# Patient Record
Sex: MALE | Race: ASIAN | NOT HISPANIC OR LATINO | Employment: FULL TIME | URBAN - METROPOLITAN AREA
[De-identification: names, ages, dates, MRNs, and addresses within clinical notes are randomized per-mention and may not be internally consistent; named-entity substitution may affect disease eponyms.]

---

## 2017-10-24 ENCOUNTER — ALLIED HEALTH/NURSE VISIT (OUTPATIENT)
Dept: FAMILY MEDICINE | Facility: CLINIC | Age: 37
End: 2017-10-24

## 2017-10-24 DIAGNOSIS — Z23 NEED FOR PROPHYLACTIC VACCINATION AND INOCULATION AGAINST INFLUENZA: Primary | ICD-10-CM

## 2017-10-24 NOTE — MR AVS SNAPSHOT
After Visit Summary   10/24/2017    Bertin Mcgill    MRN: [id number]           Patient Information     Date Of Birth          1980        Visit Information        Provider Department      10/24/2017 5:00 PM Nurse, Mi [facility]        Today's Diagnoses     Need for prophylactic vaccination and inoculation against influenza    -  1       Follow-ups after your visit        Who to contact     Please call your clinic at [phone] to:    Ask questions about your health    Make or cancel appointments    Discuss your medicines    Learn about your test results    Speak to your doctor   If you have compliments or concerns about an experience at your clinic, or if you wish to file a complaint, please contact [facility] Patient Relations at [phone] or email us at [email or web address]         Additional Information About Your Visit        MyChart Information     People[employer] is an electronic gateway that provides easy, online access to your medical records. With People[employer], you can request a clinic appointment, read your test results, renew a prescription or communicate with your care team.     To sign up for [employer] visit the website at [email or web address]   You will be asked to enter the access code listed below, as well as some personal information. Please follow the directions to create your username and password.     Your access code is: [email or web address]  Expires: 2018  5:43 PM     Your access code will  in 90 days. If you need help or a new code, please contact your [facility] or call [phone] for assistance.        Care EveryWhere ID     This is your Care EveryWhere ID. This could be used by other organizations to access your [facility] medical records  [id number]         Blood Pressure from Last 3 Encounters:   No data found for BP    Weight from Last 3 Encounters:   No data found for Wt              We  Performed the Following     FLU VAC, SPLIT VIRUS IM > 3 YO (QUADRIVALENT) [83122]     Vaccine Administration, Initial [46518]        Primary Care Provider    Physician No Ref-Primary       NO REF-PRIMARY PHYSICIAN        Equal Access to Services     DELFINA CARPENTER : Hadii aad ku hadevelioluiza Ben, carlosda luparviz, eddieta kakymda tracee, yue joannein hayaakatherine sheffieldderejebrennen kennedy. So [facility] [phone].    ATENCIÓN: Si habla español, tiene a thomas disposición servicios gratuitos de asistencia lingüística. Llame al [phone].    We comply with applicable federal civil rights laws and [place] laws. We do not discriminate on the basis of race, color, national origin, age, disability, sex, sexual orientation, or gender identity.            Thank you!     Thank you for choosing [facility]  for your care. Our goal is always to provide you with excellent care. Hearing back from our patients is one way we can continue to improve our services. Please take a few minutes to complete the written survey that you may receive in the mail after your visit with us. Thank you!             Your Updated Medication List - Protect others around you: Learn how to safely use, store and throw away your medicines at www.disposemymeds.org.      Notice  As of 10/24/2017  5:43 PM    You have not been prescribed any medications.

## 2019-08-12 ENCOUNTER — OFFICE VISIT (OUTPATIENT)
Dept: ENDOCRINOLOGY | Facility: CLINIC | Age: 39
End: 2019-08-12
Payer: COMMERCIAL

## 2019-08-12 VITALS
SYSTOLIC BLOOD PRESSURE: 141 MMHG | HEIGHT: 66 IN | DIASTOLIC BLOOD PRESSURE: 97 MMHG | HEART RATE: 75 BPM | BODY MASS INDEX: 30.84 KG/M2 | WEIGHT: 191.9 LBS

## 2019-08-12 DIAGNOSIS — E78.5 HYPERLIPIDEMIA LDL GOAL <100: ICD-10-CM

## 2019-08-12 DIAGNOSIS — E11.9 TYPE 2 DIABETES MELLITUS WITHOUT COMPLICATION, WITHOUT LONG-TERM CURRENT USE OF INSULIN (H): Primary | ICD-10-CM

## 2019-08-12 LAB
ALT SERPL W P-5'-P-CCNC: 67 U/L (ref 0–70)
ANION GAP SERPL CALCULATED.3IONS-SCNC: 8 MMOL/L (ref 3–14)
BUN SERPL-MCNC: 14 MG/DL (ref 7–30)
CALCIUM SERPL-MCNC: 9.3 MG/DL (ref 8.5–10.1)
CHLORIDE SERPL-SCNC: 106 MMOL/L (ref 94–109)
CHOLEST SERPL-MCNC: 196 MG/DL
CO2 SERPL-SCNC: 27 MMOL/L (ref 20–32)
CREAT SERPL-MCNC: 0.72 MG/DL (ref 0.66–1.25)
CREAT UR-MCNC: 137 MG/DL
GFR SERPL CREATININE-BSD FRML MDRD: >90 ML/MIN/{1.73_M2}
GLUCOSE SERPL-MCNC: 148 MG/DL (ref 70–99)
HBA1C MFR BLD: 6.8 % (ref 0–5.6)
HDLC SERPL-MCNC: 50 MG/DL
LDLC SERPL CALC-MCNC: 79 MG/DL
MICROALBUMIN UR-MCNC: 16 MG/L
MICROALBUMIN/CREAT UR: 11.39 MG/G CR (ref 0–17)
NONHDLC SERPL-MCNC: 146 MG/DL
POTASSIUM SERPL-SCNC: 4.1 MMOL/L (ref 3.4–5.3)
SODIUM SERPL-SCNC: 141 MMOL/L (ref 133–144)
TRIGL SERPL-MCNC: 337 MG/DL
TSH SERPL DL<=0.005 MIU/L-ACNC: 1.24 MU/L (ref 0.4–4)

## 2019-08-12 PROCEDURE — 84443 ASSAY THYROID STIM HORMONE: CPT | Performed by: INTERNAL MEDICINE

## 2019-08-12 PROCEDURE — 99204 OFFICE O/P NEW MOD 45 MIN: CPT | Performed by: INTERNAL MEDICINE

## 2019-08-12 PROCEDURE — 80048 BASIC METABOLIC PNL TOTAL CA: CPT | Performed by: INTERNAL MEDICINE

## 2019-08-12 PROCEDURE — 82043 UR ALBUMIN QUANTITATIVE: CPT | Performed by: INTERNAL MEDICINE

## 2019-08-12 PROCEDURE — 80061 LIPID PANEL: CPT | Performed by: INTERNAL MEDICINE

## 2019-08-12 PROCEDURE — 99207 C FOOT EXAM  NO CHARGE: CPT | Performed by: INTERNAL MEDICINE

## 2019-08-12 PROCEDURE — 84460 ALANINE AMINO (ALT) (SGPT): CPT | Performed by: INTERNAL MEDICINE

## 2019-08-12 PROCEDURE — 83036 HEMOGLOBIN GLYCOSYLATED A1C: CPT | Performed by: INTERNAL MEDICINE

## 2019-08-12 PROCEDURE — 36415 COLL VENOUS BLD VENIPUNCTURE: CPT | Performed by: INTERNAL MEDICINE

## 2019-08-12 RX ORDER — SIMVASTATIN 20 MG
TABLET ORAL
Refills: 1 | COMMUNITY
Start: 2019-06-04 | End: 2020-08-24

## 2019-08-12 RX ORDER — IBUPROFEN 200 MG
TABLET ORAL
COMMUNITY
End: 2022-04-27

## 2019-08-12 RX ORDER — METFORMIN HCL 500 MG
1000 TABLET, EXTENDED RELEASE 24 HR ORAL
Refills: 3 | COMMUNITY
Start: 2019-07-23 | End: 2019-11-09 | Stop reason: ALTCHOICE

## 2019-08-12 RX ORDER — FLUTICASONE PROPIONATE 50 MCG
SPRAY, SUSPENSION (ML) NASAL
Refills: 0 | COMMUNITY
Start: 2019-05-31 | End: 2020-03-30

## 2019-08-12 RX ORDER — MULTIVITAMIN/IRON/FOLIC ACID 18MG-0.4MG
1 TABLET ORAL
COMMUNITY
End: 2019-11-08

## 2019-08-12 ASSESSMENT — MIFFLIN-ST. JEOR: SCORE: 1733.2

## 2019-08-12 NOTE — PROGRESS NOTES
Name: Bertin Mcgill is a 39 year old man, seen at the request of Dr. Skye Mane for evaluation of     Chief Complaint   Patient presents with     Diabetes     HPI:  Recent issues:  Here for evaluation of diabetes.  Reviewed medical history from patient and [facility] chart record        2017. Diagnosis of diabetes mellitus when med eval for torn right shoulder muscle  High glucose level noted during med evaluation, hgbA1c near 11.5%  Started Jardiance medication, took for approx 6 months  Recalls significant weight loss of approx 30#  Switched to metformin medication, then dose increases   Concerns about GI symptoms with nausea, nausea, also morning vomiting   Tried Prilosec, then Zantac  Has seen [facility] for medical evaluations  Recent discussion with his workplace team physician, Dr. Skye Mane   Advised to see me for medical evaluation    Previous [facility] labs include:        Current DM medications:  metforminXR 500 mg 2-tabs with lunch and also supper.    Blood glucose (BG) meter   Tests infrequently, but recalls prior trends am , supper  mg/dl range    Last eye exam 5/2019, no DR  Dino Hx Diabetes: None  DM Complications: None known    Recent symptoms:   Indigestion, bloating, nausea, heatburn, occasional vomiting  Other chronic illnesses include:   Allergic rhinitis?:   Takes Flonase med, followed by PCP   Tobacco abuse:    Takes Nicoderm patch med, followed by PCP   Hyperlipidemia:  Takes simvastatin med, followed by PCP      Lives in [facility]  Sees [facility] for general medicine evaluations.    PMH/PSH:  Past Medical History:   Diagnosis Date     Allergic rhinitis      Hyperlipidemia      Rotator cuff tear, left 2015     Rotator cuff tear, right 2017     Type 2 diabetes mellitus (H)      Past Surgical History:   Procedure Laterality Date     APPENDECTOMY       SHOULDER SURGERY      left and right previously       Family Hx:  No family  history on file.      Social Hx:  Social History     Socioeconomic History     Marital status: Unknown     Spouse name: Not on file     Number of children: Not on file     Years of education: Not on file     Highest education level: Not on file   Occupational History     Not on file   Social Needs     Financial resource strain: Not on file     Food insecurity:     Worry: Not on file     Inability: Not on file     Transportation needs:     Medical: Not on file     Non-medical: Not on file   Tobacco Use     Smoking status: Current Some Day Smoker     Smokeless tobacco: Never Used   Substance and Sexual Activity     Alcohol use: Not on file     Drug use: Not on file     Sexual activity: Not on file   Lifestyle     Physical activity:     Days per week: Not on file     Minutes per session: Not on file     Stress: Not on file   Relationships     Social connections:     Talks on phone: Not on file     Gets together: Not on file     Attends [employer] service: Not on file     Active member of club or organization: Not on file     Attends meetings of clubs or organizations: Not on file     Relationship status: Not on file     Intimate partner violence:     Fear of current or ex partner: Not on file     Emotionally abused: Not on file     Physically abused: Not on file     Forced sexual activity: Not on file   Other Topics Concern     Not on file   Social History Narrative     Not on file          MEDICATIONS:  has a current medication list which includes the following prescription(s): fluticasone, ibuprofen, metformin, centrovite, nicotine, ranitidine, and simvastatin.    ROS:     ROS: 10 point ROS neg other than the symptoms noted above in the HPI.    GENERAL: no fatigue, mild weight gain; denies fevers, chills, night sweats.   HEENT: no dysphagia, odonophagia, diplopia, neck pain  THYROID:  no apparent hyper or hypothyroid symptoms  CV: no chest pain, pressure, palpitations  LUNGS: no SOB, MEHTA, cough, wheezing   ABDOMEN:  "intermittent nausea, heartburn, bloating; denies abdominal pain  EXTREMITIES: no rashes, ulcers, edema  NEUROLOGY: no headaches, denies changes in vision, tingling, extremitiy numbness   MSK: no muscle aches or pains, weakness  SKIN: no rashes or lesions  : denies nocturia  PSYCH:  stable mood, no significant anxiety or depression  ENDOCRINE: no heat or cold intolerance    Physical Exam   VS: BP (!) 141/97   Pulse 75   Ht 1.676 m (5' 6\")   Wt 87 kg (191 lb 14.4 oz)   BMI 30.97 kg/m    GENERAL: AXOX3, NAD, well dressed, answering questions appropriately, appears stated age.  THYROID:  normal gland, no apparent nodules or goiter  HEENT: neck non-tender, no exopthalmous, no proptosis, EOMI  CV: RRR, no rubs, gallops, no murmurs  LUNGS: CTAB, no wheezes, rales, or ronchi  ABDOMEN: mildly obese; soft, nontender, nondistended  EXTREMITIES: no edema, +pedal pulses, no lesions  NEUROLOGY: CN grossly intact, no tremors  MSK: grossly intact  SKIN: facial gottee; no rashes, no lesions    LABS:    All pertinent notes, labs, and images personally reviewed by me.     A/P:  Encounter Diagnoses   Name Primary?     Type 2 diabetes mellitus without complication, without long-term current use of insulin (H) Yes     Hyperlipidemia LDL goal <100        Comments:  Reviewed health history and diabetes issues.  Recent GI symptoms likely due to metformin medication, needs dose reduction    Plan:  Discussed general issues with the diabetes diagnosis and management  Reviewed the pathophysiology of T2DM  We discussed the hgbA1c test which reflects previous overall glucose levels or control  Discussed the importance of blood glucose (BG) testing to assess glucose trends  Provided general overview of the diabetes medication options and medication treatment plan.    Recommend:  Discussed medication options, metformin med use and potential side effects  Advised lowering the dose as metforminXR 500 mg 1-tab BID or 2 tabs every day (1000 mg " daily, not 2000 mg daily)  Consider future options with restarting low dose SGLT2-I (such as Jardianc) or DPP4-I (such as Januvia)  Resume use of BG meter, fasting testing  Goal target FBG  mg/dl  Check fasting labs today  Keep focus on diet, exercise, weight management.  Continue simvastatin med use, goal target LDL <100 mg/dl  Advise having fasting lipid panel testing and dilated eye examination, at least annually    Reminded patient of the importance to quit cigarette smoking due to potential health risks, offered assistance with smoking cessation counseling and/or medication treatment.  Bertin to follow up with Primary Care provider regarding elevated blood pressure.  Would not restart Zantac med use, since reflux may resolve with lower metformin med dosing  Addressed patient questions today    Labs ordered today:   Orders Placed This Encounter   Procedures     C FOOT EXAM  NO CHARGE     Hemoglobin A1c     Lipid panel reflex to direct LDL Fasting     TSH     Basic metabolic panel     ALT     Albumin Random Urine Quantitative with Creat Ratio     Radiology/Consults ordered today: C FOOT EXAM  NO CHARGE    More than 50% of the time spent with Mr. Mcgill on counseling / coordinating his care.  Total appointment time was 50 minutes.    Follow-up:  2-3 mo    Anival Flores MD  [facility]  CC: [facility] and BROOKLYNN Mane

## 2019-08-27 ENCOUNTER — MYC MEDICAL ADVICE (OUTPATIENT)
Dept: ENDOCRINOLOGY | Facility: CLINIC | Age: 39
End: 2019-08-27

## 2019-08-27 NOTE — TELEPHONE ENCOUNTER
Dr. Harris,     See below update from patient - pt asking if he should continue on the same Metformin dosing?    Charlotte GUERRA RN

## 2019-11-08 ENCOUNTER — OFFICE VISIT (OUTPATIENT)
Dept: ENDOCRINOLOGY | Facility: CLINIC | Age: 39
End: 2019-11-08
Payer: COMMERCIAL

## 2019-11-08 VITALS
HEART RATE: 82 BPM | HEIGHT: 66 IN | BODY MASS INDEX: 31.02 KG/M2 | SYSTOLIC BLOOD PRESSURE: 134 MMHG | WEIGHT: 193 LBS | DIASTOLIC BLOOD PRESSURE: 88 MMHG

## 2019-11-08 DIAGNOSIS — E11.9 TYPE 2 DIABETES MELLITUS WITHOUT COMPLICATION, WITHOUT LONG-TERM CURRENT USE OF INSULIN (H): Primary | ICD-10-CM

## 2019-11-08 DIAGNOSIS — E78.5 HYPERLIPIDEMIA LDL GOAL <100: ICD-10-CM

## 2019-11-08 LAB
ALT SERPL W P-5'-P-CCNC: 66 U/L (ref 0–70)
ANION GAP SERPL CALCULATED.3IONS-SCNC: 9 MMOL/L (ref 3–14)
BUN SERPL-MCNC: 14 MG/DL (ref 7–30)
CALCIUM SERPL-MCNC: 9 MG/DL (ref 8.5–10.1)
CHLORIDE SERPL-SCNC: 106 MMOL/L (ref 94–109)
CHOLEST SERPL-MCNC: 228 MG/DL
CO2 SERPL-SCNC: 23 MMOL/L (ref 20–32)
CREAT SERPL-MCNC: 0.72 MG/DL (ref 0.66–1.25)
GFR SERPL CREATININE-BSD FRML MDRD: >90 ML/MIN/{1.73_M2}
GLUCOSE SERPL-MCNC: 226 MG/DL (ref 70–99)
HBA1C MFR BLD: 7.6 % (ref 0–5.6)
HDLC SERPL-MCNC: 53 MG/DL
LDLC SERPL CALC-MCNC: ABNORMAL MG/DL
LDLC SERPL DIRECT ASSAY-MCNC: 101 MG/DL
NONHDLC SERPL-MCNC: 175 MG/DL
POTASSIUM SERPL-SCNC: 4.1 MMOL/L (ref 3.4–5.3)
SODIUM SERPL-SCNC: 138 MMOL/L (ref 133–144)
TRIGL SERPL-MCNC: 518 MG/DL

## 2019-11-08 PROCEDURE — 83036 HEMOGLOBIN GLYCOSYLATED A1C: CPT | Performed by: INTERNAL MEDICINE

## 2019-11-08 PROCEDURE — 80061 LIPID PANEL: CPT | Performed by: INTERNAL MEDICINE

## 2019-11-08 PROCEDURE — 84460 ALANINE AMINO (ALT) (SGPT): CPT | Performed by: INTERNAL MEDICINE

## 2019-11-08 PROCEDURE — 83721 ASSAY OF BLOOD LIPOPROTEIN: CPT | Performed by: INTERNAL MEDICINE

## 2019-11-08 PROCEDURE — 80048 BASIC METABOLIC PNL TOTAL CA: CPT | Performed by: INTERNAL MEDICINE

## 2019-11-08 PROCEDURE — 36415 COLL VENOUS BLD VENIPUNCTURE: CPT | Performed by: INTERNAL MEDICINE

## 2019-11-08 PROCEDURE — 99214 OFFICE O/P EST MOD 30 MIN: CPT | Performed by: INTERNAL MEDICINE

## 2019-11-08 ASSESSMENT — MIFFLIN-ST. JEOR: SCORE: 1738.19

## 2019-11-08 NOTE — PROGRESS NOTES
Name: Bertin Mcgill     Chief Complaint   Patient presents with     Diabetes     HPI:  Recent issues:  Here for f/u diabetes evaluation  Feeling well overall, less nausea but BM urgency        2017. Diagnosis of diabetes mellitus when med eval for torn right shoulder muscle  High glucose level noted during med evaluation, hgbA1c near 11.5%  Started Jardiance medication, took for approx 6 months  Recalls significant weight loss of approx 30#  Switched to metformin medication, then dose increases   Concerns about GI symptoms with nausea, nausea, also morning vomiting   Tried Prilosec, then Zantac  Has seen [facility] for medical evaluations  Recent discussion with his workplace team physician, Dr. Skye Mane   Advised to see me for medical evaluation    Previous [facility] labs include:        Previously on metforminXR 500 mg 2 tabs BID  Current DM medications:  metforminXR 500 mg 2-tabs after lunchtime    Blood glucose (BG) meter   No meter data today, tests infrequently   Previous trends reportely , supper  mg/dl range    Fam Hx Diabetes: None  History of hyperlipidemia, takes simvastatin 20 mg daily  Last eye exam 5/2019, no DR  Recent [facility] labs include:      DM Complications: None known        Lives in [facility]  Sees [facility] for general medicine evaluations.    PMH/PSH:  Past Medical History:   Diagnosis Date     Allergic rhinitis      Hyperlipidemia      Rotator cuff tear, left 2015     Rotator cuff tear, right 2017     Type 2 diabetes mellitus (H)      Past Surgical History:   Procedure Laterality Date     APPENDECTOMY       SHOULDER SURGERY      left and right previously       Family Hx:  No family history on file.      Social Hx:  Social History     Socioeconomic History     Marital status: Single     Spouse name: Not on file     Number of children: Not on file     Years of education: Not on file     Highest education level: Not on file    Occupational History     Not on file   Social Needs     Financial resource strain: Not on file     Food insecurity:     Worry: Not on file     Inability: Not on file     Transportation needs:     Medical: Not on file     Non-medical: Not on file   Tobacco Use     Smoking status: Current Some Day Smoker     Smokeless tobacco: Never Used   Substance and Sexual Activity     Alcohol use: Not on file     Drug use: Not on file     Sexual activity: Not on file   Lifestyle     Physical activity:     Days per week: Not on file     Minutes per session: Not on file     Stress: Not on file   Relationships     Social connections:     Talks on phone: Not on file     Gets together: Not on file     Attends Christian service: Not on file     Active member of club or organization: Not on file     Attends meetings of clubs or organizations: Not on file     Relationship status: Not on file     Intimate partner violence:     Fear of current or ex partner: Not on file     Emotionally abused: Not on file     Physically abused: Not on file     Forced sexual activity: Not on file   Other Topics Concern     Not on file   Social History Narrative     Not on file          MEDICATIONS:  has a current medication list which includes the following prescription(s): acetaminophen, ibuprofen, metformin, multiple vitamins-minerals, naproxen sodium, simvastatin, and fluticasone.    ROS:     ROS: 10 point ROS neg other than the symptoms noted above in the HPI.    GENERAL: no fatigue, mild weight gain; denies fevers, chills, night sweats.   HEENT: some left ear pains; no dysphagia, odonophagia, diplopia, neck pain  THYROID:  no apparent hyper or hypothyroid symptoms  CV: no chest pain, pressure, palpitations  LUNGS: no SOB, MEHTA, cough, wheezing   ABDOMEN: rare nausea, yet BM urgency; denies abdominal pain  EXTREMITIES: no rashes, ulcers, edema  NEUROLOGY: no headaches, denies changes in vision, tingling, extremitiy numbness   MSK: no muscle aches or  "pains, weakness  SKIN: no rashes or lesions  : denies nocturia  PSYCH:  stable mood, no significant anxiety or depression  ENDOCRINE: no heat or cold intolerance    Physical Exam   VS: /88 (BP Location: Right arm, Cuff Size: Adult Large)   Pulse 82   Ht 1.676 m (5' 6\")   Wt 87.5 kg (193 lb)   BMI 31.15 kg/m    GENERAL: AXOX3, NAD, well dressed, answering questions appropriately, appears stated age.  THYROID:  normal gland, no apparent nodules or goiter  HEENT: ears with normal TM's and pink ext canal left ear, no significant wax; neck non-tender, no exopthalmous, no proptosis, EOMI  CV: RRR, no rubs, gallops, no murmurs  LUNGS: CTAB, no wheezes, rales, or ronchi  ABDOMEN: mildly obese; soft, nontender, nondistended  EXTREMITIES: no edema  NEUROLOGY: CN grossly intact, no tremors  MSK: grossly intact  SKIN: facial gottee; no rashes, no lesions    LABS:    All pertinent notes, labs, and images personally reviewed by me.     A/P:  Encounter Diagnoses   Name Primary?     Type 2 diabetes mellitus without complication, without long-term current use of insulin (H) Yes     Hyperlipidemia LDL goal <100        Comments:  Reviewed health history and diabetes issues.  Recent GI symptoms likely due to metformin medication    Plan:  Discussed general issues with the diabetes diagnosis and management  We discussed the hgbA1c test which reflects previous overall glucose levels or control  Discussed the importance of blood glucose (BG) testing to assess glucose trends  Provided general overview of the diabetes medication options and medication treatment plan.    Recommend:  Continue current lower dose metforminXR 1000 mg daily, though consider dose reduction plan  We discussed option of starting a DPP4-I (such as Januvia)  Resume use of BG meter, fasting testing  Goal target FBG  mg/dl  Check fasting labs today  Keep focus on diet, exercise, weight management.  Continue simvastatin med use, goal target LDL <100 " mg/dl  Advise having fasting lipid panel testing and dilated eye examination, at least annually    See PCP if worsening ear pain  I have reminded patient of the importance to quit cigarette smoking due to potential health risks, offered assistance with smoking cessation counseling and/or medication treatment.  Addressed patient questions today    Labs ordered today:   Orders Placed This Encounter   Procedures     Hemoglobin A1c     ALT     Basic metabolic panel     Lipid panel reflex to direct LDL Fasting     Radiology/Consults ordered today: None    More than 50% of the time spent with Mr. Mcgill on counseling / coordinating his care.  Total appointment time was 25 minutes.    Follow-up:  4 mo.    LEO Flores MD, MS  Endocrinology  [facility]

## 2019-11-09 DIAGNOSIS — E11.9 TYPE 2 DIABETES MELLITUS WITHOUT COMPLICATION, WITHOUT LONG-TERM CURRENT USE OF INSULIN (H): Primary | ICD-10-CM

## 2020-01-22 ENCOUNTER — OFFICE VISIT (OUTPATIENT)
Dept: FAMILY MEDICINE | Facility: CLINIC | Age: 40
End: 2020-01-22
Payer: COMMERCIAL

## 2020-01-22 VITALS
HEART RATE: 92 BPM | HEIGHT: 66 IN | TEMPERATURE: 98.3 F | BODY MASS INDEX: 30.54 KG/M2 | DIASTOLIC BLOOD PRESSURE: 97 MMHG | WEIGHT: 190.04 LBS | RESPIRATION RATE: 18 BRPM | OXYGEN SATURATION: 99 % | SYSTOLIC BLOOD PRESSURE: 140 MMHG

## 2020-01-22 DIAGNOSIS — R79.89 LOW VITAMIN D LEVEL: ICD-10-CM

## 2020-01-22 DIAGNOSIS — E11.9 TYPE 2 DIABETES MELLITUS WITHOUT COMPLICATION, WITHOUT LONG-TERM CURRENT USE OF INSULIN (H): ICD-10-CM

## 2020-01-22 DIAGNOSIS — Z72.0 TOBACCO ABUSE: ICD-10-CM

## 2020-01-22 RX ORDER — OXYCODONE AND ACETAMINOPHEN 5; 325 MG/1; MG/1
TABLET ORAL PRN
COMMUNITY
Start: 2020-01-16 | End: 2020-03-30

## 2020-01-22 RX ORDER — ERGOCALCIFEROL 1.25 MG/1
50000 CAPSULE, LIQUID FILLED ORAL
Qty: 8 CAPSULE | Refills: 0 | Status: SHIPPED | OUTPATIENT
Start: 2020-01-22 | End: 2020-03-30

## 2020-01-22 ASSESSMENT — MIFFLIN-ST. JEOR: SCORE: 1724.77

## 2020-01-22 ASSESSMENT — PAIN SCALES - GENERAL: PAINLEVEL: MILD PAIN (2)

## 2020-01-22 NOTE — PROGRESS NOTES
"Bertin Mcgill is a 39 year old male who presents today for his first visit to the [facility].  His primary reason for scheduling was to discuss low vitamin D and have primary care. He was Diagnosed with DM2 at age 36 in Oct 2017.   A1C was over 11. He was weighing around 195lbs. Dropped to around 170 lbs and A1C normalized but then drifted back up. Now on   SitaGLIPtin-MetFORMIN HCl 100-1000 MG TB24       He broke his rib about 1 month ago coughing. Pain has subsided mostly.  After the fracture was tested for vitamin D and noted to be low.      Regarding lifestyle behaviors:  Physical activity - Not doing much now. Was set back with pneumonia and then rib fracture    Diet:   Mornings - Glucerna shakes.   Also, coffee or tea    Lunch around noon - \"anything\". Eats at cafe.   No snacks.   Dinner is usually out at a restaurant at about 9pm.     Sleep with snoring. Doesn't sleep until about 2 am- Awakes at 6:30ish.     He drinks alcohol approximately 7 nights/week and 1-2 drinks/night.     He smokes 1 ppd cigarettes/day and also occasional cigars.      Bertin Mcgill wears seat belt.    His last dental check up was about 6 months ago      STD concerns: None.         There is no problem list on file for this patient.      Past Surgical History:   Procedure Laterality Date     APPENDECTOMY       SHOULDER SURGERY      left and right previously       History reviewed. No pertinent family history.    Current Outpatient Medications   Medication Sig Dispense Refill     Acetaminophen (TYLENOL EXTRA STRENGTH PO)        fluticasone (FLONASE) 50 MCG/ACT nasal spray INSTILL 1 SPRAY INTO EACH NOSTRIL TWICE A DAY.  0     ibuprofen (ADVIL/MOTRIN) 200 MG tablet        Multiple Vitamins-Minerals (CENTRUM MEN PO)        Naproxen Sodium (ALEVE PO)        oxyCODONE-acetaminophen (PERCOCET) 5-325 MG tablet as needed       simvastatin (ZOCOR) 20 MG tablet TAKE 1 TABLET (20 MG) BY MOUTH ONCE A DAY WITH EVENING MEAL.  1     " "SitaGLIPtin-MetFORMIN HCl 100-1000 MG TB24 Take 1 tablet by mouth daily (with dinner) 30 tablet 11     Allergies   Allergen Reactions     Seasonal Allergies          Regarding preventive health care, his immunizations are as follows:     Immunization History   Administered Date(s) Administered     Influenza (IIV3) PF 10/23/2012, 10/31/2013     Influenza Intranasal Vaccine 10/01/2014     Influenza Intranasal Vaccine 4 valent 10/01/2014, 10/29/2014, 10/22/2015     Influenza Vaccine IM > 6 months Valent IIV4 10/20/2016, 10/24/2017, 09/25/2018, 10/20/2019     Pneumococcal 23 valent 10/17/2017     TD (ADULT, 7+) 10/17/2017     Tdap (Adult) Unspecified Formulation 09/17/2007           ROS  PHQ-2 Score:     PHQ-2 ( 1999 Pfizer) 1/22/2020   Q1: Little interest or pleasure in doing things 0   Q2: Feeling down, depressed or hopeless 0   PHQ-2 Score 0       CONSTITUTIONAL:NEGATIVE for fever, chills, change in weight  INTEGUMENTARY/SKIN: NEGATIVE for worrisome rashes, moles or lesions  EYES: NEGATIVE for vision changes or irritation  ENT/MOUTH: NEGATIVE for ear, mouth and throat problems  RESP:NEGATIVE for significant cough or SOB  CV: NEGATIVE for chest pain, palpitations, MEHTA, orthopnea, PND  or peripheral edema  GI: NEGATIVE for nausea, abdominal pain, heartburn, or change in bowel habits  :NEGATIVE for frequency, dysuria, or hematuria  NEURO: NEGATIVE for weakness, dizziness or paresthesias  HEME/ALLERGY/IMMUNE: NEGATIVE for bleeding problems  PSYCHIATRIC: NEGATIVE for changes in mood or affect    EXAM  BP (!) 140/97 (BP Location: Left arm, Patient Position: Chair, Cuff Size: Adult Large)   Pulse 92   Temp 98.3  F (36.8  C) (Oral)   Resp 18   Ht 1.676 m (5' 6\")   Wt 86.2 kg (190 lb 0.6 oz)   SpO2 99%   BMI 30.67 kg/m      Appears as a well-appearing 39-year-old in no distress.    HEENT: TM normal bilaterally. Eyes- with normal motor function. Conjunctiva are clear. Nose and mouth without lesions  NECK: no " adenopathy, thyroid normal to palpation  RESP: lungs clear to auscultation bilaterally  Axillae: no palpable axillary masses or adenopathy  CV: regular rate and rhythm, normal S1 S2, no murmur, no carotid bruits  ABDOMEN: soft, nontender, without HSM or masses. Bowel sounds normal   and Rectal exam: deferred  MS: extremities normal- no gross deformities noted, no tender, hot or swollen joints.   SKIN: no suspicious lesions or rashes  NEURO: Normal strength and tone, sensory exam grossly normal   PSYCH: mentation and affect appear normal.  EXT: no peripheral edema, pedal pulses palpable      ASSESSMENT/PLAN:    Bertin is a 40 yo with low level obesity at 30.7 BMI and with DM2 for the past few years.   He also has some unhealthy lifestyle behaviors such as alcohol nightly, poor sleep, low levels of exercise and smoking 1 ppd.    Also, has a recent rib fracture and a known low Vitamin D at 17.     PLAN:  Replenish Vit D with 50,000 Units weekly for 8 weeks.   After 8 weeks start 2000 international unit(s) daily.   Try to eat a diet rich in Vitamin D.     -We discussed options for exercise. I mentioned group or individual classes. Bertin states that he can push himself with elliptical or treadmill. He knows that he needs about 40 mins/day of elevated heart rates.  -suggested a home physioball with 10 min total body exercises  -Also, Bertin has access to trainers at [employer].     -We discussed smoking cessation. He's quit in the past. He's not ready now.   We have a pharm D here who can help when you're ready.     -Discussed decreasing alcohol, e.g. trying to abstain a few nights/week. This may help sleep and weight.     Follow up after your Endo visit on March 6. Goal would be to be down a few lbs through increased exercise and healthier diet.     Over 50% of the 45 minute in room time was spent discussing above treatment plan and coordinating care.     --Miguel Jose MD  [facility]  Department  of Family Medicine and [facility]

## 2020-01-22 NOTE — NURSING NOTE
"39 year old  Chief Complaint   Patient presents with     Establish Care     needs to discuss about his Vitamin D levels.        Blood pressure (!) 140/97, pulse 92, temperature 98.3  F (36.8  C), temperature source Oral, resp. rate 18, height 1.676 m (5' 6\"), weight 86.2 kg (190 lb 0.6 oz), SpO2 99 %. Body mass index is 30.67 kg/m .  There is no problem list on file for this patient.      Wt Readings from Last 2 Encounters:   01/22/20 86.2 kg (190 lb 0.6 oz)   11/08/19 87.5 kg (193 lb)     BP Readings from Last 3 Encounters:   01/22/20 (!) 140/97   11/08/19 134/88   08/12/19 (!) 141/97         Current Outpatient Medications   Medication     Acetaminophen (TYLENOL EXTRA STRENGTH PO)     fluticasone (FLONASE) 50 MCG/ACT nasal spray     ibuprofen (ADVIL/MOTRIN) 200 MG tablet     Multiple Vitamins-Minerals (CENTRUM MEN PO)     Naproxen Sodium (ALEVE PO)     oxyCODONE-acetaminophen (PERCOCET) 5-325 MG tablet     simvastatin (ZOCOR) 20 MG tablet     SitaGLIPtin-MetFORMIN HCl 100-1000 MG TB24     No current facility-administered medications for this visit.        Social History     Tobacco Use     Smoking status: Current Some Day Smoker     Smokeless tobacco: Never Used   Substance Use Topics     Alcohol use: Yes     Drug use: Never       Health Maintenance Due   Topic Date Due     PREVENTIVE CARE VISIT  1981     EYE EXAM  1981     HIV SCREENING  02/01/1996     PHQ-2  01/01/2020       No results found for: ANANDA Ruiz CMA, CMA  January 22, 2020 2:59 PM  "

## 2020-01-22 NOTE — PATIENT INSTRUCTIONS
Bertin is a 38 yo with low level obesity at 30.7 BMI and with DM2 for the past few years.   He also has some unhealthy lifestyle behaviors such as alcohol nightly, poor sleep, low levels of exercise and smoking 1 ppd.    Also, has a recent rib fracture and a known low Vitamin D at 17.     PLAN:  Replenish Vit D with 50,000 Units weekly for 8 weeks.   After 8 weeks start 2000 international unit(s) daily.   Try to eat a diet rich in Vitamin D.     -We discussed options for exercise. I mentioned group or individual classes. Bertin states that he can push himself with elliptical or treadmill. He knows that he needs about 40 mins/day of elevated heart rates.  -suggested a home physioball with 10 min total body exercises  -Also, Bertin has access to trainers at [employer].     -We discussed smoking cessation. He's quit in the past. He's not ready now.   We have a pharm D here who can help when you're ready.     -Discussed decreasing alcohol, e.g. trying to abstain a few nights/week. This may help sleep and weight.     Follow up after your Endo visit on March 6. Goal would be to be down a few lbs through increased exercise and healthier diet.

## 2020-03-01 ENCOUNTER — HEALTH MAINTENANCE LETTER (OUTPATIENT)
Age: 40
End: 2020-03-01

## 2020-03-06 ENCOUNTER — TELEPHONE (OUTPATIENT)
Dept: ORTHOPEDICS | Facility: CLINIC | Age: 40
End: 2020-03-06

## 2020-03-06 NOTE — TELEPHONE ENCOUNTER
Reason for Call:  Same Day Appointment, Requested Provider: Dr Flores    PCP: Miguel Jose    Reason for visit: Diabetes follow up    Duration of symptoms: NA    Have you been treated for this in the past? Yes    Additional comments: pt had his appt cancelled today 3/6/2020. Pt is hoping to get in sooner than June    Can we leave a detailed message on this number? YES    Phone number patient can be reached at: Home number on file [phone] (home)    Best Time: any    Call taken on 3/6/2020 at 8:19 AM by Juan David Tejeda

## 2020-03-06 NOTE — TELEPHONE ENCOUNTER
Dr. Flores,  2 voicemails were left for patient to call and reschedule this appointment.  I will try calling again

## 2020-03-06 NOTE — TELEPHONE ENCOUNTER
Message noted.  It's unfortunate that this patient was not contacted long ago for the appointment rescheduling, since my work day shift (Friday 3/6 to Thursday 3/5/20) was planned months ago.  OK for patient to use an available (10am, 3pm, or 1pm) workin slot at the [facility] or [facility].  Thanks for assisting patient.    LEO Flores MD, MS  Endocrinology  [facility]

## 2020-03-06 NOTE — TELEPHONE ENCOUNTER
Dr. Flores,    Pt's appt today was cancelled. Are you able to fit him in sooner than June (first available)?    Thank you,  Asher SALAS RN

## 2020-03-09 ENCOUNTER — OFFICE VISIT (OUTPATIENT)
Dept: FAMILY MEDICINE | Facility: CLINIC | Age: 40
End: 2020-03-09
Payer: COMMERCIAL

## 2020-03-09 VITALS
HEART RATE: 75 BPM | TEMPERATURE: 98 F | HEIGHT: 66 IN | DIASTOLIC BLOOD PRESSURE: 101 MMHG | WEIGHT: 193 LBS | RESPIRATION RATE: 18 BRPM | BODY MASS INDEX: 31.02 KG/M2 | SYSTOLIC BLOOD PRESSURE: 149 MMHG | OXYGEN SATURATION: 99 %

## 2020-03-09 DIAGNOSIS — I10 BENIGN ESSENTIAL HYPERTENSION: Primary | ICD-10-CM

## 2020-03-09 DIAGNOSIS — E78.5 HYPERLIPIDEMIA, UNSPECIFIED HYPERLIPIDEMIA TYPE: ICD-10-CM

## 2020-03-09 DIAGNOSIS — Z72.0 TOBACCO ABUSE: ICD-10-CM

## 2020-03-09 DIAGNOSIS — E11.9 TYPE 2 DIABETES MELLITUS WITHOUT COMPLICATION, WITHOUT LONG-TERM CURRENT USE OF INSULIN (H): ICD-10-CM

## 2020-03-09 RX ORDER — LISINOPRIL 5 MG/1
5 TABLET ORAL DAILY
Qty: 30 TABLET | Refills: 1 | Status: SHIPPED | OUTPATIENT
Start: 2020-03-09 | End: 2020-04-01

## 2020-03-09 ASSESSMENT — MIFFLIN-ST. JEOR: SCORE: 1732.94

## 2020-03-09 NOTE — PROGRESS NOTES
"Bertin Mcgill is a 40 year old male  with DM2 who presents to [facility] today for follow up of his weight and blood pressure.   He was seen here once previously by me on Jan 22, 2020.   Bertin was to have a follow up with his Endocrinologist on March 6, but that was cancelled and will be on March 30.     Still recovering from his rib fracture. Tough to get to gym as works late hours with the [place] [employer].     Still smoking 1 ppd. Also, still drinking every night about 1-2 drinks/night.       Review Of Systems:  Has otherwise been in usual state of health, e.g.   Cardiovascular: negative  Respiratory: No shortness of breath, dyspnea on exertion, cough, or hemoptysis  Gastrointestinal: negative  Genitourinary: negative    Patient Active Problem List   Diagnosis     Type 2 diabetes mellitus (H)     Hyperlipidemia           Current Outpatient Medications   Medication Sig Dispense Refill     Acetaminophen (TYLENOL EXTRA STRENGTH PO)        ibuprofen (ADVIL/MOTRIN) 200 MG tablet        Multiple Vitamins-Minerals (CENTRUM MEN PO)        Naproxen Sodium (ALEVE PO)        simvastatin (ZOCOR) 20 MG tablet TAKE 1 TABLET (20 MG) BY MOUTH ONCE A DAY WITH EVENING MEAL.  1     SitaGLIPtin-MetFORMIN HCl 100-1000 MG TB24 Take 1 tablet by mouth daily (with dinner) 30 tablet 11     vitamin D2 (ERGOCALCIFEROL) 27531 units (1250 mcg) capsule Take 1 capsule (50,000 Units) by mouth every 7 days for 8 doses 8 capsule 0     fluticasone (FLONASE) 50 MCG/ACT nasal spray INSTILL 1 SPRAY INTO EACH NOSTRIL TWICE A DAY.  0     oxyCODONE-acetaminophen (PERCOCET) 5-325 MG tablet as needed         Allergies   Allergen Reactions     Seasonal Allergies         Social:   Social History     Social History Narrative    Single.     Works as \"\" for the [employer] at [employer].     From [place]         EXAM    Vitals: BP (!) 151/106   Pulse 103   Temp 98  F (36.7  C) (Oral)   Resp 18   Ht 1.676 m (5' 5.98\")   Wt " "87.5 kg (193 lb)   SpO2 99%   BMI 31.17 kg/m    BMI= Body mass index is 31.17 kg/m .     BP (!) 149/101   Pulse 75   Temp 98  F (36.7  C) (Oral)   Resp 18   Ht 1.676 m (5' 5.98\")   Wt 87.5 kg (193 lb)   SpO2 99%   BMI 31.17 kg/m      Appears in no distress.    CV: RRR. No murmurs  Lungs - CTA        ASSESSMENT/PLAN:  Bertin is a 39 yo with DM2, Obesity and hypertension. He has some unhealthy habits, e.g. smoking and alcohol nightly about 2 drinks.     PLAN:   For all of the above discussed the Cardiovascular risks.   Motivational interviewing used. Bertin not feeling ready for changes, but knows that he is able.   Given the BP, suggested starting on Low dose Ace inhibitor, Lisinopril 5 mg. Take at bedtime.     Also, consider taking your DM medication in the morning.     Discouraged smoking. Again, offered a visit with our Pharm D.   Offered visits with our nutritionist and counselor. Bertin not interested at this time.     Suggested \"DASH\" diet (Diet against Systolic Hypertension)  Decrease alcohol intake.   Preferred sports are softball and golf.   He doesn't like cycling or spinning. Suggested rowing to increase heart rate.     For the Vitamin D, once done with the weekly high dose, then start over the counter 2000 international unit(s) daily in addition to your multivitamin.   We can recheck with your next blood draw.       Follow up in about 3 weeks, after your visit with Endocrinology. May need to increase blood pressure medication at that time.   Over 50% of the 25 minutes in room time was spent discussing the above treatment and diagnostic plan    --Miguel Jose MD  [facility], Department of Family Medicine and Community Health  "

## 2020-03-09 NOTE — NURSING NOTE
"40 year old  Chief Complaint   Patient presents with     RECHECK     follow up on Vitamin D levels and lifestyle changes       Blood pressure (!) 151/106, pulse 103, temperature 98  F (36.7  C), temperature source Oral, resp. rate 18, height 1.676 m (5' 5.98\"), weight 87.5 kg (193 lb), SpO2 99 %. Body mass index is 31.17 kg/m .  There is no problem list on file for this patient.      Wt Readings from Last 2 Encounters:   03/09/20 87.5 kg (193 lb)   01/22/20 86.2 kg (190 lb 0.6 oz)     BP Readings from Last 3 Encounters:   03/09/20 (!) 151/106   01/22/20 (!) 140/97   11/08/19 134/88         Current Outpatient Medications   Medication     Acetaminophen (TYLENOL EXTRA STRENGTH PO)     ibuprofen (ADVIL/MOTRIN) 200 MG tablet     Multiple Vitamins-Minerals (CENTRUM MEN PO)     Naproxen Sodium (ALEVE PO)     simvastatin (ZOCOR) 20 MG tablet     SitaGLIPtin-MetFORMIN HCl 100-1000 MG TB24     vitamin D2 (ERGOCALCIFEROL) 68861 units (1250 mcg) capsule     fluticasone (FLONASE) 50 MCG/ACT nasal spray     oxyCODONE-acetaminophen (PERCOCET) 5-325 MG tablet     No current facility-administered medications for this visit.        Social History     Tobacco Use     Smoking status: Current Some Day Smoker     Smokeless tobacco: Never Used   Substance Use Topics     Alcohol use: Yes     Drug use: Never       Health Maintenance Due   Topic Date Due     PREVENTIVE CARE VISIT  1981     EYE EXAM  1981     HIV SCREENING  02/01/1996     A1C  02/08/2020       No results found for: PAP      March 9, 2020 2:45 PM  "

## 2020-03-30 ENCOUNTER — VIRTUAL VISIT (OUTPATIENT)
Dept: ENDOCRINOLOGY | Facility: CLINIC | Age: 40
End: 2020-03-30
Payer: COMMERCIAL

## 2020-03-30 DIAGNOSIS — E11.9 TYPE 2 DIABETES MELLITUS WITHOUT COMPLICATION, WITHOUT LONG-TERM CURRENT USE OF INSULIN (H): Primary | ICD-10-CM

## 2020-03-30 PROCEDURE — 99213 OFFICE O/P EST LOW 20 MIN: CPT | Mod: TEL | Performed by: INTERNAL MEDICINE

## 2020-03-30 NOTE — PROGRESS NOTES
"Bertin Mcgill is a 40 year old male who is being evaluated via a telephone visit.      The patient has been notified of following (by GARRY Espinoza MA     \"We have found that certain health care needs can be provided without the need for a physical exam.  This service lets us provide the care you need with a short phone conversation.  If a prescription is necessary we can send it directly to your pharmacy.  If lab work is needed we can place an order for that and you can then stop by our lab to have the test done at a later time.    Since this is like an office visit,  will bill your insurance company for this service.  Please check with your medical insurance if this type of telephone/virtual is covered . You may be responsible for the cost of this service if insurance coverage is denied.  The typical cost is $30 (10min), $59(11-20min) and $85 (21-30min)     If during the course of the call the physician/provider feels a telephone visit is not appropriate, you will not be charged for this service\"    Consent has been obtained for this service by care team member: yes.  See the scanned image in the medical record.    Pertinent parts of the the patient's medical history reviewed and confirmed by the provider included    Recent issues:  Patient has had recent nausea and vomiting symptom, cause unclear  Following virus precautions  Taking the JanumetXR medication, per plan             2017. Diagnosis of diabetes mellitus when med eval for torn right shoulder muscle  High glucose level noted during med evaluation, hgbA1c near 11.5%  Started Jardiance medication, took for approx 6 months  Recalls significant weight loss of approx 30#  Switched to metformin medication, then dose increases              Concerns about GI symptoms with nausea, nausea, also morning vomiting              Tried Prilosec, then Zantac  Has seen [facility] for medical evaluations  Recent discussion with his workplace team physician, " Dr. Skye Mane              Advised to see me for medical evaluation     Previous [facility] labs include:         Previously on metforminXR 500 mg 2 tabs BID    Current DM medications:  JanumetXR 100/1000 1-tab each morning     Blood glucose (BG) meter              No recent BG data available     Fam Hx Diabetes:       None  History of hyperlipidemia, takes simvastatin 20 mg daily  Last eye exam 5/2019, no DR  Recent [facility] labs include:  Lab Results   Component Value Date    A1C 7.6 (H) 11/08/2019     11/08/2019    POTASSIUM 4.1 11/08/2019    CHLORIDE 106 11/08/2019    CO2 23 11/08/2019    ANIONGAP 9 11/08/2019     (H) 11/08/2019    BUN 14 11/08/2019    CR 0.72 11/08/2019    GFRESTIMATED >90 11/08/2019    GFRESTBLACK >90 11/08/2019    FLAVIO 9.0 11/08/2019    CHOL 228 (H) 11/08/2019    TRIG 518 (H) 11/08/2019    HDL 53 11/08/2019    LDL  11/08/2019     Cannot estimate LDL when triglyceride exceeds 400 mg/dL     (H) 11/08/2019    NHDL 175 (H) 11/08/2019    UCRR 137 08/12/2019    MICROL 16 08/12/2019    UMALCR 11.39 08/12/2019     DM Complications:      None known        [place]  Has seen [facility]   Also sees Dr. Jose for general medicine evaluations.    ===================================================    I have reviewed the note as documented above.  This accurately captures the substance of my conversation with the patient,      Assessment/Plan:    1. Type 2 diabetes mellitus  2. Hyperlipidemia      Continue current JanumetXR daily dose plan, though OK to transition to suppertime dosing  Resume use of BG meter, fasting testing  Goal target FBG  mg/dl  Recommend future hgbA1c lab test   Can be done at the [facility] lab   Future lab order placed  Keep focus on diet, exercise, weight management.  Continue simvastatin med use, goal target LDL <100 mg/dl  Advise having fasting lipid panel testing and dilated eye examination, at  least annually  Tentative plan for f/u diabetes evaluation in 6 mo.  Keep planned appointment with Dr. Jose or whichever PCP he plans to see    Total time of call between patient and provider was 15 minutes     This telephone visit chart note is the equivalent of a 81455 office visit billing code      LEO Flores MD, MS  Endocrinology  [facility]

## 2020-03-31 DIAGNOSIS — I10 BENIGN ESSENTIAL HYPERTENSION: ICD-10-CM

## 2020-04-01 RX ORDER — LISINOPRIL 5 MG/1
TABLET ORAL
Qty: 90 TABLET | Refills: 0 | Status: SHIPPED | OUTPATIENT
Start: 2020-04-01 | End: 2020-06-29

## 2020-04-01 NOTE — TELEPHONE ENCOUNTER
COVID-19, 90-day refill authorization per Medical Director override.    Lainey Gonzalez RN  04/01/20  1:27 PM

## 2020-04-02 ENCOUNTER — MYC MEDICAL ADVICE (OUTPATIENT)
Dept: ENDOCRINOLOGY | Facility: CLINIC | Age: 40
End: 2020-04-02

## 2020-06-28 DIAGNOSIS — I10 BENIGN ESSENTIAL HYPERTENSION: ICD-10-CM

## 2020-06-28 DIAGNOSIS — E11.9 TYPE 2 DIABETES MELLITUS WITHOUT COMPLICATION, WITHOUT LONG-TERM CURRENT USE OF INSULIN (H): Primary | ICD-10-CM

## 2020-06-29 ENCOUNTER — OFFICE VISIT (OUTPATIENT)
Dept: FAMILY MEDICINE | Facility: CLINIC | Age: 40
End: 2020-06-29
Payer: COMMERCIAL

## 2020-06-29 VITALS
HEIGHT: 66 IN | RESPIRATION RATE: 15 BRPM | TEMPERATURE: 98.1 F | SYSTOLIC BLOOD PRESSURE: 142 MMHG | WEIGHT: 189.5 LBS | OXYGEN SATURATION: 100 % | HEART RATE: 94 BPM | DIASTOLIC BLOOD PRESSURE: 103 MMHG | BODY MASS INDEX: 30.46 KG/M2

## 2020-06-29 DIAGNOSIS — E78.5 HYPERLIPIDEMIA, UNSPECIFIED HYPERLIPIDEMIA TYPE: ICD-10-CM

## 2020-06-29 DIAGNOSIS — I10 BENIGN ESSENTIAL HYPERTENSION: Primary | ICD-10-CM

## 2020-06-29 LAB
ALBUMIN SERPL-MCNC: 4.6 G/DL (ref 3.3–4.6)
ALP SERPL-CCNC: 46 U/L (ref 40–150)
ALT SERPL-CCNC: 91 U/L (ref 0–70)
AST SERPL-CCNC: 50 U/L (ref 0–55)
BILIRUB SERPL-MCNC: 0.7 MG/DL (ref 0.2–1.3)
BUN SERPL-MCNC: 12 MG/DL (ref 5–24)
CALCIUM SERPL-MCNC: 9.8 MG/DL (ref 8.5–10.4)
CHLORIDE SERPLBLD-SCNC: 103 MMOL/L (ref 94–109)
CHOLEST SERPL-MCNC: 204 MG/DL (ref 0–200)
CHOLEST/HDLC SERPL: 3.1 {RATIO} (ref 0–5)
CO2 SERPL-SCNC: 28 MMOL/L (ref 20–32)
CREAT SERPL-MCNC: 0.9 MG/DL (ref 0.8–1.5)
EGFR CALCULATED (BLACK REFERENCE): 120.8
EGFR CALCULATED (NON BLACK REFERENCE): 99.8
FASTING SPECIMEN: YES
GLUCOSE SERPL-MCNC: 172 MG/DL (ref 60–99)
HBA1C MFR BLD: 7.9 % (ref 4.1–5.7)
HDLC SERPL-MCNC: 67 MG/DL
LDLC SERPL CALC-MCNC: 69 MG/DL (ref 0–129)
POTASSIUM SERPL-SCNC: 4.7 MMOL/L (ref 3.4–5.3)
PROT SERPL-MCNC: 7.8 G/DL (ref 6.8–8.8)
SODIUM SERPL-SCNC: 142 MMOL/L (ref 137.3–146.3)
TRIGL SERPL-MCNC: 343 MG/DL (ref 0–150)
VLDL-CHOLESTEROL: 69 (ref 7–32)

## 2020-06-29 RX ORDER — LISINOPRIL 10 MG/1
10 TABLET ORAL DAILY
Qty: 90 TABLET | Refills: 1 | Status: SHIPPED | OUTPATIENT
Start: 2020-06-29 | End: 2020-11-02 | Stop reason: SINTOL

## 2020-06-29 ASSESSMENT — MIFFLIN-ST. JEOR: SCORE: 1717.07

## 2020-06-29 NOTE — NURSING NOTE
"40 year old  Chief Complaint   Patient presents with     RECHECK     blood pressure follow up        Blood pressure (!) 142/103, pulse 94, temperature 98.1  F (36.7  C), resp. rate 15, height 1.676 m (5' 5.98\"), weight 86 kg (189 lb 8 oz), SpO2 100 %. Body mass index is 30.6 kg/m .  Patient Active Problem List   Diagnosis     Type 2 diabetes mellitus (H)     Hyperlipidemia       Wt Readings from Last 2 Encounters:   06/29/20 86 kg (189 lb 8 oz)   03/09/20 87.5 kg (193 lb)     BP Readings from Last 3 Encounters:   06/29/20 (!) 142/103   03/09/20 (!) 149/101   01/22/20 (!) 140/97         Current Outpatient Medications   Medication     Acetaminophen (TYLENOL EXTRA STRENGTH PO)     lisinopril (ZESTRIL) 5 MG tablet     Multiple Vitamins-Minerals (CENTRUM MEN PO)     Naproxen Sodium (ALEVE PO)     simvastatin (ZOCOR) 20 MG tablet     SitaGLIPtin-MetFORMIN HCl 100-1000 MG TB24     VITAMIN D PO     ibuprofen (ADVIL/MOTRIN) 200 MG tablet     No current facility-administered medications for this visit.        Social History     Tobacco Use     Smoking status: Current Some Day Smoker     Smokeless tobacco: Never Used   Substance Use Topics     Alcohol use: Yes     Drug use: Never       Health Maintenance Due   Topic Date Due     PREVENTIVE CARE VISIT  1981     EYE EXAM  1981     HIV SCREENING  02/01/1996     A1C  02/08/2020       No results found for: PAP      June 29, 2020 2:25 PM  "

## 2020-06-29 NOTE — TELEPHONE ENCOUNTER
Last seen 3/9/20, no future appt noted   Pt needs BP follow up, overdue, plus labs.  Started on 5 mg lisinopril back in March, with no recheck yet due to COVID. .      Made an appt for today with Rebeca at 2:40 pm for above.     Med refill on hold for now.    Cherie Stratton RN  June 29, 2020 11:48 AM    Seen today. Will change to 10 mg/day  --Miguel Jose MD

## 2020-06-29 NOTE — PATIENT INSTRUCTIONS
ASSESSMENT/PLAN:    Bertin is a 40 with hypertension, DM2 and BMI = 30    -Increase Lisinopril to 10mg/day  -ordered a home BP check  -Encouraged healthy diet and exercise  -Check COMP panel, A1C and Lipids.    Follow up 2 months, sooner PRN      --Miguel Jose MD  [facility], Department of Family Medicine and Community Health

## 2020-06-29 NOTE — PROGRESS NOTES
"Bertin Mcgill is a 40 year old male who presents to [facility] today for follow up of his blood pressure. Last visit was on March 9, 2020, just as coronavirus started to affect [place].     He has started walking more.   Not checking blood pressure at home.   Home blood sugars were slightly, e.g. pre-lunch was around 140-160.   Then, post-meal in the lower 200s.   He then made some dietary changes but has not rechecked.       Review Of Systems:  Has otherwise been in usual state of health, e.g.   Cardiovascular: negative  Respiratory: No shortness of breath, dyspnea on exertion, cough, or hemoptysis  Gastrointestinal: negative  Genitourinary: negative    Problem list per EMR:  Patient Active Problem List   Diagnosis     Type 2 diabetes mellitus (H)     Hyperlipidemia           Current Outpatient Medications   Medication Sig Dispense Refill     Acetaminophen (TYLENOL EXTRA STRENGTH PO)        lisinopril (ZESTRIL) 5 MG tablet TAKE 1 TABLET BY MOUTH EVERY DAY 90 tablet 0     Multiple Vitamins-Minerals (CENTRUM MEN PO)        Naproxen Sodium (ALEVE PO)        simvastatin (ZOCOR) 20 MG tablet TAKE 1 TABLET (20 MG) BY MOUTH ONCE A DAY WITH EVENING MEAL.  1     SitaGLIPtin-MetFORMIN HCl 100-1000 MG TB24 Take 1 tablet by mouth daily (with dinner) 30 tablet 11     VITAMIN D PO Take 2,000 Units by mouth       ibuprofen (ADVIL/MOTRIN) 200 MG tablet          Allergies   Allergen Reactions     Seasonal Allergies         Social:   Works for the [employer]       EXAM    Vitals: BP (!) 142/103   Pulse 94   Temp 98.1  F (36.7  C)   Resp 15   Ht 1.676 m (5' 5.98\")   Wt 86 kg (189 lb 8 oz)   SpO2 100%   BMI 30.60 kg/m    BMI= Body mass index is 30.6 kg/m .   Recheck 127/91.  CV: RRR. No murmurs  Lungs - CTA      ASSESSMENT/PLAN:    Bertin is a 40 with hypertension, DM2 and BMI = 30    -Increase Lisinopril to 10mg/day  -ordered a home BP check  -Encouraged healthy diet and exercise  -Check COMP panel, A1C and " Lipids.    Follow up 2 months, sooner PRN      --Miguel Jose MD  [facility], Department of Family Medicine and Community Health

## 2020-08-24 ENCOUNTER — MYC MEDICAL ADVICE (OUTPATIENT)
Dept: FAMILY MEDICINE | Facility: CLINIC | Age: 40
End: 2020-08-24

## 2020-08-24 DIAGNOSIS — E78.5 HYPERLIPIDEMIA, UNSPECIFIED HYPERLIPIDEMIA TYPE: Primary | ICD-10-CM

## 2020-08-24 RX ORDER — SIMVASTATIN 20 MG
TABLET ORAL
Qty: 90 TABLET | Refills: 3 | Status: SHIPPED | OUTPATIENT
Start: 2020-08-24 | End: 2021-08-20

## 2020-08-24 NOTE — TELEPHONE ENCOUNTER
Last Office Visit: 6/29/20  Future [facility] Appointments: 9/30/20  Medication last refilled: HISTORICAL  Last labs: 6/29/20    Component      Latest Ref Rng & Units 6/29/2020   Fasting Specimen       yes   Cholesterol      0.0 - 200.0 204.0 (H)   HDL Cholesterol      >40.0 67.0   Triglycerides      0.0 - 150.0 343.0 (H)   Cholesterol/HDL Ratio      0.0 - 5.0 3.1   LDL Cholesterol Direct      0.0 - 129.0 69.0   VLDL-Cholesterol      7.0 - 32.0 69.0 (H)     Routing refill request to provider for review/approval because: Medication is reported/historical - teed up the previous dose listed in his chart.      Lainey Gonzalez RN  08/24/20  10:41 AM

## 2020-09-30 ENCOUNTER — OFFICE VISIT (OUTPATIENT)
Dept: FAMILY MEDICINE | Facility: CLINIC | Age: 40
End: 2020-09-30
Payer: COMMERCIAL

## 2020-09-30 VITALS
OXYGEN SATURATION: 99 % | BODY MASS INDEX: 31.21 KG/M2 | SYSTOLIC BLOOD PRESSURE: 118 MMHG | DIASTOLIC BLOOD PRESSURE: 84 MMHG | HEART RATE: 109 BPM | TEMPERATURE: 98 F | WEIGHT: 193.25 LBS | RESPIRATION RATE: 14 BRPM

## 2020-09-30 DIAGNOSIS — G47.00 INSOMNIA, UNSPECIFIED TYPE: ICD-10-CM

## 2020-09-30 DIAGNOSIS — R05.9 COUGH: ICD-10-CM

## 2020-09-30 DIAGNOSIS — I10 BENIGN ESSENTIAL HYPERTENSION: Primary | ICD-10-CM

## 2020-09-30 DIAGNOSIS — Z23 NEEDS FLU SHOT: ICD-10-CM

## 2020-09-30 DIAGNOSIS — R10.11 RUQ ABDOMINAL PAIN: ICD-10-CM

## 2020-09-30 RX ORDER — TRAZODONE HYDROCHLORIDE 50 MG/1
50 TABLET, FILM COATED ORAL AT BEDTIME
Qty: 30 TABLET | Refills: 1 | Status: SHIPPED | OUTPATIENT
Start: 2020-09-30 | End: 2021-01-05

## 2020-09-30 RX ORDER — LOSARTAN POTASSIUM 50 MG/1
50 TABLET ORAL AT BEDTIME
Qty: 30 TABLET | Refills: 3 | Status: SHIPPED | OUTPATIENT
Start: 2020-09-30 | End: 2021-01-22

## 2020-09-30 NOTE — PATIENT INSTRUCTIONS
ASSESSMENT/PLAN:    1. Hypertension with still poor control in a diabetic.   Also, with a cough that may be related to the Lisinopril:    Suggested stopping Lisinopril.   Start Cozaar 50 mg at bedtime.   Start measuring home blood pressure. Record numbers and bring in to next visit or send me a note with readings.     Suggested increasing medication, but Bertin wants to try to handle with exercise. This is a good option as his pulse is elevated.   About to purchase home bicycle.   Suggested obtaining a heart rate monitor. Aim to keep heart rate around 130 bpm for a few minutes.     2. Insomnia  -Try Trazadone 50 mg an hour or two prior to bedtime. Try to avoid alcohol near bedtime    3. DM  -Make appt with your Endocrinologist    4. Give flu vaccine.     5. Suggested smoking cessation. Bertin is not ready yet.   Also, mentioned counseling for insomnia and other issues, but Bertin wants to defer.     6. Keep up with the Vitamin D daily.     7.  Return to clinic in 1 month to reassess weight, pulse and BP  8. RUQ pain - Check Ultrasound    9. Concern for COVID  -Check Covid today    --Miguel Jose MD  [facility], Department of Family Medicine and Community Health

## 2020-09-30 NOTE — NURSING NOTE
40 year old  Chief Complaint   Patient presents with     Hypertension       Blood pressure (!) 137/102, pulse 99, temperature 98  F (36.7  C), temperature source Skin, resp. rate 14, weight 87.7 kg (193 lb 4 oz), SpO2 99 %. Body mass index is 31.21 kg/m .  Patient Active Problem List   Diagnosis     Type 2 diabetes mellitus (H)     Hyperlipidemia       Wt Readings from Last 2 Encounters:   09/30/20 87.7 kg (193 lb 4 oz)   06/29/20 86 kg (189 lb 8 oz)     BP Readings from Last 3 Encounters:   09/30/20 (!) 137/102   06/29/20 (!) 142/103   03/09/20 (!) 149/101         Current Outpatient Medications   Medication     Acetaminophen (TYLENOL EXTRA STRENGTH PO)     fexofenadine (ALLEGRA) 30 MG ODT     ibuprofen (ADVIL/MOTRIN) 200 MG tablet     lisinopril (ZESTRIL) 10 MG tablet     Multiple Vitamins-Minerals (CENTRUM MEN PO)     Naproxen Sodium (ALEVE PO)     simvastatin (ZOCOR) 20 MG tablet     SitaGLIPtin-MetFORMIN HCl 100-1000 MG TB24     VITAMIN D PO     No current facility-administered medications for this visit.        Social History     Tobacco Use     Smoking status: Current Some Day Smoker     Smokeless tobacco: Never Used   Substance Use Topics     Alcohol use: Yes     Drug use: Never       Health Maintenance Due   Topic Date Due     PREVENTIVE CARE VISIT  1981     EYE EXAM  1981     HIV SCREENING  02/01/1996     HEPATITIS B IMMUNIZATION (1 of 3 - Risk 3-dose series) 02/01/2000     MICROALBUMIN  08/12/2020     DIABETIC FOOT EXAM  08/12/2020     INFLUENZA VACCINE (1) 09/01/2020     A1C  09/29/2020       No results found for: PAP      September 30, 2020 2:05 PM

## 2020-09-30 NOTE — PROGRESS NOTES
Bertin Mcgill is a 40 year old male who presents to [facility] today for follow up of blood pressure. He has hypertension and DM2.     He has not obtained a home blood pressure cuff so he is unsure of his readings.  He is on lisinopril 10 mg/day.  He's doing OK, but having some intermittent coughing daily. Notes it primarily after eating. He is still   Smoking a pack/day. States that he's not at a point where he is interested in quitting.     He's unsure whether it's worse since starting Lisinopril.   10 days ago, awoke with sharp RUQ. The pain decreased, but still sore, especially with coughing and sneezing.       DM2 was diagnosed in 2017 at age 35 yo. Followed by Endocrinologist in [place].     Still smoking a pack/day.   Still drinking on most nights. Usually a glass or 2 of scotch prior to bed.     States that he has trouble falling asleep at night without the alcohol.   He's tried some nighttime hygeine, e.g. less screen time. Nothing has been effective.     Admits to not having been very physically active.   He is interested in starting exercise.   About to purchase some home exercise equipment.     Review Of Systems:  Has otherwise been in usual state of health, e.g.   Cardiovascular: negative  Respiratory: No shortness of breath, dyspnea on exertion, cough, or hemoptysis  Gastrointestinal: Intermittent right upper quadrant tenderness.  Genitourinary: negative    Problem list per EMR:  Patient Active Problem List   Diagnosis     Type 2 diabetes mellitus (H)     Hyperlipidemia       Current Outpatient Medications   Medication Sig Dispense Refill     Acetaminophen (TYLENOL EXTRA STRENGTH PO)        fexofenadine (ALLEGRA) 30 MG ODT Take 30 mg by mouth 2 times daily       ibuprofen (ADVIL/MOTRIN) 200 MG tablet        lisinopril (ZESTRIL) 10 MG tablet Take 1 tablet (10 mg) by mouth daily 90 tablet 1     Multiple Vitamins-Minerals (CENTRUM MEN PO)        Naproxen Sodium (ALEVE PO)        simvastatin (ZOCOR)  20 MG tablet TAKE 1 TABLET (20 MG) BY MOUTH ONCE A DAY WITH EVENING MEAL. 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    EXAM    Vitals: BP (!) 137/102 (BP Location: Right arm, Patient Position: Sitting, Cuff Size: Adult Large)   Pulse 99   Temp 98  F (36.7  C) (Skin)   Resp 14   Wt 87.7 kg (193 lb 4 oz)   SpO2 99%   BMI 31.21 kg/m    BMI= Body mass index is 31.21 kg/m .  Blood pressure readings taken at intervals are with a high systolic of 133 and a high diastolic of 90.  The lowest systolic was 118 with a diastolic of 84.  His pulse remained approximately 100 bpm on each reading.  His pulse was regular.    He appeared in his usual state of health.  Cardiovascular was with borderline tachycardia with a regular rhythm.  No murmur noted.  Lungs clear to auscultation.  Abdomen-soft with some mild to moderate right upper quadrant tenderness.  No rebound or guarding.    Gait normal.    Weight noted to be up 4 pounds from 3 months ago.        ASSESSMENT/PLAN:    1. Hypertension with still poor control in a diabetic.   Also, with a cough that may be related to the Lisinopril:    Suggested stopping Lisinopril.   Start Cozaar 50 mg at bedtime.   Start measuring home blood pressure. Record numbers and bring in to next visit or send me a note with readings.     Suggested increasing medication, but Bertin wants to try to handle with exercise. This is a good option as his pulse is elevated.   About to purchase home bicycle.   Suggested obtaining a heart rate monitor. Aim to keep heart rate around 130 bpm for a few minutes.     2. Insomnia  -Try Trazadone 50 mg an hour or two prior to bedtime. Try to avoid alcohol near bedtime    3. DM  -Make appt with your  Endocrinologist    4. Give flu vaccine.     5. Suggested smoking cessation. Bertin is not ready yet.   Also, mentioned counseling for insomnia and other issues, but Bertin wants to defer.     6. Keep up with the Vitamin D daily.     7.  Return to clinic in 1 month to reassess weight, pulse and BP  8. RUQ pain - Check Ultrasound    9. Concern for COVID  -Check Covid today    Over 50% of the 40 minutes in room time was spent discussing the above treatment and diagnostic plan      --Miguel Jose MD  [facility], Department of Family Medicine and Community Health

## 2020-09-30 NOTE — NURSING NOTE
"Injectable Influenza Immunization Documentation    1.  Has the patient received the information for the injectable influenza vaccine? YES     2. Is the patient 6 months of age or older? YES     3. Does the patient have any of the following contraindications?         Severe allergy to eggs? No     Severe allergic reaction to previous influenza vaccines? No   Severe allergy to latex? No       History of Guillain-Widen syndrome? No     Currently have a temperature greater than 100.4F? No        4.  Severely egg allergic patients should have flu vaccine eligibility assessed by an MD, RN, or pharmacist, and those who received flu vaccine should be observed for 15 min by an MD, RN, Pharmacist, Medical Technician, or member of clinic staff.\": YES    5. Latex-allergic patients should be given latex-free influenza vaccine Yes. Please reference the Vaccine latex table to determine if your clinic s product is latex-containing.       Vaccination given by Dalila Simmons CMA          "

## 2020-10-01 ENCOUNTER — ANCILLARY PROCEDURE (OUTPATIENT)
Dept: ULTRASOUND IMAGING | Facility: CLINIC | Age: 40
End: 2020-10-01
Attending: FAMILY MEDICINE
Payer: COMMERCIAL

## 2020-10-01 DIAGNOSIS — R10.11 RUQ ABDOMINAL PAIN: ICD-10-CM

## 2020-10-01 PROCEDURE — 76700 US EXAM ABDOM COMPLETE: CPT | Mod: GC | Performed by: RADIOLOGY

## 2020-10-03 DIAGNOSIS — R05.9 COUGH: ICD-10-CM

## 2020-10-03 PROCEDURE — U0003 INFECTIOUS AGENT DETECTION BY NUCLEIC ACID (DNA OR RNA); SEVERE ACUTE RESPIRATORY SYNDROME CORONAVIRUS 2 (SARS-COV-2) (CORONAVIRUS DISEASE [COVID-19]), AMPLIFIED PROBE TECHNIQUE, MAKING USE OF HIGH THROUGHPUT TECHNOLOGIES AS DESCRIBED BY CMS-2020-01-R: HCPCS | Performed by: FAMILY MEDICINE

## 2020-10-05 LAB
SARS-COV-2 RNA SPEC QL NAA+PROBE: NOT DETECTED
SPECIMEN SOURCE: NORMAL

## 2020-10-12 ENCOUNTER — MYC MEDICAL ADVICE (OUTPATIENT)
Dept: ENDOCRINOLOGY | Facility: CLINIC | Age: 40
End: 2020-10-12

## 2020-10-12 NOTE — TELEPHONE ENCOUNTER
Dr Flores,  Please [email or web address] and advise.  Thanks,  Brooke HERNANDES RN

## 2020-10-15 DIAGNOSIS — E11.9 TYPE 2 DIABETES MELLITUS WITHOUT COMPLICATION, WITHOUT LONG-TERM CURRENT USE OF INSULIN (H): ICD-10-CM

## 2020-10-16 RX ORDER — SITAGLIPTIN AND METFORMIN HYDROCHLORIDE 1000; 100 MG/1; MG/1
1 TABLET, FILM COATED, EXTENDED RELEASE ORAL
Qty: 90 TABLET | Refills: 0 | Status: SHIPPED | OUTPATIENT
Start: 2020-10-16 | End: 2021-01-13

## 2020-11-02 ENCOUNTER — OFFICE VISIT (OUTPATIENT)
Dept: FAMILY MEDICINE | Facility: CLINIC | Age: 40
End: 2020-11-02
Payer: COMMERCIAL

## 2020-11-02 VITALS
RESPIRATION RATE: 14 BRPM | DIASTOLIC BLOOD PRESSURE: 107 MMHG | OXYGEN SATURATION: 99 % | TEMPERATURE: 98.1 F | WEIGHT: 193 LBS | SYSTOLIC BLOOD PRESSURE: 148 MMHG | HEART RATE: 94 BPM | BODY MASS INDEX: 31.17 KG/M2

## 2020-11-02 DIAGNOSIS — I10 BENIGN ESSENTIAL HYPERTENSION: Primary | ICD-10-CM

## 2020-11-02 RX ORDER — HYDROCHLOROTHIAZIDE 12.5 MG/1
12.5 TABLET ORAL DAILY
Qty: 30 TABLET | Refills: 0 | Status: SHIPPED | OUTPATIENT
Start: 2020-11-02 | End: 2020-11-25

## 2020-11-02 NOTE — NURSING NOTE
40 year old  Chief Complaint   Patient presents with     Hypertension       Blood pressure (!) 148/107, pulse 94, temperature 98.1  F (36.7  C), temperature source Skin, resp. rate 14, weight 87.5 kg (193 lb), SpO2 99 %. Body mass index is 31.17 kg/m .  Patient Active Problem List   Diagnosis     Type 2 diabetes mellitus (H)     Hyperlipidemia       Wt Readings from Last 2 Encounters:   11/02/20 87.5 kg (193 lb)   09/30/20 87.7 kg (193 lb 4 oz)     BP Readings from Last 3 Encounters:   11/02/20 (!) 148/107   09/30/20 118/84   06/29/20 (!) 142/103         Current Outpatient Medications   Medication     Acetaminophen (TYLENOL EXTRA STRENGTH PO)     fexofenadine (ALLEGRA) 30 MG ODT     losartan (COZAAR) 50 MG tablet     Multiple Vitamins-Minerals (CENTRUM MEN PO)     Naproxen Sodium (ALEVE PO)     simvastatin (ZOCOR) 20 MG tablet     SitaGLIPtin-MetFORMIN HCl (JANUMET XR) 100-1000 MG TB24     VITAMIN D PO     ibuprofen (ADVIL/MOTRIN) 200 MG tablet     lisinopril (ZESTRIL) 10 MG tablet     traZODone (DESYREL) 50 MG tablet     No current facility-administered medications for this visit.        Social History     Tobacco Use     Smoking status: Current Some Day Smoker     Smokeless tobacco: Never Used   Substance Use Topics     Alcohol use: Yes     Drug use: Never       Health Maintenance Due   Topic Date Due     PREVENTIVE CARE VISIT  1981     EYE EXAM  1981     HIV SCREENING  02/01/1996     HEPATITIS C SCREENING  02/01/1999     HEPATITIS B IMMUNIZATION (1 of 3 - Risk 3-dose series) 02/01/2000     MICROALBUMIN  08/12/2020     DIABETIC FOOT EXAM  08/12/2020     A1C  09/29/2020       No results found for: PAP      November 2, 2020 1:28 PM

## 2020-11-02 NOTE — PATIENT INSTRUCTIONS
"ASSESSMENT/PLAN:    41 yo with Diabetes and Hypertension.     1. For the Diabetes - He has an upcoming visit with Endocrinology    2. For the hypertension - Now on 50mg of Cozaar and the cough is improved. BP is still quite high.   -Add hydrochlorothiazide at lowest dose of 12.5mg/day.   -Check numbers 1-2 times/day and record on phone.     -If pressures remain above 135/85, then will increase Cozaar to 100 mg/day.       3. For difficulty sleeping, discussed the supplement 'Somsleep\" which the baseball team uses. It has Magnesium and Melatonin or he's tried and had success with \"Valerian root\".   He knows that these are unregulated.     He may try them and/or the Trazodone at night and let me know what strategy works best.     Return in 3-4 weeks.     --Miguel Jose MD  [facility], Department of Family Medicine and Community Health  "

## 2020-11-02 NOTE — PROGRESS NOTES
Bertin Mcgill is a 40 year old male who presents to [facility] today for follow up of hypertension.   At last visit, he was switched from Lisinopril to Cozaar.       His pressures at home have been around 140-160/ about .       He also informs me that he picked up the Trazodone, but has not started it yet. He has tried some Valirian Root    He's started cycling on his new indoor bike for 45 mins to 60 mins. He's working on increasing frequency to about 4-5 times per week. He has other home exercise equipment.     Wt Readings from Last 5 Encounters:   11/02/20 87.5 kg (193 lb)   09/30/20 87.7 kg (193 lb 4 oz)   06/29/20 86 kg (189 lb 8 oz)   03/09/20 87.5 kg (193 lb)   01/22/20 86.2 kg (190 lb 0.6 oz)         Review Of Systems:  Has otherwise been in usual state of health, e.g.   Cardiovascular: negative  Respiratory: No shortness of breath, dyspnea on exertion, cough, or hemoptysis  Gastrointestinal: negative  Genitourinary: negative    Problem list per EMR:  Patient Active Problem List   Diagnosis     Type 2 diabetes mellitus (H)     Hyperlipidemia       Current Outpatient Medications   Medication Sig Dispense Refill     Acetaminophen (TYLENOL EXTRA STRENGTH PO)        fexofenadine (ALLEGRA) 30 MG ODT Take 30 mg by mouth 2 times daily       losartan (COZAAR) 50 MG tablet Take 1 tablet (50 mg) by mouth At Bedtime 30 tablet 3     Multiple Vitamins-Minerals (CENTRUM MEN PO)        Naproxen Sodium (ALEVE PO)        simvastatin (ZOCOR) 20 MG tablet TAKE 1 TABLET (20 MG) BY MOUTH ONCE A DAY WITH EVENING MEAL. 90 tablet 3     SitaGLIPtin-MetFORMIN HCl (JANUMET XR) 100-1000 MG TB24 Take 1 tablet by mouth daily (with dinner) (please call [phone]) to schedule an appointment for further refills) 90 tablet 0     VITAMIN D PO Take 2,000 Units by mouth       ibuprofen (ADVIL/MOTRIN) 200 MG tablet        lisinopril (ZESTRIL) 10 MG tablet Take 1 tablet (10 mg) by mouth daily 90 tablet 1     traZODone (DESYREL)  "50 MG tablet Take 1 tablet (50 mg) by mouth At Bedtime 30 tablet 1       Allergies   Allergen Reactions     Seasonal Allergies         Social:   Unchanged. Works for [employer]    EXAM    Vitals: BP (!) 148/107 (BP Location: Right arm, Patient Position: Sitting, Cuff Size: Adult Large)   Pulse 94   Temp 98.1  F (36.7  C) (Skin)   Resp 14   Wt 87.5 kg (193 lb)   SpO2 99%   BMI 31.17 kg/m    BMI= Body mass index is 31.17 kg/m .  Appears well.       ASSESSMENT/PLAN:    41 yo with Diabetes and Hypertension.     1. For the Diabetes - He has an upcoming visit with Endocrinology    2. For the hypertension - Now on 50mg of Cozaar and the cough is improved. BP is still quite high.   -Add hydrochlorothiazide at lowest dose of 12.5mg/day.   -Check numbers 1-2 times/day and record on phone.     -If pressures remain above 135/85, then will increase Cozaar to 100 mg/day.       3. For difficulty sleeping, discussed the supplement 'Somsleep\" which the baseball team uses. It has Magnesium and Melatonin or he's tried and had success with \"Valerian root\".   He knows that these are unregulated.     He may try them and/or the Trazodone at night and let me know what strategy works best.     Return in 3-4 weeks.     --Miguel Jose MD  [facility], Department of Family Medicine and Community Health  "

## 2020-11-24 DIAGNOSIS — I10 BENIGN ESSENTIAL HYPERTENSION: ICD-10-CM

## 2020-11-25 RX ORDER — HYDROCHLOROTHIAZIDE 12.5 MG/1
TABLET ORAL
Qty: 30 TABLET | Refills: 0 | Status: SHIPPED | OUTPATIENT
Start: 2020-11-25 | End: 2020-12-29

## 2020-11-25 NOTE — TELEPHONE ENCOUNTER
Last Office Visit: 11/2/20  [place] [facility] [place]  Medication last refilled: 11/2/20 #30    Patient notified to send in some home blood pressure readings, his meds were adjusted at last visit.     Lainey Gonzalez RN  11/25/20  3:40 PM

## 2020-12-14 ENCOUNTER — HEALTH MAINTENANCE LETTER (OUTPATIENT)
Age: 40
End: 2020-12-14

## 2020-12-22 DIAGNOSIS — I10 BENIGN ESSENTIAL HYPERTENSION: ICD-10-CM

## 2020-12-23 ENCOUNTER — MYC MEDICAL ADVICE (OUTPATIENT)
Dept: FAMILY MEDICINE | Facility: CLINIC | Age: 40
End: 2020-12-23

## 2020-12-23 NOTE — TELEPHONE ENCOUNTER
Last seen 11/2/20, no future appt noted       Pt needs to send us home BP readings again,  Has been 2  month since MD added hydrochlorothiazide ( low dose) , in addition to the losartan.      Sent pt a "[employer] message regarding this.    Will await his response.      Cherie Stratton RN  December 23, 2020 2:02 PM    Pt sent his home BP's today via "[employer] message , and I have sent the Mychart to Rebeca to determine next steps on med.     Cherie Stratton RN  December 29, 2020 11:35 AM      Hi Cherie,   Please help Bertin with Refills at his current medications dosages.   The high pulse is a bit strange. He has had a normal Thyroid test in past. Likely some dehydration and deconditioning. So, can you also gently asked him to increase hydration and keep aiming for daily exercise.   Thanks!   --Froylan     Refill done and [employer] sent back to pt with MD info above. Also entered home average BP/HR into Epic flowsheet.     Cherie Stratton RN  December 29, 2020 1:19 PM

## 2020-12-29 VITALS — SYSTOLIC BLOOD PRESSURE: 126 MMHG | HEART RATE: 109 BPM | DIASTOLIC BLOOD PRESSURE: 89 MMHG

## 2020-12-29 RX ORDER — HYDROCHLOROTHIAZIDE 12.5 MG/1
TABLET ORAL
Qty: 90 TABLET | Refills: 1 | Status: SHIPPED | OUTPATIENT
Start: 2020-12-29 | End: 2021-06-21

## 2020-12-29 NOTE — TELEPHONE ENCOUNTER
Froylan Scott , I asked pt to send us his latest home BP's as you started low dose hydrochlorothiazide 2 months ago, and he is now needing refill on this new med.     What say you?  Refill as is and for how long, or do you want to make a dosage adjustment.  On this med, or his other med?     Let me know .     Cherie SWEENEY RN   [facility] Cherie,   Please help Bertin with Refills at his current medications dosages.   The high pulse is a bit strange. He has had a normal Thyroid test in past. Likely some dehydration and deconditioning. So, can you also gently asked him to increase hydration and keep aiming for daily exercise.   Thanks!   --Froylan Weemsted pt back and completed refill at current dosage.     Cherie Stratton RN  December 29, 2020 1:16 PM

## 2021-01-05 ENCOUNTER — VIRTUAL VISIT (OUTPATIENT)
Dept: ENDOCRINOLOGY | Facility: CLINIC | Age: 41
End: 2021-01-05
Payer: COMMERCIAL

## 2021-01-05 DIAGNOSIS — E11.9 TYPE 2 DIABETES MELLITUS WITHOUT COMPLICATION, WITHOUT LONG-TERM CURRENT USE OF INSULIN (H): Primary | ICD-10-CM

## 2021-01-05 PROCEDURE — 99213 OFFICE O/P EST LOW 20 MIN: CPT | Mod: 95 | Performed by: INTERNAL MEDICINE

## 2021-01-05 RX ORDER — TRAZODONE HYDROCHLORIDE 50 MG/1
TABLET, FILM COATED ORAL PRN
COMMUNITY
Start: 2020-10-31 | End: 2022-04-06

## 2021-01-05 NOTE — PROGRESS NOTES
Bertin Mcgill is a 40 year old male who is being evaluated via a billable telephone visit.      What phone number would you like to be contacted at? Cellphone  How would you like to obtain your AVS? Verbally Reviewed    Recent issues:  Diabetes follow-up evaluation  Patient taking the JanumetXR medication, no significant GI symptoms but higher BG levels              2017. Diagnosis of diabetes mellitus when med eval for torn right shoulder muscle  High glucose level noted during med evaluation, hgbA1c near 11.5%  Started Jardiance medication, took for approx 6 months  Recalls significant weight loss of approx 30#  Switched to metformin medication, then dose increases              Concerns about GI symptoms with nausea, nausea, also morning vomiting              Tried Prilosec, then Zantac  Has seen [facility] for medical evaluations  Recent discussion with his workplace team physician, Dr. Skye Mane              Advised to see me for medical evaluation     Previous [facility] labs include:         Previously on metforminXR 500 mg 2 tabs BID    8/12/19. Initial diabetes evaluation with me at [facility]  Reviewed health history and diabetes management  Med change to JanumetXR     Current DM medications:  JanumetXR 100/1000 1-tab each morning     Blood glucose (BG) meter              Reports recent premeal -190 mg/dl     Fam Hx Diabetes:       None  Recent [facility] labs include:    Lab Results   Component Value Date    A1C 7.9 (H) 06/29/2020    .0 06/29/2020    POTASSIUM 4.7 06/29/2020    CHLORIDE 103.0 06/29/2020    CO2 28.0 06/29/2020    ANIONGAP 9 11/08/2019    .0 (H) 06/29/2020    BUN 12.0 06/29/2020    CR 0.9 06/29/2020    GFRESTIMATED >90 11/08/2019    GFRESTBLACK >90 11/08/2019    FLAVIO 9.8 06/29/2020    CHOL 204.0 (H) 06/29/2020    TRIG 343.0 (H) 06/29/2020    HDL 67.0 06/29/2020    LDL 69.0 06/29/2020    NHDL 175 (H) 11/08/2019    UCRR 137 08/12/2019    MICROL 16 08/12/2019    UMALCR 11.39  08/12/2019     History of hyperlipidemia, takes simvastatin 20 mg daily  Last eye exam 5/2019, no DR  DM Complications:      None known        L[facility] in [place] [facility]  Has seen [facility]   Also sees Dr. Jose for general medicine evaluations.      ROS: 10 point ROS neg other than the symptoms noted above in the HPI.     GENERAL: no fatigue, mild weight gain; denies fevers, chills, night sweats.   HEENT: no dysphagia, odonophagia, diplopia, neck pain  THYROID:  no apparent hyper or hypothyroid symptoms  CV: no chest pain, pressure, palpitations  LUNGS: no SOB, MEHTA, cough, wheezing   ABDOMEN: episode of rectal bleeding; occasional nausea; denies abdominal pain  EXTREMITIES: no rashes, ulcers, edema  NEUROLOGY: no headaches, denies changes in vision, tingling, extremitiy numbness   MSK: no muscle aches or pains, weakness  SKIN: no rashes or lesions  : denies nocturia  PSYCH:  stable mood, no significant anxiety or depression  ENDOCRINE: no heat or cold intolerance        LABS:     All pertinent notes, labs, and images personally reviewed by me.      A/P:       Encounter Diagnoses   Name      Type 2 diabetes mellitus without complication, without long-term current use of insulin (H)      Hyperlipidemia LDL goal <100          Comments:  Reviewed health history and diabetes issues.  Reviewed and interpreted tests that I previously ordered. Ordered appropriate tests for the endocrinology disease management.   Management options discussed and implemented after shared medical decision making with the patient.     Plan:  Discussed general issues with the diabetes diagnosis and management  We discussed the hgbA1c test which reflects previous overall glucose levels or control  Discussed the importance of blood glucose (BG) testing to assess glucose trends  Provided general overview of the diabetes medication options and medication treatment plan.     Recommend:  We have discussed medication  options, metformin med use and potential side effects  Continue JanumetXR 100/1000 as 1-tablet daily  Add Jardiance 10 mg tablet each morning    Test FBG daily or every other day, goal target BG  mg/dl  Plan [facility] lab appt in 4/2021   Lab orders placed    Keep focus on diet, exercise, weight management.  Continue simvastatin med use, goal target LDL <100 mg/dl  Advise having fasting lipid panel testing and dilated eye examination, at least annually    Plan followup evaluation with PCP, including discussion of his rectal bleeding episode  Addressed patient questions today    Total time spent in evaluation with patient, review of pertinent lab tests and chart notes, and documentation:  22 min    Follow-up:  4/2021      LEO Flores MD, MS  Endocrinology  [facility]    CC:  LANCE Jose

## 2021-01-21 ENCOUNTER — MYC MEDICAL ADVICE (OUTPATIENT)
Dept: ENDOCRINOLOGY | Facility: CLINIC | Age: 41
End: 2021-01-21

## 2021-01-21 DIAGNOSIS — E11.9 TYPE 2 DIABETES MELLITUS WITHOUT COMPLICATION, WITHOUT LONG-TERM CURRENT USE OF INSULIN (H): Primary | ICD-10-CM

## 2021-01-22 DIAGNOSIS — I10 BENIGN ESSENTIAL HYPERTENSION: ICD-10-CM

## 2021-01-22 RX ORDER — LOSARTAN POTASSIUM 50 MG/1
TABLET ORAL
Qty: 90 TABLET | Refills: 1 | Status: SHIPPED | OUTPATIENT
Start: 2021-01-22 | End: 2021-07-12

## 2021-01-22 NOTE — TELEPHONE ENCOUNTER
Pt last seen 11/2/20, no future appt noted.    Prescription approved per [facility] Refill Protocol.      Cherie Stratton RN  January 22, 2021 1:59 PM

## 2021-04-09 ENCOUNTER — IMMUNIZATION (OUTPATIENT)
Dept: LAB | Facility: CLINIC | Age: 41
End: 2021-04-09
Payer: COMMERCIAL

## 2021-04-17 ENCOUNTER — HEALTH MAINTENANCE LETTER (OUTPATIENT)
Age: 41
End: 2021-04-17

## 2021-06-19 DIAGNOSIS — I10 BENIGN ESSENTIAL HYPERTENSION: ICD-10-CM

## 2021-06-21 RX ORDER — HYDROCHLOROTHIAZIDE 12.5 MG/1
TABLET ORAL
Qty: 90 TABLET | Refills: 0 | Status: SHIPPED | OUTPATIENT
Start: 2021-06-21 | End: 2021-09-23

## 2021-06-21 NOTE — TELEPHONE ENCOUNTER
Last Office Visit: 11/2/20  [place] [facility] [place]  Medication last refilled: 12/29/20 #90 + 1 RF  Last labs: 6/29/20    Prescription approved per Refill Protocol.  Lainey Gonzalez RN  06/21/21  12:03 PM

## 2021-07-10 DIAGNOSIS — I10 BENIGN ESSENTIAL HYPERTENSION: ICD-10-CM

## 2021-07-12 RX ORDER — LOSARTAN POTASSIUM 50 MG/1
TABLET ORAL
Qty: 90 TABLET | Refills: 1 | Status: SHIPPED | OUTPATIENT
Start: 2021-07-12 | End: 2022-01-10

## 2021-07-12 NOTE — TELEPHONE ENCOUNTER
Last Office Visit: 11/20/20  [place] [facility] [place]  Medication last refilled: 1/22/21 #90 1refills    Last labs 6/29/20    Prescription approved per [facility] Refill Protocol.    Ngoc Almodovar RN, BSN

## 2021-08-07 ENCOUNTER — HEALTH MAINTENANCE LETTER (OUTPATIENT)
Age: 41
End: 2021-08-07

## 2021-08-20 DIAGNOSIS — E78.5 HYPERLIPIDEMIA, UNSPECIFIED HYPERLIPIDEMIA TYPE: ICD-10-CM

## 2021-08-20 RX ORDER — SIMVASTATIN 20 MG
TABLET ORAL
Qty: 30 TABLET | Refills: 0 | Status: SHIPPED | OUTPATIENT
Start: 2021-08-20 | End: 2021-09-13

## 2021-08-20 NOTE — TELEPHONE ENCOUNTER
Last Office Visit: 11/2/20  Future [facility] [place]ointments: None  Medication last refilled: 8/24/20 #90 with 3 refill(s)    Required labs per protocol:     Ref Range & Units  6/29/20   LDL Cholesterol Direct 0.0 - 129.0 69.0      Medication is being filled for 1 time refill only due to:  Patient needs labs Hbg A1C, Lipid Panel, ALT and BMP. Future labs ordered no. Patient needs to be seen because due for annual exam.     [employer] message sent to patient to call and schedule annual exam.    Ngoc Almodovar, RN, BSN    
25-May-2018 20:14

## 2021-08-31 ENCOUNTER — LAB (OUTPATIENT)
Dept: LAB | Facility: CLINIC | Age: 41
End: 2021-08-31
Payer: COMMERCIAL

## 2021-08-31 DIAGNOSIS — E11.9 TYPE 2 DIABETES MELLITUS WITHOUT COMPLICATION, WITHOUT LONG-TERM CURRENT USE OF INSULIN (H): ICD-10-CM

## 2021-08-31 LAB
ALT SERPL W P-5'-P-CCNC: 120 U/L (ref 0–70)
ANION GAP SERPL CALCULATED.3IONS-SCNC: 5 MMOL/L (ref 3–14)
BUN SERPL-MCNC: 14 MG/DL (ref 7–30)
CALCIUM SERPL-MCNC: 8.9 MG/DL (ref 8.5–10.1)
CHLORIDE BLD-SCNC: 105 MMOL/L (ref 94–109)
CO2 SERPL-SCNC: 25 MMOL/L (ref 20–32)
CREAT SERPL-MCNC: 0.92 MG/DL (ref 0.66–1.25)
CREAT UR-MCNC: 69 MG/DL
GFR SERPL CREATININE-BSD FRML MDRD: >90 ML/MIN/1.73M2
GLUCOSE BLD-MCNC: 212 MG/DL (ref 70–99)
HBA1C MFR BLD: 6.5 % (ref 0–5.6)
MICROALBUMIN UR-MCNC: 8 MG/L
MICROALBUMIN/CREAT UR: 11.59 MG/G CR (ref 0–17)
POTASSIUM BLD-SCNC: 4.4 MMOL/L (ref 3.4–5.3)
SODIUM SERPL-SCNC: 135 MMOL/L (ref 133–144)

## 2021-08-31 PROCEDURE — 83036 HEMOGLOBIN GLYCOSYLATED A1C: CPT

## 2021-08-31 PROCEDURE — 82043 UR ALBUMIN QUANTITATIVE: CPT

## 2021-08-31 PROCEDURE — 84460 ALANINE AMINO (ALT) (SGPT): CPT

## 2021-08-31 PROCEDURE — 80048 BASIC METABOLIC PNL TOTAL CA: CPT

## 2021-09-07 ENCOUNTER — MYC MEDICAL ADVICE (OUTPATIENT)
Dept: FAMILY MEDICINE | Facility: CLINIC | Age: 41
End: 2021-09-07

## 2021-09-13 DIAGNOSIS — E78.5 HYPERLIPIDEMIA, UNSPECIFIED HYPERLIPIDEMIA TYPE: ICD-10-CM

## 2021-09-13 RX ORDER — SIMVASTATIN 20 MG
TABLET ORAL
Qty: 30 TABLET | Refills: 0 | Status: SHIPPED | OUTPATIENT
Start: 2021-09-13 | End: 2021-10-06

## 2021-09-13 NOTE — TELEPHONE ENCOUNTER
Simvastatin (Zocor) 20mg    Last Office Visit: 11/2/20  [place] [facility] [place]  Medication last refilled: 8/20/21 #30 with 0 refill(s)    Required labs per protocol:     Ref Range & Units 11/8/19 6/29/20   LDL Cholesterol 0.0 - 129.0 101.0 High 69.0     Prescription approved per [facility] Refill Protocol.    Ngoc Almodovar RN, BSN

## 2021-09-15 ENCOUNTER — OFFICE VISIT (OUTPATIENT)
Dept: FAMILY MEDICINE | Facility: CLINIC | Age: 41
End: 2021-09-15
Payer: COMMERCIAL

## 2021-09-15 VITALS
BODY MASS INDEX: 29.75 KG/M2 | RESPIRATION RATE: 13 BRPM | WEIGHT: 184.25 LBS | OXYGEN SATURATION: 99 % | SYSTOLIC BLOOD PRESSURE: 129 MMHG | DIASTOLIC BLOOD PRESSURE: 89 MMHG | HEART RATE: 84 BPM | TEMPERATURE: 97.3 F

## 2021-09-15 DIAGNOSIS — R00.0 TACHYCARDIA: ICD-10-CM

## 2021-09-15 DIAGNOSIS — K76.0 HEPATIC STEATOSIS: Primary | ICD-10-CM

## 2021-09-15 DIAGNOSIS — Z91.89 AT RISK FOR CARDIOVASCULAR EVENT: ICD-10-CM

## 2021-09-15 DIAGNOSIS — I10 BENIGN ESSENTIAL HYPERTENSION: ICD-10-CM

## 2021-09-15 DIAGNOSIS — Z23 IMMUNIZATION DUE: ICD-10-CM

## 2021-09-15 PROCEDURE — 86803 HEPATITIS C AB TEST: CPT | Performed by: FAMILY MEDICINE

## 2021-09-15 PROCEDURE — 86706 HEP B SURFACE ANTIBODY: CPT | Performed by: FAMILY MEDICINE

## 2021-09-15 PROCEDURE — 86708 HEPATITIS A ANTIBODY: CPT | Performed by: FAMILY MEDICINE

## 2021-09-15 PROCEDURE — 86709 HEPATITIS A IGM ANTIBODY: CPT | Performed by: FAMILY MEDICINE

## 2021-09-15 PROCEDURE — 87340 HEPATITIS B SURFACE AG IA: CPT | Performed by: FAMILY MEDICINE

## 2021-09-15 NOTE — PROGRESS NOTES
OVERVIEW: Bertin Mcgill is a 41 year old male who presents to [facility] today for Results (ALT), Wart (left great toe), Neck Pain, Back Pain, and Tachycardia (feels like heart is racing while sleeping and some times during the day)        ASSESSMENT/PLAN:    1. Hypertension   - Continue on Cozaar, 50 mg/day  - Aim to lose another 10 lbs. Increase exercise with treadmill  - Encouraged to check BP at home and record on cell phone  -Encouraged to do the same for his blood sugar readings.     2. DM2  -Follow recs of Endocrinology    On Janumet XR with an A1C at 7.9.   Added Jardiance and A1C now at 6.5     3. Hepatic steatosis  - Check Hepatitis studies  - Recommended weight loss, decrease alcohol to only 3 nights/week  - Recheck LFTs in another 2-3 months.   Of note, had an Ultrasound last year. May need to repeat     4. Left foot callous vs wart  - Apply pumuce stone daily to scrap off skin. Keep clean.   -Return if no improvement.     5. Periodic elevated heart rate.  - Referred to preventive cardiology    6. Neck and Back pain  -Return to clinic for further evaluation      Schedule visit in a few weeks.     --Miguel Jose MD      SUBJECTIVE:   Here to review labs.   Also, to discuss neck and back pain and elevated heart rate.     Labs were ordered from Endocrinology. They asked that he discuss the elevated ALT with me.     Also, with LEFT foot callous over the 1st toe IP joint area dorsum on fibular side.     He's taking his BP at home about every 3 days. Believes the numbers are around 130/90, but he can't recall exactly.       Re: DM2 - On Janumet XR with an A1C at 7.9.   Added Jardiance and A1C now at 6.5     Checks his blood sugars only irregularly. Last was a few weeks ago. He can't recall the numbers but thinks that it's been around 120 mg.     Still with occasional sleep problems. Tried Trazodone without success. Feels that it's a bit better than in the past.     Exercise is slightly up but still  "\"not what it should be.\" Now, has treadmill. Often uses it for a brisk walk for an hour. Occasional running.   No weight lifting.     Drinks alcohol on most nights. Trying to limit to 2 drinks/night.     Wt Readings from Last 5 Encounters:   09/15/21 83.6 kg (184 lb 4 oz)   11/02/20 87.5 kg (193 lb)   09/30/20 87.7 kg (193 lb 4 oz)   06/29/20 86 kg (189 lb 8 oz)   03/09/20 87.5 kg (193 lb)         BP Readings from Last 6 Encounters:   09/15/21 129/89   12/29/20 126/89   11/02/20 (!) 148/107   09/30/20 118/84   06/29/20 (!) 142/103   03/09/20 (!) 149/101       Review Of Systems:    Also with some back and neck pains.    Cardiovascular: negative  Respiratory: No shortness of breath, dyspnea on exertion, cough, or hemoptysis  Gastrointestinal: negative  Genitourinary: more frequent urination since on flozin medication.     Problem list per EMR:  Patient Active Problem List   Diagnosis     Type 2 diabetes mellitus (H)     Hyperlipidemia     Benign essential hypertension       Current Outpatient Medications   Medication Sig Dispense Refill     Acetaminophen (TYLENOL EXTRA STRENGTH PO)        fexofenadine (ALLEGRA) 30 MG ODT Take 30 mg by mouth once as needed        hydrochlorothiazide (HYDRODIURIL) 12.5 MG tablet TAKE 1 TABLET BY MOUTH EVERY DAY 90 tablet 0     ibuprofen (ADVIL/MOTRIN) 200 MG tablet        JARDIANCE 10 MG TABS tablet TAKE 1 TABLET BY MOUTH EVERY DAY 90 tablet 1     losartan (COZAAR) 50 MG tablet TAKE 1 TABLET BY MOUTH EVERYDAY AT BEDTIME 90 tablet 1     Multiple Vitamins-Minerals (CENTRUM MEN PO)        Naproxen Sodium (ALEVE PO)        simvastatin (ZOCOR) 20 MG tablet TAKE 1 TABLET (20 MG) BY MOUTH ONCE A DAY WITH EVENING MEAL. 30 tablet 0     SitaGLIPtin-MetFORMIN HCl (JANUMET XR) 100-1000 MG TB24 Take 1 tablet by mouth daily 90 tablet 1     traZODone (DESYREL) 50 MG tablet as needed        VITAMIN D PO Take 2,000 Units by mouth         Allergies   Allergen Reactions     Seasonal Allergies  "        Social:   Unchanged.  He continues to work for the [employer].  He is working from home given the pandemic.    OBJECTIVE    Vitals: /89   Pulse 84   Temp 97.3  F (36.3  C) (Skin)   Resp 13   Wt 83.6 kg (184 lb 4 oz)   SpO2 99%   BMI 29.75 kg/m    BMI= Body mass index is 29.75 kg/m .  He appears in his usual state of health.  In no distress.  His gait is normal.  HEENT is unremarkable.  Cardiovascular-regular rate and rhythm without murmur    Lungs-clear to auscultation    Abdomen-is nontender.  Liver size was not assessed.    Left foot dorsum of the interphalangeal joint on the fibular side has a small callus.  No redness.    SEE TOP OF NOTE FOR ASSESSMENT AND PLAN    --Miguel Jose MD  [facility], Department of Family Medicine and Community Health

## 2021-09-15 NOTE — NURSING NOTE
41 year old  Chief Complaint   Patient presents with     Results     ALT     Wart     left great toe     Neck Pain     Back Pain     Tachycardia     feels like heart is racing while sleeping and some times during the day       Blood pressure (!) 138/92, pulse 84, temperature 97.3  F (36.3  C), temperature source Skin, resp. rate 13, weight 83.6 kg (184 lb 4 oz), SpO2 99 %. Body mass index is 29.75 kg/m .  Patient Active Problem List   Diagnosis     Type 2 diabetes mellitus (H)     Hyperlipidemia       Wt Readings from Last 2 Encounters:   09/15/21 83.6 kg (184 lb 4 oz)   11/02/20 87.5 kg (193 lb)     BP Readings from Last 3 Encounters:   09/15/21 (!) 138/92   12/29/20 126/89   11/02/20 (!) 148/107         Current Outpatient Medications   Medication     Acetaminophen (TYLENOL EXTRA STRENGTH PO)     fexofenadine (ALLEGRA) 30 MG ODT     hydrochlorothiazide (HYDRODIURIL) 12.5 MG tablet     ibuprofen (ADVIL/MOTRIN) 200 MG tablet     JARDIANCE 10 MG TABS tablet     losartan (COZAAR) 50 MG tablet     Multiple Vitamins-Minerals (CENTRUM MEN PO)     Naproxen Sodium (ALEVE PO)     simvastatin (ZOCOR) 20 MG tablet     SitaGLIPtin-MetFORMIN HCl (JANUMET XR) 100-1000 MG TB24     traZODone (DESYREL) 50 MG tablet     VITAMIN D PO     No current facility-administered medications for this visit.       Social History     Tobacco Use     Smoking status: Current Some Day Smoker     Smokeless tobacco: Never Used   Substance Use Topics     Alcohol use: Yes     Drug use: Never       Health Maintenance Due   Topic Date Due     PREVENTIVE CARE VISIT  Never done     ADVANCE CARE PLANNING  Never done     EYE EXAM  Never done     HIV SCREENING  Never done     HEPATITIS C SCREENING  Never done     HEPATITIS B IMMUNIZATION (1 of 3 - Risk 3-dose series) Never done     DIABETIC FOOT EXAM  08/12/2020     LIPID  06/29/2021     INFLUENZA VACCINE (1) 09/01/2021       No results found for: PAP      September 15, 2021 2:42 PM

## 2021-09-15 NOTE — PATIENT INSTRUCTIONS
ASSESSMENT/PLAN:    1. Hypertension   - Continue on Cozaar, 50 mg/day  - Aim to lose another 10 lbs. Increase exercise with treadmill  - Encouraged to check BP at home and record on cell phone  -Encouraged to do the same for his blood sugar readings.     2. DM2  -Follow recs of Endocrinology    On Janumet XR with an A1C at 7.9.   Added Jardiance and A1C now at 6.5     3. Hepatic steatosis  - Check Hepatitis studies  - Recommended weight loss, decrease alcohol to only 3 nights/week  - Recheck LFTs in another 2-3 months.   Of note, had an Ultrasound last year. May need to repeat     4. Left foot callous vs wart  - Apply pumuce stone daily to scrap off skin. Keep clean.   -Return if no improvement.     5. Periodic elevated heart rate.  - Referred to preventive cardiology    6. Neck and Back pain  -Return to clinic for further evaluation    Schedule visit for 2-6 weeks    --Miguel Jose MD

## 2021-09-16 LAB
HAV IGG SER QL IA: NONREACTIVE
HAV IGM SERPL QL IA: NONREACTIVE
HBV SURFACE AB SERPL IA-ACNC: 0 M[IU]/ML
HBV SURFACE AG SERPL QL IA: NONREACTIVE
HCV AB SERPL QL IA: NONREACTIVE

## 2021-09-23 ENCOUNTER — TELEPHONE (OUTPATIENT)
Dept: FAMILY MEDICINE | Facility: CLINIC | Age: 41
End: 2021-09-23

## 2021-09-23 DIAGNOSIS — I10 BENIGN ESSENTIAL HYPERTENSION: ICD-10-CM

## 2021-09-23 RX ORDER — HYDROCHLOROTHIAZIDE 12.5 MG/1
TABLET ORAL
Qty: 90 TABLET | Refills: 0 | Status: SHIPPED | OUTPATIENT
Start: 2021-09-23 | End: 2021-12-22

## 2021-09-23 NOTE — TELEPHONE ENCOUNTER
Hydrochlorothiazide (Hydrodiuril) 12.5 mg    Last Office Visit: 9/15/21  [facility] Appointments: None  Medication last refilled: 6/21/21 #90 with 0 refill(s)    Vital Signs 12/29/2020 9/15/2021 9/15/2021   Systolic 126 138 129   Diastolic 89 92 89     Required labs per protocol:    DRUG REF RANGE 6/29/20 8/31/21   Creatinine 0.8-1.25 mg/dL 0.9 0.92   Potassium 3.4-5.3 mmol/L 4.7 4.4   Sodium 137.3-146.3 mmol/L 142 135     Prescription approved per [facility] Refill Protocol.    Asked scheduling to contact patient to schedule his blood pressure recheck with Dr. Jose per Dr. Jose note on 9/15/21.    Ngoc Almodovar, RN, BSN

## 2021-09-23 NOTE — TELEPHONE ENCOUNTER
----- Message from Ngoc Almodovar RN sent at 9/23/2021 12:52 PM CDT -----  Regarding: Call to Schedule  Hi,  Please call Lj to schedule a BP follow-up with Dr. Jose in mid-October.  Thanks!

## 2021-10-02 ENCOUNTER — HEALTH MAINTENANCE LETTER (OUTPATIENT)
Age: 41
End: 2021-10-02

## 2021-10-06 DIAGNOSIS — E78.5 HYPERLIPIDEMIA, UNSPECIFIED HYPERLIPIDEMIA TYPE: ICD-10-CM

## 2021-10-06 RX ORDER — SIMVASTATIN 20 MG
TABLET ORAL
Qty: 30 TABLET | Refills: 5 | Status: SHIPPED | OUTPATIENT
Start: 2021-10-06 | End: 2022-04-27

## 2021-10-06 NOTE — TELEPHONE ENCOUNTER
Simvastatin (Zocor) 20 mg    Last Office Visit: 9/15/21  [facility] [place] [place] Appointments: None   Medication last refilled: 9/13/21 #30 with 0 refill(s)    Required labs per protocol:    DRUG REF RANGE 11/8/19 6/29/20   LDL 0.0-129.0  101 High 69     Prescription approved per [facility] Refill Protocol.    Ngoc Almodovar, RN, BSN

## 2021-10-07 PROBLEM — E11.9 TYPE 2 DIABETES MELLITUS WITHOUT COMPLICATION, WITHOUT LONG-TERM CURRENT USE OF INSULIN (H): Status: ACTIVE | Noted: 2017-10-18

## 2021-10-07 RX ORDER — CHLORAL HYDRATE 500 MG
1000 CAPSULE ORAL
COMMUNITY
End: 2022-04-06

## 2021-10-07 RX ORDER — MULTIVITAMIN
1 TABLET ORAL
COMMUNITY
Start: 2019-11-25 | End: 2022-04-06

## 2021-10-07 ASSESSMENT — ENCOUNTER SYMPTOMS
SINUS CONGESTION: 1
LIGHT-HEADEDNESS: 0
HYPOTENSION: 0
HYPERTENSION: 1
WEIGHT GAIN: 0
DECREASED APPETITE: 0
FATIGUE: 1
NAUSEA: 0
JOINT SWELLING: 0
MUSCLE CRAMPS: 0
HOARSE VOICE: 0
SYNCOPE: 0
DECREASED CONCENTRATION: 0
STIFFNESS: 0
BLOOD IN STOOL: 0
SORE THROAT: 0
HALLUCINATIONS: 0
TASTE DISTURBANCE: 0
TROUBLE SWALLOWING: 0
FEVER: 0
CONSTIPATION: 1
INCREASED ENERGY: 0
DIARRHEA: 1
BOWEL INCONTINENCE: 0
ORTHOPNEA: 0
HEARTBURN: 1
NERVOUS/ANXIOUS: 1
MYALGIAS: 1
BACK PAIN: 1
JAUNDICE: 0
NIGHT SWEATS: 1
ALTERED TEMPERATURE REGULATION: 0
MUSCLE WEAKNESS: 0
PANIC: 0
BLOATING: 1
NECK MASS: 0
WEIGHT LOSS: 0
VOMITING: 0
RECTAL PAIN: 0
SMELL DISTURBANCE: 0
DEPRESSION: 0
NECK PAIN: 1
POLYDIPSIA: 1
POLYPHAGIA: 0
PALPITATIONS: 1
SINUS PAIN: 1
INSOMNIA: 1
CHILLS: 0
LEG PAIN: 0
EXERCISE INTOLERANCE: 0
SLEEP DISTURBANCES DUE TO BREATHING: 0
ABDOMINAL PAIN: 0
ARTHRALGIAS: 0

## 2021-10-14 ENCOUNTER — PRE VISIT (OUTPATIENT)
Dept: CARDIOLOGY | Facility: CLINIC | Age: 41
End: 2021-10-14

## 2021-10-14 ENCOUNTER — OFFICE VISIT (OUTPATIENT)
Dept: CARDIOLOGY | Facility: CLINIC | Age: 41
End: 2021-10-14
Attending: INTERNAL MEDICINE
Payer: COMMERCIAL

## 2021-10-14 VITALS
DIASTOLIC BLOOD PRESSURE: 83 MMHG | SYSTOLIC BLOOD PRESSURE: 125 MMHG | WEIGHT: 185.4 LBS | HEART RATE: 99 BPM | BODY MASS INDEX: 29.94 KG/M2 | OXYGEN SATURATION: 98 %

## 2021-10-14 DIAGNOSIS — R00.2 PALPITATIONS: Primary | ICD-10-CM

## 2021-10-14 DIAGNOSIS — I10 BENIGN ESSENTIAL HYPERTENSION: ICD-10-CM

## 2021-10-14 DIAGNOSIS — E11.9 TYPE 2 DIABETES MELLITUS WITHOUT COMPLICATION, WITHOUT LONG-TERM CURRENT USE OF INSULIN (H): ICD-10-CM

## 2021-10-14 PROCEDURE — 93005 ELECTROCARDIOGRAM TRACING: CPT

## 2021-10-14 PROCEDURE — 99204 OFFICE O/P NEW MOD 45 MIN: CPT | Mod: GC | Performed by: INTERNAL MEDICINE

## 2021-10-14 PROCEDURE — G0463 HOSPITAL OUTPT CLINIC VISIT: HCPCS | Mod: 25

## 2021-10-14 ASSESSMENT — PAIN SCALES - GENERAL: PAINLEVEL: NO PAIN (0)

## 2021-10-14 NOTE — NURSING NOTE
No medication changes at this time.     Sleep study referral placed.     Pt to reach out if he is having more palpitations, well then send him a ziopatch.     Follow up with Dr. Alejo as needed.     Moiz Kemp, RN   Cardiology Nurse Coordinator

## 2021-10-14 NOTE — PROGRESS NOTES
"October 14, 2021     [facility] Cardiac Electrophysiology Clinic    Bertin Mcgill is a 41 year old man with a history of HTN, HLD, DMII who presents for evaluation of tachycardia and palpitations.    He reports intermittent symptoms for the past 6-8 months. He specifically feels his heart racing at night. He can feel when he is going to bed, but more commonly he wake up in the middle of the night sweaty with a racing heart. He does not experience symptoms during the day. His symptoms come and go, he has not had an episode for a few weeks. Other times he will have episodes every night for a week. He denies any lightheadedness or dizziness. He has a \"pinch\" of chest pain intermittently but this is not associated with his heart racing. No dyspnea, orthopnea, leg swelling. He has taken up exercising again recently and feels deconditioned.     He drinks about 1-2 alcoholic beverages per night. He smokes 1ppd. No excessive caffeine use. No illicit drug use. He has never been diagnosed with sleep apnea previously but it has been suggested to him before that he has sleep apnea.    PAST MEDICAL HISTORY:  Past Medical History:   Diagnosis Date     Allergic rhinitis      Benign essential hypertension      Hyperlipidemia      Rotator cuff tear, left 2015     Rotator cuff tear, right 2017     Type 2 diabetes mellitus (H)        FAMILY HISTORY:  No family history on file.    SOCIAL HISTORY:  Social History     Socioeconomic History     Marital status: Single     Spouse name: Not on file     Number of children: Not on file     Years of education: Not on file     Highest education level: Not on file   Occupational History     Not on file   Tobacco Use     Smoking status: Current Some Day Smoker     Smokeless tobacco: Never Used   Substance and Sexual Activity     Alcohol use: Yes     Drug use: Never     Sexual activity: Not on file   Other Topics Concern     Not on file   Social History Narrative    Single.     " "Works as \"\" for [employer] at [employer].     From [place]     Social Determinants of Health     Financial Resource Strain:      Difficulty of Paying Living Expenses:    Food Insecurity:      Worried About Running Out of Food in the Last Year:      Ran Out of Food in the Last Year:    Transportation Needs:      Lack of Transportation (Medical):      Lack of Transportation (Non-Medical):    Physical Activity:      Days of Exercise per Week:      Minutes of Exercise per Session:    Stress:      Feeling of Stress :    Social Connections:      Frequency of Communication with Friends and Family:      Frequency of Social Gatherings with Friends and Family:      Attends Anabaptism Services:      Active Member of Clubs or Organizations:      Attends Club or Organization Meetings:      Marital Status:    Intimate Partner Violence:      Fear of Current or Ex-Partner:      Emotionally Abused:      Physically Abused:      Sexually Abused:        CURRENT MEDICATIONS:  Current Outpatient Medications   Medication Sig Dispense Refill     Acetaminophen (TYLENOL EXTRA STRENGTH PO)        fexofenadine (ALLEGRA) 30 MG ODT Take 30 mg by mouth once as needed        fish oil-omega-3 fatty acids 1000 MG capsule Take 1,000 mg by mouth       hydrochlorothiazide (HYDRODIURIL) 12.5 MG tablet TAKE 1 TABLET BY MOUTH EVERY DAY 90 tablet 0     ibuprofen (ADVIL/MOTRIN) 200 MG tablet        JARDIANCE 10 MG TABS tablet TAKE 1 TABLET BY MOUTH EVERY DAY 90 tablet 1     losartan (COZAAR) 50 MG tablet TAKE 1 TABLET BY MOUTH EVERYDAY AT BEDTIME 90 tablet 1     Multiple Vitamins-Minerals (CENTRUM MEN PO)        Naproxen Sodium (ALEVE PO)        simvastatin (ZOCOR) 20 MG tablet TAKE 1 TABLET (20 MG) BY MOUTH ONCE A DAY WITH EVENING MEAL. 30 tablet 5     SitaGLIPtin-MetFORMIN HCl (JANUMET XR) 100-1000 MG TB24 Take 1 tablet by mouth daily 90 tablet 1     Specialty Vitamins Products (VITAMINS FOR HAIR) TABS Take 1 tablet by mouth       " traZODone (DESYREL) 50 MG tablet as needed        VITAMIN D PO Take 2,000 Units by mouth         ROS:   Comprehensive ROS negative except HPI    EXAM:  There were no vitals taken for this visit.  General: appears comfortable, alert and articulate  Head: normocephalic, atraumatic  Eyes: anicteric sclera, EOMI  Neck: no adenopathy  Orophyarynx: moist mucosa, no lesions, dentition intact  Heart: regular, S1/S2, no murmur, gallop, rub, JVP not elevated   Lungs: clear, no rales or wheezing  Abdomen: soft, non-tender, bowel sounds present, no hepatosplenomegaly  Extremities: no clubbing, cyanosis or edema  Neurological: normal speech and affect, no gross motor deficits    Labs:  No results for input(s): WBC, HGB, HCT, PLT in the last 45950 hours.  Recent Labs   Lab Test 08/31/21  1020 06/29/20  1513 11/08/19  0936 11/08/19  0936    142.0   < > 138   POTASSIUM 4.4 4.7   < > 4.1   CHLORIDE 105 103.0   < > 106   CO2 25 28.0   < > 23   BUN 14 12.0   < > 14   CR 0.92 0.9   < > 0.72   GFRESTIMATED >90  --   --  >90    < > = values in this interval not displayed.     Recent Labs   Lab Test 06/29/20  1454 11/08/19  0936   CHOL 204.0* 228*   HDL 67.0 53   LDL 69.0 Cannot estimate LDL when triglyceride exceeds 400 mg/dL  101*   TRIG 343.0* 518*   CHOLHDLRATIO 3.1  --      EKG  SR, right access deviation    Assessment and Plan: Bertin Mcgill is a 41 year old with a history of HTN, HLD, DMII who presents for evaluation of tachycardia and palpitations.    #Palpitations  Intermittent nocturnal palpitations and heart racing for the past 6 months. No symptoms during the day. No other associated symptoms outside of sweating. Ddx includes AFTAB, SVT, anxiety. Given that it only happens intermittently (no episodes in the last few weeks), an event monitor will be of limited use. He would likely benefit from a sleep study to ensure no AFTAB.   - Sleep referral  - Monitor symptoms, if recurs he has been instructed to call clinic and we  will send him a ziopatch     Follow up pending the results of the above.    This patient was seen and discussed with Dr. Alejo.     Bill Donato MD  Cardiology Fellow  [phone]

## 2021-10-14 NOTE — LETTER
"10/14/2021      RE: Bertin Mcgill  [address] [facility] [address]       Dear Colleague,    Thank you for the opportunity to participate in the care of your patient, Bertin Mcgill, at the [facility] [place] [facility]. Please see a copy of my visit note below.    October 14, 2021     [facility] Cardiac Electrophysiology Clinic    Bertin Mcgill is a 41 year old man with a history of HTN, HLD, DMII who presents for evaluation of tachycardia and palpitations.    He reports intermittent symptoms for the past 6-8 months. He specifically feels his heart racing at night. He can feel when he is going to bed, but more commonly he wake up in the middle of the night sweaty with a racing heart. He does not experience symptoms during the day. His symptoms come and go, he has not had an episode for a few weeks. Other times he will have episodes every night for a week. He denies any lightheadedness or dizziness. He has a \"pinch\" of chest pain intermittently but this is not associated with his heart racing. No dyspnea, orthopnea, leg swelling. He has taken up exercising again recently and feels deconditioned.     He drinks about 1-2 alcoholic beverages per night. He smokes 1ppd. No excessive caffeine use. No illicit drug use. He has never been diagnosed with sleep apnea previously but it has been suggested to him before that he has sleep apnea.    PAST MEDICAL HISTORY:  Past Medical History:   Diagnosis Date     Allergic rhinitis      Benign essential hypertension      Hyperlipidemia      Rotator cuff tear, left 2015     Rotator cuff tear, right 2017     Type 2 diabetes mellitus (H)        FAMILY HISTORY:  No family history on file.    SOCIAL HISTORY:  Social History     Socioeconomic History     Marital status: Single     Spouse name: Not on file     Number of children: Not on file     Years of education: Not on file     Highest " "education level: Not on file   Occupational History     Not on file   Tobacco Use     Smoking status: Current Some Day Smoker     Smokeless tobacco: Never Used   Substance and Sexual Activity     Alcohol use: Yes     Drug use: Never     Sexual activity: Not on file   Other Topics Concern     Not on file   Social History Narrative    Single.     Works as \"\" for [employer] at [employer].     From [place]     Social Determinants of Health     Financial Resource Strain:      Difficulty of Paying Living Expenses:    Food Insecurity:      Worried About Running Out of Food in the Last Year:      Ran Out of Food in the Last Year:    Transportation Needs:      Lack of Transportation (Medical):      Lack of Transportation (Non-Medical):    Physical Activity:      Days of Exercise per Week:      Minutes of Exercise per Session:    Stress:      Feeling of Stress :    Social Connections:      Frequency of Communication with Friends and Family:      Frequency of Social Gatherings with Friends and Family:      Attends [employer] Services:      Active Member of Clubs or Organizations:      Attends Club or Organization Meetings:      Marital Status:    Intimate Partner Violence:      Fear of Current or Ex-Partner:      Emotionally Abused:      Physically Abused:      Sexually Abused:        CURRENT MEDICATIONS:  Current Outpatient Medications   Medication Sig Dispense Refill     Acetaminophen (TYLENOL EXTRA STRENGTH PO)        fexofenadine (ALLEGRA) 30 MG ODT Take 30 mg by mouth once as needed        fish oil-omega-3 fatty acids 1000 MG capsule Take 1,000 mg by mouth       hydrochlorothiazide (HYDRODIURIL) 12.5 MG tablet TAKE 1 TABLET BY MOUTH EVERY DAY 90 tablet 0     ibuprofen (ADVIL/MOTRIN) 200 MG tablet        JARDIANCE 10 MG TABS tablet TAKE 1 TABLET BY MOUTH EVERY DAY 90 tablet 1     losartan (COZAAR) 50 MG tablet TAKE 1 TABLET BY MOUTH EVERYDAY AT BEDTIME 90 tablet 1     Multiple Vitamins-Minerals " (CENTRUM MEN PO)        Naproxen Sodium (ALEVE PO)        simvastatin (ZOCOR) 20 MG tablet TAKE 1 TABLET (20 MG) BY MOUTH ONCE A DAY WITH EVENING MEAL. 30 tablet 5     SitaGLIPtin-MetFORMIN HCl (JANUMET XR) 100-1000 MG TB24 Take 1 tablet by mouth daily 90 tablet 1     Specialty Vitamins Products (VITAMINS FOR HAIR) TABS Take 1 tablet by mouth       traZODone (DESYREL) 50 MG tablet as needed        VITAMIN D PO Take 2,000 Units by mouth         ROS:   Comprehensive ROS negative except HPI    EXAM:  There were no vitals taken for this visit.  General: appears comfortable, alert and articulate  Head: normocephalic, atraumatic  Eyes: anicteric sclera, EOMI  Neck: no adenopathy  Orophyarynx: moist mucosa, no lesions, dentition intact  Heart: regular, S1/S2, no murmur, gallop, rub, JVP not elevated   Lungs: clear, no rales or wheezing  Abdomen: soft, non-tender, bowel sounds present, no hepatosplenomegaly  Extremities: no clubbing, cyanosis or edema  Neurological: normal speech and affect, no gross motor deficits    Labs:  No results for input(s): WBC, HGB, HCT, PLT in the last 56362 hours.  Recent Labs   Lab Test 08/31/21  1020 06/29/20  1513 11/08/19  0936 11/08/19  0936    142.0   < > 138   POTASSIUM 4.4 4.7   < > 4.1   CHLORIDE 105 103.0   < > 106   CO2 25 28.0   < > 23   BUN 14 12.0   < > 14   CR 0.92 0.9   < > 0.72   GFRESTIMATED >90  --   --  >90    < > = values in this interval not displayed.     Recent Labs   Lab Test 06/29/20  1454 11/08/19  0936   CHOL 204.0* 228*   HDL 67.0 53   LDL 69.0 Cannot estimate LDL when triglyceride exceeds 400 mg/dL  101*   TRIG 343.0* 518*   CHOLHDLRATIO 3.1  --      EKG  SR, right access deviation    Assessment and Plan: Bertin Mcgill is a 41 year old with a history of HTN, HLD, DMII who presents for evaluation of tachycardia and palpitations.    #Palpitations  Intermittent nocturnal palpitations and heart racing for the past 6 months. No symptoms during the day. No other  associated symptoms outside of sweating. Ddx includes AFTAB, SVT, anxiety. Given that it only happens intermittently (no episodes in the last few weeks), an event monitor will be of limited use. He would likely benefit from a sleep study to ensure no AFTAB.   - Sleep referral  - Monitor symptoms, if recurs he has been instructed to call clinic and we will send him a ziopatch     Follow up pending the results of the above.    This patient was seen and discussed with Dr. Alejo.     Bill Donato MD  Cardiology Fellow  [phone]      Attestation signed by Sandhya Alejo MD at 10/15/2021  9:22 AM (Updated):  Patient seen and examined with Dr. Donato. The history and physical findings are accurate as recorded.   Briefly, healthy 40 yo with intermittent palpitations, only at night, often waking him up in the middle of the night. No symptoms last several weeks. DMII on sitagliptin-metformin and empagliflozin, HTN on hydrochlorothiazide and losartan.    All labs, imaging studies, ECG and telemetry data have been reviewed personally. Specifically,   EKG today: sinus 94 bpm, rightward axis. Normal intervals.    The assessment and plans outlined reflect our joint decision making.  Sleep study.  If/when symptoms recur - I asked him notify me and will send him a patch monitor.      Dr. Donato and I spent a total of 30 minutes with him today, and another 15 minutes on chart review and documentation.        Please do not hesitate to contact me if you have any questions/concerns.     Sincerely,     Sandhya Alejo MD

## 2021-10-14 NOTE — NURSING NOTE
Chief Complaint   Patient presents with     New Patient     October 14, 2021 -  Dr. Alejo: NEW Cardiology referral for Tachycardia      Vitals were taken, medications reconciled, and EKG was performed.    Cordell Joyner, EMT  4:20 PM

## 2021-10-14 NOTE — PATIENT INSTRUCTIONS
"You were seen today in the Cardiovascular Clinic at the [facility].     Cardiology Providers you saw during your visit: Dr. Sandhya Alejo    Diagnosis: palpitations    Results: discussed with patient    Orders:   None    Medication Changes:   None    Recommendations:   Sleep study  If you have more palpitations on a regular basis, please send me a note through [employer] and we can send you a heart monitor     Follow-up:   As needed  Please follow up with primary care provider for medication refills         Please feel free to call me with any questions or concerns.       Nadia Rodriguez RN     Questions and [phone].   First press #1 for the ARPU and then press #3 for \"Medical Questions\" to reach us Cardiology Nurses.      On Call Cardiologist for after hours or on weekends: [phone]   option #4 and ask to speak to the on-call Cardiologist.          If you need a medication refill please contact your pharmacy.  Please allow 3 business days for your refill to be completed.    "

## 2021-10-15 LAB
ATRIAL RATE - MUSE: 94 BPM
DIASTOLIC BLOOD PRESSURE - MUSE: NORMAL MMHG
INTERPRETATION ECG - MUSE: NORMAL
P AXIS - MUSE: 47 DEGREES
PR INTERVAL - MUSE: 170 MS
QRS DURATION - MUSE: 92 MS
QT - MUSE: 338 MS
QTC - MUSE: 422 MS
R AXIS - MUSE: 104 DEGREES
SYSTOLIC BLOOD PRESSURE - MUSE: NORMAL MMHG
T AXIS - MUSE: 41 DEGREES
VENTRICULAR RATE- MUSE: 94 BPM

## 2021-12-07 ASSESSMENT — ENCOUNTER SYMPTOMS
POLYPHAGIA: 0
WEIGHT LOSS: 0
MYALGIAS: 1
BACK PAIN: 1
VOMITING: 0
DECREASED CONCENTRATION: 0
NIGHT SWEATS: 1
RECTAL PAIN: 0
INSOMNIA: 1
HEARTBURN: 1
SMELL DISTURBANCE: 0
PANIC: 0
DEPRESSION: 0
BLOOD IN STOOL: 0
ALTERED TEMPERATURE REGULATION: 0
BLOATING: 1
INCREASED ENERGY: 0
DECREASED APPETITE: 0
NAUSEA: 0
ABDOMINAL PAIN: 0
MUSCLE CRAMPS: 0
SINUS PAIN: 0
CONSTIPATION: 1
NECK PAIN: 1
FEVER: 0
JOINT SWELLING: 0
STIFFNESS: 0
HALLUCINATIONS: 0
NECK MASS: 0
BOWEL INCONTINENCE: 0
CHILLS: 0
SINUS CONGESTION: 1
WEIGHT GAIN: 0
HOARSE VOICE: 0
DIARRHEA: 1
NERVOUS/ANXIOUS: 1
JAUNDICE: 0
ARTHRALGIAS: 0
MUSCLE WEAKNESS: 0
TROUBLE SWALLOWING: 0
POLYDIPSIA: 0
TASTE DISTURBANCE: 0
FATIGUE: 1
SORE THROAT: 0

## 2021-12-07 ASSESSMENT — SLEEP AND FATIGUE QUESTIONNAIRES
HOW LIKELY ARE YOU TO NOD OFF OR FALL ASLEEP WHILE SITTING QUIETLY AFTER LUNCH WITHOUT ALCOHOL: SLIGHT CHANCE OF DOZING
HOW LIKELY ARE YOU TO NOD OFF OR FALL ASLEEP WHILE WATCHING TV: WOULD NEVER DOZE
HOW LIKELY ARE YOU TO NOD OFF OR FALL ASLEEP WHILE SITTING AND READING: SLIGHT CHANCE OF DOZING
HOW LIKELY ARE YOU TO NOD OFF OR FALL ASLEEP WHILE LYING DOWN TO REST IN THE AFTERNOON WHEN CIRCUMSTANCES PERMIT: MODERATE CHANCE OF DOZING
HOW LIKELY ARE YOU TO NOD OFF OR FALL ASLEEP WHILE SITTING AND TALKING TO SOMEONE: WOULD NEVER DOZE
HOW LIKELY ARE YOU TO NOD OFF OR FALL ASLEEP WHILE SITTING INACTIVE IN A PUBLIC PLACE: WOULD NEVER DOZE
HOW LIKELY ARE YOU TO NOD OFF OR FALL ASLEEP WHEN YOU ARE A PASSENGER IN A CAR FOR AN HOUR WITHOUT A BREAK: SLIGHT CHANCE OF DOZING
HOW LIKELY ARE YOU TO NOD OFF OR FALL ASLEEP IN A CAR, WHILE STOPPED FOR A FEW MINUTES IN TRAFFIC: WOULD NEVER DOZE

## 2021-12-13 ENCOUNTER — VIRTUAL VISIT (OUTPATIENT)
Dept: SLEEP MEDICINE | Facility: CLINIC | Age: 41
End: 2021-12-13
Attending: INTERNAL MEDICINE
Payer: COMMERCIAL

## 2021-12-13 VITALS — BODY MASS INDEX: 29.73 KG/M2 | WEIGHT: 185 LBS | HEIGHT: 66 IN

## 2021-12-13 DIAGNOSIS — E66.3 OVERWEIGHT (BMI 25.0-29.9): ICD-10-CM

## 2021-12-13 DIAGNOSIS — R61 NIGHT SWEATS: ICD-10-CM

## 2021-12-13 DIAGNOSIS — F51.04 PSYCHOPHYSIOLOGICAL INSOMNIA: ICD-10-CM

## 2021-12-13 DIAGNOSIS — G47.23 CIRCADIAN RHYTHM SLEEP DISORDER, IRREGULAR SLEEP WAKE TYPE: ICD-10-CM

## 2021-12-13 DIAGNOSIS — G47.9 SLEEP DISTURBANCE: Primary | ICD-10-CM

## 2021-12-13 DIAGNOSIS — R06.83 SNORING: ICD-10-CM

## 2021-12-13 DIAGNOSIS — R00.2 PALPITATIONS: ICD-10-CM

## 2021-12-13 PROCEDURE — 99205 OFFICE O/P NEW HI 60 MIN: CPT | Mod: 95 | Performed by: NURSE PRACTITIONER

## 2021-12-13 RX ORDER — ZOLPIDEM TARTRATE 10 MG/1
TABLET ORAL
Qty: 2 TABLET | Refills: 0 | Status: SHIPPED | OUTPATIENT
Start: 2021-12-13 | End: 2022-02-15

## 2021-12-13 ASSESSMENT — MIFFLIN-ST. JEOR: SCORE: 1691.9

## 2021-12-13 NOTE — PROGRESS NOTES
Lj is a 41 year old who is being evaluated via a billable video visit.      How would you like to obtain your AVS? MyChart  If the video visit is dropped, the invitation should be resent by: Send to e-mail at: [email or web address]  Will anyone else be joining your video visit? No        Video-Visit Details    Type of service:  Video Visit  Video Start Time: 3:29 PM  Video End Time:  4:14 PM    Originating Location (pt. Location): Home    Distant Location (provider location):  [facility]     Platform used for Video Visit: wywy       Medication and allergies have been reviewed.     MIA Henry       Sleep Consultation:    Date on this visit: 12/13/2021    Bertin Mcgill is sent by Sandhya Alejo for a sleep consultation regarding intermittent heart palpitations/tachycardia, night sweats, evaluate for possible AFTAB.    Primary Physician: Miguel Jose     Bertin Mcgill is a pleasant 41-year-old male with a PMH pertinent for HTN, HLD, DM 2, hepatic steatosis, nicotine dependence, allergic rhinitis, and overweight who presents today with reports of occasional snoring and previously told may have sleep apnea for several years.  He wakes up not feeling well-rested/refreshed. He has difficulty falling asleep and will often restrict his sleep to achieve the ability to fall asleep. He was seen by Dr. Sandhya Alejo, cardiology, who referred the patient for evaluation for possible obstructive sleep apnea due to intermittent symptoms of heart palpitations/tachycardia and night sweats.    Bertin goes to sleep at 2:00 - 3:00 AM during the week. He wakes up at 7:30 - 9:30 AM with an alarm. He falls asleep in  minutes.  Bertin has difficulty falling asleep.  He wakes up 0-1 times a night for 5 minutes before falling back to sleep.  Bertin wakes up to go to the bathroom.  On weekends, Bertin goes to sleep at 2:00 - 3:30 AM.  He wakes up at 9:00 -10:00 AM without an alarm. He  "falls asleep in 30 minutes.  Patient gets an average of 6-12 hours of sleep per night.     Patient does worry in bed about everything and anything and does not use electronics in bed, watch TV in bed and read in bed.     Bertin does not do shift work.  He works day hours.  Works as \"\" for [employer] at [employer].    Bertin does snore some nights and snoring is loud. Patient does not have a regular bed partner. There is report of snoring.  He usually does not have witnessed apneas. Patient sleeps on his side. He has occasional morning dry mouth and morning headaches, denies occasional sleep disturbance, snort arousals, morning confusion and restless legs. Bertin denies any bruxism, sleep walking, sleep talking, dream enactment, sleep paralysis, cataplexy and hypnogogic/hypnopompic hallucinations.    He denies sleep walking, sleep talking, enuresis and sleep terrors as a child.  Bertin has difficulty breathing through his nose, denies claustrophobia, reflux at night, heartburn and depression.      Bertin has remained about the same in the last few years.  Patient describes themself as a night person.  He would prefer to go to sleep at 2:30 AM and wake up at 10:30 AM.  Patient's [place] Sleepiness score 5/24 consistent with no daytime sleepiness.      Bertin naps very infrequently. He takes no inadvertant naps.  He denies closing eyes, dozing and falling asleep while driving. Patient was counseled on the importance of driving while alert, to pull over if drowsy, or nap before getting into the vehicle if sleepy.  He uses 1-2 cups/day of coffee/tea, 1-2 sodas/day. Last caffeine intake is usually before 9PM.    Alcohol use: Drinks 1-2 drinks/evening  Nicotine/tobacco use: Smokes 1PPD  Recreational drug use: None    Other Tests:  EKG 12-lead, tracing only  Status: Edited Result - FINAL    Dx: Palpitations        Ref Range & Units 10/14/21  4:12 PM    Systolic Blood Pressure mmHg     Diastolic " Blood Pressure mmHg     Ventricular Rate BPM 94     Atrial Rate BPM 94     VT Interval ms 170     QRS Duration ms 92     QT ms 338     QTc ms 422     P Axis degrees 47     R AXIS degrees 104     T Axis degrees 41     Interpretation ECG  Sinus rhythm   Rightward axis   Nonspecific ST abnormality   Abnormal ECG   No previous ECGs available   Confirmed by fellow Ahmet Almendarez ([id number]) on 10/15/2021 11:04:09 AM   Confirmed by MD HAQUE HENRI [id number]) on 10/15/2021 7:17:01 PM             Allergies:    Allergies   Allergen Reactions     Seasonal Allergies        Medications:    Current Outpatient Medications   Medication Sig Dispense Refill     Acetaminophen (TYLENOL EXTRA STRENGTH PO)        fexofenadine (ALLEGRA) 30 MG ODT Take 30 mg by mouth once as needed        hydrochlorothiazide (HYDRODIURIL) 12.5 MG tablet TAKE 1 TABLET BY MOUTH EVERY DAY 90 tablet 0     ibuprofen (ADVIL/MOTRIN) 200 MG tablet        JARDIANCE 10 MG TABS tablet TAKE 1 TABLET BY MOUTH EVERY DAY 90 tablet 1     losartan (COZAAR) 50 MG tablet TAKE 1 TABLET BY MOUTH EVERYDAY AT BEDTIME 90 tablet 1     Multiple Vitamins-Minerals (CENTRUM MEN PO)        Naproxen Sodium (ALEVE PO)        simvastatin (ZOCOR) 20 MG tablet TAKE 1 TABLET (20 MG) BY MOUTH ONCE A DAY WITH EVENING MEAL. 30 tablet 5     SitaGLIPtin-MetFORMIN HCl (JANUMET XR) 100-1000 MG TB24 Take 1 tablet by mouth daily 90 tablet 1     VITAMIN D PO Take 2,000 Units by mouth       zolpidem (AMBIEN) 10 MG tablet Take tablet by mouth 15 minutes prior to sleep, for Sleep Study 2 tablet 0     fish oil-omega-3 fatty acids 1000 MG capsule Take 1,000 mg by mouth (Patient not taking: Reported on 10/14/2021)       Specialty Vitamins Products (VITAMINS FOR HAIR) TABS Take 1 tablet by mouth (Patient not taking: Reported on 10/14/2021)       traZODone (DESYREL) 50 MG tablet as needed  (Patient not taking: Reported on 10/14/2021)         Problem List:  Patient Active Problem List    Diagnosis Date Noted      "Benign essential hypertension      Priority: Medium     Type 2 diabetes mellitus without complication, without long-term current use of insulin (H) 10/18/2017     Priority: Medium     Formatting of this note might be different from the original.  Vendor update to replace retired or updated dx codes and/or terms.  Formatting of this note might be different from the original.  Overview:   Vendor update to replace retired or updated dx codes and/or terms.       Seasonal allergies 09/08/2015     Priority: Medium     Smoker 03/13/2015     Priority: Medium     Mixed hyperlipidemia 12/06/2007     Priority: Medium     Overweight (BMI 25.0-29.9) 12/06/2007     Priority: Medium        Past Medical/Surgical History:  Past Medical History:   Diagnosis Date     Allergic rhinitis      Benign essential hypertension      Hyperlipidemia      Rotator cuff tear, left 2015     Rotator cuff tear, right 2017     Type 2 diabetes mellitus (H)      Past Surgical History:   Procedure Laterality Date     APPENDECTOMY       SHOULDER SURGERY      left and right previously       Social History:  Social History     Socioeconomic History     Marital status: Single     Spouse name: Not on file     Number of children: Not on file     Years of education: Not on file     Highest education level: Not on file   Occupational History     Not on file   Tobacco Use     Smoking status: Current Every Day Smoker     Smokeless tobacco: Never Used   Substance and Sexual Activity     Alcohol use: Yes     Drug use: Never     Sexual activity: Not on file   Other Topics Concern     Not on file   Social History Narrative    Single.     Works as \"\" for [employer] at [employer].     From [place]     Social Determinants of Health     Financial Resource Strain: Not on file   Food Insecurity: Not on file   Transportation Needs: Not on file   Physical Activity: Not on file   Stress: Not on file   Social Connections: Not on file   Intimate Partner " "Violence: Not on file   Housing Stability: Not on file       Family History:  No family history on file.    Review of Systems:  Answers for HPI/ROS submitted by the patient on 12/7/2021  General Symptoms: Yes  Skin Symptoms: No  HENT Symptoms: Yes  EYE SYMPTOMS: No  HEART SYMPTOMS: No  LUNG SYMPTOMS: No  INTESTINAL SYMPTOMS: Yes  URINARY SYMPTOMS: No  REPRODUCTIVE SYMPTOMS: No  SKELETAL SYMPTOMS: Yes  BLOOD SYMPTOMS: No  NERVOUS SYSTEM SYMPTOMS: No  MENTAL HEALTH SYMPTOMS: Yes  Ear pain: Yes  Ear discharge: No  Hearing loss: No  Tinnitus: No  Nosebleeds: No  Congestion: Yes  Sinus pain: No  Trouble swallowing: No   Voice hoarseness: No  Mouth sores: No  Sore throat: No  Tooth pain: No  Gum tenderness: No  Bleeding gums: No  Change in taste: No  Change in sense of smell: No  Dry mouth: Yes  Hearing aid used: No  Neck lump: No  Fever: No  Loss of appetite: No  Weight loss: No  Weight gain: No  Fatigue: Yes  Night sweats: Yes  Chills: No  Increased stress: Yes  Excessive hunger: No  Excessive thirst: No  Feeling hot or cold when others believe the temperature is normal: No  Loss of height: No  Post-operative complications: No  Surgical site pain: No  Hallucinations: No  Change in or Loss of Energy: No  Hyperactivity: No  Confusion: No  Heart burn or indigestion: Yes  Nausea: No  Vomiting: No  Abdominal pain: No  Bloating: Yes  Constipation: Yes  Diarrhea: Yes  Blood in stool: No  Black stools: No  Rectal or Anal pain: No  Fecal incontinence: No  Yellowing of skin or eyes: No  Vomit with blood: No  Change in stools: No  Back pain: Yes  Muscle aches: Yes  Neck pain: Yes  Swollen joints: No  Joint pain: No  Bone pain: No  Muscle cramps: No  Muscle weakness: No  Joint stiffness: No  Bone fracture: No  Nervous or Anxious: Yes  Depression: No  Trouble sleeping: Yes  Trouble thinking or concentrating: No  Mood changes: No  Panic attacks: No        Physical Examination:  Vitals: Ht 1.676 m (5' 6\")   Wt 83.9 kg (185 lb)   BMI " 29.86 kg/m    BMI= Body mass index is 29.86 kg/m .         [place] Total Score 12/7/2021   Total score - [place] 5     STOP-BANG score: 4  (12/12/2021)    ZAHIDA Total Score: 9 (12/07/21 1617)    GENERAL APPEARANCE: healthy, alert, no distress and cooperative  EYES: Eyes grossly normal to inspection  RESP: Unlabored, easy breathing with normal conversational speech  CV: color normal  NEURO: Alert and oriented x3, mentation intact and speech normal  PSYCH: mentation appears normal and affect normal/bright  Mallampati Class: Not examined  Tonsillar Stage: Not examined    Impression/Plan:  1. Sleep disturbance  2. Palpitations  3. Night sweats  4. Snoring  5. Psychophysiological insomnia  6. Circadian rhythm sleep disorder, irregular sleep wake type  7. Overweight (BMI 25.0-29.9)  - SLEEP EVALUATION & MANAGEMENT REFERRAL - ADULT -  - Comprehensive Sleep Study; Future  - zolpidem (AMBIEN) 10 MG tablet; Take tablet by mouth 15 minutes prior to sleep, for Sleep Study  Dispense: 2 tablet; Refill: 0    This is a pleasant 41-year-old male with a PMH pertinent for HTN, HLD, DM 2, hepatic steatosis, nicotine dependence, allergic rhinitis, and overweight who presents today with reports of occasional snoring and previously told may have sleep apnea for several years.  He wakes up not feeling well-rested/refreshed. He has difficulty falling asleep and will often restrict his sleep to achieve the ability to fall asleep. He was seen by Dr. Sandhya Alejo, cardiology, who referred the patient for evaluation for possible obstructive sleep apnea due to intermittent symptoms of heart palpitations/tachycardia and night sweats.  His symptoms are consistent with possible sleep disordered breathing.  His [place] score today is 5 which is borderline for mild daytime somnolence.  His insomnia severity index score is 9 which is within normal limits.  His STOP-BANG score is 4 which suggests an intermediate risk of AFTAB.    His insomnia symptoms appear  to be multifactorial which includes overactive thinking/mind racing, anxiety, and poor sleep hygiene as well as circadian rhythm sleep disorder -irregular sleep-wake type, and possibly sleep disordered breathing contributing.  He has been using sleep deprivation or restriction to help him fall asleep at times.  He has previously tried trazodone with out significant benefit, however, it is unclear if the medication was titrated for effect.    We discussed the pathophysiology of obstructive sleep apnea and the risks associated with untreated AFTAB.  We also discussed the pros and cons of in lab polysomnography versus home sleep testing.  The patient has elected to undergo in lab polysomnography (PSG) to further evaluate his symptoms of possible obstructive sleep apnea.  A prescription for zolpidem 10 mg p.o. nightly x1 was e-scribed for the night of the study and the patient would also like to try the medication prior to using it on the night of the study to ascertain its effects.  We also discussed the role that body weight plays with regard to obstructive sleep apnea and I have encouraged weight loss as the patient is able.    Literature provided regarding sleep apnea, sleep hygiene, insomnia and circadian rhythm disorders.      He will follow up with me in approximately two weeks after his sleep study has been competed to review the results and discuss plan of care.       Polysomnography reviewed.  Obstructive sleep apnea reviewed.  Complications of untreated sleep apnea were reviewed.  Reviewed recent cardiology note and EKG results.    60 minutes were spent on the date of the encounter doing chart review, history and exam, documentation and further activities as noted above.     ALINE Scott CNP  Sleep Medicine      CC: Sandhya Alejo    This note was written with the assistance of the Dragon voice-dictation technology software. The final document, although reviewed, may contain errors. For corrections, please  contact the office.

## 2021-12-13 NOTE — PATIENT INSTRUCTIONS
Your BMI is Body mass index is 29.86 kg/m .    What is BMI?  Body mass index (BMI) is one way to tell whether you are at a healthy weight, overweight, or obese. It measures your weight in relation to your height.  A BMI of 18.5 to 24.9 is in the healthy range. A person with a BMI of 25 to 29.9 is considered overweight, and someone with a BMI of 30 or greater is considered obese.  Another way to find out if you are at risk for health problems caused by overweight and obesity is to measure your waist. If you are a woman and your waist is more than 35 inches, or if you are a man and your waist is more than 40 inches, your risk of disease may be higher.  More than two-thirds of American adults are considered overweight or obese. Being overweight or obese increases the risk for further weight gain.  Excess weight may lead to heart disease and diabetes. Creating and following plans for healthy eating and physical activity may help you improve your health.    Methods for maintaining or losing weight.  Weight control is part of healthy lifestyle and includes exercise, emotional health, and healthy eating habits.  Careful eating habits lifelong is the mainstay of weight control.  Though there are significant health benefits from weight loss, long-term weight loss with diet alone may be very difficult to achieve- studies show long-term success with dietary management in less than 10% of people. Attaining a healthy weight may be especially difficult to achieve in those with severe obesity. In some cases, medications, devices and surgical management might be considered.    What can you do?  If you are overweight or obese and are interested in methods for weight loss, you should discuss this with your provider. In addition, we recommend that you review healthy life styles and methods for weight loss available through the National Institutes of Health patient information sites:    http://win.niddk.nih.gov/publications/index.htm      Patient Education     What Are Snoring and Obstructive Sleep Apnea?  If you ve ever had a stuffed-up nose, you know the feeling of trying to breathe through a very narrow passageway. This is what happens in your throat when you snore. While you sleep, structures in your throat partly block your air passage. This makes the passage narrow and hard to breathe through. In some cases, the entire passage may become blocked so you can t breathe at all. This is called obstructive sleep apnea.      Snoring       Obstructive sleep apnea      Snoring  If your throat structures are too large or the muscles relax too much during sleep, the air passage may be partly blocked for a brief moment. But the air eventually passes through. As air from the nose or mouth passes around this blockage, the throat structures vibrate. This causes the familiar sound of snoring. This snoring sound can be loud and may wake up others, or even themselves, during the night. Snoring gets worse as more and more of the air passage is blocked.   Obstructive sleep apnea  If the structures partly or completely block the throat, air can t flow to the lungs at all. This is called hypopnea (decreased breathing) or apnea (meaning  no breathing ). The lungs aren t getting fresh air. So the brain tells the body to wake up just enough to tighten the muscles and unblock the air passage. With a loud gasp, breathing starts again. This process may be repeated over and over again during the night. This can make your sleep fragmented with lighter stages of sleep. You may not remember waking up many times during the night however due to lighter sleep you will most likely feel tired the next day. The lack of sleep and fresh air can also strain your lungs, heart, and other organs. This may lead to problems such as high blood pressure, diabetes, behavioral disorders, heart attack, or stroke.   Problems in the nose and  jaw  Problems in the structure of the nose may block breathing. A crooked (deviated) septum or swollen turbinates can make snoring worse or lead to apnea. Also, a receding jaw may make the tongue sit too far back. Then it s more likely to block the airway when you re asleep.   [employer] last reviewed this educational content on 9/1/2019 2000-2021 [employer]. All rights reserved. This information is not intended as a substitute for professional medical care. Always follow your healthcare professional's instructions.    Patient Education     Insomnia  Insomnia is repeated difficulty going to sleep or staying asleep, or both. Whether you have insomnia is not defined by a specific amount of sleep. Different people need different amounts of sleep, and you may need more or less sleep at different times of your life.  There are 3 major types of insomnia: short-term, chronic, and  other.  Short-term, or acute insomnia lasts less than 3 months. The symptoms are temporary and can be linked directly to a stressor, such as the death of a loved one, financial problems, or a new physical problem. Short-term insomnia stops when the stressor resolves or the person adapts to its presence.  Chronic insomnia occurs at least 3 times a week and lasts longer than 3 months. Chronic insomnia can occur when either the cause of the sleeping problem is not clear, or the insomnia does not get better when the stressor is resolved. A number of other criteria are also used to make the diagnosis of chronic insomnia.    Other insomnia  is the third type of insomnia-related sleep disorders. This description applies to people who have problems getting to sleep or staying asleep, but don't meet all of the factors that describe either short-term or chronic insomnia.    Many things cause insomnia. Different people may have different causes. It can be from an underlying medical or psychological condition, or lifestyle. It can also be  primary insomnia, which means no cause can be found.  Causes of insomnia include:    Chronic medical problems- heart disease, gastrointestinal problems, hormonal changes, breathing problems    Anxiety    Stress    Depression    Pain    Work schedule    Sleep apnea    Illegal drugs    Certain medicines  Many different medicines can affect your sleep, such as stimulants, caffeine, alcohol, some decongestants, and diet pills. Other medicines may include some types of blood pressure pills, steroids, asthma medicines, antihistamines, antidepressants, seizure medicines and statins. Not all of these will affect your sleep, and they shouldn t be stopped without talking to your doctor.  Symptoms of insomnia can include:    Lying awake for long periods at night before falling asleep    Waking up several times during the night    Waking up early in the morning and not being able to get back to sleep    Feeling tired and not refreshed by sleep    Not being able to function properly during the day and finding it hard to concentrate    Irritability    Tiredness and fatigue during the day  Home care    Review your medicines with your doctor or pharmacist to find out if they can cause insomnia. Not all medicines will affect your sleep, but they shouldn't be stopped without reviewing them with your doctor. There may be serious side effects and consequences from suddenly stopping your medicines. Not taking them may cause strokes, heart attacks, and many other problems.    Caffeine, smoking, and alcohol also affect sleep. Limit your daily use and don't use these before bedtime. Alcohol may make you sleepy at first, but as its effects wear off, you may awaken a few hours later and have trouble returning to sleep.    Don't exercise, eat or drink large amounts of liquid within 2 hours of your bedtime.    Improve your sleep habits. Have a fixed bed and wake-up time. Try to keep noise, light and heat in your bedroom at a comfortable level.  Try using earplugs or eyeshades if needed.     Don't watch TV in bed.    If you don't fall asleep within 30 minutes, try to relax by reading or listening to soft music.    Limit daytime napping to one 30 minute period, early in the day.    Get regular exercise. Find other ways to lessen your stress level.    If a medicine was prescribed to help reset your sleep patterns, take it as directed. Sleeping pills are intended for short-term use, only. If taken for too long, the effect wears off while the risk of physical addiction and psychological dependence increases.  Sleep diary  If the cause isn t obvious and it is not improving, try keeping a  sleep diary  for a couple of weeks. Include in it:    The time you go to bed    How long it takes to fall asleep    How many times you wake up    What time you wake up    Your meal times and what you eat    What time you drink alcohol and caffeine    Your exercise habits and times  Follow-up care  Follow up with your healthcare provider, or as advised. If an X-ray or CT scan was done, you will be notified if there is a change in the reading, especially if it affects treatment.  Call 911  Call 911 if any of these occur:    Trouble breathing    Confusion or trouble waking    Fainting or loss of consciousness    Rapid heart rate    New chest, arm, shoulder, neck or upper back pain    Trouble with speech or vision, weakness of an arm or leg    Trouble walking or talking, loss of balance, numbness or weakness in one side of your body, facial droop  When to seek medical advice  Call your healthcare provider right away if any of these occur:    Extreme restlessness or irritability    Confusion or hallucinations (seeing or hearing things that are not there)    Anxiety, depression    Several days without sleeping  [employer] last reviewed this educational content on 5/1/2018 2000-2021 [employer]. All rights reserved. This information is not intended as a substitute for  "professional medical care. Always follow your healthcare professional's instructions.    Patient Education     Tips for Sleep Hygiene  \"Sleep hygiene\" means having good sleep habits.Follow these tips to sleep better at night:     Get on a schedule. Go to bed and get up at about the same time every day.    Listen to your body. Only try to sleep when you actually feel tired or sleepy.    Be patient. If you haven't been able to get to sleep after about 30 minutes or more, get up and do something calming or boring until you feel sleepy. Then return to bed and try again.    Don't have caffeine (coffee, tea, cola drinks, chocolate and some medicines), alcohol or nicotine (cigarettes). These can make it harder for you to fall asleep and stay asleep.    Use your bed for sleeping only. That means no TV, computer or homework in bed, especially during the evening and before bedtime.    Don't nap during the day. If you must nap, make sure it is for less than 20 minutes.    Create sleep rituals that remind your body it is time to sleep. Examples include breathing exercises, stretching or reading a book.    Avoid all electronic media (smart phone, computer, tablet) within 2 hours of bed time. The \"blue light\" in these devices activates the part of the brain that keeps you awake.    Dim the lights at night.    Get early morning sources of light (walk in the sunshine) to help set sleep patterns at night.    Try a bath or shower before bed. Having a warm bath 1 to 2 hours before bedtime can help you feel sleepy. Hot baths can make you alert, so be mindful of the temperature.    Don't watch the clock. Checking the clock during the night can wake you up. It can also lead to negative thoughts such as, \"I will never fall asleep,\" which can increase anxiety and sleeplessness.    Use a sleep diary. Track your sleep schedule to know your sleep patterns and to see where you can improve.    Get regular exercise every day. Try not to do heavy " exercise in the 4 hours before bedtime.    Eat a healthy, balanced diet.    Try eating a light, healthy snack before bed, but avoid eating a heavy meal.    Create the right sleeping area. A cool, dark, quiet room is best. If needed, try earplugs, fans and blackout curtains.    Keep your daytime routine the same even if you have a bad night sleep. Avoiding activities the next day can make it harder to sleep.  For informational purposes only. Not to replace the advice of your health care provider.   Copyright   2013 [place] [employer] [facility]. All rights reserved. [employer] 686301 - 01/16.      Insomnia - Delayed Sleep Phase  Each of us has a built in tendency to find it easier to stay up late at night or get up early in the morning.  Being a  night owl  or  early bird  is a function of our internal body clock.  When you are forced to keep a sleep schedule that goes against your typical habits (because a job or other responsibilities) insomnia can be the result.  Try the following changes to see if you can improve your sleep patterns:    Pick a sleep and wake schedule with about 7-8 hours in bed that is consistent.  That means keep the same bedtime and rise time every day of the week including the weekends, for the next 4-6 weeks    Try to get outside for about 30 minutes after you get up in the morning if the sun is out.  Sunlight is the best way to  set  your internal body clock      Take melatonin - 1 - 5 mg about 5 hours before bedtime     Please keep a sleep log or diary during this time to track your sleep patterns

## 2021-12-22 DIAGNOSIS — I10 BENIGN ESSENTIAL HYPERTENSION: ICD-10-CM

## 2021-12-22 RX ORDER — HYDROCHLOROTHIAZIDE 12.5 MG/1
12.5 TABLET ORAL DAILY
Qty: 90 TABLET | Refills: 2 | Status: SHIPPED | OUTPATIENT
Start: 2021-12-22 | End: 2022-09-09

## 2021-12-22 NOTE — TELEPHONE ENCOUNTER
Last visit 9/15/21, no future visit  Prescription approved per [facility] Refill Protocol.  Codi Mai RN  [facility]

## 2022-01-08 DIAGNOSIS — E11.9 TYPE 2 DIABETES MELLITUS WITHOUT COMPLICATION, WITHOUT LONG-TERM CURRENT USE OF INSULIN (H): ICD-10-CM

## 2022-01-08 DIAGNOSIS — I10 BENIGN ESSENTIAL HYPERTENSION: ICD-10-CM

## 2022-01-10 RX ORDER — EMPAGLIFLOZIN 10 MG/1
TABLET, FILM COATED ORAL
Qty: 90 TABLET | Refills: 0 | Status: SHIPPED | OUTPATIENT
Start: 2022-01-10 | End: 2022-04-05

## 2022-01-10 RX ORDER — LOSARTAN POTASSIUM 50 MG/1
50 TABLET ORAL DAILY
Qty: 90 TABLET | Refills: 1 | Status: SHIPPED | OUTPATIENT
Start: 2022-01-10 | End: 2022-07-14

## 2022-01-10 NOTE — TELEPHONE ENCOUNTER
Prescription approved per [facility] Refill Protocol. Needs to schedule an appt /Candice Oseguera RN

## 2022-01-10 NOTE — TELEPHONE ENCOUNTER
Medication requested: losartan (COZAAR) 50 MG tablet  Last office visit: 9/15/21  [facility] appointments: NONE  Medication last refilled: 7/12/21 #90 + 1 refill  Last qualifying labs: 8/31/21  BP Readings from Last 3 Encounters:   10/14/21 125/83   09/15/21 129/89   12/29/20 126/89     Prescription approved per Refill Protocol.  Lainey Gonzalez MS RN-BC  01/10/22  1:57 PM

## 2022-01-22 ENCOUNTER — HEALTH MAINTENANCE LETTER (OUTPATIENT)
Age: 42
End: 2022-01-22

## 2022-02-04 ENCOUNTER — THERAPY VISIT (OUTPATIENT)
Dept: SLEEP MEDICINE | Facility: CLINIC | Age: 42
End: 2022-02-04
Attending: NURSE PRACTITIONER
Payer: COMMERCIAL

## 2022-02-04 DIAGNOSIS — R06.83 SNORING: ICD-10-CM

## 2022-02-04 DIAGNOSIS — G47.9 SLEEP DISTURBANCE: ICD-10-CM

## 2022-02-04 DIAGNOSIS — G47.23 CIRCADIAN RHYTHM SLEEP DISORDER, IRREGULAR SLEEP WAKE TYPE: ICD-10-CM

## 2022-02-04 DIAGNOSIS — R00.2 PALPITATIONS: ICD-10-CM

## 2022-02-04 DIAGNOSIS — F51.04 PSYCHOPHYSIOLOGICAL INSOMNIA: ICD-10-CM

## 2022-02-04 DIAGNOSIS — E66.3 OVERWEIGHT (BMI 25.0-29.9): ICD-10-CM

## 2022-02-04 DIAGNOSIS — R61 NIGHT SWEATS: ICD-10-CM

## 2022-02-04 PROCEDURE — 95810 POLYSOM 6/> YRS 4/> PARAM: CPT | Performed by: INTERNAL MEDICINE

## 2022-02-07 NOTE — PROCEDURES
" SLEEP STUDY INTERPRETATION  DIAGNOSTIC POLYSOMNOGRAPHY REPORT      Patient: RANI SETH  YOB: 1980  Study Date: 2/4/2022  MRN: [id number]  Referring Provider: -  Ordering Provider: Anival Cantor,    Indications for Polysomnography: The patient is a 42 year old Male who is 5' 6\" and weighs 185.0 lbs. His BMI is 30.1, [place] sleepiness scale 3 and neck circumference is 40.5CM cm. A diagnostic polysomnogram was performed to evaluate for sleep apnea.    Polysomnogram Data: A full night polysomnogram recorded the standard physiologic parameters including EEG, EOG, EMG, ECG, nasal and oral airflow. Respiratory parameters of chest and abdominal movements were recorded with respiratory inductance plethysmography. Oxygen saturation was recorded by pulse oximetry. Hypopnea scoring rule used: 1B 4%.    Sleep Architecture: Fragmented sleep.   The total recording time of the polysomnogram was 416.4 minutes. The total sleep time was 353.5 minutes. Sleep latency was normal at 15.1 minutes with the use of a sleep aid (Zolpidem 10 mg). REM latency was 135.0 minutes. Arousal index was increased at 46.5 arousals per hour. Sleep efficiency was normal at 84.9%. Wake after sleep onset was 48.0 minutes. The patient spent 13.3% of total sleep time in Stage N1, 37.3% in Stage N2, 16.1% in Stage N3, and 33.2% in REM. Time in REM supine was - minutes.    Respiration: Moderate sleep apnea.     Events ? The polysomnogram revealed a presence of 53 obstructive, 5 central, and 1 mixed apneas resulting in an apnea index of 10.0 events per hour. There were 103 obstructive hypopneas and - central hypopneas resulting in an obstructive hypopnea index of 17.5 and central hypopnea index of - events per hour. The combined apnea/hypopnea index was 27.5 events per hour (central apnea/hypopnea index was 0.8 events per hour). The REM AHI was 22.5 events per hour. The supine AHI was 58.4 events per hour. The RERA index was 2.2 events per " hour.  The RDI was 29.7 events per hour.    Snoring - was reported as loud.    Respiratory rate and pattern - was notable for normal respiratory rate and pattern.    Sustained Sleep Associated Hypoventilation - Transcutaneous carbon dioxide monitoring was not used; however significant hypoventilation was not suggested by oximetry.    Sleep Associated Hypoxemia - (Greater than 5 minutes O2 sat at or below 88%) was not present. Baseline oxygen saturation was 95.0%. Lowest oxygen saturation was 79.9%. Time spent less than or equal to 88% was 5.1 minutes. Time spent less than or equal to 89% was 8.6 minutes.    Movement Activity: Increased periodic limb movements of sleep and associated arousals.     Periodic Limb Activity - There were 135 PLMs during the entire study. The PLM index was 22.9 movements per hour. The PLM Arousal Index was 20.9 per hour.    REM EMG Activity - Excessive transient/sustained muscle activity was not present.    Nocturnal Behavior - Abnormal sleep related behaviors were not noted during/arising out of NREM / REM sleep.     Bruxism - None apparent.    Cardiac Summary: Sinus rhythm.   The average pulse rate was 79.6 bpm. The minimum pulse rate was 32.0 bpm while the maximum pulse rate was 112.4 bpm.  Arrhythmias were not noted.    Assessment:     This sleep study shows moderate obstructive sleep apnea with associated oxygen desaturations and sleep fragmentation.     An increased frequency of periodic limb movements of sleep and associated arousals was noted.     Recommendations:    Based on the presence of moderate obstructive sleep apnea, treatment could be empirically initiated with Auto?titrating PAP therapy with a range of 5 to 15 cmH2O. Recommend clinical follow up with sleep management team.    If CPAP is not successful, patient may be a candidate for dental appliance through referral to Sleep Dentistry.    If devices are not acceptable or effective, patient may benefit from evaluation of  possible surgical options. If he is interested, would recommend referral to specialized ENT-Sleep provider.    Suggest optimizing sleep schedule and avoiding sleep deprivation.    Weight management (if BMI > 30).    Pharmacologic therapy should be used for management of restless legs syndrome only if present and clinically indicated and not based on the presence of periodic limb movements alone.    Diagnostic Codes:   Obstructive Sleep Apnea G47.33  Periodic Limb Movement Disorder G47.61  Repetitive Intrusions Into Sleep F51.8    2/4/2022 [facility] Diagnostic Sleep Study (185.0 lbs) - AHI 27.5, RDI 29.7, Supine AHI 58.4, REM AHI 22.5, Low O2 79.9%, Time Spent ?88% 5.1 minutes / Time Spent ?89% 8.6 minutes.    _____________________________________   Electronically Signed By: Alvino Norton MD 02/07/2022

## 2022-02-08 LAB — SLPCOMP: NORMAL

## 2022-02-10 ASSESSMENT — SLEEP AND FATIGUE QUESTIONNAIRES
HOW LIKELY ARE YOU TO NOD OFF OR FALL ASLEEP WHILE SITTING QUIETLY AFTER LUNCH WITHOUT ALCOHOL: SLIGHT CHANCE OF DOZING
HOW LIKELY ARE YOU TO NOD OFF OR FALL ASLEEP WHEN YOU ARE A PASSENGER IN A CAR FOR AN HOUR WITHOUT A BREAK: SLIGHT CHANCE OF DOZING
HOW LIKELY ARE YOU TO NOD OFF OR FALL ASLEEP IN A CAR, WHILE STOPPED FOR A FEW MINUTES IN TRAFFIC: WOULD NEVER DOZE
HOW LIKELY ARE YOU TO NOD OFF OR FALL ASLEEP WHILE SITTING INACTIVE IN A PUBLIC PLACE: WOULD NEVER DOZE
HOW LIKELY ARE YOU TO NOD OFF OR FALL ASLEEP WHILE LYING DOWN TO REST IN THE AFTERNOON WHEN CIRCUMSTANCES PERMIT: SLIGHT CHANCE OF DOZING
HOW LIKELY ARE YOU TO NOD OFF OR FALL ASLEEP WHILE SITTING AND READING: SLIGHT CHANCE OF DOZING
HOW LIKELY ARE YOU TO NOD OFF OR FALL ASLEEP WHILE SITTING AND TALKING TO SOMEONE: WOULD NEVER DOZE
HOW LIKELY ARE YOU TO NOD OFF OR FALL ASLEEP WHILE WATCHING TV: WOULD NEVER DOZE

## 2022-02-15 ENCOUNTER — VIRTUAL VISIT (OUTPATIENT)
Dept: SLEEP MEDICINE | Facility: CLINIC | Age: 42
End: 2022-02-15
Payer: COMMERCIAL

## 2022-02-15 VITALS — HEIGHT: 66 IN | BODY MASS INDEX: 29.09 KG/M2 | WEIGHT: 181 LBS

## 2022-02-15 DIAGNOSIS — G47.61 PERIODIC LIMB MOVEMENT DISORDER: ICD-10-CM

## 2022-02-15 DIAGNOSIS — G47.33 OSA (OBSTRUCTIVE SLEEP APNEA): Primary | ICD-10-CM

## 2022-02-15 PROCEDURE — 99215 OFFICE O/P EST HI 40 MIN: CPT | Mod: 95 | Performed by: NURSE PRACTITIONER

## 2022-02-15 ASSESSMENT — MIFFLIN-ST. JEOR: SCORE: 1668.76

## 2022-02-15 NOTE — PROGRESS NOTES
"Lj is a 42 year old who is being evaluated via a billable video visit.      How would you like to obtain your AVS? MyChart  If the video visit is dropped, the invitation should be resent by: Send to e-mail at: [email or web address]  Will anyone else be joining your video visit? No  {If patient encounters technical issues they should call [phone] :[id number]}    Video Start Time: {video visit start/end time for provider to select:152948}  Video-Visit Details    Type of service:  Video Visit    Video End Time:{video visit start/end time for provider to select:152948}    Originating Location (pt. Location): {video visit patient location:178070::\"Home\"}    Distant Location (provider location):  [facility]     Platform used for Video Visit: {Virtual Visit Platforms:838690::\"USEUM\"}  "

## 2022-02-15 NOTE — PATIENT INSTRUCTIONS
"MY INFORMATION ON SLEEP APNEA-  Bertin Mcgill    DOCTOR : ALINE Scott CNP  SLEEP CENTER :      MY CONTACT NUMBER:   [facility]  [phone]  [facility]   [phone]  [facility]   [phone]      [facility]  [phone]  [facility]   [phone]    [facility] [place] [address], [address]  [place] [address]  Phone: [phone]    Hours:  Mon - Fri: 8:00 a.m. - 4:30 p.m.  Sat: Closed  Sun: Closed      Key Points:  1. What is Obstructive Sleep Apnea (AFTAB)? AFTAB is the most common type of sleep apnea. Apnea literally means, \"without breath.\" It is characterized by repetitive pauses in breathing, despite continued effort to breathe, and is usually associated with a reduction in blood oxygen saturation. Apneas can last 10 to over 60 seconds. It is caused by narrowing or collapse of the upper airway as muscles relax during sleep.   2. What are the consequences of AFTAB? Symptoms include: daytime sleepiness- possibly increasing the risk of falling asleep while driving, unrefreshing/restless sleep, snoring, insomnia, waking frequently to urinate, waking with heartburn or reflux, reduced concentration and memory, and morning headaches. Other health consequences may include development of high blood pressure. Untreated AFTAB also can contribute to heart disease, stroke and diabetes.   3. What are the treatment options? In most situations, sleep apnea is a lifelong disease that must be managed with daily therapy. Continuous Positive Airway (CPAP) is the most reliable treatment. A mouthguard to hold your jaw forward is usually the next most reliable option. Other options include postioning devices (to keep you off your back), nasal valves, tongue retaining device, weight loss, surgery. There is more detail about these options toward the end of this " document.  4. What are the most important things to remember about using CPAP?     WHERE CAN I FIND MORE INFORMATION?    American Academy of Sleep Medicine Patient information on sleep disorders:  http://yoursleep.aasmnet.org    CPAP -  WHY AND HOW?                 Continuous positive airway pressure, or CPAP, is the most effective treatment for obstructive sleep apnea. It works by blowing room air, through a mask, to hold your throat open. A decision to use CPAP is a major step forward in the pursuit of a healthier life. The successful use of CPAP will help you breathe easier, sleep better and live healthier. Using CPAP can be a positive experience if you keep these lauren points in mind:  1. Commitment  CPAP is not a quick fix for your problem. It involves a long-term commitment to improve your sleep and your health.    2. Communication  Stay in close communication with both your sleep doctor and your CPAP supplier. Ask lots of questions and seek help when you need it.    3. Consistency  Use CPAP all night, every night and for every nap. You will receive the maximum health benefits from CPAP when you use it every time that you sleep. This will also make it easier for your body to adjust to the treatment.    4. Correction  The first machine and mask that you try may not be the best ones for you. Work with your sleep doctor and your CPAP supplier to make corrections to your equipment selection. Ask about trying a different type of machine or mask if you have ongoing problems. Make sure that your mask is a good fit and learn to use your equipment properly.    5. Challenge  Tell a family member or close friend to ask you each morning if you used your CPAP the previous night. Have someone to challenge you to give it your best effort.    6. Connection   Your adjustment to CPAP will be easier if you are able to connect with others who use the same treatment. Ask your sleep doctor if there is a support group in your area for  "people who have sleep apnea, or look for one on the Internet.  7. Comfort   Increase your level of comfort by using a saline spray, decongestant or heated humidifier if CPAP irritates your nose, mouth or throat. Use your unit's \"ramp\" setting to slowly get used to the air pressure level. There may be soft pads you can buy that will fit over your mask straps. Look on www.CPAP.com for accessories that can help make CPAP use more comfortable.  8. Cleaning   Clean your mask, tubing and headgear on a regular basis. Put this time in your schedule so that you don't forget to do it. Check and replace the filters for your CPAP unit and humidifier.    9. Completion   Although you are never finished with CPAP therapy, you should reward yourself by celebrating the completion of your first month of treatment. Expect this first month to be your hardest period of adjustment. It will involve some trial and error as you find the machine, mask and pressure settings that are right for you.    10. Continuation  After your first month of treatment, continue to make a daily commitment to use your CPAP all night, every night and for every nap.    CPAP-Tips to starting with success:  Begin using your CPAP for short periods of time during the day while you watch TV or read.    Use CPAP every night and for every nap. Using it less often reduces the health benefits and makes it harder for your body to get used to it.    Newer CPAP models are virtually silent; however, if you find the sound of your CPAP machine to be bothersome, place the unit under your bed to dampen the sound.     Make small adjustments to your mask, tubing, straps and headgear until you get the right fit. Tightening the mask may actually worsen the leak.  If it leaves significant marks on your face or irritates the bridge of your nose, it may not be the best mask for you.  Speak with the person who supplied the mask and consider trying other masks. Insurances will allow you " to try different masks during the first month of starting CPAP.  Insurance also covers a new mask, hose and filter about every 6 months.    Use a saline nasal spray to ease mild nasal congestion. Neti-Pot or saline nasal rinses may also help. Nasal gel sprays can help reduce nasal dryness.  Biotene mouthwash can be helpful to protect your teeth if you experience frequent dry mouth.  Dry mouth may be a sign of air escaping out of your mouth or out of the mask in the case of a full face mask.  Speak with your provider if you expect that is the case.     Take a nasal decongestant to relieve more severe nasal or sinus congestion.  Do not use Afrin (oxymetazoline) nasal spray more than 3 days in a row.  Speak with your sleep doctor if your nasal congestion is chronic.    Use a heated humidifier that fits your CPAP model to enhance your breathing comfort. Adjust the heat setting up if you get a dry nose or throat, down if you get condensation in the hose or mask.  Position the CPAP lower than you so that any condensation in the hose drains back into the machine rather than towards the mask.    Try a system that uses nasal pillows if traditional masks give you problems.    Clean your mask, tubing and headgear once a week. Make sure the equipment dries fully.    Regularly check and replace the filters for your CPAP unit and humidifier.    Work closely with your sleep provider and your CPAP supplier to make sure that you have the machine, mask and air pressure setting that works best for you. It is better to stop using it and call your provider to solve problems than to lay awake all night frustrated with the device.    Weight Loss:    Weight loss decreases severity of sleep apnea in most people with obesity. For those with mild obesity who have developed snoring with weight gain, even 15-30 pound weight loss can improve and occasionally eliminate sleep apnea.  Structured and life-long dietary and health habits are necessary  to lose weight and keep healthier weight levels.     Though there are significant health benefits from weight loss, long-term weight loss is very difficult to achieve- studies show success with dietary management in less than 10% of people. In addition, substantial weight loss may require years of dietary control and may be difficult if patients have severe obesity. In these cases, surgical management may be considered.    If you are interested in methods for weight loss, you should review the options discussed at the National Institutes of Health patient information sites:     http://win.niddk.nih.gov/publications/index.htm  http:/www.health.nih.gov/topic/WeightLossDieting    Bariatric programs offer counseling in all methods of weight loss:    Http:/www.[facility]edic[facility].org/Specialties/WeightLossSurgeryandMedicalMgmt/htm    Your BMI is Body mass index is 29.21 kg/m .    Weight management plan: Patient was referred to their PCP to discuss a diet and exercise plan.    Body mass index (BMI) is one way to tell whether you are at a healthy weight, overweight, or obese. It measures your weight in relation to your height.  A BMI of 18.5 to 24.9 is in the healthy range. A person with a BMI of 25 to 29.9 is considered overweight, and someone with a BMI of 30 or greater is considered obese.  Another way to find out if you are at risk for health problems caused by overweight and obesity is to measure your waist. If you are a woman and your waist is more than 35 inches, or if you are a man and your waist is more than 40 inches, your risk of disease may be higher.  More than two-thirds of American adults are considered overweight or obese. Being overweight or obese increases the risk for further weight gain.  Excess weight may lead to heart disease and diabetes. Creating and following plans for healthy eating and physical activity may help you improve your health.    Methods for maintaining or losing weight.    Weight control  is part of healthy lifestyle and includes exercise, emotional health, and healthy eating habits.  Careful eating habits lifelong is the mainstay of weight control.  Though there are significant health benefits from weight loss, long-term weight loss with diet alone may be very difficult to achieve- studies show long-term success with dietary management in less than 10% of people. Attaining a healthy weight may be especially difficult to achieve in those with severe obesity. In some cases, medications, devices and surgical management might be considered.    What can you do?    If you are overweight or obese and are interested in methods for weight loss, you should discuss this with your provider. In addition, we recommend that you review healthy life styles and methods for weight loss available through the National Institutes of Health patient information sites:     http://win.niddk.nih.gov/publications/index.htm      Surgery:  There are a number of surgeries that have been attempted to treat apnea. In general, surgical options are usually reserved for cases in which there is a physical abnormality contributing to obstruction or other treatment options are ineffective or not tolerated. Most surgical options are either unreliable or quite invasive. One of the more common procedures is:  Uvulopalatopharyngoplasty: In this procedure, the uvula (the finger-like tissue that hangs in the back of the throat), part of the soft palate (the tissue that the uvula is attached to), and sometimes the tonsils or adenoids are removed. The efficacy of this surgery is around 30-50% .  After surgery, complications may include:  Sleepiness and sleep apnea related to post-surgery medication   Swelling, infection and bleeding   A sore throat and/or difficulty swallowing   Drainage of secretions into the nose and a nasal quality to the voice. English language speech does not seem to be affected by this surgery.   Narrowing of the airway in  "the nose and throat (hence constricting breathing) snoring and even iatrogenically caused sleep apnea. By cutting the tissues, excess scar tissue can \"tighten\" the airway and make it even smaller than it was before UPPP.  Patients who have had the uvula removed will become unable to correctly speak Fijian or any other language that has a uvular 'r' phoneme.    Surgeries to help resolve nasal congestion may help reduce the severity of apnea slightly. Nasal congestion does not cause apnea on its own, so these surgeries are usually not performed just for AFTAB.  They may be worth considering if the nasal congestion is significantly bothersome independent of apnea.    "

## 2022-02-15 NOTE — PROGRESS NOTES
Lj is a 42 year old who is being evaluated via a billable video visit.      How would you like to obtain your AVS? MyChart  If the video visit is dropped, the invitation should be resent by: Send to e-mail at: [email or web address]  Will anyone else be joining your video visit? No        Video-Visit Details    Type of service:  Video Visit  Video Start Time: 1:06 PM  Video End Time:  1:40 PM    Originating Location (pt. Location): Home    Distant Location (provider location):  [facility] [place]     Platform used for Video Visit: [facility]      Sleep Study Follow-Up Visit:    Date on this visit: 2/15/2022    Bertin Mcgill is a pleasant 42-year-old male with a PMH pertinent for HTN, HLD, DM 2, hepatic steatosis, nicotine dependence, allergic rhinitis, and overweight who presents today for follow-up of his sleep study done on 2/04/2022 at the [facility] for possible sleep apnea.    Sleep latency 15.1 minutes with Ambien 10 mg.  REM achieved.   REM latency 135.0 minutes.  Sleep efficiency 84.9%. Total sleep time 353.5 minutes.    Sleep architecture:  Stage 1, 13.3% (5%), stage 2, 37.3% (45-55%), stage 3, 16.1% (15-20%), stage REM, 33.2% (20-25%).  AHI was 27.5, with desaturations down to 79.9%. RDI 29.7.  REM RDI 22.5, consistent with moderate REM AFTAB.  Supine RDI 58.4, consistent with severe SUPINE AFTAB.  Periodic Limb Movement Index 22.9/hour.The PLM Arousal Index was 20.9 per hour.       2/4/2022 [facility] (185.0 lbs) - AHI 27.5, RDI 29.7, Supine AHI 58.4, REM AHI 22.5, Low O2 79.9%, Time Spent ?88% 5.1 minutes / Time Spent ?89% 8.6 minutes.    These findings were reviewed with patient.     Past medical/surgical history, family history, social history, medications and allergies were reviewed.      Problem List:  Patient Active Problem List    Diagnosis Date Noted     Benign essential hypertension      Priority: Medium     Type 2 diabetes  "mellitus without complication, without long-term current use of insulin (H) 10/18/2017     Priority: Medium     Formatting of this note might be different from the original.  Vendor update to replace retired or updated dx codes and/or terms.  Formatting of this note might be different from the original.  Overview:   Vendor update to replace retired or updated dx codes and/or terms.       Seasonal allergies 09/08/2015     Priority: Medium     Smoker 03/13/2015     Priority: Medium     Mixed hyperlipidemia 12/06/2007     Priority: Medium     Overweight (BMI 25.0-29.9) 12/06/2007     Priority: Medium      Current Outpatient Medications   Medication     Acetaminophen (TYLENOL EXTRA STRENGTH PO)     fexofenadine (ALLEGRA) 30 MG ODT     fish oil-omega-3 fatty acids 1000 MG capsule     hydrochlorothiazide (HYDRODIURIL) 12.5 MG tablet     ibuprofen (ADVIL/MOTRIN) 200 MG tablet     JARDIANCE 10 MG TABS tablet     losartan (COZAAR) 50 MG tablet     Multiple Vitamins-Minerals (CENTRUM MEN PO)     Naproxen Sodium (ALEVE PO)     simvastatin (ZOCOR) 20 MG tablet     SitaGLIPtin-MetFORMIN HCl (JANUMET XR) 100-1000 MG TB24     Specialty Vitamins Products (VITAMINS FOR HAIR) TABS     traZODone (DESYREL) 50 MG tablet     VITAMIN D PO     zolpidem (AMBIEN) 10 MG tablet     No current facility-administered medications for this visit.     No vitals taken at this visit.    Estimated body mass index is 29.86 kg/m  as calculated from the following:    Height as of 12/13/21: 1.676 m (5' 6\").    Weight as of 12/13/21: 83.9 kg (185 lb).    Impression/Plan:  1. AFTAB (obstructive sleep apnea)  2. Periodic limb movement disorder  - Comprehensive DME  - SLEEP ENT REFERRAL; Future    This is a pleasant 42-year-old male with a PMH pertinent for HTN, HLD, DM 2, hepatic steatosis, nicotine dependence, allergic rhinitis, and overweight who presents today for follow-up of his sleep study done on 2/04/2022 at the [facility] " which showed moderate obstructive sleep apnea with associated oxygen desaturations and sleep fragmentation.  An increased frequency of periodic limb movements of sleep and associated arousals was also noted.    All potential therapeutic options including positive airway pressure, mandibular advancing oral appliances, and surgical options were discussed. Also counseled about impact of weight loss of AFTAB.     The patient elected to treat his moderate AFTAB with APAP therapy.  A comprehensive DME order was placed for new APAP device with empiric pressure setting 5-15 cm H2O, nasal pillow mask, and supplies sent to [facility].  Patient was notified of likely delay in obtaining his device due to supply chain shortages of PAP devices.  We also discussed usual use/compliance requirements associated with new PAP device therapy.  Patient denies overt symptoms of restless leg syndrome or difficulties initiating sleep due to restlessness in his legs.    In addition, a sleep ENT referral was also placed for patient complaints of possible deviated septum and chronic difficulty breathing through his nose, particularly while sleeping.    He will follow up with me in about 2 month(s) after obtaining new APAP device to review download data and use/compliance.     Forty minutes spent with patient, all of which were spent face-to-face counseling, consulting, chart review/documentation, and coordinating plan of care on the date of the encounter.      ALINE Scott CNP  Sleep Medicine      CC: Miguel Jose     This note was written with the assistance of the Dragon voice-dictation technology software. The final document, although reviewed, may contain errors. For corrections, please contact the office.

## 2022-03-02 NOTE — NURSING NOTE
DME orders have been automatically [facility]ed to [facility].  My Chart message sent to patient:   Bertin Mcgill  [address] [facility] [address]       2022      Dear Mr. Mcgill,            Your provider has placed an order for you to get a new PAP device. Your medical equipment company will try to contact you as soon as possible to schedule your set up (Please be advised, due to a shortage of machines, this may take longer to receive call). Once you know the date of this appointment, please contact our office to schedule a follow up visit with your provider. This appointment should be scheduled 30-60 days from the day that you will be set up on your new device.     [facility] contact information:     [facility]: [phone]  [place]: [phone]  [place]: [phone]  [phone]  [employer]: [phone]  [place]: [phone]        [facility]   Schedule line: [phone]    Sincerely,    Araceli Holden, [facility]  Sleep Medicine  3/2/2022 2:12 PM

## 2022-03-07 ENCOUNTER — DOCUMENTATION ONLY (OUTPATIENT)
Dept: SLEEP MEDICINE | Facility: CLINIC | Age: 42
End: 2022-03-07
Payer: COMMERCIAL

## 2022-03-07 NOTE — PROGRESS NOTES
Patient was offered choice of vendor and chose [facility].  Patient Bertin Mcgill was set up at [place]  on March 7, 2022. Patient received a Resmed Airsense 11 Pressures were set at 5-15 cm H2O.   Patient s ramp is 5 cm H2O for Auto and FLEX/EPR is EPR, 2.  Patient received a [employer]isha [employer] Mask name: DreamWisp  Nasal mask size Medium, heated tubing and heated humidifier.  Patient does not need to meet compliance.   KEILY BOWDEN

## 2022-03-10 ENCOUNTER — DOCUMENTATION ONLY (OUTPATIENT)
Dept: SLEEP MEDICINE | Facility: CLINIC | Age: 42
End: 2022-03-10
Payer: COMMERCIAL

## 2022-03-10 NOTE — PROGRESS NOTES
3 day Sleep therapy management telephone visit    Diagnostic AHI: 27.5  PSG    Confirmed with patient at time of call- Yes Patient is still interested in STM service       Subjective measures:  Patient stated he is feeling better when using CPAP.          Objective data     Order Settings for PAP  CPAP min 5    CPAP max 15             Device settings from machine                           Assessment: Nightly usage over four hours      Action plan: Patient to have 14 day STM visit. Patient has a follow up visit scheduled:   no    Replacement device: No  STM ordered by provider: Yes     Total time spent on accessing and  interpreting remote patient PAP therapy data  10 minutes    Total time spent counseling, coaching  and reviewing PAP therapy data with patient  3 minutes    [id number] no

## 2022-03-22 ENCOUNTER — DOCUMENTATION ONLY (OUTPATIENT)
Dept: SLEEP MEDICINE | Facility: CLINIC | Age: 42
End: 2022-03-22
Payer: COMMERCIAL

## 2022-03-22 NOTE — PROGRESS NOTES
14  DAY STM VISIT    Diagnostic AHI: 27.5  PSG    Subjective measures:   Patient said things are going well. He noticed the mask had left a bruise on his face one time.    He also noticed that he gets more airflow through one nostril.     Assessment: Pt meeting objective benchmarks.  Patient meeting subjective benchmarks.     Action plan: pt to have 30 day STM visit.      Device type: Auto-CPAP    PAP settings: CPAP min 5.0 cm  H20       CPAP max 15.0 cm  H20      95th% pressure 11.1 cm  H20        RESMED EPR level Setting: TWO    RESMED Soft response setting:  OFF    Mask type:  Nasal Pillows    Objective measures: 14 day rolling measures      Compliance  92 %      Leak  5.91  lpm  last  upload      AHI 0.64   last  upload      Average number of minutes 356      Objective measure goal  Compliance   Goal >70%  Leak   Goal < 24 lpm  AHI  Goal < 5  Usage  Goal >240        Total time spent on accessing and interpreting remote patient PAP therapy data  5 minutes    Total time spent counseling, coaching  and reviewing PAP therapy data with patient  10 minutes    [id number]  [id number]  no (3 day STM)

## 2022-04-05 DIAGNOSIS — E11.9 TYPE 2 DIABETES MELLITUS WITHOUT COMPLICATION, WITHOUT LONG-TERM CURRENT USE OF INSULIN (H): ICD-10-CM

## 2022-04-05 RX ORDER — EMPAGLIFLOZIN 10 MG/1
TABLET, FILM COATED ORAL
Qty: 90 TABLET | Refills: 0 | Status: SHIPPED | OUTPATIENT
Start: 2022-04-05 | End: 2022-07-07

## 2022-04-05 NOTE — TELEPHONE ENCOUNTER
Please call and get pt scheduled for Dr. Flores as soon as next appt even virtual but better in person and labs I will enter the lab orders.Some labs for year were done in Aug 2021.Candice Oseguera RN

## 2022-04-06 ENCOUNTER — OFFICE VISIT (OUTPATIENT)
Dept: FAMILY MEDICINE | Facility: CLINIC | Age: 42
End: 2022-04-06
Payer: COMMERCIAL

## 2022-04-06 VITALS
WEIGHT: 187.5 LBS | HEART RATE: 95 BPM | DIASTOLIC BLOOD PRESSURE: 80 MMHG | BODY MASS INDEX: 29.43 KG/M2 | SYSTOLIC BLOOD PRESSURE: 122 MMHG | OXYGEN SATURATION: 99 % | HEIGHT: 67 IN | TEMPERATURE: 97 F

## 2022-04-06 DIAGNOSIS — I10 BENIGN ESSENTIAL HYPERTENSION: ICD-10-CM

## 2022-04-06 DIAGNOSIS — Z56.89 WORK-RELATED CONDITION: ICD-10-CM

## 2022-04-06 DIAGNOSIS — E78.2 MIXED HYPERLIPIDEMIA: ICD-10-CM

## 2022-04-06 DIAGNOSIS — E11.9 TYPE 2 DIABETES MELLITUS WITHOUT COMPLICATION, WITHOUT LONG-TERM CURRENT USE OF INSULIN (H): Primary | ICD-10-CM

## 2022-04-06 LAB
ALBUMIN SERPL-MCNC: 4.7 G/DL (ref 3.4–5)
ALP SERPL-CCNC: 42 U/L (ref 40–150)
ALT SERPL W P-5'-P-CCNC: 71 U/L (ref 0–70)
ANION GAP SERPL CALCULATED.3IONS-SCNC: 6 MMOL/L (ref 3–14)
AST SERPL W P-5'-P-CCNC: 36 U/L (ref 0–45)
BILIRUB SERPL-MCNC: 0.8 MG/DL (ref 0.2–1.3)
BUN SERPL-MCNC: 19 MG/DL (ref 7–30)
CALCIUM SERPL-MCNC: 9.8 MG/DL (ref 8.5–10.1)
CHLORIDE BLD-SCNC: 104 MMOL/L (ref 94–109)
CHOLEST SERPL-MCNC: 200 MG/DL
CO2 SERPL-SCNC: 25 MMOL/L (ref 20–32)
CREAT SERPL-MCNC: 0.92 MG/DL (ref 0.66–1.25)
CREAT UR-MCNC: 56 MG/DL
FASTING STATUS PATIENT QL REPORTED: NO
GFR SERPL CREATININE-BSD FRML MDRD: >90 ML/MIN/1.73M2
GLUCOSE BLD-MCNC: 164 MG/DL (ref 70–99)
HBA1C MFR BLD: 7.5 % (ref 0–5.6)
HDLC SERPL-MCNC: 50 MG/DL
LDLC SERPL CALC-MCNC: 76 MG/DL
MICROALBUMIN UR-MCNC: 8 MG/L
MICROALBUMIN/CREAT UR: 14.29 MG/G CR (ref 0–17)
NONHDLC SERPL-MCNC: 150 MG/DL
POTASSIUM BLD-SCNC: 4.2 MMOL/L (ref 3.4–5.3)
PROT SERPL-MCNC: 7.9 G/DL (ref 6.8–8.8)
SODIUM SERPL-SCNC: 135 MMOL/L (ref 133–144)
TRIGL SERPL-MCNC: 370 MG/DL

## 2022-04-06 PROCEDURE — 82043 UR ALBUMIN QUANTITATIVE: CPT | Performed by: FAMILY MEDICINE

## 2022-04-06 PROCEDURE — 80053 COMPREHEN METABOLIC PANEL: CPT | Performed by: FAMILY MEDICINE

## 2022-04-06 PROCEDURE — 80061 LIPID PANEL: CPT | Performed by: FAMILY MEDICINE

## 2022-04-06 NOTE — LETTER
Return to Work  2022     Seen today: yes    Patient:  Bertin Mcgill  :   1980  MRN:     [id number]  Physician: ROGER JOSE    Bertin Mcgill is a 41 yo with diabetes mellitus on several medications and also with hypertension (on medications) who has been vaccinated to Covid-19 but remains at high risk for complications to Covid due to the above health conditions. See: https://www.cdc.gov/coronavirus/2019-ncov/need-extra-precautions/people-with-medical-conditions.html    He has health concerns about returning to work place given the persistence of the pandemic and thus possible exposure to those with Covid-19.   He feels that he can perform his job from home.   Thank you for your consideration,     Electronically signed by Roger Jose MD

## 2022-04-06 NOTE — PROGRESS NOTES
OVERVIEW: Bertin Mcgill is a 42 year old male who presents to [facility] today for Forms (work exemption med hx diabetes )        ASSESSMENT/PLAN:    1. Work related concern re: Covid-19   - Spent the majority of the visit writing a letter for Lj's work that he does not feel safe returning to the work place.     2. For his diabetes and HTN  -Check labs.  -Check random blood sugars at home and record numbers  - Follow up in a few weeks.     3. Anxiety about work and social issues  - Suggested scheduling a visit with Shawn Ullrich, LICSW    4. Sleep apnea  - Now on CPAP  -He will be contacting a sleep specialist for any alterations needed to make the machine more comfortable.    Follow-up 1-3 months    --Miguel Jose MD      SUBJECTIVE:   Lj is a 42-year-old who has type 2 diabetes mellitus that was under poor control but is now under much better control.  He also has hypertension and hyperlipidemia.  He works for the [employer] and has been doing his job from home for the past 2 years given the pandemic.  He is now being asked to return to the workplace, but is feeling incredibly anxious about returning to close their environments.  He has been isolating himself due to a fear of catching coronavirus given his above-mentioned conditions.  He states that he can do his job as well or better if he is home rather than going into work and exposing himself to the risk of coronavirus.  Lj expresses anxiety about working in enclosed spaces and states that this will prohibit him from functioning well at work.    He is otherwise feeling well.  He recently went for a sleep study and was placed on a CPAP and has noticed that his sleep is much better.  He has not been checking his blood sugars.  He has not been checking his blood pressure.  Reports that his drinking is diminished slightly down to about 5-7 nights per week and 1-2 drinks per night.    Wt Readings from Last 5 Encounters:   04/06/22 85 kg (187  "lb 8 oz)   02/15/22 82.1 kg (181 lb)   12/13/21 83.9 kg (185 lb)   10/14/21 84.1 kg (185 lb 6.4 oz)   09/15/21 83.6 kg (184 lb 4 oz)       BP Readings from Last 6 Encounters:   04/06/22 122/80   10/14/21 125/83   09/15/21 129/89   12/29/20 126/89   11/02/20 (!) 148/107   09/30/20 118/84         Review Of Systems:    Has otherwise been in usual state of health, e.g.   Cardiovascular: negative  Respiratory: No shortness of breath, dyspnea on exertion, cough, or hemoptysis  Gastrointestinal: negative  Genitourinary: negative    Problem list per EMR:  Patient Active Problem List   Diagnosis     Type 2 diabetes mellitus without complication, without long-term current use of insulin (H)     Mixed hyperlipidemia     Benign essential hypertension     Overweight (BMI 25.0-29.9)     Seasonal allergies     Smoker       Current Outpatient Medications   Medication Sig Dispense Refill     Acetaminophen (TYLENOL EXTRA STRENGTH PO)        hydrochlorothiazide (HYDRODIURIL) 12.5 MG tablet Take 1 tablet (12.5 mg) by mouth daily 90 tablet 2     ibuprofen (ADVIL/MOTRIN) 200 MG tablet        JARDIANCE 10 MG TABS tablet TAKE 1 TABLET BY MOUTH EVERY DAY 90 tablet 0     losartan (COZAAR) 50 MG tablet Take 1 tablet (50 mg) by mouth daily 90 tablet 1     Multiple Vitamins-Minerals (CENTRUM MEN PO)        Naproxen Sodium (ALEVE PO)        simvastatin (ZOCOR) 20 MG tablet TAKE 1 TABLET (20 MG) BY MOUTH ONCE A DAY WITH EVENING MEAL. 30 tablet 5     SitaGLIPtin-MetFORMIN HCl (JANUMET XR) 100-1000 MG TB24 Take 1 tablet by mouth daily 90 tablet 1     VITAMIN D PO Take 2,000 Units by mouth         Allergies   Allergen Reactions     Seasonal Allergies         Social:   Unchanged..      OBJECTIVE    Vitals: /80 (BP Location: Right arm, Patient Position: Sitting, Cuff Size: Adult Large)   Pulse 95   Temp 97  F (36.1  C) (Temporal)   Ht 1.695 m (5' 6.75\")   Wt 85 kg (187 lb 8 oz)   SpO2 99%   BMI 29.59 kg/m    BMI= Body mass index is 29.59 " kg/m .  He appears well and in no distress.  Normal affect.  Normal gait.    SEE TOP OF NOTE FOR ASSESSMENT AND PLAN  30 minutes spent on the date of the encounter doing chart review, history and exam, documentation and further activities as noted in the note.     --Miguel Jose MD  [facility], Department of Family Medicine and Community Health

## 2022-04-06 NOTE — NURSING NOTE
"42 year old  Chief Complaint   Patient presents with     Forms     work exemption med hx diabetes        Blood pressure 122/80, pulse 95, temperature 97  F (36.1  C), temperature source Temporal, height 1.695 m (5' 6.75\"), weight 85 kg (187 lb 8 oz), SpO2 99 %. Body mass index is 29.59 kg/m .  Patient Active Problem List   Diagnosis     Type 2 diabetes mellitus without complication, without long-term current use of insulin (H)     Mixed hyperlipidemia     Benign essential hypertension     Overweight (BMI 25.0-29.9)     Seasonal allergies     Smoker       Wt Readings from Last 2 Encounters:   04/06/22 85 kg (187 lb 8 oz)   02/15/22 82.1 kg (181 lb)     BP Readings from Last 3 Encounters:   04/06/22 122/80   10/14/21 125/83   09/15/21 129/89         Current Outpatient Medications   Medication     Acetaminophen (TYLENOL EXTRA STRENGTH PO)     hydrochlorothiazide (HYDRODIURIL) 12.5 MG tablet     ibuprofen (ADVIL/MOTRIN) 200 MG tablet     JARDIANCE 10 MG TABS tablet     losartan (COZAAR) 50 MG tablet     Multiple Vitamins-Minerals (CENTRUM MEN PO)     Naproxen Sodium (ALEVE PO)     simvastatin (ZOCOR) 20 MG tablet     SitaGLIPtin-MetFORMIN HCl (JANUMET XR) 100-1000 MG TB24     VITAMIN D PO     fexofenadine (ALLEGRA) 30 MG ODT     fish oil-omega-3 fatty acids 1000 MG capsule     Specialty Vitamins Products (VITAMINS FOR HAIR) TABS     traZODone (DESYREL) 50 MG tablet     No current facility-administered medications for this visit.       Social History     Tobacco Use     Smoking status: Current Every Day Smoker     Smokeless tobacco: Never Used   Substance Use Topics     Alcohol use: Yes     Drug use: Never       Health Maintenance Due   Topic Date Due     PREVENTIVE CARE VISIT  Never done     ADVANCE CARE PLANNING  Never done     EYE EXAM  Never done     HIV SCREENING  Never done     HEPATITIS B IMMUNIZATION (1 of 3 - Risk 3-dose series) Never done     DIABETIC FOOT EXAM  08/12/2020     LIPID  06/29/2021     A1C  " 11/30/2021     PHQ-2 (once per calendar year)  01/01/2022       No results found for: PAP      April 6, 2022 11:26 AM

## 2022-04-06 NOTE — PATIENT INSTRUCTIONS
ASSESSMENT/PLAN:    1. Work related concern re: Covid-19   - Spent the majority of the visit writing a letter for Lj's work that he does not feel safe returning to the work place.     2. For his diabetes and HTN  -Check labs.  -Check random blood sugars at home and record numbers  - Follow up in a few weeks.     3. Anxiety about work and social issues  - Suggested scheduling a visit with Shawn Ullrich, LICSW    4. Sleep apnea  - Now on CPAP    Follow-up 1-3 months    --Miguel Jose MD

## 2022-04-08 ENCOUNTER — DOCUMENTATION ONLY (OUTPATIENT)
Dept: SLEEP MEDICINE | Facility: CLINIC | Age: 42
End: 2022-04-08
Payer: COMMERCIAL

## 2022-04-08 DIAGNOSIS — E11.9 TYPE 2 DIABETES MELLITUS WITHOUT COMPLICATION, WITHOUT LONG-TERM CURRENT USE OF INSULIN (H): ICD-10-CM

## 2022-04-08 NOTE — TELEPHONE ENCOUNTER
"Last Written Prescription Date:  7/15/21  Last Fill Quantity: 90,  # refills: 1   Last office visit: 1/5/21 with prescribing provider:  Dr. Flores  Future Office Visit:  None scheduled    Requested Prescriptions   Pending Prescriptions Disp Refills     JANUMET -1000 MG TB24 [Pharmacy Med Name: JANUMET -1,000 MG TABLET] 90 tablet 1     Sig: TAKE 1 TABLET BY MOUTH EVERY DAY       Combination Oral Antihyperglycemic Agents Failed - 4/8/2022 12:32 AM        Failed - Recent (6 mo) or future (30 days) visit within the authorizing provider's specialty     Patient had office visit in the last 6 months or has a visit in the next 30 days with authorizing provider or within the authorizing provider's specialty.  See \"Patient Info\" tab in inbasket, or \"Choose Columns\" in Meds & Orders section of the refill encounter.            Passed - Patient has documented A1c within the specified period of time.     If HgbA1C is 8 or greater, it needs to be on file within the past 3 months.  If less than 8, must be on file within the past 6 months.     Recent Labs   Lab Test 04/06/22  1205   A1C 7.5*             Passed - Patient's CR is NOT>1.4 OR Patient's EGFR is NOT<45 within past 12 mos.     Recent Labs   Lab Test 04/06/22  1205 08/31/21  1020 11/08/19  0936   GFRESTIMATED >90   < > >90   GFRESTBLACK  --   --  >90    < > = values in this interval not displayed.       Recent Labs   Lab Test 04/06/22  1205   CR 0.92             Passed - Patient does not have a diagnosis of CHF.        Passed - Medication is active on med list        Passed - Patient is 18 years old or older.                 "

## 2022-04-08 NOTE — PROGRESS NOTES
30 DAY [facility] VISIT    Diagnostic AHI: 27.5  PSG    Data only recheck     Assessment: Pt meeting objective benchmarks.     Action plan:   Patient has not scheduled a follow up visit.   Device type: Auto-CPAP  PAP settings: CPAP min 5.0 cm  H20     CPAP max 15.0 cm  H20    95th% pressure 10.4 cm  H20      RESMED EPR level Setting: TWO    RESMED Soft response setting:  OFF  Mask type:  Nasal Pillows  Objective measures: 14 day rolling measures      Compliance  85 %      Leak  0.83 lpm  last  upload      AHI 0.54   last  upload      Average number of minutes 352      Objective measure goal  Compliance   Goal >70%  Leak   Goal < 24 lpm  AHI  Goal < 5  Usage  Goal >240        Total time spent on accessing and interpreting remote patient PAP therapy data  10 minutes    Total time spent counseling, coaching  and reviewing PAP therapy data with patient  0 minutes     [id number] this call  [id number] no  at 3 or 14 day [facility]

## 2022-04-09 RX ORDER — SITAGLIPTIN AND METFORMIN HYDROCHLORIDE 1000; 100 MG/1; MG/1
TABLET, FILM COATED, EXTENDED RELEASE ORAL
Qty: 90 TABLET | Refills: 1 | OUTPATIENT
Start: 2022-04-09

## 2022-04-09 NOTE — TELEPHONE ENCOUNTER
Janumet Rx denied. Patient has not returned for an endocrinology evaluation since 1/2021 and no future endo appointments made.  He should follow-up with his PCP.    LEO Flores MD, MS  Endocrinology  [facility]

## 2022-04-10 ENCOUNTER — MYC REFILL (OUTPATIENT)
Dept: ENDOCRINOLOGY | Facility: CLINIC | Age: 42
End: 2022-04-10
Payer: COMMERCIAL

## 2022-04-10 DIAGNOSIS — E11.9 TYPE 2 DIABETES MELLITUS WITHOUT COMPLICATION, WITHOUT LONG-TERM CURRENT USE OF INSULIN (H): ICD-10-CM

## 2022-04-11 RX ORDER — SITAGLIPTIN AND METFORMIN HYDROCHLORIDE 1000; 100 MG/1; MG/1
1 TABLET, FILM COATED, EXTENDED RELEASE ORAL DAILY
Qty: 30 TABLET | Refills: 1 | Status: SHIPPED | OUTPATIENT
Start: 2022-04-11 | End: 2022-06-07

## 2022-04-11 NOTE — TELEPHONE ENCOUNTER
Pt scheduled a virtual visit 5/10/22.Candice Oseguera RN  Prefers to discuss with you future care need.Candice Oseguera RN

## 2022-04-12 NOTE — TELEPHONE ENCOUNTER
Message noted.  I will review the patient's T2DM management, importance of regular follow-up evaluations and lab testing with whomever he chooses to manage his diabetes... at the 5/10/22 appt.    LEO Flores MD, MS  Endocrinology  [facility]

## 2022-04-13 ENCOUNTER — TELEPHONE (OUTPATIENT)
Dept: FAMILY MEDICINE | Facility: CLINIC | Age: 42
End: 2022-04-13
Payer: COMMERCIAL

## 2022-04-13 ASSESSMENT — ANXIETY QUESTIONNAIRES
4. TROUBLE RELAXING: NEARLY EVERY DAY
3. WORRYING TOO MUCH ABOUT DIFFERENT THINGS: MORE THAN HALF THE DAYS
7. FEELING AFRAID AS IF SOMETHING AWFUL MIGHT HAPPEN: MORE THAN HALF THE DAYS
GAD7 TOTAL SCORE: 17
7. FEELING AFRAID AS IF SOMETHING AWFUL MIGHT HAPPEN: MORE THAN HALF THE DAYS
2. NOT BEING ABLE TO STOP OR CONTROL WORRYING: MORE THAN HALF THE DAYS
6. BECOMING EASILY ANNOYED OR IRRITABLE: NEARLY EVERY DAY
1. FEELING NERVOUS, ANXIOUS, OR ON EDGE: NEARLY EVERY DAY
GAD7 TOTAL SCORE: 17
5. BEING SO RESTLESS THAT IT IS HARD TO SIT STILL: MORE THAN HALF THE DAYS
GAD7 TOTAL SCORE: 17

## 2022-04-13 ASSESSMENT — PATIENT HEALTH QUESTIONNAIRE - PHQ9
10. IF YOU CHECKED OFF ANY PROBLEMS, HOW DIFFICULT HAVE THESE PROBLEMS MADE IT FOR YOU TO DO YOUR WORK, TAKE CARE OF THINGS AT HOME, OR GET ALONG WITH OTHER PEOPLE: VERY DIFFICULT
SUM OF ALL RESPONSES TO PHQ QUESTIONS 1-9: 13
SUM OF ALL RESPONSES TO PHQ QUESTIONS 1-9: 13

## 2022-04-13 NOTE — TELEPHONE ENCOUNTER
"I phoned Lj due to the difficulty that he's having with returning back to in-person work situation.   There were several \"mychart\" messages about notes for his work.   It's become complicated by the need to include social anxiety on his work notes.   We chatted for about 15 minutes and I'm writing him a new note for work.   --Miguel Jose MD  "

## 2022-04-13 NOTE — PROGRESS NOTES
"[facility]  Provider Name:  Dennis Waldronlrich     Credentials:  [facility], [facility]    PATIENT'S NAME: Bertin Mcgill  PREFERRED NAME: Lj  PRONOUNS:   he/him    MRN: [id number]  : 1980  ADDRESS: [address] [place] [id number]   ACCT. NUMBER:  [id number]  DATE OF SERVICE: 22  START TIME: 2:01 pm  END TIME:  3:20 pm  PREFERRED PHONE: [phone]  May we leave a program related message: Yes  SERVICE MODALITY:  Video Visit:      Provider verified identity through the following two step process.  Patient provided:  Patient  and Patient address    Telemedicine Visit: The patient's condition can be safely assessed and treated via synchronous audio and visual telemedicine encounter.      Reason for Telemedicine Visit: Patient has requested telehealth visit    Originating Site (Patient Location): Patient's home    Distant Site (Provider Location): [facility]    Consent:  The patient/guardian has verbally consented to: the potential risks and benefits of telemedicine (video visit) versus in person care; bill my insurance or make self-payment for services provided; and responsibility for payment of non-covered services.     Patient would like the video invitation sent by:  My Chart    Mode of Communication:  Video Conference via [employer]    As the provider I attest to compliance with applicable laws and regulations related to telemedicine.    UNIVERSAL ADULT Mental Health DIAGNOSTIC ASSESSMENT    Identifying Information:  Patient is a 42 year old,  Serbian, single, heterosexual male.  The pronoun use throughout this assessment reflects the patient's chosen pronoun.  Patient was referred for an assessment by primary care provider.  Patient attended the session alone.    Chief Complaint:   The reason for seeking services at this time is: \"Anxiety/Social Anxiety\".  The problem(s) began 2021.   Patient has not attempted to resolve these concerns in the past.    Lj" "stated, \"This [appt] was spurred about b/c...I tried to get a work exemption for returning back to work\", explaining his employer has required staff to return to the office at the start of this week.       Lj presented as anxious and nervous with congruent affect, circumstantial and ruminating thought process, decreased concentration and was fully engaged.  Lj reported a history of anxiety dating back to childhood, stating \"The first time I ever experienced anxiety was early on...probably elementary school\".  Lj cited examples throughout his life when he experienced anxiety, including childhood, teenager, during college and as an adult.  Lj believes he's an anxious person, stating, \"I always over think things\", \"I have a very active mind\", and currently \"I don't feel depressed (PHQ-9=13), but I feel extreme anxiety (NATHALIE-7=17)\".  Lj endorsed the following:    -Muscle tension: \"under my neck and between my shoulder blades\"  -Sleep: \"ridiculously hard time falling asleep\"  -GI/Stomach Upset: fluctuates between constipation and loose stool  -Heart palpitations and chest tightness  -Rumination: \"replaying the whole damn day\"  -feeling nervous and worried  -restless  -irritable  -poor concentration  -fatigue    Lj reported his anxiety and worry were exacerbated by the onset of the pandemic.  Lj reported due to his physical health condition (diabetes) and mental health symptoms, he took a very cautious approach to the pandemic, including he worked from home, decreased social contact, and only went to public places (eg grocery shopping) during off peak times to remain safe.  Lj reported taking these precautions has helped to manage the anxiety, although these measures have not stopped the anxiety, and when he contemplates returning to the office, his anxiety becomes heightened.  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  In addition to the excessive worrying, Lj noted how prominent the muscle tension and difficulty falling asleep have become, and is consuming 1-2 drinks most nights of the week (sometimes more on the weekend nights) to reduce the tension and increase his ability to fall asleep.      As noted above Lj denied current depressed mood, but he endorsed multiple depressive symptoms and scored moderately high on PHQ-9.  Lj reported one past period of significantly low mood, immediately following the death of a close friend in .  Lj reported recent passive SI, stating, \"if I were to die in my sleep...\" or \"I wish I could move out into nowhere\".  Lj reported one prior episode of SI after friend  in .  Lj denied current SI, intent or plan, was future oriented, has protective factors, and no firearms in the home.  Lj denied safety concerns and need for safety planning, but was receptive to information about Crisis Resources. Lj denied past/current SIB and denied past SA.     Lj denied history of mental health diagnoses, mental health services, or psychiatric hospitalizations.      Social/Family History:  Patient reported they grew up in [place]  .  They were raised by adopted parents  .  Parents were always together.  Patient reported that their childhood was both patient and his sister are adopted, didn't like spending time in the home.  Patient described their current relationships with family of origin as \"I've never really gotten a long with my parents...we do better now that we don't live together\".     The patient describes their cultural background as Maori, adopted.  Cultural influences and " impact on patient's life structure, values, norms, and healthcare: Just run-of-the-mill racial generalizations growing up in a predominantly white neighborhood, school district, and community. Nothing that was life changing..  Contextual influences on patient's health include: Societal Factors Covid 19 Pandemic.    These factors will be addressed in the Preliminary Treatment plan. Patient identified their preferred language to be English. Patient reported they does not need the assistance of an  or other support involved in therapy.     Patient reported had no significant delays in developmental tasks.   Patient's highest education level was college graduate  .  Patient identified the following learning problems: none reported.  Modifications will not be used to assist communication in therapy.  Patient reports they are  able to understand written materials.    Patient reported the following relationship history: no marriages/divorces.  Patient's current relationship status is single .   Patient identified their sexual orientation as heterosexual.  Patient reported having no child(jose). Patient identified friends as part of their support system.  Patient identified the quality of these relationships as fair,  .      Patient's current living/housing situation involves staying in own home/apartment.  The immediate members of family and household include Vera Alvesher, Trevon,Mother  and they report that housing is stable.    Patient is currently employed fulltime.  Patient reports their finances are obtained through employment. Patient does identify finances as a current stressor.      Patient reported that they have not been involved with the legal system. Patient does not report being under probation/ parole/ jurisdiction. They are not under any current court jurisdiction. .    Patient's Strengths and Limitations:  Patient identified the following strengths or resources that will help them succeed in treatment:  friends / good social support, insight, intelligence and work ethic. Things that may interfere with the patient's success in treatment include: covid 19 pandemic.     Assessments:  The following assessments were completed by patient for this visit:  PHQ9:   PHQ-9 SCORE 4/13/2022   PHQ-9 Total Score MyChart 13 (Moderate depression)   PHQ-9 Total Score 13     GAD7:   NATHALIE-7 SCORE 4/13/2022   Total Score 17 (severe anxiety)   Total Score 17     CAGE-AID:   CAGE-AID Total Score 4/13/2022   Total Score 2   Total Score MyChart 2 (A total score of 2 or greater is considered clinically significant)     PROMIS 10-Global Health (only subscores and total score):   PROMIS-10 Scores Only 4/13/2022   Global Mental Health Score 10   Global Physical Health Score 14   PROMIS TOTAL - SUBSCORES 24       Personal and Family Medical History:  Patient does not report a family history of mental health concerns; pt is adopted and unaware of biological family history; sister (who is also adopted) has mental health diagnosis and takes medication; pt believes mother (non-biological) has anxiety.  Patient reports family history is not on file..     Patient does not report Mental Health Diagnosis or Treatment.      Patient has had a physical exam to rule out medical causes for current symptoms.  Date of last physical exam was within the past year. Client was encouraged to follow up with PCP if symptoms were to develop. The patient has a [facility] Provider, who is named Miguel Jose..  Patient reports no current medical concerns.  Patient denies any issues with pain..   There are not significant appetite / nutritional concerns / weight changes.   Patient does not report a history of head injury / trauma / cognitive impairment.      Medication:  -no prescribed psychotropic medication    Medication Adherence:  NA    Patient Allergies:    Allergies   Allergen Reactions     Seasonal Allergies        Medical History:    Past  Medical History:   Diagnosis Date     Allergic rhinitis      Benign essential hypertension      Hyperlipidemia      Rotator cuff tear, left 2015     Rotator cuff tear, right 2017     Type 2 diabetes mellitus (H)          Current Mental Status Exam:   Appearance:  Appropriate    Eye Contact:  Fair   Psychomotor:  Normal       Gait / station:  Unable to assess due to video visit  Attitude / Demeanor: Cooperative   Speech      Rate / Production: Normal/ Responsive      Volume:  Normal  volume      Language:  intact  Mood:   Anxious   Affect:   Worrisome    Thought Content: Rumination   Thought Process: Coherent       Associations: No loosening of associations  Insight:   Fair   Judgment:  Intact   Orientation:  All  Attention/concentration: Fair      Substance Use:  Patient did not report a family history of substance use concerns; pt is adopted and unaware of biological family history..  Patient has not received chemical dependency treatment in the past.  Patient has not ever been to detox.      Patient is not currently receiving any chemical dependency treatment.         Substance History of use Age of first use Date of last use     Pattern and duration of use (include amounts and frequency)   Alcohol currently use   19 4/12/2022 1-2 drinks (hard alcohol), approximately 6 nights per week   Cannabis   used in the past 21 1/21/2001 REPORTS SUBSTANCE USE: N/A     Amphetamines   never used     REPORTS SUBSTANCE USE: N/A   Cocaine/crack    never used       REPORTS SUBSTANCE USE: N/A   Hallucinogens never used         REPORTS SUBSTANCE USE: N/A   Inhalants never used         REPORTS SUBSTANCE USE: N/A   Heroin never used         REPORTS SUBSTANCE USE: N/A   Other Opiates used in the past 26 12/19/2017 REPORTS SUBSTANCE USE: N/A   Benzodiazepine   never used     REPORTS SUBSTANCE USE: N/A   Barbiturates never used     REPORTS SUBSTANCE USE: N/A   Over the counter meds used in the past 5? 4/10/2022 REPORTS SUBSTANCE USE: N/A  "  Caffeine currently use 8?     Half pot of coffee per day; 1-2 soda in the evening   Nicotine  currently use 15 2022 Cigarettes; 1 pack per day   Other substances not listed above:  Identify:  never used     REPORTS SUBSTANCE USE: N/A     Patient reported the following problems as a result of their substance use: no problems, not applicable.    Substance Use: using alcohol to address physical and mental health issues    Based on the positive CAGE score and clinical interview there  are indications of drug or alcohol abuse. Diagnostic assessment for substance use disorder completed. Therapist did recommend client to reduce use or abstain from alcohol or substance use. Therapist did not recommend structured treatment and or community support (AA, 12 step group, etc.). pt not receptive to additional services at this time.    Significant Losses / Trauma / Abuse / Neglect Issues:   Patient did not serve in the .  There are indications or report of significant loss, trauma, abuse or neglect issues related to: see below.  -\"some of the traumas that have shaped my life have been more on the emotional side\": friend  in a car accident  Concerns for possible neglect are not present.     Safety Assessment:   Patient denies current homicidal ideation and behaviors.  Patient denies current self-injurious ideation and behaviors.    Patient denied risk behaviors associated with substance use.  Patient denies any high risk behaviors associated with mental health symptoms.  Patient reports the following current concerns for their personal safety: None.  Patient reports there are not firearms in the house.        History of Safety Concerns:  Patient denied a history of homicidal ideation.     Patient denied a history of personal safety concerns.    Patient denied a history of assaultive behaviors.    Patient denied a history of sexual assault behaviors.     Patient denied a history of risk behaviors associated with " substance use.  Patient denies any history of high risk behaviors associated with mental health symptoms.  Patient reports the following protective factors: forward or future oriented thinking; safe and stable environment; effectively controls impulses    Risk Plan:  See Recommendations for Safety and Risk Management Plan    Review of Symptoms per patient report:  Depression: Change in sleep, Lack of interest, Excessive or inappropriate guilt, Change in energy level, Difficulties concentrating, Change in appetite, Psychomotor slowing or agitation, Feelings of hopelessness, Ruminations, Irritability, Feeling sad, down, or depressed and Withdrawn  Noemy:  No Symptoms  Psychosis: No Symptoms  Anxiety: Excessive worry, Nervousness, Physical complaints, such as headaches, stomachaches, muscle tension, Social anxiety, Sleep disturbance, Psychomotor agitation, Ruminations, Poor concentration and Irritability  Panic:  No symptoms  Post Traumatic Stress Disorder:  Experienced traumatic event see above   Eating Disorder: No Symptoms  ADD / ADHD:  No symptoms  Conduct Disorder: No symptoms  Autism Spectrum Disorder: No symptoms  Obsessive Compulsive Disorder: No Symptoms    Patient reports the following compulsive behaviors and treatment history: None.      Diagnostic Criteria:   Generalized Anxiety Disorder  A. Excessive anxiety and worry about a number of events or activities (such as work or school performance).   B. The person finds it difficult to control the worry.  C. Select 3 or more symptoms (required for diagnosis). Only one item is required in children.   - Restlessness or feeling keyed up or on edge.    - Being easily fatigued.    - Difficulty concentrating or mind going blank.    - Irritability.    - Muscle tension.    - Sleep disturbance (difficulty falling or staying asleep, or restless unsatisfying sleep).   D. The focus of the anxiety and worry is not confined to features of an Axis I disorder.  E. The  anxiety, worry, or physical symptoms cause clinically significant distress or impairment in social, occupational, or other important areas of functioning.   F. The disturbance is not due to the direct physiological effects of a substance (e.g., a drug of abuse, a medication) or a general medical condition (e.g., hyperthyroidism) and does not occur exclusively during a Mood Disorder, a Psychotic Disorder, or a Pervasive Developmental Disorder.    - The aformentioned symptoms began 20 year(s) ago and occurs 7 days per week and is experienced as moderate. Substance Use Disorder Use of the substance is continued despite knowledge of having a persistent or recurrent physical or psychological problem that is likely to have been cause or exacerbated by the substance.  Met for:  Alcohol Tolerance:  either a need for markedly increased amounts of the substance to achieve the desired effect or a markedly diminished effect with continued use of the dame amount of the substance.  Met for:  Alcohol Adjustment Disorder  A. The development of emotional or behavioral symptoms in response to an identifiable stressor(s) occurring within 3 months of the onset of the stressor(s)  B. These symptoms or behaviors are clinically significant, as evidenced by one or both of the following:       - Marked distress that is out of proportion to the severity/intensity of the stressor (with consideration for external context & culture)       - Significant impairment in social, occupational, or other important areas of functioning  C. The stress-related disturbance does not meet criteria for another disorder & is not not an exacerbation of another mental disorder  D. The symptoms do not represent normal bereavement  E. Once the stressor or its consequences have terminated, the symptoms do not persist for more than an additional 6 months       * Adjustment Disorder with Depressed Mood: The predominant manifestations are symptoms such as low mood,  tearfulness, or feelings of hopelessness    Functional Status:  Patient reports the following functional impairments:  self-care, social interactions and work / vocational responsibilities.     Nonprogrammatic care:  Patient is requesting basic services to address current mental health concerns.    Clinical Summary:  1. Reason for assessment: referred by PCP.  2. Psychosocial, Cultural and Contextual Factors: [place]-American, Single, Male, heterosexual., adopted, employed, pandemic  3. Principal DSM5 Diagnoses  (Sustained by DSM5 Criteria Listed Above):   300.02 (F41.1) Generalized Anxiety Disorder.  4. Other Diagnoses that is relevant to services:   Adjustment Disorders  309.0 (F43.21) With depressed mood  Substance-Related & Addictive Disorders Alcohol Use Disorder   305.00 (F10.10) Mild current.  5. Provisional Diagnosis: NA  6. Prognosis: Expect Improvement, Relieve Acute Symptoms and Maintain Current Status / Prevent Deterioration.  7. Likely consequences of symptoms if not treated: increased symptoms and worsening functional impairment  8. Client strengths include:  educated, employed, insightful, intelligent, support of family, friends and providers and work history .     Recommendations:     1. Plan for Safety and Risk Management:   Recommended that patient call 911 or go to the local ED should there be a change in any of these risk factors.  [facility] educated about and sent Crisis Resources material to pt.   Report to child / adult protection services was NA.     2. Patient's identified mental health concerns with a cultural influence will be addressed by recognizing his adoption and familial experience.     3. Initial Treatment will focus on:    Anxiety - excessive worry; physical manifestations of anxiety; inability to relax.     4. Resources/Service Plan:    services are not indicated.   Modifications to assist communication are not indicated.   Additional disability accommodations are not  indicated.      5. Collaboration:   Collaboration / coordination of treatment will be initiated with the following  support professionals: primary care physician.      6.  Referrals:   The following referral(s) will be initiated: [facility]    -Recommend Medication Evaluation: start with PCP and will referral for psychiatric consultation if needed    -Next Scheduled Appointment: [facility] appt on 4/19/22    -C and pt will review further recommendations and referrals at next scheduled appt, including IOP, community supports, educational resources, and informal CHELO resources.     A Release of Information has been obtained for the following: NA.    7. CHELO:    CHELO:  Discussed the general effects of drugs and alcohol on health and well-being. Provider gave patient printed information about the effects of chemical use on their health and well being. Recommendations:  Reduce/abstain from alcohol use; if pt is unable to reduce/abstain from alcohol use, will recommend and refer to informal/formal tx services.        8. Records:   These were reviewed at time of assessment.   Information in this assessment was obtained from the medical record and provided by patient who is a good historian.    Patient will have open access to their mental health medical record.      Provider Name/ Credentials:  Shawn Ullrich LIC, [facility] [place][note 2]  April 14, 2022

## 2022-04-14 ENCOUNTER — VIRTUAL VISIT (OUTPATIENT)
Dept: BEHAVIORAL HEALTH | Facility: CLINIC | Age: 42
End: 2022-04-14
Payer: COMMERCIAL

## 2022-04-14 DIAGNOSIS — F43.21 ADJUSTMENT DISORDER WITH DEPRESSED MOOD: ICD-10-CM

## 2022-04-14 DIAGNOSIS — F10.10 ALCOHOL USE DISORDER, MILD, ABUSE: ICD-10-CM

## 2022-04-14 DIAGNOSIS — F41.1 GAD (GENERALIZED ANXIETY DISORDER): Primary | ICD-10-CM

## 2022-04-14 ASSESSMENT — COLUMBIA-SUICIDE SEVERITY RATING SCALE - C-SSRS
TOTAL  NUMBER OF INTERRUPTED ATTEMPTS LIFETIME: NO
1. IN THE PAST MONTH, HAVE YOU WISHED YOU WERE DEAD OR WISHED YOU COULD GO TO SLEEP AND NOT WAKE UP?: YES
REASONS FOR IDEATION LIFETIME: COMPLETELY TO END OR STOP THE PAIN (YOU COULDN'T GO ON LIVING WITH THE PAIN OR HOW YOU WERE FEELING)
6. HAVE YOU EVER DONE ANYTHING, STARTED TO DO ANYTHING, OR PREPARED TO DO ANYTHING TO END YOUR LIFE?: NO
2. HAVE YOU ACTUALLY HAD ANY THOUGHTS OF KILLING YOURSELF?: NO
TOTAL  NUMBER OF ABORTED OR SELF INTERRUPTED ATTEMPTS LIFETIME: NO
ATTEMPT LIFETIME: NO
REASONS FOR IDEATION PAST MONTH: DOES NOT APPLY
1. HAVE YOU WISHED YOU WERE DEAD OR WISHED YOU COULD GO TO SLEEP AND NOT WAKE UP?: YES

## 2022-04-14 ASSESSMENT — PATIENT HEALTH QUESTIONNAIRE - PHQ9: SUM OF ALL RESPONSES TO PHQ QUESTIONS 1-9: 13

## 2022-04-14 ASSESSMENT — ANXIETY QUESTIONNAIRES: GAD7 TOTAL SCORE: 17

## 2022-04-18 ASSESSMENT — PATIENT HEALTH QUESTIONNAIRE - PHQ9
10. IF YOU CHECKED OFF ANY PROBLEMS, HOW DIFFICULT HAVE THESE PROBLEMS MADE IT FOR YOU TO DO YOUR WORK, TAKE CARE OF THINGS AT HOME, OR GET ALONG WITH OTHER PEOPLE: SOMEWHAT DIFFICULT
SUM OF ALL RESPONSES TO PHQ QUESTIONS 1-9: 15
SUM OF ALL RESPONSES TO PHQ QUESTIONS 1-9: 15

## 2022-04-18 NOTE — PROGRESS NOTES
[facility]  2022    Behavioral Health Clinician Progress Note    Patient Name: Bertin Mcgill           Service Type:  Individual      Service Location:   telehealth     Session Start Time: 1: 03 pm  Session End Time: 2:01 pm      Session Length: 53 - 60      Attendees: Client     Service Modality:  Video Visit:      Provider verified identity through the following two step process.  Patient provided:  Patient , Patient address and Patient is known previously to provider    Telemedicine Visit: The patient's condition can be safely assessed and treated via synchronous audio and visual telemedicine encounter.      Reason for Telemedicine Visit: Patient has requested telehealth visit    Originating Site (Patient Location): Patient's home    Distant Site (Provider Location): [facility]    Consent:  The patient/guardian has verbally consented to: the potential risks and benefits of telemedicine (video visit) versus in person care; bill my insurance or make self-payment for services provided; and responsibility for payment of non-covered services.     Patient would like the video invitation sent by:  My Chart     Mode of Communication:  Video Conference via Amwell    As the provider I attest to compliance with applicable laws and regulations related to telemedicine.    Visit Activities (Refresh list every visit): [facility] [place] Only    Diagnostic Assessment Date: 22  Treatment Plan Review Date: 22   CGI Review Date:22    Clinical Global Impressions  First:  Considering your total clinical experience with this particular patient population, how severe are the patient's symptoms at this time?: 5 (2022  3:00 PM)    Most recent:  No data recorded    See Flowsheets for today's PHQ-9 and NATHALIE-7 results  Previous PHQ-9:   PHQ-9 SCORE 2022   PHQ-9 Total Score MyChart 13 (Moderate depression) 15 (Moderately severe depression)   PHQ-9 Total Score 13 15     Previous  NATHALIE-7:   NATHALIE-7 SCORE 4/13/2022   Total Score 17 (severe anxiety)   Total Score 17       KRISTINE LEVEL:  No flowsheet data found.    DATA  Extended Session (60+ minutes): No  Interactive Complexity: No  Crisis: No  [facility] Patient: No    Treatment Objective(s) Addressed in This Session:  Target Behavior(s): increased social activity/decreased isolation    Anxiety: will experience a reduction in anxiety, will develop more effective coping skills to manage anxiety symptoms, will develop healthy cognitive patterns and beliefs and will increase ability to function adaptively    Current Stressors / Issues:  Ke presented as anxious, frustrated and irritable, was fully engaged with good insight.  Ke shared and explained more worries and layers of stress, stemming from work, family, Covid, etc, and even noted symptoms, while heightened since Covid, were present prior to pandemic.  [facility] educated Ke about symptoms, treatment and recommendations, including medication evaluation, abstain from alcohol use, and engage participate in outpt mental health tx.  [facility] answered Ke's questions and provided further information about treatment options.  While Ke expressed hesitancy about recommendations, Ke was open to scheduling medication evaluation and learning more about outpt treatment options available.  Ke affirmed he will research [facility] outpt tx program that specializes in anxiety tx.  Finally, [facility] reflected how Ke appears to always be accompanied by worry, including being in his home with his thoughts, work and other stressors, and doesn't appear to get time away from this environment/space.  Ke affirmed he doesn't get much relief from his worries, he spends the majority of the time by himself in his home, and he has significantly decreased social contact over the past two years. Ke noted he went for a drive recently (ke finds going for rides enjoyable/relaxing) and shared it was helpful, but noted  how increased gas prices are impacting this strategy as well.  [facility] encouraged Lj to be intentional and create ways he can get some breaks/relief from worry. 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    Progress on Treatment Objective(s) / Homework:  New Objective established this session - CONTEMPLATION (Considering change and yet undecided); Intervened by assessing the negative and positive thinking (ambivalence) about behavior change    Motivational Interviewing    MI Intervention: Co-Developed Goal: reduce anxiety, Expressed Empathy/Understanding, Supported Autonomy, Collaboration, Evocation, Open-ended questions, Reflections: simple and complex and Change talk (evoked)     Change Talk Expressed by the Patient: Reasons to change    Provider Response to Change Talk: E - Evoked more info from patient about behavior change and R - Reflected patient's change talk      Care Plan review completed: Yes    Medication Review:  No current psychiatric medications prescribed    Medication Compliance:  NA    Changes in Health Issues:   None reported    Chemical Use Review:   Substance Use: Problem use continues with no change since last session, Stage of Change: Contemplation        Tobacco Use: No change in amount of tobacco use since last session.  Contemplation    Assessment: Current Emotional / Mental Status (status of significant symptoms):  Risk status (Self / Other harm or suicidal ideation)  Patient has had a history of suicidal ideation: passive SI in 2003; none since  Patient denies current fears or concerns for personal safety.  Patient denies current or recent suicidal ideation or  behaviors.passive, no intent or plan  Patient denies current or recent homicidal ideation or behaviors.  Patient denies current or recent self injurious behavior or ideation.  Patient denies other safety concerns.  A safety and risk management plan has not been developed at this time, however patient was encouraged to call [place] [phone] should there be a change in any of these risk factors.    Appearance:   Appropriate   Eye Contact:   Fair   Psychomotor Behavior: Normal   Attitude:   Cooperative   Orientation:   All  Speech   Rate / Production: Normal/ Responsive   Volume:  Normal   Mood:    Anxious  Depressed   Affect:    Worrisome   Thought Content:  Clear   Thought Form:  Coherent  Logical   Insight:    Fair     Diagnoses:  1. Generalized anxiety disorder        Collateral Reports Completed:  Routed note to PCP    Plan: (Homework, other):  Patient was given information about behavioral services and encouraged to schedule a follow up appointment with the clinic [facility] in 1 week.  He was also given information about mental health symptoms and treatment options  and strategies to more effectively manage anxiety.  CD Recommendations: abstain from alcohol use.  Shawn Ullrich [facility]      ______________________________________________________________________    Integrated Primary Care Behavioral Health Treatment Plan    Patient's Name: Bertin Mcgill  YOB: 1980    Date of Creation: 4/19/22  Date Treatment Plan Last Reviewed/Revised: 4/19/22    DSM5 Diagnoses: 300.02 (F41.1) Generalized Anxiety Disorder or Adjustment Disorders  309.0 (F43.21) With depressed mood  Psychosocial / Contextual Factors: Single, heterosexual male, resides in his own home; pandemic  PROMIS (reviewed every 90 days):     Referral / Collaboration:  referrals for medication evaluation and Anxiety outpt treatment program were recommended; pt open to medication evaluation, will explore/gather more information about anxiety  outpatient treatment program.  ([facility]).    Anticipated number of session for this episode of care: 6  Anticipation frequency of session: Weekly  Anticipated Duration of each session: 38-52 minutes  Treatment plan will be reviewed in 90 days or when goals have been changed.       MeasurableTreatment Goal(s) related to diagnosis / functional impairment(s)  Goal 1: Patient will learn 3 strategies to effectively manage anxiety.     I will know I've met my goal when:    Objective #A (Patient Action)    Patient will participate in outpt mh tx.  Status: New - Date: 4/19/22     Intervention(s)  Therapist will educate pt about outpt tx services    Objective #B  Patient will complete medication evaluation appt.  Status: New - Date: 4/19/22     Intervention(s)  Therapist will make referrals to medication evaluation.    Objective #C  Patient will reduce/abstain from alcohol use.  Status: New - Date: 4/19/22     Intervention(s)  Therapist will educate pt about how alcohol impacts mh.      Patient has reviewed and agreed to the above plan.      Shawn G. Ullrich, [place]  April 19, 2022

## 2022-04-19 ENCOUNTER — VIRTUAL VISIT (OUTPATIENT)
Dept: BEHAVIORAL HEALTH | Facility: CLINIC | Age: 42
End: 2022-04-19
Payer: COMMERCIAL

## 2022-04-19 DIAGNOSIS — F10.10 MILD ALCOHOL USE DISORDER: ICD-10-CM

## 2022-04-19 DIAGNOSIS — F41.1 GENERALIZED ANXIETY DISORDER: Primary | ICD-10-CM

## 2022-04-19 ASSESSMENT — PATIENT HEALTH QUESTIONNAIRE - PHQ9: SUM OF ALL RESPONSES TO PHQ QUESTIONS 1-9: 15

## 2022-04-27 ENCOUNTER — VIRTUAL VISIT (OUTPATIENT)
Dept: FAMILY MEDICINE | Facility: CLINIC | Age: 42
End: 2022-04-27
Payer: COMMERCIAL

## 2022-04-27 DIAGNOSIS — E78.5 HYPERLIPIDEMIA, UNSPECIFIED HYPERLIPIDEMIA TYPE: ICD-10-CM

## 2022-04-27 DIAGNOSIS — F41.8 MIXED ANXIETY DEPRESSIVE DISORDER: Primary | ICD-10-CM

## 2022-04-27 RX ORDER — ESCITALOPRAM OXALATE 5 MG/1
5 TABLET ORAL DAILY
Qty: 30 TABLET | Refills: 0 | Status: SHIPPED | OUTPATIENT
Start: 2022-04-27 | End: 2022-05-19

## 2022-04-27 RX ORDER — SIMVASTATIN 20 MG
TABLET ORAL
Qty: 90 TABLET | Refills: 3 | Status: SHIPPED | OUTPATIENT
Start: 2022-04-27 | End: 2022-10-18 | Stop reason: ALTCHOICE

## 2022-04-27 ASSESSMENT — PATIENT HEALTH QUESTIONNAIRE - PHQ9
SUM OF ALL RESPONSES TO PHQ QUESTIONS 1-9: 9
10. IF YOU CHECKED OFF ANY PROBLEMS, HOW DIFFICULT HAVE THESE PROBLEMS MADE IT FOR YOU TO DO YOUR WORK, TAKE CARE OF THINGS AT HOME, OR GET ALONG WITH OTHER PEOPLE: SOMEWHAT DIFFICULT
SUM OF ALL RESPONSES TO PHQ QUESTIONS 1-9: 9

## 2022-04-27 ASSESSMENT — ANXIETY QUESTIONNAIRES
GAD7 TOTAL SCORE: 14
6. BECOMING EASILY ANNOYED OR IRRITABLE: MORE THAN HALF THE DAYS
GAD7 TOTAL SCORE: 14
3. WORRYING TOO MUCH ABOUT DIFFERENT THINGS: MORE THAN HALF THE DAYS
GAD7 TOTAL SCORE: 14
5. BEING SO RESTLESS THAT IT IS HARD TO SIT STILL: MORE THAN HALF THE DAYS
2. NOT BEING ABLE TO STOP OR CONTROL WORRYING: MORE THAN HALF THE DAYS
7. FEELING AFRAID AS IF SOMETHING AWFUL MIGHT HAPPEN: MORE THAN HALF THE DAYS
7. FEELING AFRAID AS IF SOMETHING AWFUL MIGHT HAPPEN: MORE THAN HALF THE DAYS
1. FEELING NERVOUS, ANXIOUS, OR ON EDGE: MORE THAN HALF THE DAYS
4. TROUBLE RELAXING: MORE THAN HALF THE DAYS

## 2022-04-27 NOTE — PROGRESS NOTES
Lj is a 42 year old who is being evaluated via a billable video visit.    Can you please confirm what state you are currently located in? [place]     How would you like to obtain your AVS? MyChart  If the video visit is dropped, the invitation should be resent by: Text to cell phone: mobile   Will anyone else be joining your video visit? No    Video Start Time: 1:01 PM        Subjective   Lj is a 42 year old who via a video visit to follow up on his anxiety with social phobia and fear of catching Covid.   He's been doing well because he has continued to work from home.     Still drinking about every night but states that he's down to 1-2/night.     PHQ 4/27/2022   PHQ-9 Total Score 9   Q9: Thoughts of better off dead/self-harm past 2 weeks Not at all     NATHALIE-7 SCORE 4/13/2022 4/27/2022 4/27/2022   Total Score 17 (severe anxiety) - 14 (moderate anxiety)   Total Score 17 14 14           Current Outpatient Medications   Medication Sig Dispense Refill     Acetaminophen (TYLENOL EXTRA STRENGTH PO)        hydrochlorothiazide (HYDRODIURIL) 12.5 MG tablet Take 1 tablet (12.5 mg) by mouth daily 90 tablet 2     ibuprofen (ADVIL/MOTRIN) 200 MG tablet        JARDIANCE 10 MG TABS tablet TAKE 1 TABLET BY MOUTH EVERY DAY 90 tablet 0     losartan (COZAAR) 50 MG tablet Take 1 tablet (50 mg) by mouth daily 90 tablet 1     Multiple Vitamins-Minerals (CENTRUM MEN PO)        Naproxen Sodium (ALEVE PO)        simvastatin (ZOCOR) 20 MG tablet TAKE 1 TABLET (20 MG) BY MOUTH ONCE A DAY WITH EVENING MEAL. 30 tablet 5     SitaGLIPtin-MetFORMIN HCl (JANUMET XR) 100-1000 MG TB24 Take 1 tablet by mouth daily 30 tablet 1     VITAMIN D PO Take 2,000 Units by mouth           Vitals:  No vitals were obtained today due to virtual visit.    Physical Exam   GENERAL: Healthy, alert and no distress  EYES: Eyes grossly normal to inspection.  No discharge or erythema, or obvious scleral/conjunctival abnormalities.  RESP: No audible wheeze, cough, or visible  cyanosis.  No visible retractions or increased work of breathing.      NEURO: Cranial nerves grossly intact.  Mentation and speech appropriate for age.  PSYCH: Mentation appears normal, affect normal/bright, judgement and insight intact, normal speech and appearance well-groomed.    A/P  1. Anxiety with Depression  -Had a long chat about medications.   - Suggested starting on a Low-Dose selective serotonin reuptake inhibitor medication  -Chose Lexapro at 5mg. Discussed side effects for the first week.  He has close follow up with Dennis.   Chatted with Dennis about plan  Encouraged more outdoor time and exercise, and less alcohol.   Follow up 1 month.     2. Hyperlipidemia  - Refilled Simvistatin    Video-Visit Details    Type of service:  Video Visit    Video End Time:1:24 PM    Originating Location (pt. Location): Home    Distant Location (provider location):  [facility]     Platform used for Video Visit: "[employer]     24 minutes spent on the date of the encounter doing chart review, history and exam, documentation and further activities as noted in the note.

## 2022-04-28 ASSESSMENT — ANXIETY QUESTIONNAIRES: GAD7 TOTAL SCORE: 14

## 2022-04-28 ASSESSMENT — PATIENT HEALTH QUESTIONNAIRE - PHQ9: SUM OF ALL RESPONSES TO PHQ QUESTIONS 1-9: 9

## 2022-04-29 ENCOUNTER — VIRTUAL VISIT (OUTPATIENT)
Dept: BEHAVIORAL HEALTH | Facility: CLINIC | Age: 42
End: 2022-04-29
Payer: COMMERCIAL

## 2022-04-29 DIAGNOSIS — F41.1 GENERALIZED ANXIETY DISORDER: Primary | ICD-10-CM

## 2022-04-29 NOTE — PROGRESS NOTES
[facility]  2022    Behavioral Health Clinician Progress Note    Patient Name: Bertin Mcgill           Service Type:  Individual      Service Location:   telehealth     Session Start Time: 1:32 pm  Session End Time: 2:29 pm      Session Length: 53 - 60      Attendees: Client      Service Modality:  Video Visit:      Provider verified identity through the following two step process.  Patient provided:  Patient , Patient address and Patient is known previously to provider    Telemedicine Visit: The patient's condition can be safely assessed and treated via synchronous audio and visual telemedicine encounter.      Reason for Telemedicine Visit: Patient has requested telehealth visit    Originating Site (Patient Location): Patient's home    Distant Site (Provider Location): [facility]    Consent:  The patient/guardian has verbally consented to: the potential risks and benefits of telemedicine (video visit) versus in person care; bill my insurance or make self-payment for services provided; and responsibility for payment of non-covered services.     Patient would like the video invitation sent by:  My Chart     Mode of Communication:  Video Conference via Amwell    As the provider I attest to compliance with applicable laws and regulations related to telemedicine.    Visit Activities (Refresh list every visit): [facility] Only    Diagnostic Assessment Date: 22  Treatment Plan Review Date: 22   CGI Review Date:22    Clinical Global Impressions  First:  Considering your total clinical experience with this particular patient population, how severe are the patient's symptoms at this time?: 5 (2022  3:00 PM)    Most recent:  No data recorded    See Flowsheets for today's PHQ-9 and NATHALIE-7 results  Previous PHQ-9:   PHQ-9 SCORE 2022   PHQ-9 Total Score MyChart 15 (Moderately severe depression) - 9 (Mild depression)   PHQ-9 Total Score 15 9 9  "    Previous NATHALIE-7:   NATHALIE-7 SCORE 4/13/2022 4/27/2022 4/27/2022   Total Score 17 (severe anxiety) - 14 (moderate anxiety)   Total Score 17 14 14       KRISTINE LEVEL:  No flowsheet data found.    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 When [facility] inquired about willingness to engage in IOP Tx Program for Anxiety, Lj expressed ambivalence, stating other than work requesting he return to the office, he is OK, therefore if he is allowed to continue to work from home he will decline IOP tx.  Lj is willing to start taking medication prescribed by PCP.      [facility] used MI Interventions to engage Lj in discussion regarding further changes, and initially he continued to return to the topic of work, stating if work would change that would improve his mood/reduce symptoms.  [facility] requested Lj set aside work and identify things that he'd like to improve in his life outside of work.  Lj reported the following:  -\"I need to find more time to do physical activities\"   -\"last summer I made it a point to walk around my neighborhood\" during the evenings and he enjoyed it   -\"Engage in my hobbies\"  -\"Do the things around my house that desperately need to be done\"   -engaged in enjoyable activities   -Catch up on TV shows   -Read a book    Lj reported he already remains in touch with friends, ie texts or calls multiple friends per week (infrequent in-person time with " "friends).    Lj eventually returned to stressors and barriers, providing multiple examples that mostly focused on his work and his family.  [facility] reflected feelings of being defensive (ie defending/protecting himself) and observing how his emotions are just below the surface.  Lj acknowledged merit to both protecting himself and emotions being below the surface.  [facility] then reflected how Lj's strength (his ability to do things for others) serves him well at work and with his family, but by not setting any boundaries regarding this helping behavior, his work and family continue to ask and expect more and more from him, which eventually leads him to feeling taken advantage of.  Lj affirmed feeling taken of advantage of and people always wanting things from him; work and family interactions are not reciprocal.      Lj readily acknowledged lack of boundaries, stating \"I have a tough time checking out (from work)...partly its my fault\"    Lj was open to continuing to meet with [facility] to learn and set boundaries with work and family, as well as making changes outside of work.         Progress on Treatment Objective(s) / Homework:  New Objective established this session - CONTEMPLATION (Considering change and yet undecided); Intervened by assessing the negative and positive thinking (ambivalence) about behavior change    Motivational Interviewing    MI Intervention: Co-Developed Goal: reduce anxiety, Expressed Empathy/Understanding, Supported Autonomy, Collaboration, Evocation, Open-ended questions, Reflections: simple and complex and Change talk (evoked)     Change Talk Expressed by the Patient: Reasons to change    Provider Response to Change Talk: E - Evoked more info from patient about behavior change and R - Reflected patient's change talk      Care Plan review completed: Yes    Medication Review:  No changes to current psychiatric medication(s)    Medication Compliance:  NA; has not yet started    Changes in " Health Issues:   None reported    Chemical Use Review:   Substance Use: Problem use continues with no change since last session, Stage of Change: Contemplation        Tobacco Use: No change in amount of tobacco use since last session.  Contemplation    Assessment: Current Emotional / Mental Status (status of significant symptoms):  Risk status (Self / Other harm or suicidal ideation)  Patient has had a history of suicidal ideation: passive SI in 2003; none since  Patient denies current fears or concerns for personal safety.  Patient denies current or recent suicidal ideation or behaviors.passive, no intent or plan  Patient denies current or recent homicidal ideation or behaviors.  Patient denies current or recent self injurious behavior or ideation.  Patient denies other safety concerns.  A safety and risk management plan has not been developed at this time, however patient was encouraged to call [place] [phone] should there be a change in any of these risk factors.    Appearance:   Appropriate   Eye Contact:   Fair   Psychomotor Behavior: Normal   Attitude:   Cooperative   Orientation:   All  Speech   Rate / Production: Normal/ Responsive   Volume:  Normal   Mood:    Anxious   Affect:    Worrisome   Thought Content:  Clear   Thought Form:  Coherent  Logical   Insight:    Fair     Diagnoses:  1. Generalized anxiety disorder        Collateral Reports Completed:  Routed note to PCP    Plan: (Homework, other):  Patient was given information about behavioral services and encouraged to schedule a follow up appointment with the clinic [facility] in 2 weeks.  He was also given information about mental health symptoms and treatment options  and strategies to more effectively manage anxiety.  CD Recommendations: abstain from alcohol use.  Shawn Ullrich [facility]      ______________________________________________________________________    Integrated Primary Care Behavioral Health Treatment Plan    Patient's Name: Bertin FRANK  Nano  YOB: 1980    Date of Creation: 4/19/22  Date Treatment Plan Last Reviewed/Revised: 4/19/22    DSM5 Diagnoses: 300.02 (F41.1) Generalized Anxiety Disorder or Adjustment Disorders  309.0 (F43.21) With depressed mood  Psychosocial / Contextual Factors: Single, heterosexual male, resides in his own home; pandemic  PROMIS (reviewed every 90 days):     Referral / Collaboration:  referrals for medication evaluation and Anxiety outpt treatment program were recommended; pt open to medication evaluation, will explore/gather more information about anxiety outpatient treatment program.  ([facility]).    Anticipated number of session for this episode of care: 6  Anticipation frequency of session: Weekly  Anticipated Duration of each session: 38-52 minutes  Treatment plan will be reviewed in 90 days or when goals have been changed.       MeasurableTreatment Goal(s) related to diagnosis / functional impairment(s)  Goal 1: Patient will learn 3 strategies to effectively manage anxiety.     I will know I've met my goal when:     Objective #A (Patient Action)    Patient will participate in individual therapy services.  Status: New - Date: 4/19/22     Intervention(s)  Therapist will educate pt about outpt tx services    Objective #B  Patient will complete medication evaluation appt.  Status: New - Date: 4/19/22     Intervention(s)  Therapist will make referrals to medication evaluation.    Objective #C  Patient will reduce/abstain from alcohol use.  Status: New - Date: 4/19/22     Intervention(s)  Therapist will educate pt about how alcohol impacts mh.      Patient has reviewed and agreed to the above plan.      Shawn G. Ullrich, [facility]  April 19, 2022

## 2022-05-10 ENCOUNTER — VIRTUAL VISIT (OUTPATIENT)
Dept: ENDOCRINOLOGY | Facility: CLINIC | Age: 42
End: 2022-05-10
Payer: COMMERCIAL

## 2022-05-10 DIAGNOSIS — E11.9 TYPE 2 DIABETES MELLITUS WITHOUT COMPLICATION, WITHOUT LONG-TERM CURRENT USE OF INSULIN (H): Primary | ICD-10-CM

## 2022-05-10 PROCEDURE — 99214 OFFICE O/P EST MOD 30 MIN: CPT | Mod: 95 | Performed by: INTERNAL MEDICINE

## 2022-05-10 NOTE — LETTER
5/10/2022         RE: Bertin Mcgill  [address] [facility] [address]        Dear Colleague,    Thank you for referring your patient, Bertin Mcgill, to the [facility]. Please see a copy of my visit note below.    Patient is being evaluated via a billable video visit.      How would you like to obtain your AVS? Reviewed verbally  If the video visit is dropped, the invitation should be resent by cell phone  Will anyone else be joining your video visit? no      Video Start Time:  1:35 pm    Video-Visit Details    Type of service:  Video Visit    Video End Time: 1:50 pm    Originating Location (pt. Location): home    Distant Location (provider location):  [facility]/home    Platform used for Video Visit:  [employer]        Recent issues:  Diabetes follow-up evaluation  Patient taking the JanumetXR and Jardiance medications per plan  Reports less control of eating habits and exercise, also higher preappt hgbA1c level            2017. Diagnosis of diabetes mellitus when med eval for torn right shoulder muscle  High glucose level noted during med evaluation, hgbA1c near 11.5%  Started Jardiance medication, took for approx 6 months  Recalls significant weight loss of approx 30#  Switched to metformin medication, then dose increases              Concerns about GI symptoms with nausea, nausea, also morning vomiting              Tried Prilosec, then Zantac  Has seen [facility] for medical evaluations  Recent discussion with his workplace team physician, Dr. Skye Mane              Advised to see me for medical evaluation     Previous [facility] labs include:         Previously on metforminXR 500 mg 2 tabs BID    8/12/19. Initial diabetes evaluation with me at [place]  Reviewed health history and diabetes management  Med change to JanumetXR  1/2022. Addition of Jardiance     Current DM medications:  JanumetXR 100/1000 1-tab each  evening  Jardiance 10 mg  1-tab in midday     Blood glucose (BG) meter              Infrequent testing     Fam Hx Diabetes:       None  Recent [facility] labs include:           Lab Results   Component Value Date    A1C 7.5 (H) 04/06/2022     04/06/2022    POTASSIUM 4.2 04/06/2022    CHLORIDE 104 04/06/2022    CO2 25 04/06/2022    ANIONGAP 6 04/06/2022     (H) 04/06/2022    BUN 19 04/06/2022    CR 0.92 04/06/2022    GFRESTIMATED >90 04/06/2022    GFRESTBLACK >90 11/08/2019    FLAVIO 9.8 04/06/2022    CHOL 200 (H) 04/06/2022    TRIG 370 (H) 04/06/2022    HDL 50 04/06/2022    LDL 76 04/06/2022    NHDL 150 (H) 04/06/2022    UCRR 56 04/06/2022    MICROL 8 04/06/2022    UMALCR 14.29 04/06/2022     Lab Results   Component Value Date    TSH 1.24 08/12/2019     History of hyperlipidemia, takes simvastatin 20 mg daily  Last eye exam 5/2019?, no DR  DM Complications:      None known        Lives in [facility]  Has seen [facility]   Also sees Dr. Jose/[facility] for general medicine evaluations.      ROS: 10 point ROS neg other than the symptoms noted above in the HPI.     GENERAL: no fatigue, mild weight gain; denies fevers, chills, night sweats.   HEENT: no dysphagia, odonophagia, diplopia, neck pain  THYROID:  no apparent hyper or hypothyroid symptoms  CV: no chest pain, pressure, palpitations  LUNGS: no SOB, MEHTA, cough, wheezing   ABDOMEN: episode of rectal bleeding; occasional nausea; denies abdominal pain  EXTREMITIES: no rashes, ulcers, edema  NEUROLOGY: no headaches, denies changes in vision, tingling, extremitiy numbness   MSK: no muscle aches or pains, weakness  SKIN: no rashes or lesions  : denies nocturia  PSYCH:  stable mood, no significant anxiety or depression  ENDOCRINE: no heat or cold intolerance    Physical Exam (visual exam)  VS:  no vital signs taken for video visit  CONSTITUTIONAL: healthy, alert and NAD, well dressed, answering questions  appropriately  ENT: no nose swelling or nasal discharge, mouth redness or gum changes.  EYES: eyes grossly normal to inspection, conjunctivae and sclerae normal, no exophthalmos or proptosis  THYROID:  no apparent nodules or goiter  LUNGS: no audible wheeze, cough or visible cyanosis, no visible retractions or increased work of breathing  ABDOMEN: abdomen not evaluated  EXTREMITIES: no hand tremors, limited exam  NEUROLOGY: CN grossly intact, mentation intact and speech normal   SKIN:  no apparent skin lesions, rash, or edema with visualized skin appearance  PSYCH: mentation appears normal, affect normal/bright, judgement and insight intact,   normal speech and appearance well groomed          LABS:     All pertinent notes, labs, and images personally reviewed by me.      A/P:  Encounter Diagnosis   Name Primary?     Type 2 diabetes mellitus without complication, without long-term current use of insulin (H) Yes       Comments:  Reviewed health history and diabetes issues.  Difficult to assess glycemic control without BGM data, though recent preappt hgbA1c mildly elevated  Reviewed and interpreted tests that I previously ordered.   Ordered appropriate tests for the endocrinology disease management.    Management options discussed and implemented after shared medical decision making with the patient.  T2DM problem is chronic-stable    Plan:  Discussed general issues with the diabetes diagnosis and management  We discussed the hgbA1c test which reflects previous overall glucose levels or control  Discussed the importance of blood glucose (BG) testing to assess glucose trends  Provided general overview of the diabetes medication options and medication treatment plan.     Recommend:  We have discussed medication options, metformin med use and potential side effects  Continue the current JanumetXR and Jardiance daily dose routine  Consider change from Januvia (in Janumet) to a GLP1RA med such as Trulicity or Rybelsus      Discussed his needle-phobia concerns  Test FBG daily or every other day, goal target BG  mg/dl  Plan repeat non-fasting labs in late 7/2022    Testing at nearby [facility] clinic    Lab orders placed  Keep focus on diet, increased exercise, weight management.  Continue simvastatin med use, goal target LDL <100 mg/dl  Advise having fasting lipid panel testing and dilated eye examination, at least annually    Plan followup evaluation with PCP, including discussion of his rectal bleeding episode  Addressed patient questions today    There are no Patient Instructions on file for this visit.    Future labs ordered today:   Orders Placed This Encounter   Procedures     TSH     Hemoglobin A1c     Radiology/Consults ordered today: None     Total time spent in with the patient evaluation:  15 min  Additional time spent reviewing pertinent lab tests and chart notes, and documentation:  5 min    Follow-up:  8/2/22 at 3:30 pm, Katie Flores MD, MS  Endocrinology  [facility]    CC:  LANCE Jose                              Again, thank you for allowing me to participate in the care of your patient.        Sincerely,        Anival Flores MD

## 2022-05-10 NOTE — PROGRESS NOTES
Patient is being evaluated via a billable video visit.      How would you like to obtain your AVS? Reviewed verbally  If the video visit is dropped, the invitation should be resent by cell phone  Will anyone else be joining your video visit? no      Video Start Time:  1:35 pm    Video-Visit Details    Type of service:  Video Visit    Video End Time: 1:50 pm    Originating Location (pt. Location): home    Distant Location (provider location):  [facility]/home    Platform used for Video Visit:  [employer] Standards        Recent issues:  Diabetes follow-up evaluation  Patient taking the JanumetXR and Jardiance medications per plan  Reports less control of eating habits and exercise, also higher preappt hgbA1c level            2017. Diagnosis of diabetes mellitus when med eval for torn right shoulder muscle  High glucose level noted during med evaluation, hgbA1c near 11.5%  Started Jardiance medication, took for approx 6 months  Recalls significant weight loss of approx 30#  Switched to metformin medication, then dose increases              Concerns about GI symptoms with nausea, nausea, also morning vomiting              Tried Prilosec, then Zantac  Has seen [facility] for medical evaluations  Recent discussion with his workplace team physician, Dr. Skye Mane              Advised to see me for medical evaluation     Previous [facility] labs include:         Previously on metforminXR 500 mg 2 tabs BID    8/12/19. Initial diabetes evaluation with me at [place]  Reviewed health history and diabetes management  Med change to JanumetXR  1/2022. Addition of Jardiance     Current DM medications:  JanumetXR 100/1000 1-tab each evening  Jardiance 10 mg  1-tab in midday     Blood glucose (BG) meter              Infrequent testing     Fam Hx Diabetes:       None  Recent [facility] labs include:           Lab Results   Component Value Date    A1C 7.5 (H) 04/06/2022     04/06/2022    POTASSIUM 4.2  04/06/2022    CHLORIDE 104 04/06/2022    CO2 25 04/06/2022    ANIONGAP 6 04/06/2022     (H) 04/06/2022    BUN 19 04/06/2022    CR 0.92 04/06/2022    GFRESTIMATED >90 04/06/2022    GFRESTBLACK >90 11/08/2019    FLAVIO 9.8 04/06/2022    CHOL 200 (H) 04/06/2022    TRIG 370 (H) 04/06/2022    HDL 50 04/06/2022    LDL 76 04/06/2022    NHDL 150 (H) 04/06/2022    UCRR 56 04/06/2022    MICROL 8 04/06/2022    UMALCR 14.29 04/06/2022     Lab Results   Component Value Date    TSH 1.24 08/12/2019     History of hyperlipidemia, takes simvastatin 20 mg daily  Last eye exam 5/2019?, no DR  DM Complications:      None known        Lives in [facility]  Has seen [facility]   Also sees Dr. Jose/[facility] for general medicine evaluations.      ROS: 10 point ROS neg other than the symptoms noted above in the HPI.     GENERAL: no fatigue, mild weight gain; denies fevers, chills, night sweats.   HEENT: no dysphagia, odonophagia, diplopia, neck pain  THYROID:  no apparent hyper or hypothyroid symptoms  CV: no chest pain, pressure, palpitations  LUNGS: no SOB, MEHTA, cough, wheezing   ABDOMEN: episode of rectal bleeding; occasional nausea; denies abdominal pain  EXTREMITIES: no rashes, ulcers, edema  NEUROLOGY: no headaches, denies changes in vision, tingling, extremitiy numbness   MSK: no muscle aches or pains, weakness  SKIN: no rashes or lesions  : denies nocturia  PSYCH:  stable mood, no significant anxiety or depression  ENDOCRINE: no heat or cold intolerance    Physical Exam (visual exam)  VS:  no vital signs taken for video visit  CONSTITUTIONAL: healthy, alert and NAD, well dressed, answering questions appropriately  ENT: no nose swelling or nasal discharge, mouth redness or gum changes.  EYES: eyes grossly normal to inspection, conjunctivae and sclerae normal, no exophthalmos or proptosis  THYROID:  no apparent nodules or goiter  LUNGS: no audible wheeze, cough or visible  cyanosis, no visible retractions or increased work of breathing  ABDOMEN: abdomen not evaluated  EXTREMITIES: no hand tremors, limited exam  NEUROLOGY: CN grossly intact, mentation intact and speech normal   SKIN:  no apparent skin lesions, rash, or edema with visualized skin appearance  PSYCH: mentation appears normal, affect normal/bright, judgement and insight intact,   normal speech and appearance well groomed          LABS:     All pertinent notes, labs, and images personally reviewed by me.      A/P:  Encounter Diagnosis   Name Primary?     Type 2 diabetes mellitus without complication, without long-term current use of insulin (H) Yes       Comments:  Reviewed health history and diabetes issues.  Difficult to assess glycemic control without BGM data, though recent preappt hgbA1c mildly elevated  Reviewed and interpreted tests that I previously ordered.   Ordered appropriate tests for the endocrinology disease management.    Management options discussed and implemented after shared medical decision making with the patient.  T2DM problem is chronic-stable    Plan:  Discussed general issues with the diabetes diagnosis and management  We discussed the hgbA1c test which reflects previous overall glucose levels or control  Discussed the importance of blood glucose (BG) testing to assess glucose trends  Provided general overview of the diabetes medication options and medication treatment plan.     Recommend:  We have discussed medication options, metformin med use and potential side effects  Continue the current JanumetXR and Jardiance daily dose routine  Consider change from Januvia (in Janumet) to a GLP1RA med such as Trulicity or Rybelsus     Discussed his needle-phobia concerns  Test FBG daily or every other day, goal target BG  mg/dl  Plan repeat non-fasting labs in late 7/2022    Testing at nearby [facility] clinic    Lab orders placed  Keep focus on diet, increased exercise, weight management.  Continue  simvastatin med use, goal target LDL <100 mg/dl  Advise having fasting lipid panel testing and dilated eye examination, at least annually    Plan followup evaluation with PCP, including discussion of his rectal bleeding episode  Addressed patient questions today    There are no Patient Instructions on file for this visit.    Future labs ordered today:   Orders Placed This Encounter   Procedures     TSH     Hemoglobin A1c     Radiology/Consults ordered today: None     Total time spent in with the patient evaluation:  15 min  Additional time spent reviewing pertinent lab tests and chart notes, and documentation:  5 min    Follow-up:  8/2/22 at 3:30 pm, Katie Flores MD, MS  Endocrinology  [facility]    CC:  LANCE Jose

## 2022-05-12 NOTE — PROGRESS NOTES
[facility]  May 13, 2022    Behavioral Health Clinician Progress Note    Patient Name: Bertin Mcgill           Service Type:  Individual      Service Location:   telehealth     Session Start Time: 11:03 am  Session End Time: 11:59  am      Session Length: 53 - 60      Attendees: Client      Service Modality:  Video Visit:      Provider verified identity through the following two step process.  Patient provided:  Patient , Patient address and Patient is known previously to provider    Telemedicine Visit: The patient's condition can be safely assessed and treated via synchronous audio and visual telemedicine encounter.      Reason for Telemedicine Visit: Patient has requested telehealth visit    Originating Site (Patient Location): Patient's home    Distant Site (Provider Location): [facility]    Consent:  The patient/guardian has verbally consented to: the potential risks and benefits of telemedicine (video visit) versus in person care; bill my insurance or make self-payment for services provided; and responsibility for payment of non-covered services.     Patient would like the video invitation sent by:  My Chart     Mode of Communication:  Video Conference via Amwell    As the provider I attest to compliance with applicable laws and regulations related to telemedicine.    Visit Activities (Refresh list every visit): [facility] Only    Diagnostic Assessment Date: 22  Treatment Plan Review Date: 22   CGI Review Date:22    Clinical Global Impressions  First:  Considering your total clinical experience with this particular patient population, how severe are the patient's symptoms at this time?: 5 (2022  3:00 PM)    Most recent:  No data recorded    See Flowsheets for today's PHQ-9 and NATHALIE-7 results  Previous PHQ-9:   PHQ-9 SCORE 2022   PHQ-9 Total Score MyChart - 9 (Mild depression) 12 (Moderate depression)   PHQ-9 Total Score 9 9 12     Previous  "NATHALIE-7:   NATHALIE-7 SCORE 4/27/2022 4/27/2022 5/13/2022   Total Score - 14 (moderate anxiety) 11 (moderate anxiety)   Total Score 14 14 11       KRISTINE LEVEL:  No flowsheet data found.    DATA  Extended Session (60+ minutes): No  Interactive Complexity: No  Crisis: No  [facility] Patient: No    Treatment Objective(s) Addressed in This Session:  Target Behavior(s): increased social activity/decreased isolation    Anxiety: will experience a reduction in anxiety, will develop more effective coping skills to manage anxiety symptoms, will develop healthy cognitive patterns and beliefs and will increase ability to function adaptively    Current Stressors / Issues:  Lj reported he started the medication (escitalopram) and \"I'm not too crazy about\".  Lj reported experiencing nausea more days than not, with some vomiting in the shower, refraining from morning coffee on some days b/c he doesn't want to further upset his stomach, and metallic aftertaste. In addition, Lj stated, \"I've been more jittery, I can't sit still some times\" and at other times \"I just completely zone out\".  Lj reported poor sleep on some nights due to increased restlessness.    Lj reported he takes medication approximately noon daily.  When inquiring about benefits of Medication, Lj could not identify any benefits.  Lj questioned why he should continue the medication and expressed desire to stop.  [facility] encouraged Lj to follow up with PCP, and facilitated scheduling of follow up appt on Monday.  Lj stated he will continue to take medication until PCP appt.         Lj reported having a stressful week of work, especially on Monday, due to taking Friday off and staff being out on Monday when he returned to work. Lj reported feeling stressed, frustrated, and overwhelmed, but shared two strategies to decrease feelings of being overwhelmed and promote taking effective action: \"Take it one task at time\" & \"Do things I can complete really quickly first\".   " "    [employer] and Lj returned to the topic of setting/maintaining boundaries to promote better work-life balance.  Lj reported use of one strategy over the past weekend, namely, not checking his email.  Lj did not report any other use of boundaries. Lj shared some personal barriers to setting boundaries, including the following:     -\"All the problems I want to take on myself, so other people don't have to take them on\"  -\"I don't want to give anyone a reason to complain\"  -Difficulty living up to his own expectations      Lj reported the above issues often lead to working longer, later hours, including nights and weekends. Lj also reported there are also times of the year and emergencies, when he has to work long, later hours.  [employer] reflected how while his current work schedule/process may work for the short term, but does not appear healthy for the long-term.  Lj acknowledged that while at any given time he's OK, on the flip side, at any given time a crisis can arise which then puts him over the edge.      [employer] and Lj agreed to find boundaries that allow him to manage workload most of the time, and accepting that some of the time he will have stressful days--this is a much more realistic goal and could lead to long-term improvement of work-life balance.  Lj agreed to try to attempt one action between now and next appt that promotes better boundaries and balancing of work-life.       Progress on Treatment Objective(s) / Homework:  No improvement - ACTION (Actively working towards change); Intervened by reinforcing change plan / affirming steps taken    Motivational Interviewing    MI Intervention: Co-Developed Goal: reduce anxiety, Expressed Empathy/Understanding, Supported Autonomy, Collaboration, Evocation, Open-ended questions, Reflections: simple and complex and Change talk (evoked)     Change Talk Expressed by the Patient: Reasons to change    Provider Response to Change Talk: E - Evoked more info " from patient about behavior change and R - Reflected patient's change talk    Solution-Focused Therapy    Explore patterns in patient's behaviors and relationships and discuss options for new behaviors    Explore new options for problem-solving, communication, managing stress, etc.      Care Plan review completed: Yes    Medication Review:  No changes to current psychiatric medication(s)    Medication Compliance:  Yes;     Changes in Health Issues:   None reported    Chemical Use Review:   Substance Use: Problem use continues with no change since last session, Stage of Change: Contemplation        Tobacco Use: No change in amount of tobacco use since last session.  Contemplation    Assessment: Current Emotional / Mental Status (status of significant symptoms):  Risk status (Self / Other harm or suicidal ideation)  Patient has had a history of suicidal ideation: passive SI in 2003; none since  Patient denies current fears or concerns for personal safety.  Patient denies current or recent suicidal ideation or behaviors.passive, no intent or plan  Patient denies current or recent homicidal ideation or behaviors.  Patient denies current or recent self injurious behavior or ideation.  Patient denies other safety concerns.  A safety and risk management plan has not been developed at this time, however patient was encouraged to call [place] [phone] should there be a change in any of these risk factors.    Appearance:   Appropriate   Eye Contact:   Fair   Psychomotor Behavior: Normal   Attitude:   Cooperative   Orientation:   All  Speech   Rate / Production: Normal/ Responsive   Volume:  Normal   Mood:    Anxious   Affect:    Worrisome   Thought Content:  Clear   Thought Form:  Coherent  Logical   Insight:    Fair     Diagnoses:  1. Generalized anxiety disorder        Collateral Reports Completed:  Routed note to PCP    Plan: (Homework, other):  Patient was given information about behavioral services and encouraged to  schedule a follow up appointment with the clinic [facility] in 2 weeks.  He was also given information about mental health symptoms and treatment options  and strategies to more effectively manage anxiety.  CD Recommendations: abstain from alcohol use.  Shawn Ullrich [facility]      ______________________________________________________________________    Integrated Primary Care Behavioral Health Treatment Plan    Patient's Name: Bertin Mcgill  YOB: 1980    Date of Creation: 4/19/22  Date Treatment Plan Last Reviewed/Revised: 4/19/22    DSM5 Diagnoses: 300.02 (F41.1) Generalized Anxiety Disorder or Adjustment Disorders  309.0 (F43.21) With depressed mood  Psychosocial / Contextual Factors: Single, heterosexual male, resides in his own home; pandemic  PROMIS (reviewed every 90 days):     Referral / Collaboration:  referrals for medication evaluation and Anxiety outpt treatment program were recommended; pt open to medication evaluation, will explore/gather more information about anxiety outpatient treatment program.  ([facility]).    Anticipated number of session for this episode of care: 6  Anticipation frequency of session: Weekly  Anticipated Duration of each session: 38-52 minutes  Treatment plan will be reviewed in 90 days or when goals have been changed.       MeasurableTreatment Goal(s) related to diagnosis / functional impairment(s)  Goal 1: Patient will learn 3 strategies to effectively manage anxiety.     I will know I've met my goal when:     Objective #A (Patient Action)    Patient will participate in individual therapy services.  Status: New - Date: 4/19/22     Intervention(s)  Therapist will educate pt about outpt tx services    Objective #B  Patient will complete medication evaluation appt.  Status: New - Date: 4/19/22     Intervention(s)  Therapist will make referrals to medication evaluation.    Objective #C  Patient will reduce/abstain from alcohol use.  Status: New - Date:  4/19/22     Intervention(s)  Therapist will educate pt about how alcohol impacts mh.      Patient has reviewed and agreed to the above plan.      Shawn G. Ullrich, [facility]  April 19, 2022

## 2022-05-13 ENCOUNTER — VIRTUAL VISIT (OUTPATIENT)
Dept: BEHAVIORAL HEALTH | Facility: CLINIC | Age: 42
End: 2022-05-13
Payer: COMMERCIAL

## 2022-05-13 DIAGNOSIS — F41.1 GENERALIZED ANXIETY DISORDER: Primary | ICD-10-CM

## 2022-05-13 ASSESSMENT — ANXIETY QUESTIONNAIRES
GAD7 TOTAL SCORE: 11
5. BEING SO RESTLESS THAT IT IS HARD TO SIT STILL: MORE THAN HALF THE DAYS
3. WORRYING TOO MUCH ABOUT DIFFERENT THINGS: MORE THAN HALF THE DAYS
4. TROUBLE RELAXING: MORE THAN HALF THE DAYS
GAD7 TOTAL SCORE: 11
1. FEELING NERVOUS, ANXIOUS, OR ON EDGE: MORE THAN HALF THE DAYS
2. NOT BEING ABLE TO STOP OR CONTROL WORRYING: SEVERAL DAYS
GAD7 TOTAL SCORE: 11
6. BECOMING EASILY ANNOYED OR IRRITABLE: MORE THAN HALF THE DAYS
7. FEELING AFRAID AS IF SOMETHING AWFUL MIGHT HAPPEN: NOT AT ALL
8. IF YOU CHECKED OFF ANY PROBLEMS, HOW DIFFICULT HAVE THESE MADE IT FOR YOU TO DO YOUR WORK, TAKE CARE OF THINGS AT HOME, OR GET ALONG WITH OTHER PEOPLE?: SOMEWHAT DIFFICULT
7. FEELING AFRAID AS IF SOMETHING AWFUL MIGHT HAPPEN: NOT AT ALL

## 2022-05-13 ASSESSMENT — PATIENT HEALTH QUESTIONNAIRE - PHQ9
10. IF YOU CHECKED OFF ANY PROBLEMS, HOW DIFFICULT HAVE THESE PROBLEMS MADE IT FOR YOU TO DO YOUR WORK, TAKE CARE OF THINGS AT HOME, OR GET ALONG WITH OTHER PEOPLE: SOMEWHAT DIFFICULT
SUM OF ALL RESPONSES TO PHQ QUESTIONS 1-9: 12
SUM OF ALL RESPONSES TO PHQ QUESTIONS 1-9: 12

## 2022-05-13 NOTE — Clinical Note
Lj's reported a lot of side effects from the medication, they're listed in the note, and has been taking med for two weeks with no reduction of intensity of side effects.  I helped him to schedule a video visit with you on Monday to review and decide about stopping/continuing or trying new med.  Lj will continue with taking medication until meeting with you.  I'll see him in two weeks.

## 2022-05-14 ENCOUNTER — HEALTH MAINTENANCE LETTER (OUTPATIENT)
Age: 42
End: 2022-05-14

## 2022-05-14 ASSESSMENT — PATIENT HEALTH QUESTIONNAIRE - PHQ9: SUM OF ALL RESPONSES TO PHQ QUESTIONS 1-9: 12

## 2022-05-14 ASSESSMENT — ANXIETY QUESTIONNAIRES: GAD7 TOTAL SCORE: 11

## 2022-05-16 ENCOUNTER — VIRTUAL VISIT (OUTPATIENT)
Dept: FAMILY MEDICINE | Facility: CLINIC | Age: 42
End: 2022-05-16
Payer: COMMERCIAL

## 2022-05-16 DIAGNOSIS — F41.8 MIXED ANXIETY DEPRESSIVE DISORDER: Primary | ICD-10-CM

## 2022-05-16 NOTE — PROGRESS NOTES
jL is a 42 year old who is being evaluated via a billable video visit.      How would you like to obtain your AVS? MyChart  If the video visit is dropped, the invitation should be resent by: Text to cell phone: [phone]  Will anyone else be joining your video visit? No      Video Start Time: 1:55pm      Subjective   Lj is a 42 year old who is calling in for a video visit to follow-up on his generalized anxiety and struggles with returning to the workplace.  Fortunately, his work has been excepting of him staying at home where he feels that he has most productive.  In terms of the anxiety, he has established a very therapeutic relationship with Dennis Melvin at our clinic.  In addition, I have prescribed a low-dose of Lexapro.  Unfortunately, Lj is having side effects to the Lexapro.  He is having extreme nausea with occasional vomiting.  This is caused him to feel more distress.  Overall, Lj feels that the medicine is causing him more harm than benefit and he would like to discontinue.      Current Outpatient Medications   Medication Sig Dispense Refill     Acetaminophen (TYLENOL EXTRA STRENGTH PO)        escitalopram (LEXAPRO) 5 MG tablet Take 1 tablet (5 mg) by mouth daily 30 tablet 0     hydrochlorothiazide (HYDRODIURIL) 12.5 MG tablet Take 1 tablet (12.5 mg) by mouth daily 90 tablet 2     JARDIANCE 10 MG TABS tablet TAKE 1 TABLET BY MOUTH EVERY DAY 90 tablet 0     losartan (COZAAR) 50 MG tablet Take 1 tablet (50 mg) by mouth daily 90 tablet 1     Multiple Vitamins-Minerals (CENTRUM MEN PO)        simvastatin (ZOCOR) 20 MG tablet One by mouth per day 90 tablet 3     SitaGLIPtin-MetFORMIN HCl (JANUMET XR) 100-1000 MG TB24 Take 1 tablet by mouth daily 30 tablet 1     VITAMIN D PO Take 2,000 Units by mouth       PHQ 5/13/2022   PHQ-9 Total Score 12   Q9: Thoughts of better off dead/self-harm past 2 weeks Not at all     NATHALIE-7 SCORE 4/27/2022 4/27/2022 5/13/2022   Total Score - 14 (moderate anxiety) 11  (moderate anxiety)   Total Score 14 14 11            Objective           Vitals:  No vitals were obtained today due to virtual visit.    Physical Exam   GENERAL: Healthy, alert and no distress  EYES: Eyes grossly normal to inspection.    RESP: No audible wheeze, cough, or visible cyanosis.  No visible retractions or increased work of breathing.    SKIN: Visible skin clear. No significant rash, abnormal pigmentation or lesions.  NEURO: Cranial nerves grossly intact.  Mentation and speech appropriate for age.  PSYCH: Mentation appears normal, affect normal/bright, judgement and insight intact, normal speech and appearance well-groomed.    Reviewed his labs from a month ago in early April. His A1c at that time was 7.5.  He is in touch with endocrinology    A/P  1. Generalized anxiety: Doing well other than the medication, Lexapro causing side effects.   - Discussed switching to Buspar which appears to have fewer G.I. side effects. However, Lj wants to try without medication.   - He will take 1/2 dose of Lexapro for 2-3 days and then discontinue.   - Continue therapy with Dennis.     2. For Diabetes, has an Endocrinologist who wants labs done in the summer of 2022. Lj knows to schedule.   Discussed again the benefit of exercise on both diabetes and mental     Video-Visit Details    Type of service:  Video Visit    Video End Time:2:14 PM    Originating Location (pt. Location): Home    Distant Location (provider location):  [facility]     Platform used for Video Visit: [employer]

## 2022-05-19 DIAGNOSIS — F41.8 MIXED ANXIETY DEPRESSIVE DISORDER: ICD-10-CM

## 2022-05-19 RX ORDER — ESCITALOPRAM OXALATE 5 MG/1
TABLET ORAL
Qty: 30 TABLET | Refills: 0 | OUTPATIENT
Start: 2022-05-19

## 2022-05-19 NOTE — TELEPHONE ENCOUNTER
Last visit 5/16/22, no future visit  Per pt appt note with Dr Jose 5/16/22 - pt stopping use of this med.   Will not be refilling this med.  Codi Mai RN  [facility]

## 2022-05-26 ASSESSMENT — ANXIETY QUESTIONNAIRES
4. TROUBLE RELAXING: SEVERAL DAYS
2. NOT BEING ABLE TO STOP OR CONTROL WORRYING: NOT AT ALL
1. FEELING NERVOUS, ANXIOUS, OR ON EDGE: SEVERAL DAYS
7. FEELING AFRAID AS IF SOMETHING AWFUL MIGHT HAPPEN: NOT AT ALL
3. WORRYING TOO MUCH ABOUT DIFFERENT THINGS: SEVERAL DAYS
GAD7 TOTAL SCORE: 6
7. FEELING AFRAID AS IF SOMETHING AWFUL MIGHT HAPPEN: NOT AT ALL
5. BEING SO RESTLESS THAT IT IS HARD TO SIT STILL: SEVERAL DAYS
GAD7 TOTAL SCORE: 6
6. BECOMING EASILY ANNOYED OR IRRITABLE: MORE THAN HALF THE DAYS
8. IF YOU CHECKED OFF ANY PROBLEMS, HOW DIFFICULT HAVE THESE MADE IT FOR YOU TO DO YOUR WORK, TAKE CARE OF THINGS AT HOME, OR GET ALONG WITH OTHER PEOPLE?: SOMEWHAT DIFFICULT
GAD7 TOTAL SCORE: 6

## 2022-05-26 ASSESSMENT — PATIENT HEALTH QUESTIONNAIRE - PHQ9
10. IF YOU CHECKED OFF ANY PROBLEMS, HOW DIFFICULT HAVE THESE PROBLEMS MADE IT FOR YOU TO DO YOUR WORK, TAKE CARE OF THINGS AT HOME, OR GET ALONG WITH OTHER PEOPLE: NOT DIFFICULT AT ALL
SUM OF ALL RESPONSES TO PHQ QUESTIONS 1-9: 8
SUM OF ALL RESPONSES TO PHQ QUESTIONS 1-9: 8

## 2022-05-26 NOTE — PROGRESS NOTES
[facility]  May 27, 2022    Behavioral Health Clinician Progress Note    Patient Name: Bertin Mcgill           Service Type:  Individual      Service Location:   telehealth     Session Start Time: 11:03 am  Session End Time: 11:58  am      Session Length: 53 - 60      Attendees: Client      Service Modality:  Video Visit:      Provider verified identity through the following two step process.  Patient provided:  Patient , Patient address and Patient is known previously to provider    Telemedicine Visit: The patient's condition can be safely assessed and treated via synchronous audio and visual telemedicine encounter.      Reason for Telemedicine Visit: Patient has requested telehealth visit    Originating Site (Patient Location): Patient's home    Distant Site (Provider Location): [facility]    Consent:  The patient/guardian has verbally consented to: the potential risks and benefits of telemedicine (video visit) versus in person care; bill my insurance or make self-payment for services provided; and responsibility for payment of non-covered services.     Patient would like the video invitation sent by:  My Chart     Mode of Communication:  Video Conference via [employer]    As the provider I attest to compliance with applicable laws and regulations related to telemedicine.    Visit Activities (Refresh list every visit): [facility] Only    Diagnostic Assessment Date: 22  Treatment Plan Review Date: 22   CGI Review Date:22    Clinical Global Impressions  First:  Considering your total clinical experience with this particular patient population, how severe are the patient's symptoms at this time?: 5 (2022  3:00 PM)    Most recent:  No data recorded    See Flowsheets for today's PHQ-9 and NATHALIE-7 results  Previous PHQ-9:   PHQ-9 SCORE 2022   PHQ-9 Total Score MyChart 9 (Mild depression) 12 (Moderate depression) 8 (Mild depression)   PHQ-9 Total Score 9 12 8  "    Previous NATHALIE-7:   NATHALIE-7 SCORE 4/27/2022 5/13/2022 5/26/2022   Total Score 14 (moderate anxiety) 11 (moderate anxiety) 6 (mild anxiety)   Total Score 14 11 6       KRISTINE LEVEL:  No flowsheet data found.    DATA  Extended Session (60+ minutes): No  Interactive Complexity: No  Crisis: No  [facility] Patient: No    Treatment Objective(s) Addressed in This Session:  Target Behavior(s): increased social activity/decreased isolation    Anxiety: will experience a reduction in anxiety, will develop more effective coping skills to manage anxiety symptoms, will develop healthy cognitive patterns and beliefs and will increase ability to function adaptively    Current Stressors / Issues:    Lj reported he met with PCP and they agreed to stop selective serotonin reuptake inhibitor medication, the negative side effects have now stopped, and Lj has felt a little better.  In regard to work, Lj stated the \"workflow has subsided a little bit...Maybe I can schedule some evening activities\".  Lj explained how workload varies throughout the year, and during busy times his healthy habits tend to stop or are very hard to stay consistent with, so now could be  the time to re-establish some healthy habits that he has stopped.  [facility] used MI interventions to promote further change talk, Lj responded with more change talk, stating, \"I need to do something\", and immediately shared he needs to start walking.  Lj shared long history of being physically active, even after shoulder surgery, and right up to the onset of the pandemic.  Lj reported past running, working out at the gym, and having both a treadmill and elliptical machine in his home.  Lj even shared he was walking 3-4x's/week as recently as last Fall.  Lj acknowledged restarting exercise is difficult, especially from a psychological standpoint, stating, \"You've got to work your way into it...I'm not there yet\", further explaining how he wakes up with motivation, but loses the " "motivation throughout the day.  To increase motivation, [facility] prompted Lj to identify motivators, Lj readily shared he doesn't like taking medication (believes he could take less medication if he was exercising) and he doesn't want to buy a new wardrobe.  Lj engaged in goal setting, stating, \"Shoot for 4 days a week...Every other day\".  Lj reported he could walk outside or use the treadmill, but most likely walk outside.  Lj reported he is prepared for walking, and has the music/radio/podcast, shoes and clothes.  When responding to scaling question, Lj stated he was at a \"5-7\" that he would go for a walk tonight, and even more confident he would walk during the weekend.       Progress on Treatment Objective(s) / Homework:  Minimal progress - CONTEMPLATION (Considering change and yet undecided); Intervened by assessing the negative and positive thinking (ambivalence) about behavior change    Motivational Interviewing    MI Intervention: Co-Developed Goal: reduce anxiety, Expressed Empathy/Understanding, Supported Autonomy, Collaboration, Evocation, Open-ended questions, Reflections: simple and complex and Change talk (evoked)     Change Talk Expressed by the Patient: Reasons to change    Provider Response to Change Talk: E - Evoked more info from patient about behavior change and R - Reflected patient's change talk    Solution-Focused Therapy    Explore patterns in patient's behaviors and relationships and discuss options for new behaviors    Explore new options for problem-solving, communication, managing stress, etc.      Care Plan review completed: Yes    Medication Review:  No current psychiatric medications prescribed    Medication Compliance:  NA;     Changes in Health Issues:   None reported    Chemical Use Review:   Substance Use: Problem use continues with no change since last session, Stage of Change: Contemplation        Tobacco Use: No change in amount of tobacco use since last session.  " Contemplation    Assessment: Current Emotional / Mental Status (status of significant symptoms):  Risk status (Self / Other harm or suicidal ideation)  Patient has had a history of suicidal ideation: passive SI in 2003; none since  Patient denies current fears or concerns for personal safety.  Patient denies current or recent suicidal ideation or behaviors.passive, no intent or plan  Patient denies current or recent homicidal ideation or behaviors.  Patient denies current or recent self injurious behavior or ideation.  Patient denies other safety concerns.  A safety and risk management plan has not been developed at this time, however patient was encouraged to call [place] [phone] should there be a change in any of these risk factors.    Appearance:   Appropriate   Eye Contact:   Fair   Psychomotor Behavior: Normal   Attitude:   Cooperative   Orientation:   All  Speech   Rate / Production: Normal/ Responsive   Volume:  Normal   Mood:    Anxious   Affect:    Worrisome   Thought Content:  Clear   Thought Form:  Coherent  Logical   Insight:    Fair     Diagnoses:  1. Generalized anxiety disorder        Collateral Reports Completed:  Routed note to PCP    Plan: (Homework, other):  Patient was given information about behavioral services and encouraged to schedule a follow up appointment with the clinic [facility] in 2 weeks.  He was also given information about mental health symptoms and treatment options  and strategies to more effectively manage anxiety.  CD Recommendations: abstain from alcohol use.  Shawn Ullrich [facility], [facility]      ______________________________________________________________________    Integrated Primary Care Behavioral Health Treatment Plan    Patient's Name: Bertin Mcgill  YOB: 1980    Date of Creation: 4/19/22  Date Treatment Plan Last Reviewed/Revised: 4/19/22    DSM5 Diagnoses: 300.02 (F41.1) Generalized Anxiety Disorder or Adjustment Disorders  309.0 (F43.21) With depressed  mood  Psychosocial / Contextual Factors: Single, heterosexual male, resides in his own home; pandemic  PROMIS (reviewed every 90 days):     Referral / Collaboration:  referrals for medication evaluation and Anxiety outpt treatment program were recommended; pt open to medication evaluation, will explore/gather more information about anxiety outpatient treatment program.  ([facility]).    Anticipated number of session for this episode of care: 6  Anticipation frequency of session: Weekly  Anticipated Duration of each session: 38-52 minutes  Treatment plan will be reviewed in 90 days or when goals have been changed.       MeasurableTreatment Goal(s) related to diagnosis / functional impairment(s)  Goal 1: Patient will learn 3 strategies to effectively manage anxiety.     I will know I've met my goal when:     Objective #A (Patient Action)    Patient will participate in individual therapy services.  Status: New - Date: 4/19/22     Intervention(s)  Therapist will educate pt about outpt tx services    Objective #B  Patient will complete medication evaluation appt.  Status: New - Date: 4/19/22     Intervention(s)  Therapist will make referrals to medication evaluation.    Objective #C  Patient will reduce/abstain from alcohol use.  Status: New - Date: 4/19/22     Intervention(s)  Therapist will educate pt about how alcohol impacts mh.      Patient has reviewed and agreed to the above plan.      Shawn G. Ullrich, Dorothea Dix Psychiatric CenterSW  April 19, 2022

## 2022-05-27 ENCOUNTER — VIRTUAL VISIT (OUTPATIENT)
Dept: BEHAVIORAL HEALTH | Facility: CLINIC | Age: 42
End: 2022-05-27
Payer: COMMERCIAL

## 2022-05-27 DIAGNOSIS — F41.1 GENERALIZED ANXIETY DISORDER: Primary | ICD-10-CM

## 2022-06-05 DIAGNOSIS — E11.9 TYPE 2 DIABETES MELLITUS WITHOUT COMPLICATION, WITHOUT LONG-TERM CURRENT USE OF INSULIN (H): ICD-10-CM

## 2022-06-07 RX ORDER — SITAGLIPTIN AND METFORMIN HYDROCHLORIDE 1000; 100 MG/1; MG/1
TABLET, FILM COATED, EXTENDED RELEASE ORAL
Qty: 30 TABLET | Refills: 1 | Status: SHIPPED | OUTPATIENT
Start: 2022-06-07 | End: 2022-08-02

## 2022-06-07 NOTE — TELEPHONE ENCOUNTER
"Last Written Prescription Date:  4/11/22  Last Fill Quantity: 30,  # refills: 1   Last office visit: Visit 5/10/22 with Dr. Flores  Future Office Visit:  8/2/22        Requested Prescriptions   Pending Prescriptions Disp Refills     JANUMET -1000 MG TB24 [Pharmacy Med Name: JANUMET -1,000 MG TABLET] 30 tablet 1     Sig: TAKE 1 TABLET BY MOUTH EVERY DAY       Combination Oral Antihyperglycemic Agents Passed - 6/5/2022 12:23 AM        Passed - Patient has documented A1c within the specified period of time.     If HgbA1C is 8 or greater, it needs to be on file within the past 3 months.  If less than 8, must be on file within the past 6 months.     Recent Labs   Lab Test 04/06/22  1205   A1C 7.5*             Passed - Patient's CR is NOT>1.4 OR Patient's EGFR is NOT<45 within past 12 mos.     Recent Labs   Lab Test 04/06/22  1205 08/31/21  1020 11/08/19  0936   GFRESTIMATED >90   < > >90   GFRESTBLACK  --   --  >90    < > = values in this interval not displayed.       Recent Labs   Lab Test 04/06/22  1205   CR 0.92             Passed - Patient does not have a diagnosis of CHF.        Passed - Medication is active on med list        Passed - Patient is 18 years old or older.        Passed - Recent (6 mo) or future (30 days) visit within the authorizing provider's specialty     Patient had office visit in the last 6 months or has a visit in the next 30 days with authorizing provider or within the authorizing provider's specialty.  See \"Patient Info\" tab in inbasket, or \"Choose Columns\" in Meds & Orders section of the refill encounter.      Approved per protocol  Aimee Delgadillo RN                 "

## 2022-06-09 ASSESSMENT — ANXIETY QUESTIONNAIRES
7. FEELING AFRAID AS IF SOMETHING AWFUL MIGHT HAPPEN: NOT AT ALL
GAD7 TOTAL SCORE: 8
3. WORRYING TOO MUCH ABOUT DIFFERENT THINGS: MORE THAN HALF THE DAYS
6. BECOMING EASILY ANNOYED OR IRRITABLE: SEVERAL DAYS
2. NOT BEING ABLE TO STOP OR CONTROL WORRYING: SEVERAL DAYS
GAD7 TOTAL SCORE: 8
7. FEELING AFRAID AS IF SOMETHING AWFUL MIGHT HAPPEN: NOT AT ALL
4. TROUBLE RELAXING: MORE THAN HALF THE DAYS
GAD7 TOTAL SCORE: 8
5. BEING SO RESTLESS THAT IT IS HARD TO SIT STILL: SEVERAL DAYS
8. IF YOU CHECKED OFF ANY PROBLEMS, HOW DIFFICULT HAVE THESE MADE IT FOR YOU TO DO YOUR WORK, TAKE CARE OF THINGS AT HOME, OR GET ALONG WITH OTHER PEOPLE?: SOMEWHAT DIFFICULT
1. FEELING NERVOUS, ANXIOUS, OR ON EDGE: SEVERAL DAYS

## 2022-06-09 ASSESSMENT — PATIENT HEALTH QUESTIONNAIRE - PHQ9
10. IF YOU CHECKED OFF ANY PROBLEMS, HOW DIFFICULT HAVE THESE PROBLEMS MADE IT FOR YOU TO DO YOUR WORK, TAKE CARE OF THINGS AT HOME, OR GET ALONG WITH OTHER PEOPLE: SOMEWHAT DIFFICULT
SUM OF ALL RESPONSES TO PHQ QUESTIONS 1-9: 10
SUM OF ALL RESPONSES TO PHQ QUESTIONS 1-9: 10

## 2022-06-10 ENCOUNTER — VIRTUAL VISIT (OUTPATIENT)
Dept: BEHAVIORAL HEALTH | Facility: CLINIC | Age: 42
End: 2022-06-10
Payer: COMMERCIAL

## 2022-06-10 DIAGNOSIS — F41.1 GENERALIZED ANXIETY DISORDER: Primary | ICD-10-CM

## 2022-06-10 NOTE — PROGRESS NOTES
[facility]  Jessica 10, 2022    Behavioral Health Clinician Progress Note    Patient Name: Bertin Mcgill           Service Type:  Individual      Service Location:   telehealth     Session Start Time: 3:31 pm  Session End Time: 4:31 pm      Session Length: 53 - 60      Attendees: Client      Service Modality:  Video Visit:      Provider verified identity through the following two step process.  Patient provided:  Patient , Patient address and Patient is known previously to provider    Telemedicine Visit: The patient's condition can be safely assessed and treated via synchronous audio and visual telemedicine encounter.      Reason for Telemedicine Visit: Patient has requested telehealth visit    Originating Site (Patient Location): Patient's home    Distant Site (Provider Location): [facility]    Consent:  The patient/guardian has verbally consented to: the potential risks and benefits of telemedicine (video visit) versus in person care; bill my insurance or make self-payment for services provided; and responsibility for payment of non-covered services.     Patient would like the video invitation sent by:  My Chart     Mode of Communication:  Video Conference via Amwell    As the provider I attest to compliance with applicable laws and regulations related to telemedicine.    Visit Activities (Refresh list every visit): [facility] Only    Diagnostic Assessment Date: 22  Treatment Plan Review Date: 22   CGI Review Date:22    Clinical Global Impressions  First:  Considering your total clinical experience with this particular patient population, how severe are the patient's symptoms at this time?: 5 (2022  3:00 PM)    Most recent:  No data recorded    See Flowsheets for today's PHQ-9 and NATHALIE-7 results  Previous PHQ-9:   PHQ-9 SCORE 2022   PHQ-9 Total Score MyChart 12 (Moderate depression) 8 (Mild depression) 10 (Moderate depression)   PHQ-9 Total Score 12 8  "10     Previous NATHALIE-7:   NATHALIE-7 SCORE 5/13/2022 5/26/2022 6/9/2022   Total Score 11 (moderate anxiety) 6 (mild anxiety) 8 (mild anxiety)   Total Score 11 6 8       KRISTINE LEVEL:  No flowsheet data found.    DATA  Extended Session (60+ minutes): No  Interactive Complexity: No  Crisis: No  [facility] Patient: No    Treatment Objective(s) Addressed in This Session:  Target Behavior(s): increased social activity/decreased isolation    Anxiety: will experience a reduction in anxiety, will develop more effective coping skills to manage anxiety symptoms, will develop healthy cognitive patterns and beliefs and will increase ability to function adaptively    Current Stressors / Issues:  Lj presented as anxious with some irritability and decreased patience.  Lj shared stressors from work and how stressors exacerbate symptoms.  [facility] and Lj reviewed ways in which he manages work stress, Lj stated he's gotten better at setting limits and boundaries at work, although sometimes the demands of the job are not negotiable and require immediate response.  In addition, Lj was also able identify the pro's of the job which can offset some of the con's of his work. Lj continues to have difficulty getting space from work.     [facility] and Lj then shifted focus to making health changes that will improve both physical and mental health.  Lj reaffirmed wanting to improve his health, both through diet and exercise.  Lj reported he has already made some positive changes regarding his diet, sharing he purchased healthy foods and is trying to return to eating healthy lunches like he did in the past (Salad, sandwich and greek yogurt).  In regard to exercise, Lj continues to express desire to be more active, and even noted how exercise cannot only improve his physical health, but it can also help him to manage work stress stating, \"when I take a walk I can separate [from work]\".  While Lj wants to engage in exercise, he has been less " "successful implementing exercise goals, stating he walked 2x's in the past week (goal is to walk at least every other day).  Lj identified work and daily stress as barriers, noting how as stress increases throughout the day he has experiences less energy and motivation.  Lj expressed change talk, stating he was walking more consistently last summer and fall, and noted, \"I'd like to get back to that\".  Lj stated to be successful he needs to be more consistent and rather than letting daily stress determine whether he walks or not, he needs to be \"making the walking become part of my routine\".  Lj adjusted walking goals for the next two weeks, stating, since he only walked 2x's in the past two weeks, he wants to at a minimum walk 2x's in one week.       Progress on Treatment Objective(s) / Homework:  Minimal progress - CONTEMPLATION (Considering change and yet undecided); Intervened by assessing the negative and positive thinking (ambivalence) about behavior change    Motivational Interviewing    MI Intervention: Co-Developed Goal: reduce anxiety, Expressed Empathy/Understanding, Supported Autonomy, Collaboration, Evocation, Open-ended questions, Reflections: simple and complex and Change talk (evoked)     Change Talk Expressed by the Patient: Reasons to change    Provider Response to Change Talk: E - Evoked more info from patient about behavior change and R - Reflected patient's change talk    Solution-Focused Therapy    Explore patterns in patient's behaviors and relationships and discuss options for new behaviors    Explore new options for problem-solving, communication, managing stress, etc.      Care Plan review completed: Yes    Medication Review:  No current psychiatric medications prescribed    Medication Compliance:  NA;     Changes in Health Issues:   None reported    Chemical Use Review:   Substance Use: Problem use continues with no change since last session, Stage of Change: Contemplation   "      Tobacco Use: No change in amount of tobacco use since last session.  Contemplation    Assessment: Current Emotional / Mental Status (status of significant symptoms):  Risk status (Self / Other harm or suicidal ideation)  Patient has had a history of suicidal ideation: passive SI in 2003; none since  Patient denies current fears or concerns for personal safety.  Patient denies current or recent suicidal ideation or behaviors.passive, no intent or plan  Patient denies current or recent homicidal ideation or behaviors.  Patient denies current or recent self injurious behavior or ideation.  Patient denies other safety concerns.  A safety and risk management plan has not been developed at this time, however patient was encouraged to call [place] [phone] should there be a change in any of these risk factors.    Appearance:   Appropriate   Eye Contact:   Fair   Psychomotor Behavior: Normal   Attitude:   Cooperative   Orientation:   All  Speech   Rate / Production: Normal/ Responsive   Volume:  Normal   Mood:    Anxious   Affect:    Worrisome   Thought Content:  Clear   Thought Form:  Coherent  Logical   Insight:    Fair     Diagnoses:  1. Generalized anxiety disorder        Collateral Reports Completed:  Routed note to PCP    Plan: (Homework, other):  Patient was given information about behavioral services and encouraged to schedule a follow up appointment with the clinic [facility] in 2 weeks.  He was also given information about mental health symptoms and treatment options  and strategies to more effectively manage anxiety.  CD Recommendations: abstain from alcohol use.  Shawn Ullrich LICSW, [facility]      ______________________________________________________________________    Integrated Primary Care Behavioral Health Treatment Plan    Patient's Name: Bertin Mcgill  YOB: 1980    Date of Creation: 4/19/22  Date Treatment Plan Last Reviewed/Revised: 4/19/22    DSM5 Diagnoses: 300.02 (F41.1) Generalized  Anxiety Disorder or Adjustment Disorders  309.0 (F43.21) With depressed mood  Psychosocial / Contextual Factors: Single, heterosexual male, resides in his own home; pandemic  PROMIS (reviewed every 90 days):     Referral / Collaboration:  referrals for medication evaluation and Anxiety outpt treatment program were recommended; pt open to medication evaluation, will explore/gather more information about anxiety outpatient treatment program.  ([facility]).    Anticipated number of session for this episode of care: 6  Anticipation frequency of session: Weekly  Anticipated Duration of each session: 38-52 minutes  Treatment plan will be reviewed in 90 days or when goals have been changed.       MeasurableTreatment Goal(s) related to diagnosis / functional impairment(s)  Goal 1: Patient will learn 3 strategies to effectively manage anxiety.     I will know I've met my goal when:     Objective #A (Patient Action)    Patient will participate in individual therapy services.  Status: New - Date: 4/19/22     Intervention(s)  Therapist will educate pt about outpt tx services    Objective #B  Patient will complete medication evaluation appt.  Status: New - Date: 4/19/22     Intervention(s)  Therapist will make referrals to medication evaluation.    Objective #C  Patient will reduce/abstain from alcohol use.  Status: New - Date: 4/19/22     Intervention(s)  Therapist will educate pt about how alcohol impacts mh.      Patient has reviewed and agreed to the above plan.      Shawn G. Ullrich, [facility]  April 19, 2022

## 2022-06-23 ASSESSMENT — ANXIETY QUESTIONNAIRES
4. TROUBLE RELAXING: SEVERAL DAYS
3. WORRYING TOO MUCH ABOUT DIFFERENT THINGS: MORE THAN HALF THE DAYS
6. BECOMING EASILY ANNOYED OR IRRITABLE: MORE THAN HALF THE DAYS
2. NOT BEING ABLE TO STOP OR CONTROL WORRYING: SEVERAL DAYS
GAD7 TOTAL SCORE: 10
1. FEELING NERVOUS, ANXIOUS, OR ON EDGE: MORE THAN HALF THE DAYS
GAD7 TOTAL SCORE: 10
7. FEELING AFRAID AS IF SOMETHING AWFUL MIGHT HAPPEN: SEVERAL DAYS
8. IF YOU CHECKED OFF ANY PROBLEMS, HOW DIFFICULT HAVE THESE MADE IT FOR YOU TO DO YOUR WORK, TAKE CARE OF THINGS AT HOME, OR GET ALONG WITH OTHER PEOPLE?: SOMEWHAT DIFFICULT
5. BEING SO RESTLESS THAT IT IS HARD TO SIT STILL: SEVERAL DAYS
GAD7 TOTAL SCORE: 10
7. FEELING AFRAID AS IF SOMETHING AWFUL MIGHT HAPPEN: SEVERAL DAYS

## 2022-06-23 NOTE — PROGRESS NOTES
[facility]  2022    Behavioral Health Clinician Progress Note    Patient Name: Bertin Mcgill           Service Type:  Individual      Service Location:   telehealth     Session Start Time: 3:31 pm  Session End Time: 4:30 pm      Session Length: 53 - 60      Attendees: Client      Service Modality:  Video Visit:      Provider verified identity through the following two step process.  Patient provided:  Patient , Patient address and Patient is known previously to provider    Telemedicine Visit: The patient's condition can be safely assessed and treated via synchronous audio and visual telemedicine encounter.      Reason for Telemedicine Visit: Patient has requested telehealth visit    Originating Site (Patient Location): Patient's home    Distant Site (Provider Location): [facility]    Consent:  The patient/guardian has verbally consented to: the potential risks and benefits of telemedicine (video visit) versus in person care; bill my insurance or make self-payment for services provided; and responsibility for payment of non-covered services.     Patient would like the video invitation sent by:  My Chart     Mode of Communication:  Video Conference via Amwell    As the provider I attest to compliance with applicable laws and regulations related to telemedicine.    Visit Activities (Refresh list every visit): [facility] Only    Diagnostic Assessment Date: 22  Treatment Plan Review Date: 22   CGI Review Date:22    Clinical Global Impressions  First:  Considering your total clinical experience with this particular patient population, how severe are the patient's symptoms at this time?: 5 (2022  3:00 PM)    Most recent:  No data recorded    See Flowsheets for today's PHQ-9 and NATHALIE-7 results  Previous PHQ-9:   PHQ-9 SCORE 2022   PHQ-9 Total Score MyChart 8 (Mild depression) 10 (Moderate depression) 10 (Moderate depression)   PHQ-9 Total Score 8 10  10     Previous NATHALIE-7:   NATHALIE-7 SCORE 5/26/2022 6/9/2022 6/23/2022   Total Score 6 (mild anxiety) 8 (mild anxiety) 10 (moderate anxiety)   Total Score 6 8 10       KRISTINE LEVEL:  No flowsheet data found.    DATA  Extended Session (60+ minutes): No  Interactive Complexity: No  Crisis: No  [facility] Patient: No    Treatment Objective(s) Addressed in This Session:  Target Behavior(s): increased social activity/decreased isolation    Anxiety: will experience a reduction in anxiety, will develop more effective coping skills to manage anxiety symptoms, will develop healthy cognitive patterns and beliefs and will increase ability to function adaptively    Current Stressors / Issues:    When inquiring about health goals, ie walking/physical activity, Lj reported he initially started walking both in and outside of the home, but just as he was starting to walk more, his walking was sidelined by increased stressors.  Dorian Calhoun reported his father was hospitalized on Father's Day and was discharged yesterday (blood clot in lung and leg); Lj visited father every day while he was in the hospital.  Lj reported that not only are his father's health and going to the hospital cause for increased stress, but interacting with his parents is stressful in general.  Lj reported history of challenging family dynamics, including father, mother, and sister.   [facility] provided support and assisted with processing of family dynamics, which led to Lj sharing ways he's learned how to manage these interactions and relationships, including how use of resilience has led to Lj developing strengths to overcome these challenges.      Finally, [facility] and Lj returned to health goals, Lj stated he wants and will return to walking as stressors subside, and he is continuing to slowly make positive changes to his diet.       Progress on Treatment Objective(s) / Homework:  No improvement - CONTEMPLATION (Considering change and yet undecided); Intervened by  assessing the negative and positive thinking (ambivalence) about behavior change    Motivational Interviewing    MI Intervention: Co-Developed Goal: reduce anxiety, Expressed Empathy/Understanding, Supported Autonomy, Collaboration, Evocation, Open-ended questions, Reflections: simple and complex and Change talk (evoked)     Change Talk Expressed by the Patient: Reasons to change    Provider Response to Change Talk: E - Evoked more info from patient about behavior change and R - Reflected patient's change talk    Solution-Focused Therapy    Explore patterns in patient's behaviors and relationships and discuss options for new behaviors    Explore new options for problem-solving, communication, managing stress, etc.    Psychodynamic psychotherapy    Discuss patient's emotional dynamics and issues and how they impact behaviors    Explore patient's history of relationships and how they impact present behaviors    Explore how to work with and make changes in these schemas and patterns    Care Plan review completed: Yes    Medication Review:  No current psychiatric medications prescribed    Medication Compliance:  NA;     Changes in Health Issues:   None reported    Chemical Use Review:   Substance Use: Problem use continues with no change since last session, Stage of Change: Contemplation        Tobacco Use: No change in amount of tobacco use since last session.  Contemplation    Assessment: Current Emotional / Mental Status (status of significant symptoms):  Risk status (Self / Other harm or suicidal ideation)  Patient has had a history of suicidal ideation: passive SI in 2003; none since  Patient denies current fears or concerns for personal safety.  Patient denies current or recent suicidal ideation or behaviors.passive, no intent or plan  Patient denies current or recent homicidal ideation or behaviors.  Patient denies current or recent self injurious behavior or ideation.  Patient denies other safety concerns.  A  safety and risk management plan has not been developed at this time, however patient was encouraged to call [place] [phone] should there be a change in any of these risk factors.    Appearance:   Appropriate   Eye Contact:   Fair   Psychomotor Behavior: Normal   Attitude:   Cooperative   Orientation:   All  Speech   Rate / Production: Normal/ Responsive   Volume:  Normal   Mood:    Anxious   Affect:    Worrisome   Thought Content:  Clear    Thought Form:  Coherent  Logical   Insight:    Fair     Diagnoses:  1. Generalized anxiety disorder        Collateral Reports Completed:  Routed note to PCP    Plan: (Homework, other):  Patient was given information about behavioral services and encouraged to schedule a follow up appointment with the clinic [facility] in 3 weeks.  He was also given information about mental health symptoms and treatment options  and strategies to more effectively manage anxiety.  CD Recommendations: abstain from alcohol use.  Shawn Ullrich [facility], [facility]      ______________________________________________________________________    Integrated Primary Care Behavioral Health Treatment Plan    Patient's Name: Bertin Mcgill  YOB: 1980    Date of Creation: 4/19/22  Date Treatment Plan Last Reviewed/Revised: 4/19/22    DSM5 Diagnoses: 300.02 (F41.1) Generalized Anxiety Disorder or Adjustment Disorders  309.0 (F43.21) With depressed mood  Psychosocial / Contextual Factors: Single, heterosexual male, resides in his own home; pandemic  PROMIS (reviewed every 90 days):     Referral / Collaboration:  referrals for medication evaluation and Anxiety outpt treatment program were recommended; pt open to medication evaluation, will explore/gather more information about anxiety outpatient treatment program.  ([facility]).    Anticipated number of session for this episode of care: 6  Anticipation frequency of session: Weekly  Anticipated Duration of each session: 38-52 minutes  Treatment  plan will be reviewed in 90 days or when goals have been changed.       MeasurableTreatment Goal(s) related to diagnosis / functional impairment(s)  Goal 1: Patient will learn 3 strategies to effectively manage anxiety.     I will know I've met my goal when:     Objective #A (Patient Action)    Patient will participate in individual therapy services.  Status: New - Date: 4/19/22     Intervention(s)  Therapist will educate pt about outpt tx services    Objective #B  Patient will complete medication evaluation appt.  Status: New - Date: 4/19/22     Intervention(s)  Therapist will make referrals to medication evaluation.    Objective #C  Patient will reduce/abstain from alcohol use.  Status: New - Date: 4/19/22     Intervention(s)  Therapist will educate pt about how alcohol impacts mh.      Patient has reviewed and agreed to the above plan.      Shawn G. Ullrich, [facility]  April 19, 2022

## 2022-06-24 ENCOUNTER — VIRTUAL VISIT (OUTPATIENT)
Dept: BEHAVIORAL HEALTH | Facility: CLINIC | Age: 42
End: 2022-06-24
Payer: COMMERCIAL

## 2022-06-24 DIAGNOSIS — F41.1 GENERALIZED ANXIETY DISORDER: Primary | ICD-10-CM

## 2022-07-06 DIAGNOSIS — E11.9 TYPE 2 DIABETES MELLITUS WITHOUT COMPLICATION, WITHOUT LONG-TERM CURRENT USE OF INSULIN (H): ICD-10-CM

## 2022-07-07 RX ORDER — EMPAGLIFLOZIN 10 MG/1
TABLET, FILM COATED ORAL
Qty: 90 TABLET | Refills: 0 | Status: SHIPPED | OUTPATIENT
Start: 2022-07-07 | End: 2022-10-03

## 2022-07-07 NOTE — TELEPHONE ENCOUNTER
"Last Written Prescription Date:  4/5/22  Last Fill Quantity: 90,  # refills: 0   Last office visit: 5/10/22 with Dr. Flores  Future Office Visit: 8/2/22         Requested Prescriptions   Pending Prescriptions Disp Refills     JARDIANCE 10 MG TABS tablet [Pharmacy Med Name: JARDIANCE 10 MG TABLET] 90 tablet 0     Sig: TAKE 1 TABLET BY MOUTH EVERY DAY       Sodium Glucose Co-Transport Inhibitor Agents Passed - 7/6/2022  1:51 PM        Passed - Patient has documented A1c within the specified period of time.     If HgbA1C is 8 or greater, it needs to be on file within the past 3 months.  If less than 8, must be on file within the past 6 months.     Recent Labs   Lab Test 04/06/22  1205   A1C 7.5*             Passed - No creatinine >1.4 or GFR <45 within the past 12 mos     Recent Labs   Lab Test 04/06/22  1205 08/31/21  1020 11/08/19  0936   GFRESTIMATED >90   < > >90   GFRESTBLACK  --   --  >90    < > = values in this interval not displayed.       Recent Labs   Lab Test 04/06/22  1205   CR 0.92             Passed - Medication is active on med list        Passed - Patient is age 18 or older        Passed - Patient has documented normal Potassium within the last 12 mos.     Recent Labs   Lab Test 04/06/22  1205   POTASSIUM 4.2             Passed - Recent (6 mo) or future (30 days) visit within the authorizing provider's specialty     Patient had office visit in the last 6 months or has a visit in the next 30 days with authorizing provider or within the authorizing provider's specialty.  See \"Patient Info\" tab in inbasket, or \"Choose Columns\" in Meds & Orders section of the refill encounter.               Refill approved per protocol  Aimee Delgadillo RN    "

## 2022-07-09 ENCOUNTER — HEALTH MAINTENANCE LETTER (OUTPATIENT)
Age: 42
End: 2022-07-09

## 2022-07-13 ENCOUNTER — MYC MEDICAL ADVICE (OUTPATIENT)
Dept: FAMILY MEDICINE | Facility: CLINIC | Age: 42
End: 2022-07-13

## 2022-07-13 DIAGNOSIS — I10 BENIGN ESSENTIAL HYPERTENSION: ICD-10-CM

## 2022-07-14 RX ORDER — LOSARTAN POTASSIUM 50 MG/1
50 TABLET ORAL DAILY
Qty: 90 TABLET | Refills: 1 | Status: SHIPPED | OUTPATIENT
Start: 2022-07-14 | End: 2022-12-27

## 2022-07-14 ASSESSMENT — ANXIETY QUESTIONNAIRES
8. IF YOU CHECKED OFF ANY PROBLEMS, HOW DIFFICULT HAVE THESE MADE IT FOR YOU TO DO YOUR WORK, TAKE CARE OF THINGS AT HOME, OR GET ALONG WITH OTHER PEOPLE?: SOMEWHAT DIFFICULT
GAD7 TOTAL SCORE: 9
GAD7 TOTAL SCORE: 9
4. TROUBLE RELAXING: SEVERAL DAYS
7. FEELING AFRAID AS IF SOMETHING AWFUL MIGHT HAPPEN: NOT AT ALL
6. BECOMING EASILY ANNOYED OR IRRITABLE: MORE THAN HALF THE DAYS
1. FEELING NERVOUS, ANXIOUS, OR ON EDGE: MORE THAN HALF THE DAYS
5. BEING SO RESTLESS THAT IT IS HARD TO SIT STILL: SEVERAL DAYS
7. FEELING AFRAID AS IF SOMETHING AWFUL MIGHT HAPPEN: NOT AT ALL
2. NOT BEING ABLE TO STOP OR CONTROL WORRYING: SEVERAL DAYS
GAD7 TOTAL SCORE: 9
3. WORRYING TOO MUCH ABOUT DIFFERENT THINGS: MORE THAN HALF THE DAYS

## 2022-07-14 ASSESSMENT — PATIENT HEALTH QUESTIONNAIRE - PHQ9
SUM OF ALL RESPONSES TO PHQ QUESTIONS 1-9: 8
10. IF YOU CHECKED OFF ANY PROBLEMS, HOW DIFFICULT HAVE THESE PROBLEMS MADE IT FOR YOU TO DO YOUR WORK, TAKE CARE OF THINGS AT HOME, OR GET ALONG WITH OTHER PEOPLE: SOMEWHAT DIFFICULT
SUM OF ALL RESPONSES TO PHQ QUESTIONS 1-9: 8

## 2022-07-14 NOTE — TELEPHONE ENCOUNTER
Medication requested: losartan (COZAAR) 50 MG tablet  Last office visit: 4/6/22  [facility] appointments: none  Medication last refilled: 1/10/22; 90 + 1 refill  Last qualifying labs:   BP Readings from Last 3 Encounters:   04/06/22 122/80   10/14/21 125/83   09/15/21 129/89     Component      Latest Ref Rng & Units 4/6/2022   Sodium      133 - 144 mmol/L 135   Potassium      3.4 - 5.3 mmol/L 4.2   Chloride      94 - 109 mmol/L 104   Carbon Dioxide      20 - 32 mmol/L 25   Anion Gap      3 - 14 mmol/L 6   Urea Nitrogen      7 - 30 mg/dL 19   Creatinine      0.66 - 1.25 mg/dL 0.92   Calcium      8.5 - 10.1 mg/dL 9.8   Glucose      70 - 99 mg/dL 164 (H)   Alkaline Phosphatase      40 - 150 U/L 42   AST      0 - 45 U/L 36   ALT      0 - 70 U/L 71 (H)   Protein Total      6.8 - 8.8 g/dL 7.9   Albumin      3.4 - 5.0 g/dL 4.7   Bilirubin Total      0.2 - 1.3 mg/dL 0.8   GFR Estimate      >60 mL/min/1.73m2 >90     Prescription approved per [facility] Refill Protocol.    Bill RAMOS, RN  07/14/22 9:26 AM

## 2022-07-15 ENCOUNTER — VIRTUAL VISIT (OUTPATIENT)
Dept: BEHAVIORAL HEALTH | Facility: CLINIC | Age: 42
End: 2022-07-15
Payer: COMMERCIAL

## 2022-07-15 DIAGNOSIS — F41.1 GAD (GENERALIZED ANXIETY DISORDER): Primary | ICD-10-CM

## 2022-07-15 NOTE — PROGRESS NOTES
[facility]  July 15, 2022    Behavioral Health Clinician Progress Note    Patient Name: Bertin Mcgill           Service Type:  Individual      Service Location:   telehealth     Session Start Time: 3:31 pm  Session End Time: 4:30 pm      Session Length: 53 - 60      Attendees: Client      Service Modality:  Video Visit:      Provider verified identity through the following two step process.  Patient provided:  Patient , Patient address and Patient is known previously to provider    Telemedicine Visit: The patient's condition can be safely assessed and treated via synchronous audio and visual telemedicine encounter.      Reason for Telemedicine Visit: Patient has requested telehealth visit    Originating Site (Patient Location): Patient's home    Distant Site (Provider Location): [facility]    Consent:  The patient/guardian has verbally consented to: the potential risks and benefits of telemedicine (video visit) versus in person care; bill my insurance or make self-payment for services provided; and responsibility for payment of non-covered services.     Patient would like the video invitation sent by:  My Chart     Mode of Communication:  Video Conference via Amwell    As the provider I attest to compliance with applicable laws and regulations related to telemedicine.    Visit Activities (Refresh list every visit): [facility] Only    Diagnostic Assessment Date: 22  Treatment Plan Review Date: 22   CGI Review Date:10/15/22  Promis 10 Review Date: 22    Clinical Global Impressions  First:  Considering your total clinical experience with this particular patient population, how severe are the patient's symptoms at this time?: 4 (7/15/2022  4:40 PM)    Most recent:  Compared to the patient's condition at the START of treatment, this patient's condition is: 3 (7/15/2022  4:40 PM)      See Flowsheets for today's PHQ-9 and NATHALIE-7 results  Previous PHQ-9:   PHQ-9 SCORE 2022  "7/14/2022   PHQ-9 Total Score [place] 10 (Moderate depression) 10 (Moderate depression) 8 (Mild depression)   PHQ-9 Total Score 10 10 8     Previous NATHALIE-7:   NATHALIE-7 SCORE 6/9/2022 6/23/2022 7/14/2022   Total Score 8 (mild anxiety) 10 (moderate anxiety) 9 (mild anxiety)   Total Score 8 10 9       KRISTINE LEVEL:  No flowsheet data found.    DATA  Extended Session (60+ minutes): No  Interactive Complexity: No  Crisis: No  [facility] Patient: No    Treatment Objective(s) Addressed in This Session:  Target Behavior(s): increased social activity/decreased isolation    Anxiety: will experience a reduction in anxiety, will develop more effective coping skills to manage anxiety symptoms, will develop healthy cognitive patterns and beliefs and will increase ability to function adaptively    Current Stressors / Issues:  Lj reported feeling more fatigued lately, citing more evening naps in the past couple weeks than in the last couple months. Lj identified sources of fatigue, stating mental fatigue is from work and emotional fatigue comes from his family.  To promote improved stress/symptom management, [facility] inquired about self-care and healthy activities/strategies.  Lj reported he's continued to make progress regarding diet, specifically eating \"pretty big salad\" for lunch, having \"better snacks\" (triscuits), and decreased quantities at dinner.  And while Lj has not been walking as much as he'd like, he has increased exercising by doing push ups every time he goes downstairs (has increased from 10 to 15).  Lj continued to identify that even when he has intentions to walk after work/night, sometimes he can't b/c he's tired from stressful days.  [facility] reflected how Lj's number one priority is work, and while that makes him an excellent employee and satisfies his strong work ethic, it also means everything else is a lower priority.  [facility] posited that even when Lj starts to make progress regarding health goals, when work gets busy, " his health goals suffer and his healthy habits typically stop.  Lj affirmed pattern of work interrupting progress toward health goals.   [facility] also noted that since work is his number one priority, this results in difficulty setting healthy work-life boundaries, eg always being available, difficulty saying no, etc, which results in feeling like people take advantage of him and becoming resentful.  Lj acknowledged difficulty setting healthy work-life boundaries.      Lj stated he will continue to work toward health goals, and think more about how he could start to develop healthier work-life balance.         Progress on Treatment Objective(s) / Homework:  No improvement - CONTEMPLATION (Considering change and yet undecided); Intervened by assessing the negative and positive thinking (ambivalence) about behavior change    Motivational Interviewing    MI Intervention: Co-Developed Goal: reduce anxiety, Expressed Empathy/Understanding, Supported Autonomy, Collaboration, Evocation, Open-ended questions, Reflections: simple and complex and Change talk (evoked)     Change Talk Expressed by the Patient: Reasons to change    Provider Response to Change Talk: E - Evoked more info from patient about behavior change and R - Reflected patient's change talk    Solution-Focused Therapy    Explore patterns in patient's behaviors and relationships and discuss options for new behaviors    Explore new options for problem-solving, communication, managing stress, etc.    Psychodynamic psychotherapy    Discuss patient's emotional dynamics and issues and how they impact behaviors    Explore patient's history of relationships and how they impact present behaviors    Explore how to work with and make changes in these schemas and patterns    Care Plan review completed: Yes    Medication Review:  No current psychiatric medications prescribed    Medication Compliance:  NA;     Changes in Health Issues:   None reported    Chemical Use  Review:   Substance Use: Problem use continues with no change since last session, Stage of Change: Contemplation        Tobacco Use: No change in amount of tobacco use since last session.  Contemplation    Assessment: Current Emotional / Mental Status (status of significant symptoms):  Risk status (Self / Other harm or suicidal ideation)  Patient has had a history of suicidal ideation: passive SI in 2003; none since  Patient denies current fears or concerns for personal safety.  Patient denies current or recent suicidal ideation or behaviors.passive, no intent or plan  Patient denies current or recent homicidal ideation or behaviors.  Patient denies current or recent self injurious behavior or ideation.  Patient denies other safety concerns.  A safety and risk management plan has not been developed at this time, however patient was encouraged to call [place] [phone] should there be a change in any of these risk factors.    Appearance:   Appropriate   Eye Contact:   Fair   Psychomotor Behavior: Normal   Attitude:   Cooperative   Orientation:   All  Speech   Rate / Production: Normal/ Responsive   Volume:  Normal   Mood:    Anxious frustrated  Affect:    Worrisome   Thought Content:  Clear    Thought Form:  Coherent  Logical   Insight:    Fair     Diagnoses:  1. NATHALIE (generalized anxiety disorder)        Collateral Reports Completed:  Routed note to PCP    Plan: (Homework, other):  Patient was given information about behavioral services and encouraged to schedule a follow up appointment with the clinic [facility] in 2 weeks.  He was also given information about mental health symptoms and treatment options  and strategies to more effectively manage anxiety.  CD Recommendations: abstain from alcohol use.  Shawn Ullrich [facility], [facility]      ______________________________________________________________________    Integrated Primary Care Behavioral Health Treatment Plan    Patient's Name: Bertin Mcgill  Date Of  Birth: 1980    Date of Creation: 4/19/22  Date Treatment Plan Last Reviewed/Revised: 4/19/22    DSM5 Diagnoses: 300.02 (F41.1) Generalized Anxiety Disorder or Adjustment Disorders  309.0 (F43.21) With depressed mood  Psychosocial / Contextual Factors: Single, heterosexual male, resides in his own home; pandemic  PROMIS (reviewed every 90 days):     Referral / Collaboration:  referrals for medication evaluation and Anxiety outpt treatment program were recommended; pt open to medication evaluation, will explore/gather more information about anxiety outpatient treatment program.  ([facility]).    Anticipated number of session for this episode of care: 6  Anticipation frequency of session: Weekly  Anticipated Duration of each session: 38-52 minutes  Treatment plan will be reviewed in 90 days or when goals have been changed.       MeasurableTreatment Goal(s) related to diagnosis / functional impairment(s)  Goal 1: Patient will learn 3 strategies to effectively manage anxiety.     I will know I've met my goal when:     Objective #A (Patient Action)    Patient will participate in individual therapy services.  Status: New - Date: 4/19/22     Intervention(s)  Therapist will educate pt about outpt tx services    Objective #B  Patient will complete medication evaluation appt.  Status: New - Date: 4/19/22     Intervention(s)  Therapist will make referrals to medication evaluation.    Objective #C  Patient will reduce/abstain from alcohol use.  Status: New - Date: 4/19/22     Intervention(s)  Therapist will educate pt about how alcohol impacts mh.      Patient has reviewed and agreed to the above plan.      Shawn G. Ullrich, [facility]  April 19, 2022

## 2022-07-25 ENCOUNTER — LAB (OUTPATIENT)
Dept: LAB | Facility: CLINIC | Age: 42
End: 2022-07-25
Payer: COMMERCIAL

## 2022-07-25 DIAGNOSIS — E11.9 TYPE 2 DIABETES MELLITUS WITHOUT COMPLICATION, WITHOUT LONG-TERM CURRENT USE OF INSULIN (H): ICD-10-CM

## 2022-07-25 LAB
ALT SERPL W P-5'-P-CCNC: 58 U/L (ref 0–70)
ANION GAP SERPL CALCULATED.3IONS-SCNC: 8 MMOL/L (ref 3–14)
BUN SERPL-MCNC: 19 MG/DL (ref 7–30)
CALCIUM SERPL-MCNC: 9.2 MG/DL (ref 8.5–10.1)
CHLORIDE BLD-SCNC: 103 MMOL/L (ref 94–109)
CO2 SERPL-SCNC: 25 MMOL/L (ref 20–32)
CREAT SERPL-MCNC: 0.85 MG/DL (ref 0.66–1.25)
GFR SERPL CREATININE-BSD FRML MDRD: >90 ML/MIN/1.73M2
GLUCOSE BLD-MCNC: 202 MG/DL (ref 70–99)
HBA1C MFR BLD: 7.6 % (ref 0–5.6)
POTASSIUM BLD-SCNC: 4 MMOL/L (ref 3.4–5.3)
SODIUM SERPL-SCNC: 136 MMOL/L (ref 133–144)
TSH SERPL DL<=0.005 MIU/L-ACNC: 1.62 MU/L (ref 0.4–4)

## 2022-07-25 PROCEDURE — 36415 COLL VENOUS BLD VENIPUNCTURE: CPT

## 2022-07-25 PROCEDURE — 84443 ASSAY THYROID STIM HORMONE: CPT

## 2022-07-25 PROCEDURE — 84460 ALANINE AMINO (ALT) (SGPT): CPT

## 2022-07-25 PROCEDURE — 80048 BASIC METABOLIC PNL TOTAL CA: CPT

## 2022-07-25 PROCEDURE — 83036 HEMOGLOBIN GLYCOSYLATED A1C: CPT

## 2022-07-29 ENCOUNTER — VIRTUAL VISIT (OUTPATIENT)
Dept: BEHAVIORAL HEALTH | Facility: CLINIC | Age: 42
End: 2022-07-29
Payer: COMMERCIAL

## 2022-07-29 DIAGNOSIS — F41.1 GAD (GENERALIZED ANXIETY DISORDER): Primary | ICD-10-CM

## 2022-07-29 ASSESSMENT — ANXIETY QUESTIONNAIRES
GAD7 TOTAL SCORE: 7
7. FEELING AFRAID AS IF SOMETHING AWFUL MIGHT HAPPEN: NOT AT ALL
1. FEELING NERVOUS, ANXIOUS, OR ON EDGE: SEVERAL DAYS
GAD7 TOTAL SCORE: 7
5. BEING SO RESTLESS THAT IT IS HARD TO SIT STILL: SEVERAL DAYS
7. FEELING AFRAID AS IF SOMETHING AWFUL MIGHT HAPPEN: NOT AT ALL
2. NOT BEING ABLE TO STOP OR CONTROL WORRYING: SEVERAL DAYS
3. WORRYING TOO MUCH ABOUT DIFFERENT THINGS: SEVERAL DAYS
6. BECOMING EASILY ANNOYED OR IRRITABLE: SEVERAL DAYS
GAD7 TOTAL SCORE: 7
4. TROUBLE RELAXING: MORE THAN HALF THE DAYS

## 2022-07-29 NOTE — PROGRESS NOTES
[facility]  2022    Behavioral Health Clinician Progress Note    Patient Name: Bertin Mcgill           Service Type:  Individual      Service Location:   telehealth     Session Start Time: 3:31 pm  Session End Time: 4:30 pm      Session Length: 53 - 60      Attendees: Client      Service Modality:  Video Visit:      Provider verified identity through the following two step process.  Patient provided:  Patient , Patient address and Patient is known previously to provider    Telemedicine Visit: The patient's condition can be safely assessed and treated via synchronous audio and visual telemedicine encounter.      Reason for Telemedicine Visit: Patient has requested telehealth visit    Originating Site (Patient Location): Patient's home    Distant Site (Provider Location): [facility]    Consent:  The patient/guardian has verbally consented to: the potential risks and benefits of telemedicine (video visit) versus in person care; bill my insurance or make self-payment for services provided; and responsibility for payment of non-covered services.     Patient would like the video invitation sent by:  My Chart     Mode of Communication:  Video Conference via Amwell    As the provider I attest to compliance with applicable laws and regulations related to telemedicine.    Visit Activities (Refresh list every visit): [facility] Only    Diagnostic Assessment Date: 22  Treatment Plan Review Date: 10/29/22  CGI Review Date:10/15/22  Promis 10 Review Date:     Clinical Global Impressions  First:  Considering your total clinical experience with this particular patient population, how severe are the patient's symptoms at this time?: 4 (7/15/2022  4:40 PM)    Most recent:  Compared to the patient's condition at the START of treatment, this patient's condition is: 3 (7/15/2022  4:40 PM)      See Flowsheets for today's PHQ-9 and NATHALIE-7 results  Previous PHQ-9:   PHQ-9 SCORE 2022  "6/23/2022 7/14/2022   PHQ-9 Total Score MyChart 10 (Moderate depression) 10 (Moderate depression) 8 (Mild depression)   PHQ-9 Total Score 10 10 8     Previous NATHALIE-7:   NATHALIE-7 SCORE 6/23/2022 7/14/2022 7/29/2022   Total Score 10 (moderate anxiety) 9 (mild anxiety) 7 (mild anxiety)   Total Score 10 9 7       KRISTINE LEVEL:  No flowsheet data found.    DATA  Extended Session (60+ minutes): No  Interactive Complexity: No  Crisis: No  [facility] Patient: No    Treatment Objective(s) Addressed in This Session:  Target Behavior(s): increased social activity/decreased isolation    Anxiety: will experience a reduction in anxiety, will develop more effective coping skills to manage anxiety symptoms, will develop healthy cognitive patterns and beliefs and will increase ability to function adaptively    Current Stressors / Issues:    Lj reported work continues to be stressful, displaying irritability when talking about work matters.   Lj even noted when he took the day off, he was still contacted multiple times b/c of an emergency; but Lj had turned off all the electronics for the morning, so co-workers had to address the issue.  [facility] inquired why he needed to shut off all electronics on his day off, Lj reported he just wanted to sleep, because he was so tired.  Lj stated, he's \"not getting enough sleep during the week\" and \"exerting myself to exhaustion during the week\".  [facility] attempted to use MI approach to decrease ambivalence regarding making changes to work life/habits, but Lj reported there's nothing he can do about work.  Lj reported his boss is also overworked, so this is an organizational thing and he's not going to look for a new job b/c of \"fear of change\".    [facility] and Lj shifted focus to areas he's willing to address and actions he's willing to take.  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  Lj reported he attempted several strategies, which have not been " "effective.  Lj reported he spoke with friend that knows people that write and they set time aside daily.  Lj likened it to exercise, including committing and dedicating time for the activity. Lj stated his goal is to set aside time daily to engage in some writing.  Lj said his goal is \"writing a book, getting a book deal\".    Lj reported he's still making progress on diet and exercise as well.  Lj reported in the past week, he went for \"2 really long walks\" and did one hour on the treadmill.  Lj is also did some planks and increased push ups from 15 to 20 per set and is thinking about increasing to 25 next week (approximately 140/day). Lj is also continuing with salad for lunch, Triscuits for snacks and has decreased fast food as well. Lj recently had \"labs\" completed, A1C was 7.6, goal is 6.5.    At this point, Lj is frustrated about and feels change cannot be made regarding work (which only exacerbates his frustration), therefore he would like to focus on making changing in other life areas to manage stress, improve mood and decrease anxiety.     Progress on Treatment Objective(s) / Homework:  Minimal progress - ACTION (Actively working towards change); Intervened by reinforcing change plan / affirming steps taken    Motivational Interviewing    MI Intervention: Co-Developed Goal: reduce anxiety, Expressed Empathy/Understanding, Supported Autonomy, Collaboration, Evocation, Open-ended questions, Reflections: simple and complex and Change talk (evoked)     Change Talk Expressed by the Patient: Reasons to change    Provider Response to Change Talk: E - Evoked more info from patient about behavior change and R - Reflected patient's change talk    Solution-Focused Therapy    Explore patterns in patient's behaviors and relationships and discuss options for new behaviors    Explore new options for problem-solving, communication, managing stress, etc.        Care Plan review completed: " Yes    Medication Review:  No current psychiatric medications prescribed    Medication Compliance:  NA;     Changes in Health Issues:   None reported    Chemical Use Review:   Substance Use: Problem use continues with no change since last session, Stage of Change: Contemplation        Tobacco Use: No change in amount of tobacco use since last session.  Contemplation    Assessment: Current Emotional / Mental Status (status of significant symptoms):  Risk status (Self / Other harm or suicidal ideation)  Patient has had a history of suicidal ideation: passive SI in 2003; none since  Patient denies current fears or concerns for personal safety.  Patient denies current or recent suicidal ideation or behaviors.passive, no intent or plan  Patient denies current or recent homicidal ideation or behaviors.  Patient denies current or recent self injurious behavior or ideation.  Patient denies other safety concerns.  A safety and risk management plan has not been developed at this time, however patient was encouraged to call [place] [phone] should there be a change in any of these risk factors.    Appearance:   Appropriate   Eye Contact:   Fair   Psychomotor Behavior: Normal   Attitude:   Cooperative   Orientation:   All  Speech   Rate / Production: Normal/ Responsive   Volume:  Normal   Mood:    irritablefrustrated  Affect:    congruent with mood   Thought Content:  Clear    Thought Form:  Coherent  Logical   Insight:    Fair     Diagnoses:  1. NATHALIE (generalized anxiety disorder)        Collateral Reports Completed:  Routed note to PCP    Plan: (Homework, other):  Patient was given information about behavioral services and encouraged to schedule a follow up appointment with the clinic [facility] in 2 weeks.  He was also given information about mental health symptoms and treatment options  and strategies to more effectively manage anxiety.  CD Recommendations: abstain from alcohol use.  Shawn Ullrich [facility],  [facility]      ______________________________________________________________________    Integrated Primary Care Behavioral Health Treatment Plan    Patient's Name: Bertin Mcgill  YOB: 1980    Date of Creation: 7/29/22  Date Treatment Plan Last Reviewed/Revised: 4/19/22    DSM5 Diagnoses: 300.02 (F41.1) Generalized Anxiety Disorder or Adjustment Disorders  309.0 (F43.21) With depressed mood  Psychosocial / Contextual Factors: Single, heterosexual male, resides in his own home; pandemic  PROMIS (reviewed every 90 days): pt completed on 7/29/22    Referral / Collaboration:  Referral to another professional/service is not indicated at this time.; Lj followed through with medication evaluation, attempted medication and stopped medication    Anticipated number of session for this episode of care: 6  Anticipation frequency of session: Every other week  Anticipated Duration of each session: 38-52 minutes  Treatment plan will be reviewed in 90 days or when goals have been changed.       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      Objective #A (Patient Action)    Patient will identify three distraction and diversion activities and use those activities to decrease level of anxiety  .  Status: New - Date: 4/19/22     Intervention(s)  Therapist will assign homework to monitor and report back anxiety levels when walking, doing push ups/planks, and writing    Objective #B  Patient will engage in some physical activity/exercise at least daily.  Status: New - Date: 7/29/22     Intervention(s)  Therapist will teach the client how to complete a 4-part pros and cons. and apply to exercise.    Objective #C  Patient will increase mood and confidence by following through  with diet.  Status: New - Date: 7/29/22     Intervention(s)  Therapist will prompt patient to share how maintaining impacts mood/anxiety.      Patient has reviewed and agreed to the above plan.      Shawn G. Ullrich, [facility]  July 29, 2022

## 2022-08-02 ENCOUNTER — TELEPHONE (OUTPATIENT)
Dept: ENDOCRINOLOGY | Facility: CLINIC | Age: 42
End: 2022-08-02

## 2022-08-02 ENCOUNTER — VIRTUAL VISIT (OUTPATIENT)
Dept: ENDOCRINOLOGY | Facility: CLINIC | Age: 42
End: 2022-08-02
Payer: COMMERCIAL

## 2022-08-02 DIAGNOSIS — E11.9 TYPE 2 DIABETES MELLITUS WITHOUT COMPLICATION, WITHOUT LONG-TERM CURRENT USE OF INSULIN (H): Primary | ICD-10-CM

## 2022-08-02 DIAGNOSIS — E11.9 TYPE 2 DIABETES MELLITUS WITHOUT COMPLICATION, WITHOUT LONG-TERM CURRENT USE OF INSULIN (H): ICD-10-CM

## 2022-08-02 PROCEDURE — 99214 OFFICE O/P EST MOD 30 MIN: CPT | Mod: 95 | Performed by: INTERNAL MEDICINE

## 2022-08-02 RX ORDER — SITAGLIPTIN AND METFORMIN HYDROCHLORIDE 1000; 100 MG/1; MG/1
TABLET, FILM COATED, EXTENDED RELEASE ORAL
Qty: 30 TABLET | Refills: 1 | Status: SHIPPED | OUTPATIENT
Start: 2022-08-02 | End: 2022-09-30

## 2022-08-02 NOTE — LETTER
8/2/2022         RE: Bertin Mcgill  [address] [facility] [address]        Dear Colleague,    Thank you for referring your patient, Bertin Mcgill, to the [facility]. Please see a copy of my visit note below.    Patient is being evaluated via a billable video visit.      How would you like to obtain your AVS? Reviewed verbally  If the video visit is dropped, the invitation should be resent by cell phone  Will anyone else be joining your video visit? no      Video Start Time: 3:40 pm     Video-Visit Details    Type of service:  Video Visit    Video End Time:  3:57 pm    Originating Location (pt. Location): home    Distant Location (provider location):  [facility]/home    Platform used for Video Visit:  Array Bridge        Recent issues:  Diabetes follow-up evaluation  Patient taking the JanumetXR and Jardiance medications per plan  Reviewed his recent (non-fasting) lab tests from 7/2022  Trying to do more dietary and exercise compliance, starting use of treadmill or outdoor walking, pushups           2017. Diagnosis of diabetes mellitus when med eval for torn right shoulder muscle  High glucose level noted during med evaluation, hgbA1c near 11.5%  Started Jardiance medication, took for approx 6 months  Recalls significant weight loss of approx 30#  Switched to metformin medication, then dose increases              Concerns about GI symptoms with nausea, nausea, also morning vomiting              Tried Prilosec, then Zantac  Has seen [facility] for medical evaluations  Recent discussion with his workplace team physician, Dr. Skye Mane              Advised to see me for medical evaluation    Previously on metforminXR 500 mg 2 tabs BID     Previous [facility] labs include:               8/12/19. Initial diabetes evaluation with me at [place]  Reviewed health history and diabetes management  Medication change to JanumetXR  1/2022.  Addition of Jardiance     Current DM medications:  JanumetXR 100/1000 1-tab each morning  Jardiance 10 mg  1-tab each morning     Blood glucose (BG) meter              Infrequent testing     Fam Hx Diabetes:       None  Recent [facility] labs include:           Lab Results   Component Value Date    A1C 7.6 (H) 07/25/2022     07/25/2022    POTASSIUM 4.0 07/25/2022    CHLORIDE 103 07/25/2022    CO2 25 07/25/2022    ANIONGAP 8 07/25/2022     (H) 07/25/2022    BUN 19 07/25/2022    CR 0.85 07/25/2022    GFRESTIMATED >90 07/25/2022    GFRESTBLACK >90 11/08/2019    FLAVIO 9.2 07/25/2022    CHOL 200 (H) 04/06/2022    TRIG 370 (H) 04/06/2022    HDL 50 04/06/2022    LDL 76 04/06/2022    NHDL 150 (H) 04/06/2022    UCRR 56 04/06/2022    MICROL 8 04/06/2022    UMALCR 14.29 04/06/2022     Lab Results   Component Value Date    TSH 1.62 07/25/2022     History of hyperlipidemia, takes simvastatin 20 mg daily  Last eye exam 5/2022 at [facility], no DR per patient  DM Complications:      None known        Lives in [place]  Has seen [facility]   Also sees Dr. Jose/[facility] for general medicine evaluations.      PMH/PSH:  Past Medical History:   Diagnosis Date     Allergic rhinitis      Benign essential hypertension      Hyperlipidemia      Rotator cuff tear, left 2015     Rotator cuff tear, right 2017     Type 2 diabetes mellitus (H)      Past Surgical History:   Procedure Laterality Date     APPENDECTOMY       SHOULDER SURGERY      left and right previously       Family Hx:  No family history on file.      Social Hx:  Social History     Socioeconomic History     Marital status: Single     Spouse name: Not on file     Number of children: Not on file     Years of education: Not on file     Highest education level: Not on file   Occupational History     Not on file   Tobacco Use     Smoking status: Current Every Day Smoker     Smokeless tobacco: Never Used  "  Substance and Sexual Activity     Alcohol use: Yes     Drug use: Never     Sexual activity: Not on file   Other Topics Concern     Not on file   Social History Narrative    Single.     Works as \"\" for [employer] at [employer].     From [place]     Social Determinants of Health     Financial Resource Strain: Not on file   Food Insecurity: Not on file   Transportation Needs: Not on file   Physical Activity: Not on file   Stress: Not on file   Social Connections: Not on file   Intimate Partner Violence: Not on file   Housing Stability: Not on file          MEDICATIONS:  has a current medication list which includes the following prescription(s): acetaminophen, hydrochlorothiazide, janumet xr, jardiance, losartan, multiple vitamins-minerals, simvastatin, and vitamin d.    ROS: 10 point ROS neg other than the symptoms noted above in the HPI.     GENERAL: energy good, wt stable?; denies fevers, chills, night sweats.   HEENT: no dysphagia, odonophagia, diplopia, neck pain  THYROID:  no apparent hyper or hypothyroid symptoms  CV: no chest pain, pressure, palpitations  LUNGS: no SOB, MEHTA, cough, wheezing   ABDOMEN: episode of rectal bleeding; occasional nausea; denies abdominal pain  EXTREMITIES: no rashes, ulcers, edema  NEUROLOGY: no headaches, denies changes in vision, tingling, extremitiy numbness   MSK: no muscle aches or pains, weakness  SKIN: no rashes or lesions  : denies nocturia  PSYCH:  stable mood, no significant anxiety or depression  ENDOCRINE: no heat or cold intolerance    Physical Exam (visual exam)  VS:  no vital signs taken for video visit  CONSTITUTIONAL: healthy, alert and NAD, well dressed, answering questions appropriately  ENT: no nose swelling or nasal discharge, mouth redness or gum changes.  EYES: eyes grossly normal to inspection, conjunctivae and sclerae normal, no exophthalmos or proptosis  THYROID:  no apparent nodules or goiter  LUNGS: no audible wheeze, cough or " visible cyanosis, no visible retractions or increased work of breathing  ABDOMEN: abdomen not evaluated  EXTREMITIES: no hand tremors, limited exam  NEUROLOGY: CN grossly intact, mentation intact and speech normal   SKIN:  no apparent skin lesions, rash, or edema with visualized skin appearance  PSYCH: mentation appears normal, affect normal/bright, judgement and insight intact,   normal speech and appearance well groomed    LABS:     All pertinent notes, labs, and images personally reviewed by me.      A/P:  Encounter Diagnosis   Name Primary?     Type 2 diabetes mellitus without complication, without long-term current use of insulin (H) Yes       Comments:  Reviewed health history and diabetes issues.  Difficult to assess glycemic control without BGM data, though recent preappt hgbA1c mildly elevated  Reviewed and interpreted tests that I previously ordered.   Ordered appropriate tests for the endocrinology disease management.    Management options discussed and implemented after shared medical decision making with the patient.  T2DM problem is chronic-stable    Plan:  Discussed general issues with the diabetes diagnosis and management  We discussed the hgbA1c test which reflects previous overall glucose levels or control  Discussed the importance of blood glucose (BG) testing to assess glucose trends  Provided general overview of the diabetes medication options and medication treatment plan.     Recommend:  We have discussed medication options, metformin med use and potential side effects  Continue the current JanumetXR and Jardiance daily dose routine  Consider change from Januvia (in Janumet) to a GLP1RA med such as Trulicity or Rybelsus     Discussed his needle-phobia concerns  Test FBG at least 1-2x/week, goal target BG  mg/dl  Plan repeat fasting pre-appt labs in 10/2022    Testing at nearby [facility]    Lab orders placed  Keep focus on diet, increased exercise, weight management.  Continue  simvastatin med use, goal target LDL <100 mg/dl  Advise having fasting lipid panel testing and dilated eye examination, at least annually    Keep regular follow-up appointments with PCP  Addressed patient questions today    There are no Patient Instructions on file for this visit.    Future labs ordered today:   Orders Placed This Encounter   Procedures     Lipid panel reflex to direct LDL Fasting     Hemoglobin A1c     ALT     Radiology/Consults ordered today: None    Total time spent in with the patient evaluation:  17 min  Additional time spent reviewing pertinent lab tests and chart notes, and documentation:  7 min    Follow-up:  10/18/22 at 3pm, Katie Flores MD, MS  Endocrinology  [facility]    CC:  MILES Jose    CC:  MILES Jose.                                Again, thank you for allowing me to participate in the care of your patient.        Sincerely,        Anival Flores MD

## 2022-08-02 NOTE — TELEPHONE ENCOUNTER
Patient is eligible to fill with [facility].  Patient currently filling with [facility] pharmacy, left message for patient to offer [facility].  
Detail Level: Simple

## 2022-08-02 NOTE — PROGRESS NOTES
Patient is being evaluated via a billable video visit.      How would you like to obtain your AVS? Reviewed verbally  If the video visit is dropped, the invitation should be resent by cell phone  Will anyone else be joining your video visit? no      Video Start Time: 3:40 pm     Video-Visit Details    Type of service:  Video Visit    Video End Time:  3:57 pm    Originating Location (pt. Location): home    Distant Location (provider location):  [facility]/home    Platform used for Video Visit:  Amitree        Recent issues:  Diabetes follow-up evaluation  Patient taking the JanumetXR and Jardiance medications per plan  Reviewed his recent (non-fasting) lab tests from 7/2022  Trying to do more dietary and exercise compliance, starting use of treadmill or outdoor walking, pushups           2017. Diagnosis of diabetes mellitus when med eval for torn right shoulder muscle  High glucose level noted during med evaluation, hgbA1c near 11.5%  Started Jardiance medication, took for approx 6 months  Recalls significant weight loss of approx 30#  Switched to metformin medication, then dose increases              Concerns about GI symptoms with nausea, nausea, also morning vomiting              Tried Prilosec, then Zantac  Has seen [facility] for medical evaluations  Recent discussion with his workplace team physician, Dr. Skye Mane              Advised to see me for medical evaluation    Previously on metforminXR 500 mg 2 tabs BID     Previous [facility] labs include:               8/12/19. Initial diabetes evaluation with me at [place]  Reviewed health history and diabetes management  Medication change to JanumetXR  1/2022. Addition of Jardiance     Current DM medications:  JanumetXR 100/1000 1-tab each morning  Jardiance 10 mg  1-tab each morning     Blood glucose (BG) meter              Infrequent testing     Fam Hx Diabetes:       None  Recent [facility] labs include:           Lab Results  "  Component Value Date    A1C 7.6 (H) 07/25/2022     07/25/2022    POTASSIUM 4.0 07/25/2022    CHLORIDE 103 07/25/2022    CO2 25 07/25/2022    ANIONGAP 8 07/25/2022     (H) 07/25/2022    BUN 19 07/25/2022    CR 0.85 07/25/2022    GFRESTIMATED >90 07/25/2022    GFRESTBLACK >90 11/08/2019    FLAVIO 9.2 07/25/2022    CHOL 200 (H) 04/06/2022    TRIG 370 (H) 04/06/2022    HDL 50 04/06/2022    LDL 76 04/06/2022    NHDL 150 (H) 04/06/2022    UCRR 56 04/06/2022    MICROL 8 04/06/2022    UMALCR 14.29 04/06/2022     Lab Results   Component Value Date    TSH 1.62 07/25/2022     History of hyperlipidemia, takes simvastatin 20 mg daily  [facility], no DR per patient  DM Complications:      None known        Lives in [place]  Has seen [facility]   Also sees Dr. Jose/[facility] for general medicine evaluations.      PMH/PSH:  Past Medical History:   Diagnosis Date     Allergic rhinitis      Benign essential hypertension      Hyperlipidemia      Rotator cuff tear, left 2015     Rotator cuff tear, right 2017     Type 2 diabetes mellitus (H)      Past Surgical History:   Procedure Laterality Date     APPENDECTOMY       SHOULDER SURGERY      left and right previously       Family Hx:  No family history on file.      Social Hx:  Social History     Socioeconomic History     Marital status: Single     Spouse name: Not on file     Number of children: Not on file     Years of education: Not on file     Highest education level: Not on file   Occupational History     Not on file   Tobacco Use     Smoking status: Current Every Day Smoker     Smokeless tobacco: Never Used   Substance and Sexual Activity     Alcohol use: Yes     Drug use: Never     Sexual activity: Not on file   Other Topics Concern     Not on file   Social History Narrative    Single.     Works as \"\" for [employer] at [employer].     From [place] "     Social Determinants of Health     Financial Resource Strain: Not on file   Food Insecurity: Not on file   Transportation Needs: Not on file   Physical Activity: Not on file   Stress: Not on file   Social Connections: Not on file   Intimate Partner Violence: Not on file   Housing Stability: Not on file          MEDICATIONS:  has a current medication list which includes the following prescription(s): acetaminophen, hydrochlorothiazide, janumet xr, jardiance, losartan, multiple vitamins-minerals, simvastatin, and vitamin d.    ROS: 10 point ROS neg other than the symptoms noted above in the HPI.     GENERAL: energy good, wt stable?; denies fevers, chills, night sweats.   HEENT: no dysphagia, odonophagia, diplopia, neck pain  THYROID:  no apparent hyper or hypothyroid symptoms  CV: no chest pain, pressure, palpitations  LUNGS: no SOB, MEHTA, cough, wheezing   ABDOMEN: episode of rectal bleeding; occasional nausea; denies abdominal pain  EXTREMITIES: no rashes, ulcers, edema  NEUROLOGY: no headaches, denies changes in vision, tingling, extremitiy numbness   MSK: no muscle aches or pains, weakness  SKIN: no rashes or lesions  : denies nocturia  PSYCH:  stable mood, no significant anxiety or depression  ENDOCRINE: no heat or cold intolerance    Physical Exam (visual exam)  VS:  no vital signs taken for video visit  CONSTITUTIONAL: healthy, alert and NAD, well dressed, answering questions appropriately  ENT: no nose swelling or nasal discharge, mouth redness or gum changes.  EYES: eyes grossly normal to inspection, conjunctivae and sclerae normal, no exophthalmos or proptosis  THYROID:  no apparent nodules or goiter  LUNGS: no audible wheeze, cough or visible cyanosis, no visible retractions or increased work of breathing  ABDOMEN: abdomen not evaluated  EXTREMITIES: no hand tremors, limited exam  NEUROLOGY: CN grossly intact, mentation intact and speech normal   SKIN:  no apparent skin lesions, rash, or edema with  visualized skin appearance  PSYCH: mentation appears normal, affect normal/bright, judgement and insight intact,   normal speech and appearance well groomed    LABS:     All pertinent notes, labs, and images personally reviewed by me.      A/P:  Encounter Diagnosis   Name Primary?     Type 2 diabetes mellitus without complication, without long-term current use of insulin (H) Yes       Comments:  Reviewed health history and diabetes issues.  Difficult to assess glycemic control without BGM data, though recent preappt hgbA1c mildly elevated  Reviewed and interpreted tests that I previously ordered.   Ordered appropriate tests for the endocrinology disease management.    Management options discussed and implemented after shared medical decision making with the patient.  T2DM problem is chronic-stable    Plan:  Discussed general issues with the diabetes diagnosis and management  We discussed the hgbA1c test which reflects previous overall glucose levels or control  Discussed the importance of blood glucose (BG) testing to assess glucose trends  Provided general overview of the diabetes medication options and medication treatment plan.     Recommend:  We have discussed medication options, metformin med use and potential side effects  Continue the current JanumetXR and Jardiance daily dose routine  Consider change from Januvia (in Janumet) to a GLP1RA med such as Trulicity or Rybelsus     Discussed his needle-phobia concerns  Test FBG at least 1-2x/week, goal target BG  mg/dl  Plan repeat fasting pre-appt labs in 10/2022    [facility]    Lab [facility]  Keep focus on diet, increased exercise, weight management.  Continue simvastatin med use, goal target LDL <100 mg/dl  Advise having fasting lipid panel testing and dilated eye examination, at least annually    Keep regular follow-up appointments with PCP  Addressed patient questions today    There are no Patient Instructions on file for  this visit.    Future labs ordered today:   Orders Placed This Encounter   Procedures     Lipid panel reflex to direct LDL Fasting     Hemoglobin A1c     ALT     Radiology/Consults ordered today: None    Total time spent in with the patient evaluation:  17 min  Additional time spent reviewing pertinent lab tests and chart notes, and documentation:  7 min    Follow-up:  10/18/22 at 3pm, Katie Flores MD, MS  Endocrinology  [facility]    CC:  MILES Jose    CC:  LANCE Jose

## 2022-08-11 ASSESSMENT — ANXIETY QUESTIONNAIRES
GAD7 TOTAL SCORE: 12
7. FEELING AFRAID AS IF SOMETHING AWFUL MIGHT HAPPEN: SEVERAL DAYS
GAD7 TOTAL SCORE: 12
GAD7 TOTAL SCORE: 12
5. BEING SO RESTLESS THAT IT IS HARD TO SIT STILL: SEVERAL DAYS
3. WORRYING TOO MUCH ABOUT DIFFERENT THINGS: MORE THAN HALF THE DAYS
4. TROUBLE RELAXING: MORE THAN HALF THE DAYS
6. BECOMING EASILY ANNOYED OR IRRITABLE: MORE THAN HALF THE DAYS
2. NOT BEING ABLE TO STOP OR CONTROL WORRYING: MORE THAN HALF THE DAYS
7. FEELING AFRAID AS IF SOMETHING AWFUL MIGHT HAPPEN: SEVERAL DAYS
1. FEELING NERVOUS, ANXIOUS, OR ON EDGE: MORE THAN HALF THE DAYS

## 2022-08-12 ENCOUNTER — VIRTUAL VISIT (OUTPATIENT)
Dept: BEHAVIORAL HEALTH | Facility: CLINIC | Age: 42
End: 2022-08-12
Payer: COMMERCIAL

## 2022-08-12 DIAGNOSIS — F41.1 GAD (GENERALIZED ANXIETY DISORDER): Primary | ICD-10-CM

## 2022-08-12 NOTE — PROGRESS NOTES
[facility]  2022    Behavioral Health Clinician Progress Note    Patient Name: Bertin Mcgill           Service Type:  Individual      Service Location:   telehealth     Session Start Time: 12:31 pm  Session End Time: 1:30  pm      Session Length: 53 - 60      Attendees: Client      Service Modality:  Video Visit:      Provider verified identity through the following two step process.  Patient provided:  Patient , Patient address and Patient is known previously to provider    Telemedicine Visit: The patient's condition can be safely assessed and treated via synchronous audio and visual telemedicine encounter.      Reason for Telemedicine Visit: Patient has requested telehealth visit    Originating Site (Patient Location): Patient's home    Distant Site (Provider Location): [facility]    Consent:  The patient/guardian has verbally consented to: the potential risks and benefits of telemedicine (video visit) versus in person care; bill my insurance or make self-payment for services provided; and responsibility for payment of non-covered services.     Patient would like the video invitation sent by:  My Chart     Mode of Communication:  Video Conference via Amwell    As the provider I attest to compliance with applicable laws and regulations related to telemedicine.    Visit Activities (Refresh list every visit): [facility] Only    Diagnostic Assessment Date: 22  Treatment Plan Review Date: 10/29/22  CGI Review Date:10/15/22  Promis 10 Review Date: 10/29/22    Clinical Global Impressions  First:  Considering your total clinical experience with this particular patient population, how severe are the patient's symptoms at this time?: 4 (7/15/2022  4:40 PM)    Most recent:  Compared to the patient's condition at the START of treatment, this patient's condition is: 3 (7/15/2022  4:40 PM)      See Flowsheets for today's PHQ-9 and NATHALIE-7 results  Previous PHQ-9:   PHQ-9 SCORE 2022  "7/14/2022 8/11/2022   PHQ-9 Total Score Randal 10 (Moderate depression) 8 (Mild depression) 8 (Mild depression)   PHQ-9 Total Score 10 8 8     Previous NATHALIE-7:   NATHALIE-7 SCORE 7/14/2022 7/29/2022 8/11/2022   Total Score 9 (mild anxiety) 7 (mild anxiety) 12 (moderate anxiety)   Total Score 9 7 12       KRISTINE LEVEL:  No flowsheet data found.    DATA  Extended Session (60+ minutes): No  Interactive Complexity: No  Crisis: No  [facility] Patient: No    Treatment Objective(s) Addressed in This Session:  Target Behavior(s): increased social activity/decreased isolation    Anxiety: will experience a reduction in anxiety, will develop more effective coping skills to manage anxiety symptoms, will develop healthy cognitive patterns and beliefs and will increase ability to function adaptively    Current Stressors / Issues:    Lj reported receiving email from HR dept this week requesting update on Lj's treatment/potential return to the office.  Lj reported anxiety has not decreased, citing recent example of getting together in-person with long time, close friends.  Lj reported he has not seen friends in awhile and prior to getting together he was nervous.  When Lj was at his friend's home he was \"uncomfortable, \"nervous and sweating the whole time\".  Lj reported vommitting when he got home and then had GI upset for the next week.  Lj questioned, if he has this much difficulty being with friends, how is he going to return to work 3 days per week.  Lj stated he doesn't want to vomit every day when he is in the office.  When discussing work policy Lj became irritable, stating he's productive, works hard, and get his job done, so why does he have to be in the office?  Lj is also worried about [place] Covid 19 b/c of his other health issues.  [facility] recognized his anxiety and reflected his concerns and questions.  [facility] identified how impactful anxiety can be and educated Lj about how isolation negatively impacts health " and how further isolation/withdrawl is not actually treating the anxiety, but accommodating the anxiety.       Then [facility] recognized Lj's effort and progress, noting how for a person who's never engaged in mental health services previously, he's done a lot in the past 3-4 months, including trying medication and consistently engaging in individual therapy services.  [facility] also recognized how he is making positive changes regarding his diet and exercise, in order to improve his physical health.     [facility] informed Lj that after consulting with Lj's PCP, [facility] will provide documentation, so Lj can update HR.      Progress on Treatment Objective(s) / Homework:  Minimal progress - ACTION (Actively working towards change); Intervened by reinforcing change plan / affirming steps taken    Motivational Interviewing    MI Intervention: Co-Developed Goal: reduce anxiety, Expressed Empathy/Understanding, Supported Autonomy, Collaboration, Evocation, Open-ended questions, Reflections: simple and complex and Change talk (evoked)     Change Talk Expressed by the Patient: Reasons to change    Provider Response to Change Talk: E - Evoked more info from patient about behavior change and R - Reflected patient's change talk    Solution-Focused Therapy    Explore patterns in patient's behaviors and relationships and discuss options for new behaviors    Explore new options for problem-solving, communication, managing stress, etc.    Care Plan review completed: Yes    Medication Review:  No current psychiatric medications prescribed    Medication Compliance:  NA    Changes in Health Issues:   None reported    Chemical Use Review:   Substance Use: Problem use continues with no change since last session, Stage of Change: Contemplation        Tobacco Use: No change in amount of tobacco use since last session.  Contemplation    Assessment: Current Emotional / Mental Status (status of significant symptoms):  Risk status (Self / Other harm or  suicidal ideation)  Patient has had a history of suicidal ideation: passive SI in 2003; none since  Patient denies current fears or concerns for personal safety.  Patient denies current or recent suicidal ideation or behaviors.passive, no intent or plan  Patient denies current or recent homicidal ideation or behaviors.  Patient denies current or recent self injurious behavior or ideation.  Patient denies other safety concerns.  A safety and risk management plan has not been developed at this time, however patient was encouraged to call [place] [id number] should there be a change in any of these risk factors.    Appearance:   Appropriate   Eye Contact:   Fair   Psychomotor Behavior: Normal   Attitude:   Cooperative   Orientation:   All  Speech   Rate / Production: Normal/ Responsive   Volume:  Normal   Mood:    Anxious  irritable  Affect:    Worrisome   Thought Content:  Clear    Thought Form:  Coherent  Logical   Insight:    Fair     Diagnoses:  1. NATHALIE (generalized anxiety disorder)        Collateral Reports Completed:  Routed note to PCP    Plan: (Homework, other):  Patient was given information about behavioral services and encouraged to schedule a follow up appointment with the clinic [facility] in 2 weeks.  He was also given information about mental health symptoms and treatment options  and strategies to more effectively manage anxiety.  CD Recommendations: abstain from alcohol use.  Shawn Ullrich [facility], [facility]      ______________________________________________________________________    Integrated Primary Care Behavioral Health Treatment Plan    Patient's Name: Bertin Mcgill  YOB: 1980    Date of Creation: 7/29/22  Date Treatment Plan Last Reviewed/Revised: 4/19/22    DSM5 Diagnoses: 300.02 (F41.1) Generalized Anxiety Disorder or Adjustment Disorders  309.0 (F43.21) With depressed mood  Psychosocial / Contextual Factors: Single, heterosexual male, resides in his own home; pandemic  PROMIS (reviewed  every 90 days): pt completed on 7/29/22    Referral / Collaboration:  Referral to another professional/service is not indicated at this time.; Lj followed through with medication evaluation, attempted medication and stopped medication    Anticipated number of session for this episode of care: 6  Anticipation frequency of session: Every other week  Anticipated Duration of each session: 38-52 minutes  Treatment plan will be reviewed in 90 days or when goals have been changed.       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      Objective #A (Patient Action)    Patient will identify three distraction and diversion activities and use those activities to decrease level of anxiety  .  Status: New - Date: 4/19/22     Intervention(s)  Therapist will assign homework to monitor and report back anxiety levels when walking, doing push ups/planks, and writing    Objective #B  Patient will engage in some physical activity/exercise at least daily.  Status: New - Date: 7/29/22     Intervention(s)  Therapist will teach the client how to complete a 4-part pros and cons. and apply to exercise.    Objective #C  Patient will increase mood and confidence by following through with diet.  Status: New - Date: 7/29/22     Intervention(s)  Therapist will prompt patient to share how maintaining impacts mood/anxiety.      Patient has reviewed and agreed to the above plan.      Shawn G. Ullrich, [facility]  July 29, 2022

## 2022-08-25 ASSESSMENT — PATIENT HEALTH QUESTIONNAIRE - PHQ9
SUM OF ALL RESPONSES TO PHQ QUESTIONS 1-9: 10
SUM OF ALL RESPONSES TO PHQ QUESTIONS 1-9: 10
10. IF YOU CHECKED OFF ANY PROBLEMS, HOW DIFFICULT HAVE THESE PROBLEMS MADE IT FOR YOU TO DO YOUR WORK, TAKE CARE OF THINGS AT HOME, OR GET ALONG WITH OTHER PEOPLE: SOMEWHAT DIFFICULT

## 2022-08-25 ASSESSMENT — ANXIETY QUESTIONNAIRES
3. WORRYING TOO MUCH ABOUT DIFFERENT THINGS: MORE THAN HALF THE DAYS
5. BEING SO RESTLESS THAT IT IS HARD TO SIT STILL: MORE THAN HALF THE DAYS
4. TROUBLE RELAXING: MORE THAN HALF THE DAYS
6. BECOMING EASILY ANNOYED OR IRRITABLE: MORE THAN HALF THE DAYS
7. FEELING AFRAID AS IF SOMETHING AWFUL MIGHT HAPPEN: SEVERAL DAYS
GAD7 TOTAL SCORE: 12
GAD7 TOTAL SCORE: 12
2. NOT BEING ABLE TO STOP OR CONTROL WORRYING: SEVERAL DAYS
GAD7 TOTAL SCORE: 12
7. FEELING AFRAID AS IF SOMETHING AWFUL MIGHT HAPPEN: SEVERAL DAYS
1. FEELING NERVOUS, ANXIOUS, OR ON EDGE: MORE THAN HALF THE DAYS

## 2022-08-25 NOTE — PROGRESS NOTES
[facility]  2022    Behavioral Health Clinician Progress Note    Patient Name: Bertin Mcgill           Service Type:  Individual      Service Location:   telehealth     Session Start Time: 1:31 pm  Session End Time: 2:30  pm      Session Length: 53 - 60      Attendees: Client      Service Modality:  Video Visit:      Provider verified identity through the following two step process.  Patient provided:  Patient , Patient address and Patient is known previously to provider    Telemedicine Visit: The patient's condition can be safely assessed and treated via synchronous audio and visual telemedicine encounter.      Reason for Telemedicine Visit: Patient has requested telehealth visit    Originating Site (Patient Location): Patient's home    Distant Site (Provider Location): [facility]    Consent:  The patient/guardian has verbally consented to: the potential risks and benefits of telemedicine (video visit) versus in person care; bill my insurance or make self-payment for services provided; and responsibility for payment of non-covered services.     Patient would like the video invitation sent by:  My Chart     Mode of Communication:  Video Conference via Amwell    As the provider I attest to compliance with applicable laws and regulations related to telemedicine.    Visit Activities (Refresh list every visit): [facility] Only    Diagnostic Assessment Date: 22  Treatment Plan Review Date: 10/29/22  CGI Review Date:10/15/22  Promis 10 Review Date: 10/29/22    Clinical Global Impressions  First:  Considering your total clinical experience with this particular patient population, how severe are the patient's symptoms at this time?: 4 (7/15/2022  4:40 PM)    Most recent:  Compared to the patient's condition at the START of treatment, this patient's condition is: 3 (7/15/2022  4:40 PM)      See Flowsheets for today's PHQ-9 and NATHALIE-7 results  Previous PHQ-9:   PHQ-9 SCORE 2022  "8/11/2022 8/25/2022   PHQ-9 Total Score Randal 8 (Mild depression) 8 (Mild depression) 10 (Moderate depression)   PHQ-9 Total Score 8 8 10     Previous NATHALIE-7:   NATHALIE-7 SCORE 7/29/2022 8/11/2022 8/25/2022   Total Score 7 (mild anxiety) 12 (moderate anxiety) 12 (moderate anxiety)   Total Score 7 12 12       KRISTINE LEVEL:  No flowsheet data found.    DATA  Extended Session (60+ minutes): No  Interactive Complexity: No  Crisis: No  [facility] Patient: No    Treatment Objective(s) Addressed in This Session:  Target Behavior(s): increased social activity/decreased isolation    Anxiety: will experience a reduction in anxiety, will develop more effective coping skills to manage anxiety symptoms, will develop healthy cognitive patterns and beliefs and will increase ability to function adaptively    Current Stressors / Issues:  Even though Lj reported ongoing work stress, he presented with mildly improved mood, including quicker to smile or laugh, and decreased irritability.  Lj reported following through with multiple healthy strategies/actions, including the following:    Exercise:  -Daily Push Ups   -200 push ups per day   -He's considering increasing  -Daily activity    -treadmill or walking in the neighborhood   -planks     Lj then shared he made a commitment to himself this week, stating,  \"Each day I feel like I've done something\" positive for his health; he reported the following:    Sunday: Mowed the yard (active and felt better, doesn't have to worry about the lawn for another week)  Monday:  Short nap after work; was tired, listened to his body and rested; maintained a short nap, which helped and didn't impact sleep  Tuesday: walked for one hour and talked to friend   Weds: treadmill and planks   Thursday: Went for a drive: its his \"therapy\", thinks and sings while driving  Friday: will do treadmill tonight    When assessing the week, Lj stated, \"I was able to do a variety of activities I enjoy\".  Without prompting " Lj stated he started to reduce his alcohol consumption as well, reporting he consuming 2-3 drinks per day, and now its 1-2 drinks per day.  Lj reported his goal to only consume alcohol on the weekends.     [facility] and Lj also addressed anxiety, how its impacting him and his ability to work in the office.  [facility] recognized the above progress, reflecting how his mood has improved and also discussed further services/interventions to help him attempt to return to work in the office.  At this point, Lj is open to exploring medication options again and continue to engage in therapy.  [facility] educated Lj about [facility], Lj was receptive to referral to [facility].  Lj was also open to pushing himself a little further regarding social anxiety, including attending therapy in-person and exposing himself to uncomfortable situations.         Progress on Treatment Objective(s) / Homework:  Minimal progress - ACTION (Actively working towards change); Intervened by reinforcing change plan / affirming steps taken    Motivational Interviewing    MI Intervention: Co-Developed Goal: reduce anxiety, Expressed Empathy/Understanding, Supported Autonomy, Collaboration, Evocation, Open-ended questions, Reflections: simple and complex and Change talk (evoked)     Change Talk Expressed by the Patient: Reasons to change    Provider Response to Change Talk: E - Evoked more info from patient about behavior change and R - Reflected patient's change talk    Solution-Focused Therapy    Explore patterns in patient's behaviors and relationships and discuss options for new behaviors    Explore new options for problem-solving, communication, managing stress, etc.    Care Plan review completed: Yes    Medication Review:  No current psychiatric medications prescribed    Medication Compliance:  NA    Changes in Health Issues:   None reported    Chemical Use Review:   Substance Use: Problem use continues with no change since  last session, Stage of Change: Contemplation        Tobacco Use: No change in amount of tobacco use since last session.  Contemplation    Assessment: Current Emotional / Mental Status (status of significant symptoms):  Risk status (Self / Other harm or suicidal ideation)  Patient has had a history of suicidal ideation: passive SI in 2003; none since  Patient denies current fears or concerns for personal safety.  Patient denies current or recent suicidal ideation or behaviors.passive, no intent or plan  Patient denies current or recent homicidal ideation or behaviors.  Patient denies current or recent self injurious behavior or ideation.  Patient denies other safety concerns.  A safety and risk management plan has not been developed at this time, however patient was encouraged to call [place] [phone] should there be a change in any of these risk factors.    Appearance:   Appropriate   Eye Contact:   Fair   Psychomotor Behavior: Normal   Attitude:   Cooperative   Orientation:   All  Speech   Rate / Production: Normal/ Responsive   Volume:  Normal   Mood:    Anxious   Affect:    Worrisome   Thought Content:  Clear    Thought Form:  Coherent  Logical   Insight:    Good     Diagnoses:  1. NATHALIE (generalized anxiety disorder)        Collateral Reports Completed:  Routed note to PCP    Plan: (Homework, other):  Patient was given information about behavioral services and encouraged to schedule a follow up appointment with the clinic [facility] in 2 weeks.  He was also given information about mental health symptoms and treatment options  and strategies to more effectively manage anxiety.  CD Recommendations: abstain from alcohol use.  Shawn Ullrich [facility], [facility]      ______________________________________________________________________    Integrated Primary Care Behavioral Health Treatment Plan    Patient's Name: Bertin Mcgill  YOB: 1980    Date of Creation: 7/29/22  Date Treatment Plan Last Reviewed/Revised:  4/19/22    DSM5 Diagnoses: 300.02 (F41.1) Generalized Anxiety Disorder or Adjustment Disorders  309.0 (F43.21) With depressed mood  Psychosocial / Contextual Factors: Single, heterosexual male, resides in his own home; pandemic  PROMIS (reviewed every 90 days): pt completed on 7/29/22    Referral / Collaboration:  Referral to another professional/service is not indicated at this time.; Lj followed through with medication evaluation, attempted medication and stopped medication    Anticipated number of session for this episode of care: 6  Anticipation frequency of session: Every other week  Anticipated Duration of each session: 38-52 minutes  Treatment plan will be reviewed in 90 days or when goals have been changed.       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      Objective #A (Patient Action)    Patient will identify three distraction and diversion activities and use those activities to decrease level of anxiety  .  Status: New - Date: 4/19/22     Intervention(s)  Therapist will assign homework to monitor and report back anxiety levels when walking, doing push ups/planks, and writing    Objective #B  Patient will engage in some physical activity/exercise at least daily.  Status: New - Date: 7/29/22     Intervention(s)  Therapist will teach the client how to complete a 4-part pros and cons. and apply to exercise.    Objective #C  Patient will increase mood and confidence by following through with diet.  Status: New - Date: 7/29/22     Intervention(s)  Therapist will prompt patient to share how maintaining impacts mood/anxiety.      Patient has reviewed and agreed to the above plan.      Shawn G. Ullrich, [facility]  July 29, 2022

## 2022-08-26 ENCOUNTER — VIRTUAL VISIT (OUTPATIENT)
Dept: BEHAVIORAL HEALTH | Facility: CLINIC | Age: 42
End: 2022-08-26
Payer: COMMERCIAL

## 2022-08-26 DIAGNOSIS — F41.1 GAD (GENERALIZED ANXIETY DISORDER): Primary | ICD-10-CM

## 2022-09-08 DIAGNOSIS — I10 BENIGN ESSENTIAL HYPERTENSION: ICD-10-CM

## 2022-09-08 ASSESSMENT — ANXIETY QUESTIONNAIRES
7. FEELING AFRAID AS IF SOMETHING AWFUL MIGHT HAPPEN: NOT AT ALL
4. TROUBLE RELAXING: MORE THAN HALF THE DAYS
1. FEELING NERVOUS, ANXIOUS, OR ON EDGE: MORE THAN HALF THE DAYS
6. BECOMING EASILY ANNOYED OR IRRITABLE: SEVERAL DAYS
IF YOU CHECKED OFF ANY PROBLEMS ON THIS QUESTIONNAIRE, HOW DIFFICULT HAVE THESE PROBLEMS MADE IT FOR YOU TO DO YOUR WORK, TAKE CARE OF THINGS AT HOME, OR GET ALONG WITH OTHER PEOPLE: SOMEWHAT DIFFICULT
GAD7 TOTAL SCORE: 9
5. BEING SO RESTLESS THAT IT IS HARD TO SIT STILL: SEVERAL DAYS
GAD7 TOTAL SCORE: 9
3. WORRYING TOO MUCH ABOUT DIFFERENT THINGS: MORE THAN HALF THE DAYS
2. NOT BEING ABLE TO STOP OR CONTROL WORRYING: SEVERAL DAYS
8. IF YOU CHECKED OFF ANY PROBLEMS, HOW DIFFICULT HAVE THESE MADE IT FOR YOU TO DO YOUR WORK, TAKE CARE OF THINGS AT HOME, OR GET ALONG WITH OTHER PEOPLE?: SOMEWHAT DIFFICULT
7. FEELING AFRAID AS IF SOMETHING AWFUL MIGHT HAPPEN: NOT AT ALL
GAD7 TOTAL SCORE: 9

## 2022-09-08 ASSESSMENT — PATIENT HEALTH QUESTIONNAIRE - PHQ9
SUM OF ALL RESPONSES TO PHQ QUESTIONS 1-9: 9
SUM OF ALL RESPONSES TO PHQ QUESTIONS 1-9: 9
10. IF YOU CHECKED OFF ANY PROBLEMS, HOW DIFFICULT HAVE THESE PROBLEMS MADE IT FOR YOU TO DO YOUR WORK, TAKE CARE OF THINGS AT HOME, OR GET ALONG WITH OTHER PEOPLE: SOMEWHAT DIFFICULT

## 2022-09-09 ENCOUNTER — VIRTUAL VISIT (OUTPATIENT)
Dept: BEHAVIORAL HEALTH | Facility: CLINIC | Age: 42
End: 2022-09-09
Payer: COMMERCIAL

## 2022-09-09 DIAGNOSIS — F41.1 GAD (GENERALIZED ANXIETY DISORDER): Primary | ICD-10-CM

## 2022-09-09 RX ORDER — HYDROCHLOROTHIAZIDE 12.5 MG/1
TABLET ORAL
Qty: 90 TABLET | Refills: 2 | Status: SHIPPED | OUTPATIENT
Start: 2022-09-09 | End: 2023-05-30

## 2022-09-09 NOTE — PROGRESS NOTES
[facility]  2022    Behavioral Health Clinician Progress Note    Patient Name: Bertin Mcgill           Service Type:  Individual      Service Location:   telehealth     Session Start Time: 2:31 pm  Session End Time: 3:29  pm      Session Length: 53 - 60      Attendees: Client      Service Modality:  Video Visit:      Provider verified identity through the following two step process.  Patient provided:  Patient , Patient address and Patient is known previously to provider    Telemedicine Visit: The patient's condition can be safely assessed and treated via synchronous audio and visual telemedicine encounter.      Reason for Telemedicine Visit: Patient has requested telehealth visit    Originating Site (Patient Location): Patient's home    Distant Site (Provider Location): [facility]    Consent:  The patient/guardian has verbally consented to: the potential risks and benefits of telemedicine (video visit) versus in person care; bill my insurance or make self-payment for services provided; and responsibility for payment of non-covered services.     Patient would like the video invitation sent by:  My Chart     Mode of Communication:  Video Conference via Amwell    As the provider I attest to compliance with applicable laws and regulations related to telemedicine.    Visit Activities (Refresh list every visit): [facility] Only    Diagnostic Assessment Date: 22  Treatment Plan Review Date: 10/29/22  CGI Review Date:10/15/22  Promis 10 Review Date: 10/29/22    Clinical Global Impressions  First:  Considering your total clinical experience with this particular patient population, how severe are the patient's symptoms at this time?: 4 (7/15/2022  4:40 PM)    Most recent:  Compared to the patient's condition at the START of treatment, this patient's condition is: 3 (7/15/2022  4:40 PM)      See Flowsheets for today's PHQ-9 and NATHALIE-7 results  Previous PHQ-9:   PHQ-9 SCORE 2022  "8/25/2022 9/8/2022   PHQ-9 Total Score Jaht 8 (Mild depression) 10 (Moderate depression) 9 (Mild depression)   PHQ-9 Total Score 8 10 9     Previous NATHALIE-7:   NATHALIE-7 SCORE 8/11/2022 8/25/2022 9/8/2022   Total Score 12 (moderate anxiety) 12 (moderate anxiety) 9 (mild anxiety)   Total Score 12 12 9       KRISTINE LEVEL:  No flowsheet data found.    DATA  Extended Session (60+ minutes): No  Interactive Complexity: No  Crisis: No  [facility] Patient: No    Treatment Objective(s) Addressed in This Session:  Target Behavior(s): increased social activity/decreased isolation    Anxiety: will experience a reduction in anxiety, will develop more effective coping skills to manage anxiety symptoms, will develop healthy cognitive patterns and beliefs and will increase ability to function adaptively    Current Stressors / Issues:  When assessing his mood/sxs over the past two weeks, Lj stated, \"its peaks and valleys\".  Lj took off work on Thurs and Friday before Labor Day and did a pretty good job of minding work/life boundaries, only briefly checking emails one time.  While he noted the time off was helpful to decrease stress,  Lj identified ongoing anxiety in various life areas.  [facility] prompted Lj to share specific symptoms, Lj readily identifies irritability and worry, and then [facility] and Lj discussed warning signs of increased anxiety.  Lj shared how his TV viewing and sleep habits reflect his symptom level, explaining when symptom levels are high he seeks out reruns of his favorite old shows, b/c \"its comforting\" (eg Scrubs, The Office, Friday Night Lights) and if he watches something new, then he has greater capacity to experience a wider range of emotions that might be experienced when watching new show.   Lj also shared how staying up later is typically a sign of increased sxs, b/c essentially he's avoiding the stress of the next day (ie sooner he goes to bed, the sooner he faces the next day's stressors).     Lj " "reported he continues to walk and do push ups on most days, and he is still working on eating healthier.    Finally, Lj reported he scheduled psychiatric Medication evaluation with psychiatric nurse practitioner, Janeil Schoenbauer APRN for Monday.  [facility] assisted Lj with preparing for the appt, including identifying his primary symptoms and desired goals, so he can clearly communicate them to new provider.  Lj reported the following:    How does anxiety get in the way of living your life?  -Social: \"Fear of interacting with other people\"  -Family Interactions: \"I don't want to be short with them (parents)\"/ wants to be more patient  -Sleep Schedule: \"forcing myself to go to bed\"; avoiding the stress of the next day by not going to bed  -Work Stress: Anxiety about the work week  -Irritability     Lj scheduled in-person [facility] visit for next appt.  [facility] and Lj will start to work on managing symptoms in the moment/during exposure.      Progress on Treatment Objective(s) / Homework:  Minimal progress - ACTION (Actively working towards change); Intervened by reinforcing change plan / affirming steps taken    Motivational Interviewing    MI Intervention: Co-Developed Goal: reduce anxiety, Expressed Empathy/Understanding, Supported Autonomy, Collaboration, Evocation, Open-ended questions, Reflections: simple and complex and Change talk (evoked)     Change Talk Expressed by the Patient: Reasons to change    Provider Response to Change Talk: E - Evoked more info from patient about behavior change and R - Reflected patient's change talk    Solution-Focused Therapy    Explore patterns in patient's behaviors and relationships and discuss options for new behaviors    Explore new options for problem-solving, communication, managing stress, etc.    Care Plan review completed: Yes    Medication Review:  No current psychiatric medications prescribed    Medication Compliance:  NA    Changes in Health Issues:   None " reported    Chemical Use Review:   Substance Use: Problem use continues with no change since last session, Stage of Change: Contemplation        Tobacco Use: No change in amount of tobacco use since last session.  Contemplation    Assessment: Current Emotional / Mental Status (status of significant symptoms):  Risk status (Self / Other harm or suicidal ideation)  Patient has had a history of suicidal ideation: passive SI in 2003; none since  Patient denies current fears or concerns for personal safety.  Patient denies current or recent suicidal ideation or behaviors.passive, no intent or plan  Patient denies current or recent homicidal ideation or behaviors.  Patient denies current or recent self injurious behavior or ideation.  Patient denies other safety concerns.  A safety and risk management plan has not been developed at this time, however patient was encouraged to call [place] [phone] should there be a change in any of these risk factors.    Appearance:   Appropriate   Eye Contact:   Fair   Psychomotor Behavior: Normal   Attitude:   Cooperative   Orientation:   All  Speech   Rate / Production: Normal/ Responsive   Volume:  Normal   Mood:    Anxious   Affect:    Worrisome   Thought Content:  Clear    Thought Form:  Coherent  Logical   Insight:    Good     Diagnoses:  1. NATHALIE (generalized anxiety disorder)        Collateral Reports Completed:  Routed note to PCP    Plan: (Homework, other):  Patient was given information about behavioral services and encouraged to schedule a follow up appointment with the clinic C in 2 weeks.  He was also given information about mental health symptoms and treatment options  and strategies to more effectively manage anxiety.  CD Recommendations: abstain from alcohol use.  [facility], [facility]      ______________________________________________________________________    Integrated Primary Care Behavioral Health Treatment Plan    Patient's Name: Bertin Mcgill  Date Of  Birth: 1980    Date of Creation: 7/29/22  Date Treatment Plan Last Reviewed/Revised: 4/19/22    DSM5 Diagnoses: 300.02 (F41.1) Generalized Anxiety Disorder or Adjustment Disorders  309.0 (F43.21) With depressed mood  Psychosocial / Contextual Factors: Single, heterosexual male, resides in his own home; pandemic  PROMIS (reviewed every 90 days): pt completed on 7/29/22    Referral / Collaboration:  Referral to another professional/service is not indicated at this time.; Lj followed through with medication evaluation, attempted medication and stopped medication    Anticipated number of session for this episode of care: 6  Anticipation frequency of session: Every other week  Anticipated Duration of each session: 38-52 minutes  Treatment plan will be reviewed in 90 days or when goals have been changed.       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      Objective #A (Patient Action)    Patient will identify three distraction and diversion activities and use those activities to decrease level of anxiety  .  Status: New - Date: 4/19/22     Intervention(s)  Therapist will assign homework to monitor and report back anxiety levels when walking, doing push ups/planks, and writing    Objective #B  Patient will engage in some physical activity/exercise at least daily.  Status: New - Date: 7/29/22     Intervention(s)  Therapist will teach the client how to complete a 4-part pros and cons. and apply to exercise.    Objective #C  Patient will increase mood and confidence by following through with diet.  Status: New - Date: 7/29/22     Intervention(s)  Therapist will prompt patient to share how maintaining impacts mood/anxiety.      Patient has reviewed and agreed to the  above plan.      Shawn G. Ullrich, [facility]  July 29, 2022

## 2022-09-09 NOTE — TELEPHONE ENCOUNTER
"Last Written Prescription Date:  12/22/21  Last Fill Quantity: 90,  # refills: 2   Last office visit: 8/2/22 with prescribing provider:  Dr. Flores   Future Office Visit:  10/18/22        Requested Prescriptions   Pending Prescriptions Disp Refills     hydrochlorothiazide (HYDRODIURIL) 12.5 MG tablet [Pharmacy Med Name: HYDROCHLOROTHIAZIDE 12.5 MG TB] 90 tablet 2     Sig: TAKE 1 TABLET BY MOUTH EVERY DAY       Diuretics (Including Combos) Protocol Passed - 9/8/2022  5:18 PM        Passed - Blood pressure under 140/90 in past 12 months     BP Readings from Last 3 Encounters:   04/06/22 122/80   10/14/21 125/83   09/15/21 129/89                 Passed - Recent (12 mo) or future (30 days) visit within the authorizing provider's specialty     Patient has had an office visit with the authorizing provider or a provider within the authorizing providers department within the previous 12 mos or has a future within next 30 days. See \"Patient Info\" tab in inbasket, or \"Choose Columns\" in Meds & Orders section of the refill encounter.              Passed - Medication is active on med list        Passed - Patient is age 18 or older        Passed - Normal serum creatinine on file in past 12 months     Recent Labs   Lab Test 07/25/22  1020   CR 0.85              Passed - Normal serum potassium on file in past 12 months     Recent Labs   Lab Test 07/25/22  1020   POTASSIUM 4.0                    Passed - Normal serum sodium on file in past 12 months     Recent Labs   Lab Test 07/25/22  1020                    Med not rx'd by Dr. Flores  Will route to PCP  Aimee Delgadillo RN    "

## 2022-09-26 ENCOUNTER — VIRTUAL VISIT (OUTPATIENT)
Dept: FAMILY MEDICINE | Facility: CLINIC | Age: 42
End: 2022-09-26
Payer: COMMERCIAL

## 2022-09-26 DIAGNOSIS — F41.8 MIXED ANXIETY DEPRESSIVE DISORDER: Primary | ICD-10-CM

## 2022-09-26 DIAGNOSIS — E11.9 TYPE 2 DIABETES MELLITUS WITHOUT COMPLICATION, WITHOUT LONG-TERM CURRENT USE OF INSULIN (H): ICD-10-CM

## 2022-09-26 NOTE — PROGRESS NOTES
"Lj is a 42 year old who is being evaluated via a billable video visit.    Can you please confirm what state you are currently located in? [place]      How would you like to obtain your AVS? MyChart  If the video visit is dropped, the invitation should be resent by: Text to cell phone: [phone]  Will anyone else be joining your video visit? No          Subjective   Lj is a 42 year old, presenting for a video visit to discuss mental health.   Lj has visited with Dennis at our clinic on multiple occasions.  In addition, he  recently with Patrick Schoenecker, ALINE CNP PMHNP who recommended medication, e.g. Prozac type medication, or Buspar or Propranolol or Gabapentin or Vistaril. See \"MyChart\" from 9/23/22.     Of importance to the medication choices the fact that I tried Lj on Lexapro about 6 months ago.  He had a very intense reaction to the medication with GI distress.  He discontinued after a few weeks.  He also struggles with insomnia at times.        PHQ 9/26/2022   PHQ-9 Total Score 10   Q9: Thoughts of better off dead/self-harm past 2 weeks Not at all     NATHALIE-7 SCORE 8/25/2022 9/8/2022 9/26/2022   Total Score 12 (moderate anxiety) 9 (mild anxiety) 9 (mild anxiety)   Total Score 12 9 9   Last visit was \"virtual\" in May (4 months ago) and in person in April (5 months ago).     He's been \"OK\", but has had sinus issues.    He's been working from home.     Current Outpatient Medications   Medication Sig Dispense Refill     Acetaminophen (TYLENOL EXTRA STRENGTH PO)        hydrochlorothiazide (HYDRODIURIL) 12.5 MG tablet TAKE 1 TABLET BY MOUTH EVERY DAY 90 tablet 2     JANUMET -1000 MG TB24 TAKE 1 TABLET BY MOUTH EVERY DAY 30 tablet 1     JARDIANCE 10 MG TABS tablet TAKE 1 TABLET BY MOUTH EVERY DAY 90 tablet 0     losartan (COZAAR) 50 MG tablet Take 1 tablet (50 mg) by mouth daily 90 tablet 1     Multiple Vitamins-Minerals (CENTRUM MEN PO)        simvastatin (ZOCOR) 20 MG tablet One by mouth per day 90 " tablet 3     VITAMIN D PO Take 2,000 Units by mouth           Objective           Vitals:  No vitals were obtained today due to virtual visit.    Physical Exam   GENERAL: Healthy, alert and no distress  EYES: Eyes grossly normal to inspection.    RESP: No audible wheeze, cough, or visible cyanosis.  No visible retractions or increased work of breathing.    SKIN: Visible skin clear. No significant rash, abnormal pigmentation or lesions.  NEURO: Cranial nerves grossly intact.  Mentation and speech appropriate for age.  PSYCH: Mentation appears normal, affect normal/bright, judgement and insight intact, normal speech.    A/P  1.  Mental health-  Lj believes that his anxiety is a much bigger problem than his depression.  We discussed trying another SSRI.  However they tended to either have GI side effects and/or problems with insomnia and agitation.  Both of these have been issues for him in the past.  I discussed medications such as gabapentin and/or other medications which might make him more somnolent would not be a good for him in terms of his need to exercise and control his diabetes.  In the end, I gave Lj my opinion that trying BuSpar would be the best choice for him.  He could give it a few weeks and see how it goes.  If he did not want to go that way then I might suggest SSRI along with trazodone to help him sleep.  He wanted to discuss this with his psychologist.    2.  He had a question about the COVID booster.  His last COVID vaccination was in December, about 9 months ago.  I encouraged him to obtain the new vaccine booster.    3.  For his diabetes, he has follow-up labs in October and will follow up with endocrinology on October 18.    4.  We discussed his sinus congestion.  I recommended nasal washes frequently.  If symptoms do not improve then come in for a visit.    I mention to Lj that I would be out of the office from October 1 to October 17.  Asked him to follow-up with me shortly after  that.          Video-Visit Details    Video Start Time: 4:15pm    Type of service:  Video Visit    Video End Time:4:57 PM    Originating Location (pt. Location): Home    Distant Location (provider location):  [facility]     Platform used for Video Visit: Diego

## 2022-09-28 NOTE — PROGRESS NOTES
"[facility]  September 30, 2022    Behavioral Health Clinician Progress Note    Patient Name: Bertin Mcgill           Service Type:  Individual      Service Location:    In-Clinic     Session Start Time: 1:26 pm  Session End Time: 2:29  pm      Session Length: 53 - 60      Attendees: Client      Service Modality:  Face to Face      Visit Activities (Refresh list every visit): [facility] Only    Diagnostic Assessment Date: 4/14/22  Treatment Plan Review Date: 10/29/22  CGI Review Date:10/15/22  Promis 10 Review Date: 10/29/22    Clinical Global Impressions  First:  Considering your total clinical experience with this particular patient population, how severe are the patient's symptoms at this time?: 4 (7/15/2022  4:40 PM)    Most recent:  Compared to the patient's condition at the START of treatment, this patient's condition is: 3 (7/15/2022  4:40 PM)      See Flowsheets for today's PHQ-9 and NATHALIE-7 results  Previous PHQ-9:   PHQ-9 SCORE 9/8/2022 9/26/2022 9/29/2022   PHQ-9 Total Score MyChart 9 (Mild depression) 10 (Moderate depression) 10 (Moderate depression)   PHQ-9 Total Score 9 10 10     Previous NATHALIE-7:   NATHALIE-7 SCORE 8/25/2022 9/8/2022/8/2022 9/26/2022   Total Score 12 (moderate anxiety) 9 (mild anxiety) 9 (mild anxiety)   Total Score 12 9 9       KRISTNIE LEVEL:  No flowsheet data found.    DATA  Extended Session (60+ minutes): No  Interactive Complexity: No  Crisis: No  [facility] Patient: No    Treatment Objective(s) Addressed in This Session:  Target Behavior(s): increased social activity/decreased isolation    Anxiety: will experience a reduction in anxiety, will develop more effective coping skills to manage anxiety symptoms, will develop healthy cognitive patterns and beliefs and will increase ability to function adaptively    Current Stressors / Issues:    When assessing his status, Lj stated, \"there anxiety's still there\" it just shifts from one issue to another.  Lj met with psychiatric prescriber and " "then met with PCP to discuss psych prescriber's recommendations.  Lj reported PCP's recommendations differed from psych prescriber, so Lj is going to talk with psych prescriber one more time before he makes a decision regarding medication.  When [facility] engaged Lj about concerns and barriers to medication, Lj expressed decreased engagement in healthcare decisions regarding medication, stating he's just a passenger along for the ride.  [facility] used MI interventions to address ambivalence, including focusing on potential benefits.  Lj responded by stating medication would \"Help with anxious moments\" and help him to \"function with a clearer head\", but he had difficulty connecting these benefits to practical, real life changes.   In order to help Lj clarify how medication could be beneficial in his day to day life, [facility] and Lj reviewed weekly stressors and challenges.  After exploring stressors, Lj affirmed decreased irritability and increased patience would allow him to more effectively navigate interpersonal challenges with family members (recognize and use effective communication with parents), worry about stress would be lower as evidenced by cessation of rumination about quitting, and sleep would improve.  Lj affirmed having clearer signposts would allow him to better assess effectiveness of medication.  [facility] reinforced autonomy about decision to take medication and encourage him to continue to clarify reasons for taking mediation and signposts which delineate effectiveness/ineffectivenss.        Lj reported he's continuing to walk most days and complete well over 200 push ups daily.      Lj reported struggling over the past 3 weeks with sinus issues, which are negatively impacting sleep.  Lj reported better sleep last night and is hopeful sinus issues are decreasing       Progress on Treatment Objective(s) / Homework:  No improvement - CONTEMPLATION (Considering change and yet undecided); Intervened " by assessing the negative and positive thinking (ambivalence) about behavior change    Motivational Interviewing    MI Intervention: Co-Developed Goal: reduce anxiety, Expressed Empathy/Understanding, Supported Autonomy, Collaboration, Evocation, Open-ended questions, Reflections: simple and complex and Change talk (evoked)     Change Talk Expressed by the Patient: Reasons to change    Provider Response to Change Talk: E - Evoked more info from patient about behavior change and R - Reflected patient's change talk    Solution-Focused Therapy    Explore patterns in patient's behaviors and relationships and discuss options for new behaviors    Explore new options for problem-solving, communication, managing stress, etc.    Care Plan review completed: Yes    Medication Review:  No current psychiatric medications prescribed; see above    Medication Compliance:  NA    Changes in Health Issues:   None reported    Chemical Use Review:   Substance Use: Problem use continues with no change since last session, Stage of Change: Contemplation        Tobacco Use: No change in amount of tobacco use since last session.  Contemplation    Assessment: Current Emotional / Mental Status (status of significant symptoms):  Risk status (Self / Other harm or suicidal ideation)  Patient has had a history of suicidal ideation: passive SI in 2003; none since  Patient denies current fears or concerns for personal safety.  Patient denies current or recent suicidal ideation or behaviors.passive, no intent or plan  Patient denies current or recent homicidal ideation or behaviors.  Patient denies current or recent self injurious behavior or ideation.  Patient denies other safety concerns.  A safety and risk management plan has not been developed at this time, however patient was encouraged to call [facility] / [facility] should there be a change in any of these risk factors.    Appearance:   Appropriate   Eye Contact:   Fair   Psychomotor  Behavior: Normal   Attitude:   Cooperative   Orientation:   All  Speech   Rate / Production: Normal/ Responsive   Volume:  Normal   Mood:    Anxious   Affect:    Worrisome   Thought Content:  Clear    Thought Form:  Coherent  Logical   Insight:    Good     Diagnoses:  1. NATHALIE (generalized anxiety disorder)        Collateral Reports Completed:  Routed note to PCP    Plan: (Homework, other):  Patient was given information about behavioral services and encouraged to schedule a follow up appointment with the clinic [facility] in 2 weeks.  He was also given information about mental health symptoms and treatment options  and strategies to more effectively manage anxiety.  CD Recommendations: abstain from alcohol use.  Shawn Ullrich [facility], [facility]      ______________________________________________________________________    Integrated Primary Care Behavioral Health Treatment Plan    Patient's Name: Bertin Mcgill  YOB: 1980    Date of Creation: 7/29/22  Date Treatment Plan Last Reviewed/Revised: 4/19/22    DSM5 Diagnoses: 300.02 (F41.1) Generalized Anxiety Disorder or Adjustment Disorders  309.0 (F43.21) With depressed mood  Psychosocial / Contextual Factors: Single, heterosexual male, resides in his own home; pandemic  PROMIS (reviewed every 90 days): pt completed on 7/29/22    Referral / Collaboration:  Referral to another professional/service is not indicated at this time.; Lj followed through with medication evaluation, attempted medication and stopped medication    Anticipated number of session for this episode of care: 6  Anticipation frequency of session: Every other week  Anticipated Duration of each session: 38-52 minutes  Treatment plan will be reviewed in 90 days or when goals have been changed.       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      Objective #A (Patient Action)    Patient will identify three distraction and diversion activities and use those activities to decrease level of anxiety  .  Status: New - Date: 4/19/22     Intervention(s)  Therapist will assign homework to monitor and report back anxiety levels when walking, doing push ups/planks, and writing    Objective #B  Patient will engage in some physical activity/exercise at least daily.  Status: New - Date: 7/29/22     Intervention(s)  Therapist will teach the client how to complete a 4-part pros and cons. and apply to exercise.    Objective #C  Patient will increase mood and confidence by following through with diet.  Status: New - Date: 7/29/22     Intervention(s)  Therapist will prompt patient to share how maintaining impacts mood/anxiety.      Patient has reviewed and agreed to the above plan.      Shawn G. Ullrich, [facility]  July 29, 2022

## 2022-09-30 ENCOUNTER — OFFICE VISIT (OUTPATIENT)
Dept: BEHAVIORAL HEALTH | Facility: CLINIC | Age: 42
End: 2022-09-30
Payer: COMMERCIAL

## 2022-09-30 DIAGNOSIS — E11.9 TYPE 2 DIABETES MELLITUS WITHOUT COMPLICATION, WITHOUT LONG-TERM CURRENT USE OF INSULIN (H): ICD-10-CM

## 2022-09-30 DIAGNOSIS — F41.1 GAD (GENERALIZED ANXIETY DISORDER): Primary | ICD-10-CM

## 2022-09-30 RX ORDER — SITAGLIPTIN AND METFORMIN HYDROCHLORIDE 1000; 100 MG/1; MG/1
TABLET, FILM COATED, EXTENDED RELEASE ORAL
Qty: 30 TABLET | Refills: 1 | Status: SHIPPED | OUTPATIENT
Start: 2022-09-30 | End: 2022-11-25

## 2022-09-30 NOTE — TELEPHONE ENCOUNTER
"Last Written Prescription Date:  8/2/22  Last Fill Quantity: 30,  # refills: 1   Last office visit: 8/2/22 with Dr. Flores  Future Office Visit:  10/18/22        Requested Prescriptions   Pending Prescriptions Disp Refills     JANUMET -1000 MG TB24 [Pharmacy Med Name: JANUMET -1,000 MG TABLET] 30 tablet 1     Sig: TAKE 1 TABLET BY MOUTH EVERY DAY       Combination Oral Antihyperglycemic Agents Passed - 9/30/2022  2:23 AM        Passed - Patient has documented A1c within the specified period of time.     If HgbA1C is 8 or greater, it needs to be on file within the past 3 months.  If less than 8, must be on file within the past 6 months.     Recent Labs   Lab Test 07/25/22  1020   A1C 7.6*             Passed - Patient's CR is NOT>1.4 OR Patient's EGFR is NOT<45 within past 12 mos.     Recent Labs   Lab Test 07/25/22  1020 08/31/21  1020 11/08/19  0936   GFRESTIMATED >90   < > >90   GFRESTBLACK  --   --  >90    < > = values in this interval not displayed.       Recent Labs   Lab Test 07/25/22  1020   CR 0.85             Passed - Patient does not have a diagnosis of CHF.        Passed - Medication is active on med list        Passed - Patient is 18 years old or older.        Passed - Recent (6 mo) or future (30 days) visit within the authorizing provider's specialty     Patient had office visit in the last 6 months or has a visit in the next 30 days with authorizing provider or within the authorizing provider's specialty.  See \"Patient Info\" tab in inbasket, or \"Choose Columns\" in Meds & Orders section of the refill encounter.               Refilled per protocol  Aimee Delgadillo RN    "

## 2022-10-04 ENCOUNTER — LAB (OUTPATIENT)
Dept: LAB | Facility: CLINIC | Age: 42
End: 2022-10-04
Payer: COMMERCIAL

## 2022-10-04 DIAGNOSIS — E11.9 TYPE 2 DIABETES MELLITUS WITHOUT COMPLICATION, WITHOUT LONG-TERM CURRENT USE OF INSULIN (H): ICD-10-CM

## 2022-10-04 LAB
ALT SERPL W P-5'-P-CCNC: 53 U/L (ref 0–70)
CHOLEST SERPL-MCNC: 217 MG/DL
FASTING STATUS PATIENT QL REPORTED: ABNORMAL
HBA1C MFR BLD: 7.2 % (ref 0–5.6)
HDLC SERPL-MCNC: 59 MG/DL
LDLC SERPL CALC-MCNC: 113 MG/DL
LDLC SERPL CALC-MCNC: ABNORMAL MG/DL
NONHDLC SERPL-MCNC: 158 MG/DL
TRIGL SERPL-MCNC: 463 MG/DL

## 2022-10-04 PROCEDURE — 80061 LIPID PANEL: CPT

## 2022-10-04 PROCEDURE — 36415 COLL VENOUS BLD VENIPUNCTURE: CPT

## 2022-10-04 PROCEDURE — 83721 ASSAY OF BLOOD LIPOPROTEIN: CPT | Mod: 59

## 2022-10-04 PROCEDURE — 83036 HEMOGLOBIN GLYCOSYLATED A1C: CPT

## 2022-10-04 PROCEDURE — 84460 ALANINE AMINO (ALT) (SGPT): CPT

## 2022-10-12 ASSESSMENT — ANXIETY QUESTIONNAIRES
6. BECOMING EASILY ANNOYED OR IRRITABLE: SEVERAL DAYS
IF YOU CHECKED OFF ANY PROBLEMS ON THIS QUESTIONNAIRE, HOW DIFFICULT HAVE THESE PROBLEMS MADE IT FOR YOU TO DO YOUR WORK, TAKE CARE OF THINGS AT HOME, OR GET ALONG WITH OTHER PEOPLE: SOMEWHAT DIFFICULT
GAD7 TOTAL SCORE: 10
2. NOT BEING ABLE TO STOP OR CONTROL WORRYING: SEVERAL DAYS
7. FEELING AFRAID AS IF SOMETHING AWFUL MIGHT HAPPEN: SEVERAL DAYS
4. TROUBLE RELAXING: MORE THAN HALF THE DAYS
3. WORRYING TOO MUCH ABOUT DIFFERENT THINGS: MORE THAN HALF THE DAYS
GAD7 TOTAL SCORE: 10
1. FEELING NERVOUS, ANXIOUS, OR ON EDGE: MORE THAN HALF THE DAYS
5. BEING SO RESTLESS THAT IT IS HARD TO SIT STILL: SEVERAL DAYS
7. FEELING AFRAID AS IF SOMETHING AWFUL MIGHT HAPPEN: SEVERAL DAYS
8. IF YOU CHECKED OFF ANY PROBLEMS, HOW DIFFICULT HAVE THESE MADE IT FOR YOU TO DO YOUR WORK, TAKE CARE OF THINGS AT HOME, OR GET ALONG WITH OTHER PEOPLE?: SOMEWHAT DIFFICULT
GAD7 TOTAL SCORE: 10

## 2022-10-12 ASSESSMENT — PATIENT HEALTH QUESTIONNAIRE - PHQ9
SUM OF ALL RESPONSES TO PHQ QUESTIONS 1-9: 13
10. IF YOU CHECKED OFF ANY PROBLEMS, HOW DIFFICULT HAVE THESE PROBLEMS MADE IT FOR YOU TO DO YOUR WORK, TAKE CARE OF THINGS AT HOME, OR GET ALONG WITH OTHER PEOPLE: SOMEWHAT DIFFICULT
SUM OF ALL RESPONSES TO PHQ QUESTIONS 1-9: 13

## 2022-10-12 NOTE — PROGRESS NOTES
[facility]  October 13, 2022    Telemedicine Visit: The patient's condition can be safely assessed and treated via synchronous audio and visual telemedicine encounter.      Reason for Telemedicine Visit: Patient has requested telehealth visit    Originating Site (Patient Location): Patient's home        Distant Location (provider location):  On-site; [facility]    Consent:  The patient/guardian has verbally consented to: the potential risks and benefits of telemedicine (video visit) versus in person care; bill my insurance or make self-payment for services provided; and responsibility for payment of non-covered services.     Mode of Communication:  Video Conference via General Compression    As the provider I attest to compliance with applicable laws and regulations related to telemedicine.        Behavioral Health Clinician Progress Note    Patient Name: Bertin Mcgill           Service Type:  Individual      Service Location:    See above     Session Start Time: 11:01 am  Session End Time: 11:58 am      Session Length: 53 - 60      Attendees: Client      Service Modality:  Telemedicine    Visit Activities (Refresh list every visit): [facility] Only    Diagnostic Assessment Date: 4/14/22  Treatment Plan Review Date: 10/29/22  CGI Review Date:10/15/22  Promis 10 Review Date: 10/29/22    Clinical Global Impressions  First:  Considering your total clinical experience with this particular patient population, how severe are the patient's symptoms at this time?: 4 (7/15/2022  4:40 PM)    Most recent:  Compared to the patient's condition at the START of treatment, this patient's condition is: 3 (7/15/2022  4:40 PM)      See Flowsheets for today's PHQ-9 and NATHALIE-7 results  Previous PHQ-9:   PHQ-9 SCORE 9/26/2022 9/29/2022 10/12/2022   PHQ-9 Total Score MyChart 10 (Moderate depression) 10 (Moderate depression) 13 (Moderate depression)   PHQ-9 Total Score 10 10 13     Previous NATHALIE-7:   NATHALIE-7 SCORE  "9/8/2022 9/26/2022 10/12/2022   Total Score 9 (mild anxiety) 9 (mild anxiety) 10 (moderate anxiety)   Total Score 9 9 10       KRISTINE LEVEL:  No flowsheet data found.    DATA  Extended Session (60+ minutes): No  Interactive Complexity: No  Crisis: No  [facility] Patient: No    Treatment Objective(s) Addressed in This Session:  Target Behavior(s): increased social activity/decreased isolation    Anxiety: will experience a reduction in anxiety, will develop more effective coping skills to manage anxiety symptoms, will develop healthy cognitive patterns and beliefs and will increase ability to function adaptively    Current Stressors / Issues:    Lj reported he only messaged psychiatrist about medication recs/questions/concerns yesterday and has not yet heard back, therefore he has not started medications.  Lj stated, \"Its been a lot (of work) in the past week...I've been pretty exhausted\" and he didn't sleep well, therefore medications have fallen down his list of priorities.  [facility] acknowledged workload and challenges, and reflected ambivalence about medication.    [facility] used MI interventions to elicit Lj's priorities and he stated, he doesn't want to wake up exhausted, would like restful sleep and wants to take a day or two off from work so he can catch up on his sleep.  [facility] supported rest and taking time away from work to do so, while also identifying the short-term nature of this solution and how Lj would benefit from long-term stress and anxiety coping skills/strategies.  Lj replied he has been working on walking, push ups, and attempting to set boundaries around work.  [facility] provided positive feedback about these changes/strategies, and then inquired what else does he need to do to be ready to return to work in the office?  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  [facility] and Lj explored worries about returning to " "the office, Lj stated, \"Covid is still pretty scary\" and \"I still have these health concerns\", and sometimes he thinks \"going into the office is not worth the risk\".  [facility] validated his health concerns and worries, and then directed toward strategies to manage risk and worry.  Lj reported he will get the newest Covid Booster and he can remind himself, \"chances are I'll be OK\".        Progress on Treatment Objective(s) / Homework:  No improvement - CONTEMPLATION (Considering change and yet undecided); Intervened by assessing the negative and positive thinking (ambivalence) about behavior change    Motivational Interviewing    MI Intervention: Co-Developed Goal: reduce anxiety, Expressed Empathy/Understanding, Supported Autonomy, Collaboration, Evocation, Open-ended questions, Reflections: simple and complex and Change talk (evoked)     Change Talk Expressed by the Patient: Reasons to change    Provider Response to Change Talk: E - Evoked more info from patient about behavior change and R - Reflected patient's change talk    Psychodynamic psychotherapy    Discuss patient's emotional dynamics and issues and how they impact behaviors    Explore patient's history of relationships and how they impact present behaviors    Explore how to work with and make changes in these schemas and patterns    Solution-Focused Therapy    Explore patterns in patient's behaviors and relationships and discuss options for new behaviors    Explore new options for problem-solving, communication, managing stress, etc.    Care Plan review completed: Yes    Medication Review:  No current psychiatric medications prescribed; see above    Medication Compliance:  NA    Changes in Health Issues:   None reported    Chemical Use Review:   Substance Use: Problem use continues with no change since last session, Stage of Change: Contemplation        Tobacco Use: No change in amount of tobacco use since last session.  Contemplation    Assessment: Current " Emotional / Mental Status (status of significant symptoms):  Risk status (Self / Other harm or suicidal ideation)  Patient has had a history of suicidal ideation: passive SI in 2003; none since  Patient denies current fears or concerns for personal safety.  Patient denies current or recent suicidal ideation or behaviors.passive, no intent or plan  Patient denies current or recent homicidal ideation or behaviors.  Patient denies current or recent self injurious behavior or ideation.  Patient denies other safety concerns.  A safety and risk management plan has not been developed at this time, however patient was encouraged to call [place] [phone] should there be a change in any of these risk factors.    Appearance:   Appropriate   Eye Contact:   Fair   Psychomotor Behavior: Normal   Attitude:   Cooperative   Orientation:   All  Speech   Rate / Production: Normal/ Responsive   Volume:  Normal   Mood:    Anxious   Affect:    Worrisome   Thought Content:  Clear    Thought Form:  Coherent  Logical   Insight:    Good     Diagnoses:  1. NATHALIE (generalized anxiety disorder)        Collateral Reports Completed:  Routed note to PCP    Plan: (Homework, other):  Patient was given information about behavioral services and encouraged to schedule a follow up appointment with the clinic [facility] in 2 weeks.  He was also given information about mental health symptoms and treatment options  and strategies to more effectively manage anxiety.  CD Recommendations: abstain from alcohol use.  Shawn Ullrich [facility], [facility]      ______________________________________________________________________    Integrated Primary Care Behavioral Health Treatment Plan    Patient's Name: Bertin Mcgill  YOB: 1980    Date of Creation: 7/29/22  Date Treatment Plan Last Reviewed/Revised: 4/19/22    DSM5 Diagnoses: 300.02 (F41.1) Generalized Anxiety Disorder or Adjustment Disorders  309.0 (F43.21) With depressed mood  Psychosocial / Contextual  Factors: Single, heterosexual male, resides in his own home; pandemic  PROMIS (reviewed every 90 days): pt completed on 7/29/22    Referral / Collaboration:  Referral to another professional/service is not indicated at this time.; Lj followed through with medication evaluation, attempted medication and stopped medication    Anticipated number of session for this episode of care: 6  Anticipation frequency of session: Every other week  Anticipated Duration of each session: 38-52 minutes  Treatment plan will be reviewed in 90 days or when goals have been changed.       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      Objective #A (Patient Action)    Patient will identify three distraction and diversion activities and use those activities to decrease level of anxiety  .  Status: New - Date: 4/19/22     Intervention(s)  Therapist will assign homework to monitor and report back anxiety levels when walking, doing push ups/planks, and writing    Objective #B  Patient will engage in some physical activity/exercise at least daily.  Status: New - Date: 7/29/22     Intervention(s)  Therapist will teach the client how to complete a 4-part pros and cons. and apply to exercise.    Objective #C  Patient will increase mood and confidence by following through with diet.  Status: New - Date: 7/29/22     Intervention(s)  Therapist will prompt patient to share how maintaining impacts mood/anxiety.      Patient has reviewed and agreed to the above plan.      Shawn G. Ullrich, [facility]  July 29, 2022

## 2022-10-13 ENCOUNTER — VIRTUAL VISIT (OUTPATIENT)
Dept: BEHAVIORAL HEALTH | Facility: CLINIC | Age: 42
End: 2022-10-13
Payer: COMMERCIAL

## 2022-10-13 DIAGNOSIS — F41.1 GAD (GENERALIZED ANXIETY DISORDER): Primary | ICD-10-CM

## 2022-10-18 ENCOUNTER — VIRTUAL VISIT (OUTPATIENT)
Dept: ENDOCRINOLOGY | Facility: CLINIC | Age: 42
End: 2022-10-18
Payer: COMMERCIAL

## 2022-10-18 DIAGNOSIS — E11.9 TYPE 2 DIABETES MELLITUS WITHOUT COMPLICATION, WITHOUT LONG-TERM CURRENT USE OF INSULIN (H): Primary | ICD-10-CM

## 2022-10-18 DIAGNOSIS — E78.2 MIXED HYPERLIPIDEMIA: ICD-10-CM

## 2022-10-18 PROCEDURE — 99213 OFFICE O/P EST LOW 20 MIN: CPT | Mod: 95 | Performed by: INTERNAL MEDICINE

## 2022-10-18 RX ORDER — LANOLIN ALCOHOL/MO/W.PET/CERES
500 CREAM (GRAM) TOPICAL AT BEDTIME
Qty: 30 TABLET | Refills: 5 | Status: SHIPPED | OUTPATIENT
Start: 2022-10-18 | End: 2023-03-07 | Stop reason: ALTCHOICE

## 2022-10-18 NOTE — LETTER
"    10/18/2022         RE: Bertin Mcgill  [address] [facility] [place] [address]        Dear Colleague,    Thank you for referring your patient, Bertin Mcgill, to the [facility] [place]. Please see a copy of my visit note below.    Patient is being evaluated via a billable video visit.      How would you like to obtain your AVS? Verbally Reviewed  If the video visit is dropped, the invitation should be resent by: Cellphone  Will anyone else be joining your video visit? No        Video-Visit Details    Video Start Time:  3:00 pm    Type of service:  Video Visit    Video End Time:  3:15 pm    Originating Location (pt. Location):  Home    PROVIDER LOCATION On-site vs Off-site    Distant Location (provider location):  Home    Platform used for Video Visit: [email or web address]        Recent issues:  Diabetes follow-up evaluation  Taking the JanumetXR and Jardiance medications per plan  Problems with \"sinus issues\" past 6 weeks           2017. Diagnosis of diabetes mellitus when med eval for torn right shoulder muscle  High glucose level noted during med evaluation, hgbA1c near 11.5%  Started Jardiance medication, took for approx 6 months  Recalls significant weight loss of approx 30#  Switched to metformin medication, then dose increases              Concerns about GI symptoms with nausea, nausea, also morning vomiting              Tried Prilosec, then Zantac  Has seen [facility] for medical evaluations  Recent discussion with his workplace team physician, Dr. Skye Mane              Advised to see me for medical evaluation    Previously on metforminXR 500 mg 2 tabs BID     Previous [facility] labs include:               8/12/19. Initial diabetes evaluation with me at [place]  Reviewed health history and diabetes management  Medication change to JanumetXR  1/2022. Addition of Jardiance     Current DM medications:  JanumetXR 100/1000 1-tab each morning  Jardiance 10 mg  1-tab each " morning     Blood glucose (BG) meter              Infrequent testing     Fam Hx Diabetes:       None  Recent [facility] labs include:           Lab Results   Component Value Date    A1C 7.2 (H) 10/04/2022     07/25/2022    POTASSIUM 4.0 07/25/2022    CHLORIDE 103 07/25/2022    CO2 25 07/25/2022    ANIONGAP 8 07/25/2022     (H) 07/25/2022    BUN 19 07/25/2022    CR 0.85 07/25/2022    GFRESTIMATED >90 07/25/2022    GFRESTBLACK >90 11/08/2019    FLAVIO 9.2 07/25/2022    CHOL 217 (H) 10/04/2022    TRIG 463 (H) 10/04/2022    HDL 59 10/04/2022    LDL  10/04/2022      Comment:      Cannot estimate LDL when triglyceride exceeds 400 mg/dL     (H) 10/04/2022    NHDL 158 (H) 10/04/2022    UCRR 56 04/06/2022    MICROL 8 04/06/2022    UMALCR 14.29 04/06/2022     Lab Results   Component Value Date    TSH 1.62 07/25/2022     History of hyperlipidemia, takes simvastatin 20 mg daily  Last eye exam 5/2022 at [facility], no DR per patient  DM Complications:      None known        Lives in [facility]  Has seen Lele [facility]   Also sees Dr. Jose/[facility] for general medicine evaluations.      PMH/PSH:  Past Medical History:   Diagnosis Date     Allergic rhinitis      Benign essential hypertension      Hyperlipidemia      Rotator cuff tear, left 2015     Rotator cuff tear, right 2017     Type 2 diabetes mellitus (H)      Past Surgical History:   Procedure Laterality Date     APPENDECTOMY       SHOULDER SURGERY      left and right previously       Family Hx:  No family history on file.      Social Hx:  Social History     Socioeconomic History     Marital status: Single     Spouse name: Not on file     Number of children: Not on file     Years of education: Not on file     Highest education level: Not on file   Occupational History     Not on file   Tobacco Use     Smoking status: Every Day     Smokeless tobacco: Never   Substance and Sexual Activity     Alcohol use:  "Yes     Drug use: Never     Sexual activity: Not on file   Other Topics Concern     Not on file   Social History Narrative    Single.     Works as \"\" for [employer] at [employer].     From [place]     Social Determinants of Health     Financial Resource Strain: Not on file   Food Insecurity: Not on file   Transportation Needs: Not on file   Physical Activity: Not on file   Stress: Not on file   Social Connections: Not on file   Intimate Partner Violence: Not on file   Housing Stability: Not on file          MEDICATIONS:  has a current medication list which includes the following prescription(s): niacin, acetaminophen, hydrochlorothiazide, janumet xr, jardiance, losartan, multiple vitamins-minerals, and vitamin d.    ROS: 10 point ROS neg other than the symptoms noted above in the HPI.     GENERAL: energy good, wt stable; denies fevers, chills, night sweats.   HEENT: sinus congestion; no dysphagia, odonophagia, diplopia, neck pain  THYROID:  no apparent hyper or hypothyroid symptoms  CV: no chest pain, pressure, palpitations  LUNGS: no SOB, MEHTA, cough, wheezing   ABDOMEN: episode of rectal bleeding; occasional nausea; denies abdominal pain  EXTREMITIES: no rashes, ulcers, edema  NEUROLOGY: no headaches, denies changes in vision, tingling, extremitiy numbness   MSK: no muscle aches or pains, weakness  SKIN: no rashes or lesions  : denies nocturia  PSYCH:  stable mood, no significant anxiety or depression  ENDOCRINE: no heat or cold intolerance    Physical Exam (visual exam)  VS:  no vital signs taken for video visit  CONSTITUTIONAL: healthy, alert and NAD, well dressed, answering questions appropriately  ENT: no nose swelling or nasal discharge, mouth redness or gum changes.  EYES: eyes grossly normal to inspection, conjunctivae and sclerae normal, no exophthalmos or proptosis  THYROID:  no apparent nodules or goiter  LUNGS: no audible wheeze, cough or visible cyanosis, no visible " retractions or increased work of breathing  ABDOMEN: abdomen not evaluated  EXTREMITIES: no hand tremors, limited exam  NEUROLOGY: CN grossly intact, mentation intact and speech normal   SKIN:  no apparent skin lesions, rash, or edema with visualized skin appearance  PSYCH: mentation appears normal, affect normal/bright, judgement and insight intact,   normal speech and appearance well groomed    LABS:     All pertinent notes, labs, and images personally reviewed by me.      A/P:  Encounter Diagnoses   Name Primary?     Type 2 diabetes mellitus without complication, without long-term current use of insulin (H) Yes     Mixed hyperlipidemia        Comments:  Reviewed health history and diabetes issues.  Recent hgbA1c improved but cholesterol and TG levels high despite simvastatin med use  Reviewed and interpreted tests that I previously ordered.   Ordered appropriate tests for the endocrinology disease management.    Management options discussed and implemented after shared medical decision making with the patient.  T2DM problem is chronic-stable    Plan:  Discussed general issues with the diabetes diagnosis and management  We discussed the hgbA1c test which reflects previous overall glucose levels or control  Discussed the importance of blood glucose (BG) testing to assess glucose trends  Provided general overview of the diabetes medication options and medication treatment plan.  Reviewed lipid test results and the cholesterol, triglyceride lower medication options     Recommend:  Continue the current JanumetXR and Jardiance daily dose routine  Future options include change from Januvia (in Janumet) to a GLP1RA med such as Trulicity or Rybelsus     We have discussed his needle-phobia concerns  Test FBG at least 1-2x/week, goal target BG  mg/dl  Advised changes to the lipid medication treatment plan, goal LDL <100 mg/dl and TG <200 mg/dl    Stop the simvastatin and switch to Slo-Niacin 500 mg daily    Take  Slo-Niacin with regular aspirin each evening, then future titration to 1000 mg QPM likely    Watch for possible flushing SE     Message me with feedback on any symptoms after starting new lipid med plan  Will need future fasting lipid panel and hgbA1c testing also.  Keep focus on diet, increased exercise, weight management.  Advise having fasting lipid panel testing and dilated eye examination, at least annually    Keep regular follow-up appointments with PCP  Addressed patient questions today    There are no Patient Instructions on file for this visit.    Future labs ordered today: No orders of the defined types were placed in this encounter.    Radiology/Consults ordered today: None    Total time spent in with the patient evaluation:  15 min  Additional time spent reviewing pertinent lab tests and chart notes, and documentation:  8 min    Follow-up:  1/2023 or 2/2023, Katie Flores MD, MS  Endocrinology  [facility]    CC:  MILES Jose                                    Again, thank you for allowing me to participate in the care of your patient.        Sincerely,        Anival Flores MD

## 2022-10-18 NOTE — PROGRESS NOTES
"Patient is being evaluated via a billable video visit.      How would you like to obtain your AVS? Verbally Reviewed  If the video visit is dropped, the invitation should be resent by: Cellphone  Will anyone else be joining your video visit? No        Video-Visit Details    Video Start Time:  3:00 pm    Type of service:  Video Visit    Video End Time:  3:15 pm    Originating Location (pt. Location):  Home    PROVIDER LOCATION On-site vs Off-site    Distant Location (provider location):  Home    Platform used for Video Visit: Dovo        Recent issues:  Diabetes follow-up evaluation  Taking the JanumetXR and Jardiance medications per plan  Problems with \"sinus issues\" past 6 weeks           2017. Diagnosis of diabetes mellitus when med eval for torn right shoulder muscle  High glucose level noted during med evaluation, hgbA1c near 11.5%  Started Jardiance medication, took for approx 6 months  Recalls significant weight loss of approx 30#  Switched to metformin medication, then dose increases              Concerns about GI symptoms with nausea, nausea, also morning vomiting              Tried Prilosec, then Zantac  Has seen [facility] for medical evaluations  Recent discussion with his workplace team physician, Dr. Skye Mane              Advised to see me for medical evaluation    Previously on metforminXR 500 mg 2 tabs BID     Previous [facility] labs include:               8/12/19. Initial diabetes evaluation with me at [place]  Reviewed health history and diabetes management  Medication change to JanumetXR  1/2022. Addition of Jardiance     Current DM medications:  JanumetXR 100/1000 1-tab each morning  Jardiance 10 mg  1-tab each morning     Blood glucose (BG) meter              Infrequent testing     Fam Hx Diabetes:       None  Recent [facility] labs include:           Lab Results   Component Value Date    A1C 7.2 (H) 10/04/2022     07/25/2022    POTASSIUM 4.0 07/25/2022    CHLORIDE 103 07/25/2022    " "CO2 25 07/25/2022    ANIONGAP 8 07/25/2022     (H) 07/25/2022    BUN 19 07/25/2022    CR 0.85 07/25/2022    GFRESTIMATED >90 07/25/2022    GFRESTBLACK >90 11/08/2019    FLAVIO 9.2 07/25/2022    CHOL 217 (H) 10/04/2022    TRIG 463 (H) 10/04/2022    HDL 59 10/04/2022    LDL  10/04/2022      Comment:      Cannot estimate LDL when triglyceride exceeds 400 mg/dL     (H) 10/04/2022    NHDL 158 (H) 10/04/2022    UCRR 56 04/06/2022    MICROL 8 04/06/2022    UMALCR 14.29 04/06/2022     Lab Results   Component Value Date    TSH 1.62 07/25/2022     History of hyperlipidemia, takes simvastatin 20 mg daily  Last [facility] exam [facility]/2022 [facility], lila VERDUGO per patient  DM Complications:      None known        Lives in [place]  Has seen Lele [facility]   Also sees Dr. Jose/[facility] for general medicine evaluations.      PMH/PSH:  Past Medical History:   Diagnosis Date     Allergic rhinitis      Benign essential hypertension      Hyperlipidemia      Rotator cuff tear, left 2015     Rotator cuff tear, right 2017     Type 2 diabetes mellitus (H)      Past Surgical History:   Procedure Laterality Date     APPENDECTOMY       SHOULDER SURGERY      left and right previously       Family Hx:  No family history on file.      Social Hx:  Social History     Socioeconomic History     Marital status: Single     Spouse name: Not on file     Number of children: Not on file     Years of education: Not on file     Highest education level: Not on file   Occupational History     Not on file   Tobacco Use     Smoking status: Every Day     Smokeless tobacco: Never   Substance and Sexual Activity     Alcohol use: Yes     Drug use: Never     Sexual activity: Not on file   Other Topics Concern     Not on file   Social History Narrative    Single.     Works as \"\" for [employer] at [employer].     From [place]     Social Determinants of Health     Financial " Resource Strain: Not on file   Food Insecurity: Not on file   Transportation Needs: Not on file   Physical Activity: Not on file   Stress: Not on file   Social Connections: Not on file   Intimate Partner Violence: Not on file   Housing Stability: Not on file          MEDICATIONS:  has a current medication list which includes the following prescription(s): niacin, acetaminophen, hydrochlorothiazide, janumet xr, jardiance, losartan, multiple vitamins-minerals, and vitamin d.    ROS: 10 point ROS neg other than the symptoms noted above in the HPI.     GENERAL: energy good, wt stable; denies fevers, chills, night sweats.   HEENT: sinus congestion; no dysphagia, odonophagia, diplopia, neck pain  THYROID:  no apparent hyper or hypothyroid symptoms  CV: no chest pain, pressure, palpitations  LUNGS: no SOB, MEHTA, cough, wheezing   ABDOMEN: episode of rectal bleeding; occasional nausea; denies abdominal pain  EXTREMITIES: no rashes, ulcers, edema  NEUROLOGY: no headaches, denies changes in vision, tingling, extremitiy numbness   MSK: no muscle aches or pains, weakness  SKIN: no rashes or lesions  : denies nocturia  PSYCH:  stable mood, no significant anxiety or depression  ENDOCRINE: no heat or cold intolerance    Physical Exam (visual exam)  VS:  no vital signs taken for video visit  CONSTITUTIONAL: healthy, alert and NAD, well dressed, answering questions appropriately  ENT: no nose swelling or nasal discharge, mouth redness or gum changes.  EYES: eyes grossly normal to inspection, conjunctivae and sclerae normal, no exophthalmos or proptosis  THYROID:  no apparent nodules or goiter  LUNGS: no audible wheeze, cough or visible cyanosis, no visible retractions or increased work of breathing  ABDOMEN: abdomen not evaluated  EXTREMITIES: no hand tremors, limited exam  NEUROLOGY: CN grossly intact, mentation intact and speech normal   SKIN:  no apparent skin lesions, rash, or edema with visualized skin appearance  PSYCH:  mentation appears normal, affect normal/bright, judgement and insight intact,   normal speech and appearance well groomed    LABS:     All pertinent notes, labs, and images personally reviewed by me.      A/P:  Encounter Diagnoses   Name Primary?     Type 2 diabetes mellitus without complication, without long-term current use of insulin (H) Yes     Mixed hyperlipidemia        Comments:  Reviewed health history and diabetes issues.  Recent hgbA1c improved but cholesterol and TG levels high despite simvastatin med use  Reviewed and interpreted tests that I previously ordered.   Ordered appropriate tests for the endocrinology disease management.    Management options discussed and implemented after shared medical decision making with the patient.  T2DM problem is chronic-stable    Plan:  Discussed general issues with the diabetes diagnosis and management  We discussed the hgbA1c test which reflects previous overall glucose levels or control  Discussed the importance of blood glucose (BG) testing to assess glucose trends  Provided general overview of the diabetes medication options and medication treatment plan.  Reviewed lipid test results and the cholesterol, triglyceride lower medication options     Recommend:  Continue the current JanumetXR and Jardiance daily dose routine  Future options include change from Januvia (in Janumet) to a GLP1RA med such as Trulicity or Rybelsus     We have discussed his needle-phobia concerns  Test FBG at least 1-2x/week, goal target BG  mg/dl  Advised changes to the lipid medication treatment plan, goal LDL <100 mg/dl and TG <200 mg/dl    Stop the simvastatin and switch to Slo-Niacin 500 mg daily    Take Slo-Niacin with regular aspirin each evening, then future titration to 1000 mg QPM likely    Watch for possible flushing SE     Message me with feedback on any symptoms after starting new lipid med plan  Will need future fasting lipid panel and hgbA1c testing also.  Keep focus on  diet, increased exercise, weight management.  Advise having fasting lipid panel testing and dilated eye examination, at least annually    Keep regular follow-up appointments with PCP  Addressed patient questions today    There are no Patient Instructions on file for this visit.    Future labs ordered today: No orders of the defined types were placed in this encounter.    Radiology/Consults ordered today: None    Total time spent in with the patient evaluation:  15 min  Additional time spent reviewing pertinent lab tests and chart notes, and documentation:  8 min    Follow-up:  1/2023 or 2/2023, Katie Flores MD, MS  Endocrinology  [facility]    CC:  MILES Jose

## 2022-10-18 NOTE — Clinical Note
I also advised change in lipid med from (chol lowering) simvastatin to (chol + TG lowering) Slo-Niacin, SAMAN  TL

## 2022-10-26 ENCOUNTER — OFFICE VISIT (OUTPATIENT)
Dept: FAMILY MEDICINE | Facility: CLINIC | Age: 42
End: 2022-10-26
Payer: COMMERCIAL

## 2022-10-26 VITALS
BODY MASS INDEX: 29.03 KG/M2 | TEMPERATURE: 98.1 F | WEIGHT: 184 LBS | HEART RATE: 90 BPM | DIASTOLIC BLOOD PRESSURE: 89 MMHG | SYSTOLIC BLOOD PRESSURE: 136 MMHG | OXYGEN SATURATION: 99 % | RESPIRATION RATE: 12 BRPM

## 2022-10-26 DIAGNOSIS — J01.00 SUBACUTE MAXILLARY SINUSITIS: Primary | ICD-10-CM

## 2022-10-26 RX ORDER — FLUTICASONE PROPIONATE 50 MCG
SPRAY, SUSPENSION (ML) NASAL
Qty: 11.1 ML | Refills: 0 | Status: SHIPPED | OUTPATIENT
Start: 2022-10-26 | End: 2023-06-26

## 2022-10-26 ASSESSMENT — ANXIETY QUESTIONNAIRES
1. FEELING NERVOUS, ANXIOUS, OR ON EDGE: MORE THAN HALF THE DAYS
2. NOT BEING ABLE TO STOP OR CONTROL WORRYING: SEVERAL DAYS
GAD7 TOTAL SCORE: 10
IF YOU CHECKED OFF ANY PROBLEMS ON THIS QUESTIONNAIRE, HOW DIFFICULT HAVE THESE PROBLEMS MADE IT FOR YOU TO DO YOUR WORK, TAKE CARE OF THINGS AT HOME, OR GET ALONG WITH OTHER PEOPLE: SOMEWHAT DIFFICULT
7. FEELING AFRAID AS IF SOMETHING AWFUL MIGHT HAPPEN: SEVERAL DAYS
3. WORRYING TOO MUCH ABOUT DIFFERENT THINGS: SEVERAL DAYS
5. BEING SO RESTLESS THAT IT IS HARD TO SIT STILL: MORE THAN HALF THE DAYS
GAD7 TOTAL SCORE: 10
6. BECOMING EASILY ANNOYED OR IRRITABLE: SEVERAL DAYS

## 2022-10-26 ASSESSMENT — PATIENT HEALTH QUESTIONNAIRE - PHQ9
5. POOR APPETITE OR OVEREATING: MORE THAN HALF THE DAYS
SUM OF ALL RESPONSES TO PHQ QUESTIONS 1-9: 10

## 2022-10-26 NOTE — PROGRESS NOTES
[facility]  October 28, 2022    Behavioral Health Clinician Progress Note    Patient Name: Bertin Mcgill           Service Type:  Individual      Service Location:    In-Clinic     Session Start Time: 10:37 am  Session End Time: 11:33 am      Session Length: 53 - 60      Attendees: Client      Service Modality:  Face to Face      Visit Activities (Refresh list every visit): [facility] Only    Diagnostic Assessment Date: 4/14/22  Treatment Plan Review Date: 10/29/22  CGI Review Date:1/28/23  Promis 10 Review Date: 10/29/22    Clinical Global Impressions  First:  Considering your total clinical experience with this particular patient population, how severe are the patient's symptoms at this time?: 5 (10/28/2022 12:11 PM)    Most recent:  Compared to the patient's condition at the START of treatment, this patient's condition is: 3 (10/28/2022 12:11 PM)      See Flowsheets for today's PHQ-9 and NATHALIE-7 results  Previous PHQ-9:   PHQ-9 SCORE 9/29/2022 10/12/2022 10/26/2022   PHQ-9 Total Score MyChart 10 (Moderate depression) 13 (Moderate depression) -   PHQ-9 Total Score 10 13 10     Previous NATHALIE-7:   NATHALIE-7 SCORE 9/26/2022 10/12/2022 10/26/2022   Total Score 9 (mild anxiety) 10 (moderate anxiety) -   Total Score 9 10 10       KRISTINE LEVEL:  No flowsheet data found.    DATA  Extended Session (60+ minutes): No  Interactive Complexity: No  Crisis: No  [facility] Patient: No    Treatment Objective(s) Addressed in This Session:  Target Behavior(s): increased social activity/decreased isolation    Anxiety: will experience a reduction in anxiety, will develop more effective coping skills to manage anxiety symptoms, will develop healthy cognitive patterns and beliefs and will increase ability to function adaptively    Current Stressors / Issues:    Lj presented as euthymic, smiling and used humor.  Lj reported meeting with PCP this week, obtained and started medication, and is noticing benefits, including improved sleep  "last night.  [facility] used MI interventions to focus on change, Lj responded by stating his A1C decreased to 7.2 during his last lab in early October, which is trending in the right direction from high 7's in July.  Lj reported he's taking medication as prescribed, has continued his walking and push ups, and is eating salad at lunch.  Lj reported he really enjoys this weather and may even do an \"urban hike\" this Saturday (3 hrs of walking).  Without prompting Lj also identified another area for change and stated \"dinner needs to improve\".     When [facility] focused conversation toward  medication, stress and work, Lj's mood became frustrated, pessimistic and irritable. Lj reported he has not started psychotropic medication, stating he's been dealing with the sinus issue for the past 1-2 months, his PCP and psychiatrist each initially proposed different medications, and the medication they both agree upon is taken 3x's per day which is too much since he's already taking 8 pills per day for diabetes, high blood pressure, and cholesterol.  [facility] reflected concerns about medication, Lj responded by stating he's not even sure if the medication will actually help to reduce his fears about covid and returning to work. 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   Lj expressed frustration and confusion about why work would jeopardize his health when he can do the work, just as effectively when at home.     Since Lj's original plan was to reduce his anxiety so he could return to the office and now he is considering getting a new job, [facility] inquired if [facility] services have/are helping to address his anxiety?  Lj reported, getting these thoughts and feelings out is helpful, citing overall mildly improved mood, " increased patience with parents and has started and maintained healthy lifestyle changes since working with [facility].     Progress on Treatment Objective(s) / Homework:  Minimal progress - ACTION (Actively working towards change); Intervened by reinforcing change plan / affirming steps taken    Motivational Interviewing    MI Intervention: Co-Developed Goal: reduce anxiety, Expressed Empathy/Understanding, Supported Autonomy, Collaboration, Evocation, Open-ended questions, Reflections: simple and complex and Change talk (evoked)     Change Talk Expressed by the Patient: Reasons to change    Provider Response to Change Talk: E - Evoked more info from patient about behavior change and R - Reflected patient's change talk    Psychodynamic psychotherapy    Discuss patient's emotional dynamics and issues and how they impact behaviors    Explore patient's history of relationships and how they impact present behaviors    Explore how to work with and make changes in these schemas and patterns    Solution-Focused Therapy    Explore patterns in patient's behaviors and relationships and discuss options for new behaviors    Explore new options for problem-solving, communication, managing stress, etc.    Care Plan review completed: Yes    Medication Review:  No current psychiatric medications prescribed; see above    Medication Compliance:  NA    Changes in Health Issues:   None reported    Chemical Use Review:   Substance Use: Problem use continues with no change since last session, Stage of Change: Contemplation        Tobacco Use: No change in amount of tobacco use since last session.  Contemplation    Assessment: Current Emotional / Mental Status (status of significant symptoms):  Risk status (Self / Other harm or suicidal ideation)  Patient has had a history of suicidal ideation: passive SI in 2003; none since  Patient denies current fears or concerns for personal safety.  Patient denies current or recent suicidal ideation or  behaviors.passive, no intent or plan  Patient denies current or recent homicidal ideation or behaviors.  Patient denies current or recent self injurious behavior or ideation.  Patient denies other safety concerns.  A safety and risk management plan has not been developed at this time, however patient was encouraged to call [place] [phone] should there be a change in any of these risk factors.    Appearance:   Appropriate   Eye Contact:   Fair   Psychomotor Behavior: Normal   Attitude:   Cooperative   Orientation:   All  Speech   Rate / Production: Normal/ Responsive   Volume:  Normal   Mood:    Irritable   Affect:    congruent wiht mood   Thought Content:  Clear    Thought Form:  Coherent  Logical   Insight:    Good     Diagnoses:  1. NATHALIE (generalized anxiety disorder)        Collateral Reports Completed:  Routed note to PCP    Plan: (Homework, other):  Patient was given information about behavioral services and encouraged to schedule a follow up appointment with the clinic [facility] in 3 weeks.  He was also given information about mental health symptoms and treatment options  and strategies to more effectively manage anxiety.  CD Recommendations: abstain from alcohol use.  Shawn Ullrich [facility], [facility]      ______________________________________________________________________    Integrated Primary Care Behavioral Health Treatment Plan    Patient's Name: Betrin Mcgill  YOB: 1980    Date of Creation: 7/29/22  Date Treatment Plan Last Reviewed/Revised: 4/19/22    DSM5 Diagnoses: 300.02 (F41.1) Generalized Anxiety Disorder or Adjustment Disorders  309.0 (F43.21) With depressed mood  Psychosocial / Contextual Factors: Single, heterosexual male, resides in his own home; pandemic  PROMIS (reviewed every 90 days): pt completed on 7/29/22    Referral / Collaboration:  Referral to another professional/service is not indicated at this time.; Lj followed through with medication evaluation, attempted medication  and stopped medication    Anticipated number of session for this episode of care: 6  Anticipation frequency of session: Every other week  Anticipated Duration of each session: 38-52 minutes  Treatment plan will be reviewed in 90 days or when goals have been changed.       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      Objective #A (Patient Action)    Patient will identify three distraction and diversion activities and use those activities to decrease level of anxiety  .  Status: New - Date: 4/19/22     Intervention(s)  Therapist will assign homework to monitor and report back anxiety levels when walking, doing push ups/planks, and writing    Objective #B  Patient will engage in some physical activity/exercise at least daily.  Status: New - Date: 7/29/22     Intervention(s)  Therapist will teach the client how to complete a 4-part pros and cons. and apply to exercise.    Objective #C  Patient will increase mood and confidence by following through with diet.  Status: New - Date: 7/29/22     Intervention(s)  Therapist will prompt patient to share how maintaining impacts mood/anxiety.      Patient has reviewed and agreed to the above plan.      Shawn G. Ullrich, [facility]  July 29, 2022

## 2022-10-26 NOTE — PATIENT INSTRUCTIONS
ASSESSMENT/PLAN:    Maxillary sinusitis now for about 6 weeks. Not helped with neti-pot   -- Increase fluids.  - Use humidified and purified air if possible.   Also, Rx for:   - amoxicillin-clavulanate (AUGMENTIN) 875-125 MG tablet; Take 1 tablet by mouth 2 times daily  Dispense: 14 tablet; Refill: 0  - fluticasone (FLONASE) 50 MCG/ACT nasal spray; Use 2 sprays/day for 10 days  Dispense: 11.1 mL; Refill: 0     - Discussed side effects. Eat natural yogurt when taking Augmentin      2. CV risk, given Diabetes.   Not on Simvistatin, as Endocrinology discontinued and started Niacin.      Follow-up for an annual exam in next few months. Sooner if sinusitis does not clear.     --Miguel Jose MD

## 2022-10-26 NOTE — PROGRESS NOTES
Medical assistant intake:  Bertin Mcgill is a 42 year old male who presents to [facility] today for Sinus Problem (Happening since beginning of sept 2022. Pressure in the face, feels like swollen, issues breathing through one or the other nostril. )        ASSESSMENT/PLAN:    1. Maxillary sinusitis now for about 6 weeks. Not helped with neti-pot   -- Increase fluids.  - Use humidified and purified air if possible.   Also, Rx for:   - amoxicillin-clavulanate (AUGMENTIN) 875-125 MG tablet; Take 1 tablet by mouth 2 times daily  Dispense: 14 tablet; Refill: 0  - fluticasone (FLONASE) 50 MCG/ACT nasal spray; Use 2 sprays/day for 10 days  Dispense: 11.1 mL; Refill: 0     - Discussed side effects. Eat natural yogurt when taking Augmentin      2. CV risk, given Diabetes.   Not on Simvistatin, as Endocrinology discontinued and started Niacin.      Follow-up for an annual exam in next few months. Sooner if sinusitis does not clear.     --Miguel Jose MD      SUBJECTIVE:   Bertin is here with nasal suggestion and facial pressure for about 6 weeks. We had one video visit. He's having trouble breathing through nose during the day and then with trouble with CPAP at night.   Tried some over the counter nasal sprays of sinus rinses and Afrin.   No fevers or sweats or chills.     He's been walking a lot around neighborhood or treadmill.   Also, doing push-ups.   Not playing any sports.     Review Of Systems:    Has otherwise been in usual state of health, e.g.   Cardiovascular: negative  Gastrointestinal: negative  Genitourinary: negative    Problem list per EMR:  Patient Active Problem List   Diagnosis     Type 2 diabetes mellitus without complication, without long-term current use of insulin (H)     Mixed hyperlipidemia     Benign essential hypertension     Overweight (BMI 25.0-29.9)     Seasonal allergies     Smoker       Current Outpatient Medications   Medication Sig Dispense Refill     Acetaminophen (TYLENOL EXTRA  STRENGTH PO)        hydrochlorothiazide (HYDRODIURIL) 12.5 MG tablet TAKE 1 TABLET BY MOUTH EVERY DAY 90 tablet 2     JANUMET -1000 MG TB24 TAKE 1 TABLET BY MOUTH EVERY DAY 30 tablet 1     JARDIANCE 10 MG TABS tablet TAKE 1 TABLET BY MOUTH EVERY DAY 90 tablet 0     losartan (COZAAR) 50 MG tablet Take 1 tablet (50 mg) by mouth daily 90 tablet 1     Multiple Vitamins-Minerals (CENTRUM MEN PO)        niacin (SLO-NIACIN) 500 MG CR tablet Take 1 tablet (500 mg) by mouth At Bedtime 30 tablet 5     VITAMIN D PO Take 2,000 Units by mouth         Allergies   Allergen Reactions     Seasonal Allergies         Social:   Still working with [employer].         OBJECTIVE    Vitals: /89 (BP Location: Right arm, Patient Position: Sitting, Cuff Size: Adult Regular)   Pulse 90   Temp 98.1  F (36.7  C) (Temporal)   Resp 12   Wt 83.5 kg (184 lb)   SpO2 99%   BMI 29.03 kg/m    BMI= Body mass index is 29.03 kg/m .  Bertin appears in no distress.  His nasopharynx exam is noteworthy for swollen turbinates.  They appear more swollen on the right versus the left although he is able to breathe slightly more easily through his right nostril than his left.  He has maxillary sinus tenderness.  His TMs are slightly erythematous but do not appear to be infected.  Oropharynx is clear.  Neck is supple and without lymphadenopathy.    Cardiovascular- pulse at about 90 bpm.  Regular rate.  No murmurs.    Lungs-clear to auscultation throughout    PHQ 10/26/2022   PHQ-9 Total Score 10   Q9: Thoughts of better off dead/self-harm past 2 weeks Not at all     NATHALIE-7 SCORE 9/26/2022 10/12/2022 10/26/2022   Total Score 9 (mild anxiety) 10 (moderate anxiety) -   Total Score 9 10 10         SEE TOP OF NOTE FOR ASSESSMENT AND PLAN    --Miguel Jose MD  [facility], Department of Family Medicine and Community Health

## 2022-10-26 NOTE — NURSING NOTE
42 year old  Chief Complaint   Patient presents with     Sinus Problem     Happening since beginning of sept 2022. Pressure in the face, feels like swollen, issues breathing through one or the other nostril.        Blood pressure 136/89, pulse 90, temperature 98.1  F (36.7  C), temperature source Temporal, resp. rate 12, weight 83.5 kg (184 lb), SpO2 99 %. Body mass index is 29.03 kg/m .  Patient Active Problem List   Diagnosis     Type 2 diabetes mellitus without complication, without long-term current use of insulin (H)     Mixed hyperlipidemia     Benign essential hypertension     Overweight (BMI 25.0-29.9)     Seasonal allergies     Smoker       Wt Readings from Last 2 Encounters:   10/26/22 83.5 kg (184 lb)   04/06/22 85 kg (187 lb 8 oz)     BP Readings from Last 3 Encounters:   10/26/22 136/89   04/06/22 122/80   10/14/21 125/83         Current Outpatient Medications   Medication     Acetaminophen (TYLENOL EXTRA STRENGTH PO)     hydrochlorothiazide (HYDRODIURIL) 12.5 MG tablet     JANUMET -1000 MG TB24     JARDIANCE 10 MG TABS tablet     losartan (COZAAR) 50 MG tablet     Multiple Vitamins-Minerals (CENTRUM MEN PO)     niacin (SLO-NIACIN) 500 MG CR tablet     VITAMIN D PO     No current facility-administered medications for this visit.       Social History     Tobacco Use     Smoking status: Every Day     Smokeless tobacco: Never   Substance Use Topics     Alcohol use: Yes     Drug use: Never       Health Maintenance Due   Topic Date Due     ADVANCE CARE PLANNING  Never done     EYE EXAM  Never done     HEPATITIS B IMMUNIZATION (1 of 3 - 3-dose series) Never done     HIV SCREENING  Never done     Pneumococcal Vaccine: Pediatrics (0 to 5 Years) and At-Risk Patients (6 to 64 Years) (2 - PCV) 10/17/2018     YEARLY PREVENTIVE VISIT  05/31/2020     DIABETIC FOOT EXAM  08/12/2020       No results found for: PAP      October 26, 2022 2:31 PM

## 2022-10-27 ASSESSMENT — ANXIETY QUESTIONNAIRES
5. BEING SO RESTLESS THAT IT IS HARD TO SIT STILL: MORE THAN HALF THE DAYS
GAD7 TOTAL SCORE: 11
IF YOU CHECKED OFF ANY PROBLEMS ON THIS QUESTIONNAIRE, HOW DIFFICULT HAVE THESE PROBLEMS MADE IT FOR YOU TO DO YOUR WORK, TAKE CARE OF THINGS AT HOME, OR GET ALONG WITH OTHER PEOPLE: SOMEWHAT DIFFICULT
8. IF YOU CHECKED OFF ANY PROBLEMS, HOW DIFFICULT HAVE THESE MADE IT FOR YOU TO DO YOUR WORK, TAKE CARE OF THINGS AT HOME, OR GET ALONG WITH OTHER PEOPLE?: SOMEWHAT DIFFICULT
7. FEELING AFRAID AS IF SOMETHING AWFUL MIGHT HAPPEN: SEVERAL DAYS
2. NOT BEING ABLE TO STOP OR CONTROL WORRYING: SEVERAL DAYS
GAD7 TOTAL SCORE: 11
4. TROUBLE RELAXING: MORE THAN HALF THE DAYS
GAD7 TOTAL SCORE: 11
6. BECOMING EASILY ANNOYED OR IRRITABLE: SEVERAL DAYS
7. FEELING AFRAID AS IF SOMETHING AWFUL MIGHT HAPPEN: SEVERAL DAYS
3. WORRYING TOO MUCH ABOUT DIFFERENT THINGS: MORE THAN HALF THE DAYS
1. FEELING NERVOUS, ANXIOUS, OR ON EDGE: MORE THAN HALF THE DAYS

## 2022-10-27 ASSESSMENT — PATIENT HEALTH QUESTIONNAIRE - PHQ9
SUM OF ALL RESPONSES TO PHQ QUESTIONS 1-9: 12
SUM OF ALL RESPONSES TO PHQ QUESTIONS 1-9: 12
10. IF YOU CHECKED OFF ANY PROBLEMS, HOW DIFFICULT HAVE THESE PROBLEMS MADE IT FOR YOU TO DO YOUR WORK, TAKE CARE OF THINGS AT HOME, OR GET ALONG WITH OTHER PEOPLE: SOMEWHAT DIFFICULT

## 2022-10-28 ENCOUNTER — OFFICE VISIT (OUTPATIENT)
Dept: BEHAVIORAL HEALTH | Facility: CLINIC | Age: 42
End: 2022-10-28
Payer: COMMERCIAL

## 2022-10-28 DIAGNOSIS — F41.1 GAD (GENERALIZED ANXIETY DISORDER): Primary | ICD-10-CM

## 2022-11-03 DIAGNOSIS — J01.00 SUBACUTE MAXILLARY SINUSITIS: ICD-10-CM

## 2022-11-03 RX ORDER — FLUTICASONE PROPIONATE 50 MCG
SPRAY, SUSPENSION (ML) NASAL
Qty: 96 ML | Refills: 1 | OUTPATIENT
Start: 2022-11-03

## 2022-11-14 DIAGNOSIS — E78.2 MIXED HYPERLIPIDEMIA: ICD-10-CM

## 2022-11-15 RX ORDER — NIACIN 500 MG/1
TABLET, EXTENDED RELEASE ORAL
Qty: 30 TABLET | Refills: 5 | OUTPATIENT
Start: 2022-11-15

## 2022-11-20 ASSESSMENT — ANXIETY QUESTIONNAIRES
7. FEELING AFRAID AS IF SOMETHING AWFUL MIGHT HAPPEN: SEVERAL DAYS
GAD7 TOTAL SCORE: 12
6. BECOMING EASILY ANNOYED OR IRRITABLE: SEVERAL DAYS
7. FEELING AFRAID AS IF SOMETHING AWFUL MIGHT HAPPEN: SEVERAL DAYS
GAD7 TOTAL SCORE: 12
4. TROUBLE RELAXING: MORE THAN HALF THE DAYS
IF YOU CHECKED OFF ANY PROBLEMS ON THIS QUESTIONNAIRE, HOW DIFFICULT HAVE THESE PROBLEMS MADE IT FOR YOU TO DO YOUR WORK, TAKE CARE OF THINGS AT HOME, OR GET ALONG WITH OTHER PEOPLE: SOMEWHAT DIFFICULT
5. BEING SO RESTLESS THAT IT IS HARD TO SIT STILL: MORE THAN HALF THE DAYS
3. WORRYING TOO MUCH ABOUT DIFFERENT THINGS: MORE THAN HALF THE DAYS
8. IF YOU CHECKED OFF ANY PROBLEMS, HOW DIFFICULT HAVE THESE MADE IT FOR YOU TO DO YOUR WORK, TAKE CARE OF THINGS AT HOME, OR GET ALONG WITH OTHER PEOPLE?: SOMEWHAT DIFFICULT
2. NOT BEING ABLE TO STOP OR CONTROL WORRYING: MORE THAN HALF THE DAYS
GAD7 TOTAL SCORE: 12
1. FEELING NERVOUS, ANXIOUS, OR ON EDGE: MORE THAN HALF THE DAYS

## 2022-11-20 ASSESSMENT — PATIENT HEALTH QUESTIONNAIRE - PHQ9
SUM OF ALL RESPONSES TO PHQ QUESTIONS 1-9: 13
SUM OF ALL RESPONSES TO PHQ QUESTIONS 1-9: 13
10. IF YOU CHECKED OFF ANY PROBLEMS, HOW DIFFICULT HAVE THESE PROBLEMS MADE IT FOR YOU TO DO YOUR WORK, TAKE CARE OF THINGS AT HOME, OR GET ALONG WITH OTHER PEOPLE: SOMEWHAT DIFFICULT

## 2022-11-22 ENCOUNTER — OFFICE VISIT (OUTPATIENT)
Dept: BEHAVIORAL HEALTH | Facility: CLINIC | Age: 42
End: 2022-11-22
Payer: COMMERCIAL

## 2022-11-22 DIAGNOSIS — F41.1 GAD (GENERALIZED ANXIETY DISORDER): Primary | ICD-10-CM

## 2022-11-22 NOTE — PROGRESS NOTES
"[facility]  November 22, 2022    Behavioral Health Clinician Progress Note    Patient Name: Bertin Mcgill           Service Type:  Individual      Service Location:    In-Clinic     Session Start Time: 2:19 pm  Session End Time: 3:18 pm      Session Length: 53 - 60      Attendees: Patient      Service Modality:  Face to Face      Visit Activities (Refresh list every visit): [facility] Only    Diagnostic Assessment Date: 4/14/22  Treatment Plan Review Date: 2/22/23  CGI Review Date:1/28/23  Promis 10 Review Date: pt did not complete    Clinical Global Impressions  First:  Considering your total clinical experience with this particular patient population, how severe are the patient's symptoms at this time?: 5 (10/28/2022 12:11 PM)    Most recent:  Compared to the patient's condition at the START of treatment, this patient's condition is: 3 (10/28/2022 12:11 PM)      See Flowsheets for today's PHQ-9 and NATHALIE-7 results  Previous PHQ-9:   PHQ-9 SCORE 10/26/2022 10/27/2022 11/20/2022   PHQ-9 Total Score MyChart - 12 (Moderate depression) 13 (Moderate depression)   PHQ-9 Total Score 10 12 13     Previous NATHALIE-7:   NATHALIE-7 SCORE 10/26/2022 10/27/2022 11/20/2022   Total Score - 11 (moderate anxiety) 12 (moderate anxiety)   Total Score 10 11 12       KRISTINE LEVEL:  No flowsheet data found.    DATA  Extended Session (60+ minutes): No  Interactive Complexity: No  Crisis: No  [facility] Patient: No    Treatment Objective(s) Addressed in This Session:  Target Behavior(s): increased social activity/decreased isolation    Anxiety: will experience a reduction in anxiety, will develop more effective coping skills to manage anxiety symptoms, will develop healthy cognitive patterns and beliefs and will increase ability to function adaptively    Current Stressors / Issues:    Lj reported work continues to be stressful, but he's taken several days off work, which has been helpful.  Lj even noted, \"I'm not really trying to get sucked " "into work\" on his days off either, which he would have done in the past.  Lj realizes that worrying about work, when not working isn't helpful, stating, \"I don't need to spend all my time worrying about 20,000 envelopes\".  Lj reported his supervisor has not had any conversations with him about returning to work in the office, so Lj is trying not to worry about potentially returning to the office, and instead is focusing on health goals (medication goals) and interpersonal goals.       [facility] used MI interventions to focus conversation on goals, maintain motivation for positive changes, and identify effective strategies. Lj reported the following:    Sleep is his number one priority:  -not enough sleep; I can't fall asleep until past 2 o'clock; wakes around 8 am  -falling asleep at 2:30 am in front of the TV, wakes in front of the TV around 5 am, and then goes to the bedroom  -sleep goals: \"I would like to get more sleep during the week\"; catches up on sleep during the weekends  -potential strategy: make \"slight adjustments\"    -since starting niacin, Lj is consistently falling asleep after 2 am, before 2:30 am; previously didn't know when he'd fall asleep, therefore watched TV until got tired   -since Lj knows he will now fall asleep shortly after 2 am, he will be in bed before 2 am, rather than on the couch in front of the TV   -if this is successful, Lj will continue to adjustment bedtime by earlier increments     Diet:  -switched from salad to roasted vegetable medley for lunch  -still needs to work on dinner  -in the past was successful with having fish, brown rice and vegetable for dinner and believe he can return to this dinner  -barrier: takes time and effort, but once he gets into the habit, he can continue with it  -when will he make this dinner change? After Thanksgiving he will start making healthy dinner    Workout/Exercise:  -progress: \"I'm seeing the results\" from the increased walking (over " "the summer)  -as seasons change, he will shift to using treadmill and elliptical   -goal: 45 mins to 1 hour of treadmill or elliptical 4-5 days per week   -Push Ups: currently 40 in a set; 6-8 sets per day  -goal: \"maybe bump up\" to 45 and then if successful, would like to plateau at 50 per set     Medications:  -continues to take 8 pills per day     Interaction with parents (father):   -\"I feel much more aware of when I'm [getting frustrated]\"  -progress: able to recognize and step away from conflict/escalation  -goal: decreased conflict    Progress on Treatment Objective(s) / Homework:  Satisfactory progress - ACTION (Actively working towards change); Intervened by reinforcing change plan / affirming steps taken    Motivational Interviewing    MI Intervention: Co-Developed Goal: reduce anxiety, Expressed Empathy/Understanding, Supported Autonomy, Collaboration, Evocation, Open-ended questions, Reflections: simple and complex and Change talk (evoked)     Change Talk Expressed by the Patient: Reasons to change    Provider Response to Change Talk: E - Evoked more info from patient about behavior change and R - Reflected patient's change talk      Solution-Focused Therapy    Explore patterns in patient's behaviors and relationships and discuss options for new behaviors    Explore new options for problem-solving, communication, managing stress, etc.    Care Plan review completed: Yes    Medication Review:  No current psychiatric medications prescribed    Medication Compliance:  NA    Changes in Health Issues:   None reported    Chemical Use Review:   Substance Use: Problem use continues with no change since last session, Stage of Change: Contemplation        Tobacco Use: No change in amount of tobacco use since last session.  Contemplation    Assessment: Current Emotional / Mental Status (status of significant symptoms):  Risk status (Self / Other harm or suicidal ideation)  Patient has had a history of suicidal " ideation: passive SI in 2003; none since  Patient denies current fears or concerns for personal safety.  Patient denies current or recent suicidal ideation or behaviors.passive, no intent or plan  Patient denies current or recent homicidal ideation or behaviors.  Patient denies current or recent self injurious behavior or ideation.  Patient denies other safety concerns.  A safety and risk management plan has not been developed at this time, however patient was encouraged to call [place] [phone] should there be a change in any of these risk factors.    Appearance:   Appropriate   Eye Contact:   Fair   Psychomotor Behavior: Normal   Attitude:   Cooperative   Orientation:   All  Speech   Rate / Production: Normal/ Responsive   Volume:  Normal   Mood:    Anxious   Affect:    congruent wiht mood   Thought Content:  Clear    Thought Form:  Coherent  Logical   Insight:    Good     Diagnoses:  1. NATHALIE (generalized anxiety disorder)        Collateral Reports Completed:  Routed note to PCP    Plan: (Homework, other):  Patient was given information about behavioral services and encouraged to schedule a follow up appointment with the clinic C in 3 weeks.  He was also given information about mental health symptoms and treatment options  and strategies to more effectively manage anxiety.  CD Recommendations: abstain from alcohol use.  Shawn Ullrich [facility], [facility]      ______________________________________________________________________    Integrated Primary Care Behavioral Health Treatment Plan    Patient's Name: Bertin Mcgill  YOB: 1980    Date of Creation: 11/22/22  Date Treatment Plan Last Reviewed/Revised: 11/22/22    DSM5 Diagnoses: 300.02 (F41.1) Generalized Anxiety Disorder or Adjustment Disorders  309.0 (F43.21) With depressed mood  Psychosocial / Contextual Factors: Single, , heterosexual male, resides in his own home; working from home  PROMIS (reviewed every 90 days): pt completed on  "7/29/22    Referral / Collaboration:  Referral to another professional/service is not indicated at this time.; Lj followed through with medication evaluation, attempted medication and stopped medication    Anticipated number of session for this episode of care: 6  Anticipation frequency of session: Every other week  Anticipated Duration of each session: 38-52 minutes  Treatment plan will be reviewed in 90 days or when goals have been changed.       MeasurableTreatment Goal(s) related to diagnosis / functional impairment(s)  Goal 1: Patient will reduce anxiety and improve mood by improving health and relationship with parents.     I will know I've met my goal when:  Lj doesn't have to take 8 pills per day; sleeping more than 7-8 hours on weeknights, exercising at least 4 days per week, eating healthy dinner more days of the week than not, and decreased conflict with parents.        Sleep is his number one priority:  -not enough sleep; I can't fall asleep until past 2 o'clock; wakes around 8 am    -falling asleep at 2:30 am in front of the TV, wakes in front of the TV around 5 am, and then goes to the bedroom  -sleep goals: \"I would like to get more sleep during the week\"; catches up on sleep during the weekends  -potential strategy: make \"slight adjustments\"    -since starting niacin, Lj is consistently falling asleep after 2 am, before 2:30 am; previously didn't know when he'd fall asleep, therefore watched TV until got tired   -since Lj knows he will now fall asleep shortly after 2 am, he will be in bed before 2 am, rather than on the couch in front of the TV   -if this is successful, Lj will continue to adjustment bedtime by earlier increments     Diet:  -switched from salad to roasted vegetable medley for lunch  -still needs to work on dinner  -in the past was successful with having fish, brown rice and vegetable for dinner and believe he can return to this dinner  -barrier: takes time and effort, but " "once he gets into the habit, he can continue with it  -when will he make this dinner change? After Thanksgiving he will start making healthy dinner    Workout/Exercise:  -progress: \"I'm seeing the results\" from the increased walking (over the summer)  -as seasons change, he will shift to using treadmill and elliptical   -goal: 45 mins to 1 hour of treadmill or elliptical 4-5 days per week   -Push Ups: currently 40 in a set; 6-8 sets per day  -goal: \"maybe bump up\" to 45 and then if successful, would like to plateau at 50 per set     Medications:  -continues to take 8 pills per day  -Goal: reduce amount of daily pills      Interaction with parents (father):   -\"I feel much more aware of when I'm [getting frustrated]\"  -progress: able to recognize and step away from conflict/escalation  -goal: decreased conflict    Objective #A (Patient Action)    Patient will exercise at least 4 days per week.  Status: New - Date: 11/22/22     Intervention(s)  Therapist will assign homework to monitor level of exercise and mood on a daily basis    Objective #B  Patient will use effective sleep hygiene strategies.  Status: New - Date: 11/22/22     Intervention(s)  Therapist will teach sleep hygiene strategies.    Objective #C  Patient will increase mood and confidence by following through with diet.  Status: Continued - Date(s):11/22/22      Intervention(s)  Therapist will prompt patient to share how maintaining diet impacts mood/anxiety.    Objective D:  Patient will learn and practice at least one interpersonal skill  Status: new: 11/22/22  Intervention:  Therapist will teach interpersonal skills        Patient has reviewed and agreed to the above plan.      Shawn G. Ullrich, [facility]  November 22, 2022  "

## 2022-11-25 DIAGNOSIS — E11.9 TYPE 2 DIABETES MELLITUS WITHOUT COMPLICATION, WITHOUT LONG-TERM CURRENT USE OF INSULIN (H): ICD-10-CM

## 2022-11-25 RX ORDER — SITAGLIPTIN AND METFORMIN HYDROCHLORIDE 1000; 100 MG/1; MG/1
TABLET, FILM COATED, EXTENDED RELEASE ORAL
Qty: 30 TABLET | Refills: 2 | Status: SHIPPED | OUTPATIENT
Start: 2022-11-25 | End: 2023-02-17

## 2022-11-25 NOTE — TELEPHONE ENCOUNTER
"Last Written Prescription Date:  9/30/22  Last Fill Quantity: 30,  # refills: 1   Last office visit:10/18/22 with Dr. Flores  Future Office Visit:  2/21/23        Requested Prescriptions   Pending Prescriptions Disp Refills     JANUMET -1000 MG TB24 [Pharmacy Med Name: JANUMET -1,000 MG TABLET] 30 tablet 1     Sig: TAKE 1 TABLET BY MOUTH EVERY DAY       Combination Oral Antihyperglycemic Agents Passed - 11/25/2022 12:42 AM        Passed - Patient has documented A1c within the specified period of time.     If HgbA1C is 8 or greater, it needs to be on file within the past 3 months.  If less than 8, must be on file within the past 6 months.     Recent Labs   Lab Test 10/04/22  0925   A1C 7.2*             Passed - Patient's CR is NOT>1.4 OR Patient's EGFR is NOT<45 within past 12 mos.     Recent Labs   Lab Test 07/25/22  1020 08/31/21  1020 11/08/19  0936   GFRESTIMATED >90   < > >90   GFRESTBLACK  --   --  >90    < > = values in this interval not displayed.       Recent Labs   Lab Test 07/25/22  1020   CR 0.85             Passed - Patient does not have a diagnosis of CHF.        Passed - Medication is active on med list        Passed - Patient is 18 years old or older.        Passed - Recent (6 mo) or future (30 days) visit within the authorizing provider's specialty     Patient had office visit in the last 6 months or has a visit in the next 30 days with authorizing provider or within the authorizing provider's specialty.  See \"Patient Info\" tab in inbasket, or \"Choose Columns\" in Meds & Orders section of the refill encounter.               Refills sent  Aimee Delgadillo RN    "

## 2022-12-14 NOTE — PROGRESS NOTES
[facility]  December 16, 2022    Behavioral Health Clinician Progress Note    Patient Name: Bertin Mcgill           Service Type:  Individual      Service Location:    Telehealth     Session Start Time: 1:31 pm  Session End Time: 2:28 pm      Session Length: 53 - 60      Attendees: Patient      Service Modality:  Video visit      Visit Activities (Refresh list every visit): [facility] Care Coordination Outreach    Telemedicine Visit: The patient's condition can be safely assessed and treated via synchronous audio and visual telemedicine encounter.      Reason for Telemedicine Visit: Patient has requested telehealth visit    Originating Site (Patient Location): Patient's home        Distant Location (provider location):  On-site    Consent:  The patient/guardian has verbally consented to: the potential risks and benefits of telemedicine (video visit) versus in person care; bill my insurance or make self-payment for services provided; and responsibility for payment of non-covered services.     Mode of Communication:  Video Conference via [employer]    As the provider I attest to compliance with applicable laws and regulations related to telemedicine.  Diagnostic Assessment Date: 4/14/22  Treatment Plan Review Date: 2/22/23  CGI Review Date:1/28/23  Promis 10 Review Date: 3/15/23    Clinical Global Impressions  First:  Considering your total clinical experience with this particular patient population, how severe are the patient's symptoms at this time?: 5 (10/28/2022 12:11 PM)    Most recent:  Compared to the patient's condition at the START of treatment, this patient's condition is: 3 (10/28/2022 12:11 PM)      See Flowsheets for today's PHQ-9 and NATHALIE-7 results  Previous PHQ-9:   PHQ-9 SCORE 11/20/2022 12/15/2022 12/16/2022   PHQ-9 Total Score MyChart 13 (Moderate depression) - 12 (Moderate depression)   PHQ-9 Total Score 13 12 12     Previous NATHALIE-7:   NATHALIE-7  "SCORE 10/27/2022 11/20/2022 12/15/2022   Total Score 11 (moderate anxiety) 12 (moderate anxiety) 11 (moderate anxiety)   Total Score 11 12 11       KRISTINE LEVEL:  No flowsheet data found.    DATA  Extended Session (60+ minutes): No  Interactive Complexity: No  Crisis: No  [facility] Patient: No    Treatment Objective(s) Addressed in This Session:  Target Behavior(s): increased social activity/decreased isolation    Anxiety: will experience a reduction in anxiety, will develop more effective coping skills to manage anxiety symptoms, will develop healthy cognitive patterns and beliefs and will increase ability to function adaptively    Current Stressors / Issues:    When assessing current status, Lj stated, \"The holidays are always stressful\", sharing recent conflict between mother and sister, how both reached out to him and how he spoke with both.  [facility] supported processing of the incident by exploring family dynamics, communication patterns, and roles.      When focusing on health goals, Lj reported no progress and perhaps some regression in some areas.  Lj reported he's continued with lunches and having roast vegetables for dinner, but he has not yet started cooking fish.  In fact, Lj reported experiencing \"a hiccup\", explaining he downloaded and used the [employer]'s Dao. When inquiring in current diet will help Lj achieve goal of reducing amount of medication he needs to take, Lj was less concerned about his diet, sharing that his lack of exercise is a more pressing factor.  Lj reported he has not worked out much, and when inquiring about barrier, Lj immediately stated, \"not sleeping great\".  Lj reported his poor sleep has led to significant fatigue during the day, resulting in no energy to exercise after work; \"I'm tired throughout the day....I just can't do it (exercise)\".      [facility] engaged Lj in problem-solving regarding fatigue/sleep, he responded by sharing he's falling asleep in the recliner (reading or " "watching TV), wakes hours later and then goes to the bedroom and doesn't bother to use the cpap.  Lj stated, \"I haven't been as disciplined (about going to bed on time)...I get caught up reading\".  Without prompting, Lj shared strategy, namely, he will have two weeks off over the holidays/new year and believes he can reset his sleep schedule during this time, b/c he will be more well rested.       Progress on Treatment Objective(s) / Homework:  Worsening - CONTEMPLATION (Considering change and yet undecided); Intervened by assessing the negative and positive thinking (ambivalence) about behavior change    Motivational Interviewing    MI Intervention: Co-Developed Goal: reduce anxiety, Expressed Empathy/Understanding, Supported Autonomy, Collaboration, Evocation, Open-ended questions, Reflections: simple and complex and Change talk (evoked)     Change Talk Expressed by the Patient: Reasons to change    Provider Response to Change Talk: E - Evoked more info from patient about behavior change and R - Reflected patient's change talk      Solution-Focused Therapy    Explore patterns in patient's behaviors and relationships and discuss options for new behaviors    Explore new options for problem-solving, communication, managing stress, etc.    Care Plan review completed: Yes    Medication Review:  No current psychiatric medications prescribed    Medication Compliance:  NA    Changes in Health Issues:   None reported    Chemical Use Review:   Substance Use: Problem use continues with no change since last session, Stage of Change: Contemplation        Tobacco Use: No change in amount of tobacco use since last session.  Contemplation    Assessment: Current Emotional / Mental Status (status of significant symptoms):  Risk status (Self / Other harm or suicidal ideation)  Patient has had a history of suicidal ideation: passive SI in 2003; none since  Patient denies current fears or concerns for personal safety.  Patient " denies current or recent suicidal ideation or behaviors.passive, no intent or plan  Patient denies current or recent homicidal ideation or behaviors.  Patient denies current or recent self injurious behavior or ideation.  Patient denies other safety concerns.  A safety and risk management plan has not been developed at this time, however patient was encouraged to call [place] [phone] should there be a change in any of these risk factors.    Appearance:   Appropriate   Eye Contact:   Fair   Psychomotor Behavior: Normal   Attitude:   Cooperative   Orientation:   All  Speech   Rate / Production: Normal/ Responsive   Volume:  Normal   Mood:    Anxious   Affect:    Worrisome   Thought Content:  Clear    Thought Form:  Coherent  Logical   Insight:    Good     Diagnoses:  1. NATHALIE (generalized anxiety disorder)        Collateral Reports Completed:  Routed note to PCP    Plan: (Homework, other):  Patient was given information about behavioral services and encouraged to schedule a follow up appointment with the clinic [facility] in 2 weeks.  He was also given information about mental health symptoms and treatment options  and strategies to more effectively manage anxiety.  CD Recommendations: abstain from alcohol use.  Shawn Ullrich [facility], [facility]      ______________________________________________________________________    Integrated Primary Care Behavioral Health Treatment Plan    Patient's Name: Bertin Mcgill  YOB: 1980    Date of Creation: 11/22/22  Date Treatment Plan Last Reviewed/Revised: 11/22/22    DSM5 Diagnoses: 300.02 (F41.1) Generalized Anxiety Disorder or Adjustment Disorders  309.0 (F43.21) With depressed mood  Psychosocial / Contextual Factors: Single, , heterosexual male, resides in his own home; working from home  PROMIS (reviewed every 90 days): pt completed on 7/29/22    Referral / Collaboration:  Referral to another professional/service is not indicated at this time.; Lj followed through  "with medication evaluation, attempted medication and stopped medication    Anticipated number of session for this episode of care: 6  Anticipation frequency of session: Every other week  Anticipated Duration of each session: 38-52 minutes  Treatment plan will be reviewed in 90 days or when goals have been changed.       MeasurableTreatment Goal(s) related to diagnosis / functional impairment(s)  Goal 1: Patient will reduce anxiety and improve mood by improving health and relationship with parents.     I will know I've met my goal when:  Lj doesn't have to take 8 pills per day; sleeping more than 7-8 hours on weeknights, exercising at least 4 days per week, eating healthy dinner more days of the week than not, and decreased conflict with parents.        Sleep is his number one priority:  -not enough sleep; I can't fall asleep until past 2 o'clock; wakes around 8 am    -falling asleep at 2:30 am in front of the TV, wakes in front of the TV around 5 am, and then goes to the bedroom  -sleep goals: \"I would like to get more sleep during the week\"; catches up on sleep during the weekends  -potential strategy: make \"slight adjustments\"    -since starting niacin, Lj is consistently falling asleep after 2 am, before 2:30 am; previously didn't know when he'd fall asleep, therefore watched TV until got tired   -since Lj knows he will now fall asleep shortly after 2 am, he will be in bed before 2 am, rather than on the couch in front of the TV   -if this is successful, Lj will continue to adjustment bedtime by earlier increments     Diet:  -switched from salad to roasted vegetable medley for lunch  -still needs to work on dinner  -in the past was successful with having fish, brown rice and vegetable for dinner and believe he can return to this dinner  -barrier: takes time and effort, but once he gets into the habit, he can continue with it  -when will he make this dinner change? After Thanksgiving he will start making " "healthy dinner    Workout/Exercise:  -progress: \"I'm seeing the results\" from the increased walking (over the summer)  -as seasons change, he will shift to using treadmill and elliptical   -goal: 45 mins to 1 hour of treadmill or elliptical 4-5 days per week   -Push Ups: currently 40 in a set; 6-8 sets per day  -goal: \"maybe bump up\" to 45 and then if successful, would like to plateau at 50 per set     Medications:  -continues to take 8 pills per day  -Goal: reduce amount of daily pills      Interaction with parents (father):   -\"I feel much more aware of when I'm [getting frustrated]\"  -progress: able to recognize and step away from conflict/escalation  -goal: decreased conflict    Objective #A (Patient Action)    Patient will exercise at least 4 days per week.  Status: New - Date: 11/22/22     Intervention(s)  Therapist will assign homework to monitor level of exercise and mood on a daily basis    Objective #B  Patient will use effective sleep hygiene strategies.  Status: New - Date: 11/22/22     Intervention(s)  Therapist will teach sleep hygiene strategies.    Objective #C  Patient will increase mood and confidence by following through with diet.  Status: Continued - Date(s):11/22/22      Intervention(s)  Therapist will prompt patient to share how maintaining diet impacts mood/anxiety.    Objective D:  Patient will learn and practice at least one interpersonal skill  Status: new: 11/22/22  Intervention:  Therapist will teach interpersonal skills        Patient has reviewed and agreed to the above plan.      Shawn G. Ullrich, [facility]  November 22, 2022  "

## 2022-12-15 ASSESSMENT — ANXIETY QUESTIONNAIRES
GAD7 TOTAL SCORE: 11
7. FEELING AFRAID AS IF SOMETHING AWFUL MIGHT HAPPEN: SEVERAL DAYS
5. BEING SO RESTLESS THAT IT IS HARD TO SIT STILL: MORE THAN HALF THE DAYS
3. WORRYING TOO MUCH ABOUT DIFFERENT THINGS: MORE THAN HALF THE DAYS
6. BECOMING EASILY ANNOYED OR IRRITABLE: SEVERAL DAYS
IF YOU CHECKED OFF ANY PROBLEMS ON THIS QUESTIONNAIRE, HOW DIFFICULT HAVE THESE PROBLEMS MADE IT FOR YOU TO DO YOUR WORK, TAKE CARE OF THINGS AT HOME, OR GET ALONG WITH OTHER PEOPLE: SOMEWHAT DIFFICULT
1. FEELING NERVOUS, ANXIOUS, OR ON EDGE: MORE THAN HALF THE DAYS
2. NOT BEING ABLE TO STOP OR CONTROL WORRYING: SEVERAL DAYS
4. TROUBLE RELAXING: MORE THAN HALF THE DAYS

## 2022-12-15 ASSESSMENT — PATIENT HEALTH QUESTIONNAIRE - PHQ9: SUM OF ALL RESPONSES TO PHQ QUESTIONS 1-9: 12

## 2022-12-16 ENCOUNTER — VIRTUAL VISIT (OUTPATIENT)
Dept: BEHAVIORAL HEALTH | Facility: CLINIC | Age: 42
End: 2022-12-16
Payer: COMMERCIAL

## 2022-12-16 DIAGNOSIS — F41.1 GAD (GENERALIZED ANXIETY DISORDER): Primary | ICD-10-CM

## 2022-12-16 ASSESSMENT — ANXIETY QUESTIONNAIRES
7. FEELING AFRAID AS IF SOMETHING AWFUL MIGHT HAPPEN: SEVERAL DAYS
2. NOT BEING ABLE TO STOP OR CONTROL WORRYING: SEVERAL DAYS
GAD7 TOTAL SCORE: 11
5. BEING SO RESTLESS THAT IT IS HARD TO SIT STILL: MORE THAN HALF THE DAYS
GAD7 TOTAL SCORE: 11
IF YOU CHECKED OFF ANY PROBLEMS ON THIS QUESTIONNAIRE, HOW DIFFICULT HAVE THESE PROBLEMS MADE IT FOR YOU TO DO YOUR WORK, TAKE CARE OF THINGS AT HOME, OR GET ALONG WITH OTHER PEOPLE: SOMEWHAT DIFFICULT
8. IF YOU CHECKED OFF ANY PROBLEMS, HOW DIFFICULT HAVE THESE MADE IT FOR YOU TO DO YOUR WORK, TAKE CARE OF THINGS AT HOME, OR GET ALONG WITH OTHER PEOPLE?: SOMEWHAT DIFFICULT
4. TROUBLE RELAXING: MORE THAN HALF THE DAYS
GAD7 TOTAL SCORE: 11
7. FEELING AFRAID AS IF SOMETHING AWFUL MIGHT HAPPEN: SEVERAL DAYS
1. FEELING NERVOUS, ANXIOUS, OR ON EDGE: MORE THAN HALF THE DAYS
6. BECOMING EASILY ANNOYED OR IRRITABLE: SEVERAL DAYS
3. WORRYING TOO MUCH ABOUT DIFFERENT THINGS: MORE THAN HALF THE DAYS

## 2022-12-26 DIAGNOSIS — E11.9 TYPE 2 DIABETES MELLITUS WITHOUT COMPLICATION, WITHOUT LONG-TERM CURRENT USE OF INSULIN (H): ICD-10-CM

## 2022-12-26 DIAGNOSIS — I10 BENIGN ESSENTIAL HYPERTENSION: ICD-10-CM

## 2022-12-26 RX ORDER — EMPAGLIFLOZIN 10 MG/1
TABLET, FILM COATED ORAL
Qty: 90 TABLET | Refills: 0 | Status: SHIPPED | OUTPATIENT
Start: 2022-12-26 | End: 2023-04-10

## 2022-12-26 NOTE — TELEPHONE ENCOUNTER
"Last Written Prescription Date:  10/3/22  Last Fill Quantity: 90,  # refills: 0   Last office visit: 10/18/22 with Dr. Flores  Future Office Visit:  2/21/23        Requested Prescriptions   Pending Prescriptions Disp Refills     JARDIANCE 10 MG TABS tablet [Pharmacy Med Name: JARDIANCE 10 MG TABLET] 90 tablet 0     Sig: TAKE 1 TABLET BY MOUTH EVERY DAY       Sodium Glucose Co-Transport Inhibitor Agents Passed - 12/26/2022  3:28 PM        Passed - Patient has documented A1c within the specified period of time.     If HgbA1C is 8 or greater, it needs to be on file within the past 3 months.  If less than 8, must be on file within the past 6 months.     Recent Labs   Lab Test 10/04/22  0925   A1C 7.2*             Passed - No creatinine >1.4 or GFR <45 within the past 12 mos     Recent Labs   Lab Test 07/25/22  1020 08/31/21  1020 11/08/19  0936   GFRESTIMATED >90   < > >90   GFRESTBLACK  --   --  >90    < > = values in this interval not displayed.       Recent Labs   Lab Test 07/25/22  1020   CR 0.85             Passed - Medication is active on med list        Passed - Patient is age 18 or older        Passed - Patient has documented normal Potassium within the last 12 mos.     Recent Labs   Lab Test 07/25/22  1020   POTASSIUM 4.0             Passed - Recent (6 mo) or future (30 days) visit within the authorizing provider's specialty     Patient had office visit in the last 6 months or has a visit in the next 30 days with authorizing provider or within the authorizing provider's specialty.  See \"Patient Info\" tab in inbasket, or \"Choose Columns\" in Meds & Orders section of the refill encounter.               Refills sent  Aimee Delgadillo RN      "

## 2022-12-27 RX ORDER — LOSARTAN POTASSIUM 50 MG/1
TABLET ORAL
Qty: 90 TABLET | Refills: 1 | Status: SHIPPED | OUTPATIENT
Start: 2022-12-27 | End: 2023-06-20

## 2022-12-27 NOTE — TELEPHONE ENCOUNTER
Medication requested: losartan (COZAAR) 50 MG tablet  Last office visit: 10/26/22  [facility] appointments: none  Medication last refilled: 7/14/22; 90 + 1 refill  Last qualifying labs:   BP Readings from Last 3 Encounters:   10/26/22 136/89   04/06/22 122/80   10/14/21 125/83     Component      Latest Ref Rng & Units 7/25/2022   Potassium      3.4 - 5.3 mmol/L 4.0     Component      Latest Ref Rng & Units 7/25/2022   Creatinine      0.66 - 1.25 mg/dL 0.85     Prescription approved per [facility] Refill Protocol.    Bill RAMOS, RN  12/27/22 2:28 PM

## 2022-12-28 ASSESSMENT — PATIENT HEALTH QUESTIONNAIRE - PHQ9
SUM OF ALL RESPONSES TO PHQ QUESTIONS 1-9: 7
10. IF YOU CHECKED OFF ANY PROBLEMS, HOW DIFFICULT HAVE THESE PROBLEMS MADE IT FOR YOU TO DO YOUR WORK, TAKE CARE OF THINGS AT HOME, OR GET ALONG WITH OTHER PEOPLE: SOMEWHAT DIFFICULT
SUM OF ALL RESPONSES TO PHQ QUESTIONS 1-9: 7

## 2022-12-28 NOTE — PROGRESS NOTES
[facility]  December 29, 2022    Behavioral Health Clinician Progress Note    Patient Name: Bertin Mcgill           Service Type:  Individual      Service Location:    In-Clinic     Session Start Time: 1:58 pm  Session End Time: 2:56 pm      Session Length: 53 - 60      Attendees: Patient      Service Modality:  Face to Face      Visit Activities (Refresh list every visit): [facility] Only    Diagnostic Assessment Date: 4/14/22  Treatment Plan Review Date: 2/22/23  CGI Review Date:1/28/23  Promis 10 Review Date: 3/15/23    Clinical Global Impressions  First:  Considering your total clinical experience with this particular patient population, how severe are the patient's symptoms at this time?: 5 (10/28/2022 12:11 PM)    Most recent:  Compared to the patient's condition at the START of treatment, this patient's condition is: 3 (10/28/2022 12:11 PM)      See Flowsheets for today's PHQ-9 and NATHALIE-7 results  Previous PHQ-9:   PHQ-9 SCORE 12/15/2022 12/16/2022 12/28/2022   PHQ-9 Total Score MyChart - 12 (Moderate depression) 7 (Mild depression)   PHQ-9 Total Score 12 12 7     Previous NATHALIE-7:   NATHALIE-7 SCORE 11/20/2022 12/15/2022 12/16/2022   Total Score 12 (moderate anxiety) - 11 (moderate anxiety)   Total Score 12 11 11       KRISTINE LEVEL:  No flowsheet data found.    DATA  Extended Session (60+ minutes): No  Interactive Complexity: No  Crisis: No  [facility] Patient: No    Treatment Objective(s) Addressed in This Session:  Target Behavior(s): increased social activity/decreased isolation    Anxiety: will experience a reduction in anxiety, will develop more effective coping skills to manage anxiety symptoms, will develop healthy cognitive patterns and beliefs and will increase ability to function adaptively    Current Stressors / Issues:    Due to Lj's self-reported decreased use of coping skills during previous appt, as well as Lj reporting workload will ramp up starting in January, IRASEMA and Lj focused on  "consistent use of effective coping strategies to more effectively manage stress and anxiety for the long-term.  Lj reported he has not returned to consistent exercise, but he has improved sleep, which has been helpful.  Lj stated, \"I've been getting more sleep\" with some going to bed rather than falling asleep in the chair.  Lj observed having a little more patience with others recently, including around family during the holidays, and attributed it to improved sleep.  Lj will attempt to make some of the changes to sleep routine more habitual.  During past periods of high work stress, Lj experienced thoughts/worries about work even outside of work hours and has had thoughts about quitting his job.  When reviewing past periods of effectively managing work stress,  Lj reported during the last summer/early Fall, he was able to not think about work most nights for at least 1-2 hours when he was walking.  Lj affirmed that getting away from work for short periods of time on a daily basis was helpful, so he is open to returning to that strategy again.  Lj reported he could not only get away from work by walking, but reading is also a way for him to recharge as well.  Finally, to increase motivation to follow through with healthy coping strategies, [facility] reflected how exercise is not only helpful for stress management, but will also get him closer to his goal of reducing the number of medications he must take.  Lj affirmed that when he is successfully managing work stress he has no thoughts of quitting his job.     Progress on Treatment Objective(s) / Homework:  Minimal progress - ACTION (Actively working towards change); Intervened by reinforcing change plan / affirming steps taken    Motivational Interviewing    MI Intervention: Co-Developed Goal: reduce anxiety, Expressed Empathy/Understanding, Supported Autonomy, Collaboration, Evocation, Open-ended questions, Reflections: simple and complex and Change " talk (evoked)     Change Talk Expressed by the Patient: Reasons to change    Provider Response to Change Talk: E - Evoked more info from patient about behavior change and R - Reflected patient's change talk      Solution-Focused Therapy    Explore patterns in patient's behaviors and relationships and discuss options for new behaviors    Explore new options for problem-solving, communication, managing stress, etc.    Care Plan review completed: Yes    Medication Review:  No current psychiatric medications prescribed    Medication Compliance:  NA    Changes in Health Issues:   None reported    Chemical Use Review:   Substance Use: Problem use continues with no change since last session, Stage of Change: Contemplation        Tobacco Use: No change in amount of tobacco use since last session.  Contemplation    Assessment: Current Emotional / Mental Status (status of significant symptoms):  Risk status (Self / Other harm or suicidal ideation)  Patient has had a history of suicidal ideation: passive SI in 2003; none since  Patient denies current fears or concerns for personal safety.  Patient denies current or recent suicidal ideation or behaviors.  Patient denies current or recent homicidal ideation or behaviors.  Patient denies current or recent self injurious behavior or ideation.  Patient denies other safety concerns.  A safety and risk management plan has not been developed at this time, however patient was encouraged to call [facility] / [facility] should there be a change in any of these risk factors.    Appearance:   Appropriate   Eye Contact:   Fair   Psychomotor Behavior: Normal   Attitude:   Cooperative   Orientation:   All  Speech   Rate / Production: Normal/ Responsive   Volume:  Normal   Mood:    Anxious   Affect:    Worrisome   Thought Content:  Clear    Thought Form:  Coherent  Logical   Insight:    Good     Diagnoses:  1. NATHALIE (generalized anxiety disorder)        Collateral Reports Completed:  Routed note to  PCP    Plan: (Homework, other):  Patient was given information about behavioral services and encouraged to schedule a follow up appointment with the clinic [facility] in 2 weeks.  He was also given information about mental health symptoms and treatment options  and strategies to more effectively manage anxiety.  CD Recommendations: abstain from alcohol use.  Shawn Ullrich [facility], [facility]      ______________________________________________________________________    Integrated Primary Care Behavioral Health Treatment Plan    Patient's Name: Bertin Mcgill  YOB: 1980    Date of Creation: 11/22/22  Date Treatment Plan Last Reviewed/Revised: 11/22/22    DSM5 Diagnoses: 300.02 (F41.1) Generalized Anxiety Disorder or Adjustment Disorders  309.0 (F43.21) With depressed mood  Psychosocial / Contextual Factors: Single, , heterosexual male, resides in his own home; working from home  PROMIS (reviewed every 90 days): pt completed on 7/29/22    Referral / Collaboration:  Referral to another professional/service is not indicated at this time.; Lj followed through with medication evaluation, attempted medication and stopped medication    Anticipated number of session for this episode of care: 6  Anticipation frequency of session: Every other week  Anticipated Duration of each session: 38-52 minutes  Treatment plan will be reviewed in 90 days or when goals have been changed.       MeasurableTreatment Goal(s) related to diagnosis / functional impairment(s)  Goal 1: Patient will reduce anxiety and improve mood by improving health and relationship with parents.     I will know I've met my goal when:  Lj doesn't have to take 8 pills per day; sleeping more than 7-8 hours on weeknights, exercising at least 4 days per week, eating healthy dinner more days of the week than not, and decreased conflict with parents.        Sleep is his number one priority:  -not enough sleep; I can't fall asleep until past 2 o'clock; wakes  "around 8 am    -falling asleep at 2:30 am in front of the TV, wakes in front of the TV around 5 am, and then goes to the bedroom  -sleep goals: \"I would like to get more sleep during the week\"; catches up on sleep during the weekends  -potential strategy: make \"slight adjustments\"    -since starting niacin, Lj is consistently falling asleep after 2 am, before 2:30 am; previously didn't know when he'd fall asleep, therefore watched TV until got tired   -since Lj knows he will now fall asleep shortly after 2 am, he will be in bed before 2 am, rather than on the couch in front of the TV   -if this is successful, Lj will continue to adjustment bedtime by earlier increments     Diet:  -switched from salad to roasted vegetable medley for lunch  -still needs to work on dinner  -in the past was successful with having fish, brown rice and vegetable for dinner and believe he can return to this dinner  -barrier: takes time and effort, but once he gets into the habit, he can continue with it  -when will he make this dinner change? After Thanksgiving he will start making healthy dinner    Workout/Exercise:  -progress: \"I'm seeing the results\" from the increased walking (over the summer)  -as seasons change, he will shift to using treadmill and elliptical   -goal: 45 mins to 1 hour of treadmill or elliptical 4-5 days per week   -Push Ups: currently 40 in a set; 6-8 sets per day  -goal: \"maybe bump up\" to 45 and then if successful, would like to plateau at 50 per set     Medications:  -continues to take 8 pills per day  -Goal: reduce amount of daily pills      Interaction with parents (father):   -\"I feel much more aware of when I'm [getting frustrated]\"  -progress: able to recognize and step away from conflict/escalation  -goal: decreased conflict    Objective #A (Patient Action)    Patient will exercise at least 4 days per week.  Status: New - Date: 11/22/22     Intervention(s)  Therapist will assign homework to monitor " level of exercise and mood on a daily basis    Objective #B  Patient will use effective sleep hygiene strategies.  Status: New - Date: 11/22/22     Intervention(s)  Therapist will teach sleep hygiene strategies.    Objective #C  Patient will increase mood and confidence by following through with diet.  Status: Continued - Date(s):11/22/22      Intervention(s)  Therapist will prompt patient to share how maintaining diet impacts mood/anxiety.    Objective D:  Patient will learn and practice at least one interpersonal skill  Status: new: 11/22/22  Intervention:  Therapist will teach interpersonal skills        Patient has reviewed and agreed to the above plan.      Shawn G. Ullrich, [facility]  November 22, 2022

## 2022-12-29 ENCOUNTER — OFFICE VISIT (OUTPATIENT)
Dept: BEHAVIORAL HEALTH | Facility: CLINIC | Age: 42
End: 2022-12-29
Payer: COMMERCIAL

## 2022-12-29 DIAGNOSIS — F41.1 GAD (GENERALIZED ANXIETY DISORDER): Primary | ICD-10-CM

## 2023-01-12 ENCOUNTER — MYC MEDICAL ADVICE (OUTPATIENT)
Dept: ENDOCRINOLOGY | Facility: CLINIC | Age: 43
End: 2023-01-12

## 2023-01-12 ASSESSMENT — ANXIETY QUESTIONNAIRES
5. BEING SO RESTLESS THAT IT IS HARD TO SIT STILL: MORE THAN HALF THE DAYS
1. FEELING NERVOUS, ANXIOUS, OR ON EDGE: MORE THAN HALF THE DAYS
IF YOU CHECKED OFF ANY PROBLEMS ON THIS QUESTIONNAIRE, HOW DIFFICULT HAVE THESE PROBLEMS MADE IT FOR YOU TO DO YOUR WORK, TAKE CARE OF THINGS AT HOME, OR GET ALONG WITH OTHER PEOPLE: SOMEWHAT DIFFICULT
7. FEELING AFRAID AS IF SOMETHING AWFUL MIGHT HAPPEN: SEVERAL DAYS
2. NOT BEING ABLE TO STOP OR CONTROL WORRYING: MORE THAN HALF THE DAYS
3. WORRYING TOO MUCH ABOUT DIFFERENT THINGS: MORE THAN HALF THE DAYS
4. TROUBLE RELAXING: MORE THAN HALF THE DAYS
GAD7 TOTAL SCORE: 12
6. BECOMING EASILY ANNOYED OR IRRITABLE: SEVERAL DAYS
7. FEELING AFRAID AS IF SOMETHING AWFUL MIGHT HAPPEN: SEVERAL DAYS
GAD7 TOTAL SCORE: 12
GAD7 TOTAL SCORE: 12
8. IF YOU CHECKED OFF ANY PROBLEMS, HOW DIFFICULT HAVE THESE MADE IT FOR YOU TO DO YOUR WORK, TAKE CARE OF THINGS AT HOME, OR GET ALONG WITH OTHER PEOPLE?: SOMEWHAT DIFFICULT

## 2023-01-12 ASSESSMENT — PATIENT HEALTH QUESTIONNAIRE - PHQ9
SUM OF ALL RESPONSES TO PHQ QUESTIONS 1-9: 14
SUM OF ALL RESPONSES TO PHQ QUESTIONS 1-9: 14
10. IF YOU CHECKED OFF ANY PROBLEMS, HOW DIFFICULT HAVE THESE PROBLEMS MADE IT FOR YOU TO DO YOUR WORK, TAKE CARE OF THINGS AT HOME, OR GET ALONG WITH OTHER PEOPLE: SOMEWHAT DIFFICULT

## 2023-01-12 NOTE — PROGRESS NOTES
"[facility]  January 13, 2023    Behavioral Health Clinician Progress Note    Patient Name: Bertin Mcgill           Service Type:  Individual      Service Location:    In-Clinic     Session Start Time: 3:06 pm-3:44pm Session End Time: 4:04 pm-4:22 pm      Session Length: 53 - 60      Attendees: Patient      Service Modality:  Face to Face      Visit Activities (Refresh list every visit): [facility] Only    Diagnostic Assessment Date: 4/14/22  Treatment Plan Review Date: 2/22/23  CGI Review Date:1/28/23  Promis 10 Review Date: 3/15/23    Clinical Global Impressions  First:  Considering your total clinical experience with this particular patient population, how severe are the patient's symptoms at this time?: 5 (10/28/2022 12:11 PM)    Most recent:  Compared to the patient's condition at the START of treatment, this patient's condition is: 3 (10/28/2022 12:11 PM)      See Flowsheets for today's PHQ-9 and NATHALIE-7 results  Previous PHQ-9:   PHQ-9 SCORE 12/16/2022 12/28/2022 1/12/2023   PHQ-9 Total Score MyChart 12 (Moderate depression) 7 (Mild depression) 14 (Moderate depression)   PHQ-9 Total Score 12 7 14     Previous NATHALIE-7:   NATHALIE-7 SCORE 12/15/2022 12/16/2022 1/12/2023   Total Score - 11 (moderate anxiety) 12 (moderate anxiety)   Total Score 11 11 12       KRISTINE LEVEL:  No flowsheet data found.    DATA  Extended Session (60+ minutes): No  Interactive Complexity: No  Crisis: No  [facility] Patient: No    Treatment Objective(s) Addressed in This Session:  Target Behavior(s): increased social activity/decreased isolation    Anxiety: will experience a reduction in anxiety, will develop more effective coping skills to manage anxiety symptoms, will develop healthy cognitive patterns and beliefs and will increase ability to function adaptively    Current Stressors / Issues:    Lj presented as tired and reported being fatigued, explaining this is one of the busiest times of years, \"its a lot of really long days, some " "working on the weekends\".  IRASEMA and Lj explored topic of work related stress management, he shared one of the barriers to managing workload is he worries about delegating work to staff, stating \"I worry about overworking my employee\".  Lj shared the worry is also problematic, b/c he doesn't trust other people to do the job (to his standards), therefore resulting in not taking days off and working longer hours.   Lj recognized how this impacts him and then stated, \"I have to patient with the training (of others)\" and \"I gotta trust that other people can do the job\".    Finally, IRASEMA and Lj returned to proactive manage of health/stress, including exercise, diet, and sleep.  Lj reported no progress in these areas, possibly regression, sharing no consistent exercise, some poor dietary choices, and poor sleep.  Lj acknowledged how fatigue and exhaustion lead to difficulty with decision making, which then negatively impacts health choices.  Lj would like to return to better health choices, affirming sleep is probably most important, stating \"its better\" and he's more rested when falls asleep in his bed b/c then he uses his CPAP (rather than falling asleep in his chair).       Progress on Treatment Objective(s) / Homework:  Worsening - CONTEMPLATION (Considering change and yet undecided); Intervened by assessing the negative and positive thinking (ambivalence) about behavior change    Motivational Interviewing    MI Intervention: Co-Developed Goal: reduce anxiety, Expressed Empathy/Understanding, Supported Autonomy, Collaboration, Evocation, Open-ended questions, Reflections: simple and complex and Change talk (evoked)     Change Talk Expressed by the Patient: Reasons to change    Provider Response to Change Talk: E - Evoked more info from patient about behavior change and R - Reflected patient's change talk      Solution-Focused Therapy    Explore patterns in patient's behaviors and relationships and discuss " options for new behaviors    Explore new options for problem-solving, communication, managing stress, etc.    Care Plan review completed: Yes    Medication Review:  No current psychiatric medications prescribed    Medication Compliance:  NA    Changes in Health Issues:    None reported    Chemical Use Review:   Substance Use: Problem use continues with no change since last session, Stage of Change: Contemplation        Tobacco Use: No change in amount of tobacco use since last session.  Contemplation    Assessment: Current Emotional / Mental Status (status of significant symptoms):  Risk status (Self / Other harm or suicidal ideation)  Patient has had a history of suicidal ideation: passive SI in 2003; none since  Patient denies current fears or concerns for personal safety.  Patient denies current or recent suicidal ideation or behaviors.  Patient denies current or recent homicidal ideation or behaviors.  Patient denies current or recent self injurious behavior or ideation.  Patient denies other safety concerns.  A safety and risk management plan has not been developed at this time, however patient was encouraged to call [facility] / [facility] should there be a change in any of these risk factors.    Appearance:   Appropriate   Eye Contact:   Fair   Psychomotor Behavior: Normal   Attitude:   Cooperative   Orientation:   All  Speech   Rate / Production: Normal/ Responsive   Volume:  Normal   Mood:    Anxious , low, fatigued    Affect:    tired   Thought Content:  Clear    Thought Form:  Coherent  Logical   Insight:    Good     Diagnoses:  1. NATHALIE (generalized anxiety disorder)        Collateral Reports Completed:  Routed note to PCP    Plan: (Homework, other):  Patient was given information about behavioral services and encouraged to schedule a follow up appointment with the clinic [facility] in 2 weeks.  He was also given information about mental health symptoms and treatment options  and strategies to more effectively manage  "anxiety.  CD Recommendations: abstain from alcohol use.  Shawn Ullrich [facility], [facility]      ______________________________________________________________________    Integrated Primary Care Behavioral Health Treatment Plan    Patient's Name: Bertin Mcgill  YOB: 1980    Date of Creation: 11/22/22  Date Treatment Plan Last Reviewed/Revised: 11/22/22    DSM5 Diagnoses: 300.02 (F41.1) Generalized Anxiety Disorder or Adjustment Disorders  309.0 (F43.21) With depressed mood  Psychosocial / Contextual Factors: Single, , heterosexual male, resides in his own home; working from home  PROMIS (reviewed every 90 days): pt completed on 7/29/22    Referral / Collaboration:  Referral to another professional/service is not indicated at this time.; Lj followed through with medication evaluation, attempted medication and stopped medication    Anticipated number of session for this episode of care: 6  Anticipation frequency of session: Every other week  Anticipated Duration of each session: 38-52 minutes  Treatment plan will be reviewed in 90 days or when goals have been changed.       MeasurableTreatment Goal(s) related to diagnosis / functional impairment(s)  Goal 1: Patient will reduce anxiety and improve mood by improving health and relationship with parents.     I will know I've met my goal when:  jL doesn't have to take 8 pills per day; sleeping more than 7-8 hours on weeknights, exercising at least 4 days per week, eating healthy dinner more days of the week than not, and decreased conflict with parents.        Sleep is his number one priority:  -not enough sleep; I can't fall asleep until past 2 o'clock; wakes around 8 am    -falling asleep at 2:30 am in front of the TV, wakes in front of the TV around 5 am, and then goes to the bedroom  -sleep goals: \"I would like to get more sleep during the week\"; catches up on sleep during the weekends  -potential strategy: make \"slight adjustments\"    -since starting " "binh, Lj is consistently falling asleep after 2 am, before 2:30 am; previously didn't know when he'd fall asleep, therefore watched TV until got tired   -since Lj knows he will now fall asleep shortly after 2 am, he will be in bed before 2 am, rather than on the couch in front of the TV   -if this is successful, Lj will continue to adjustment bedtime by earlier increments     Diet:  -switched from salad to roasted vegetable medley for lunch  -still needs to work on dinner  -in the past was successful with having fish, brown rice and vegetable for dinner and believe he can return to this dinner  -barrier: takes time and effort, but once he gets into the habit, he can continue with it  -when will he make this dinner change? After Thanksgiving he will start making healthy dinner    Workout/Exercise:  -progress: \"I'm seeing the results\" from the increased walking (over the summer)  -as seasons change, he will shift to using treadmill and elliptical   -goal: 45 mins to 1 hour of treadmill or elliptical 4-5 days per week   -Push Ups: currently 40 in a set; 6-8 sets per day  -goal: \"maybe bump up\" to 45 and then if successful, would like to plateau at 50 per set     Medications:  -continues to take 8 pills per day  -Goal: reduce amount of daily pills      Interaction with parents (father):   -\"I feel much more aware of when I'm [getting frustrated]\"  -progress: able to recognize and step away from conflict/escalation  -goal: decreased conflict    Objective #A (Patient Action)    Patient will exercise at least 4 days per week.  Status: New - Date: 11/22/22     Intervention(s)  Therapist will assign homework to monitor level of exercise and mood on a daily basis    Objective #B  Patient will use effective sleep hygiene strategies.  Status: New - Date: 11/22/22     Intervention(s)  Therapist will teach sleep hygiene strategies.    Objective #C  Patient will increase mood and confidence by following through with " diet.  Status: Continued - Date(s):11/22/22      Intervention(s)  Therapist will prompt patient to share how maintaining diet impacts mood/anxiety.    Objective D:  Patient will learn and practice at least one interpersonal skill  Status: new: 11/22/22  Intervention:  Therapist will teach interpersonal skills        Patient has reviewed and agreed to the above plan.      Shawn G. Ullrich, [facility]  November 22, 2022

## 2023-01-13 ENCOUNTER — OFFICE VISIT (OUTPATIENT)
Dept: BEHAVIORAL HEALTH | Facility: CLINIC | Age: 43
End: 2023-01-13
Payer: COMMERCIAL

## 2023-01-13 DIAGNOSIS — F41.1 GAD (GENERALIZED ANXIETY DISORDER): Primary | ICD-10-CM

## 2023-01-13 DIAGNOSIS — E11.9 TYPE 2 DIABETES MELLITUS WITHOUT COMPLICATION, WITHOUT LONG-TERM CURRENT USE OF INSULIN (H): Primary | ICD-10-CM

## 2023-01-13 DIAGNOSIS — E78.2 MIXED HYPERLIPIDEMIA: ICD-10-CM

## 2023-01-14 ENCOUNTER — HEALTH MAINTENANCE LETTER (OUTPATIENT)
Age: 43
End: 2023-01-14

## 2023-01-25 NOTE — PROGRESS NOTES
[facility]  January 27, 2023    Behavioral Health Clinician Progress Note    Patient Name: Bertin Mcgill           Service Type:  Individual      Service Location:    Telehealth; see below     Session Start Time: 1:31 pm Session End Time: 2:29 pm      Session Length: 53 - 60      Attendees: Patient      Service Modality:  Telehealth, via video      Visit Activities (Refresh list every visit): [facility] Only    Telemedicine Visit: The patient's condition can be safely assessed and treated via synchronous audio and visual telemedicine encounter.      Reason for Telemedicine Visit: Patient has requested telehealth visit    Originating Site (Patient Location): Patient's home        Distant Location (provider location):  On-site    Consent:  The patient/guardian has verbally consented to: the potential risks and benefits of telemedicine (video visit) versus in person care; bill my insurance or make self-payment for services provided; and responsibility for payment of non-covered services.     Mode of Communication:  Video Conference via [employer]    As the provider I attest to compliance with applicable laws and regulations related to telemedicine.    Diagnostic Assessment Date: 4/14/22  Treatment Plan Review Date: 2/22/23  CGI Review Date:4/27/23  Promis 10 Review Date: 3/15/23    Clinical Global Impressions  First:  Considering your total clinical experience with this particular patient population, how severe are the patient's symptoms at this time?: 4 (1/27/2023  3:10 PM)    Most recent:  Compared to the patient's condition at the START of treatment, this patient's condition is: 4 (1/27/2023  3:10 PM)      See Flowsheets for today's PHQ-9 and NATHALIE-7 results  Previous PHQ-9:   PHQ-9 SCORE 12/28/2022 1/12/2023 1/26/2023   PHQ-9 Total Score MyChart 7 (Mild depression) 14 (Moderate depression) 15 (Moderately severe depression)   PHQ-9 Total Score 7 14 15     Previous NATHALIE-7:   NATHALIE-7 SCORE 12/16/2022  "1/12/2023 1/26/2023   Total Score 11 (moderate anxiety) 12 (moderate anxiety) 12 (moderate anxiety)   Total Score 11 12 12       KRISTINE LEVEL:  No flowsheet data found.    DATA  Extended Session (60+ minutes): No  Interactive Complexity: No  Crisis: No  [facility] Patient: No    Treatment Objective(s) Addressed in This Session:  Target Behavior(s): increased social activity/decreased isolation    Anxiety: will experience a reduction in anxiety, will develop more effective coping skills to manage anxiety symptoms, will develop healthy cognitive patterns and beliefs and will increase ability to function adaptively    Current Stressors / Issues:    Lj presented as stressed with low mood and decreased energy. Lj reported work continues to be busy, stating \"the quantity (of work) is pretty high\".  In addition, Lj reported he was informed this morning that he made a rather large mistake at work and \"Its weighing on me...the morning was tough\".  Lj reported decreased concentration and ruminating on the mistake, but eventually he tried to find something he could do correctly in order to build his confidence.  [facility] validated his experience, reflected the high stress load and inquired how is he managing the workload?   Lj stated he's\"finding things to do to decompress\", but most nights he's so \"exhausted\", he ends up doing nothing.  Lj reported even on days when he wants to exercise, by evening he's so tired he doesn't exercise, which then makes him even more frustrated.  Lj reported no exercise, outside of shoveling, since previous appt, and even his push up routine has decreased in frequency and quantity.  Lj even noted he doesn't want to do tasks around the home due to no energy and little motivation.  [facility] used MI interventions to focus conversation toward to sleep, decrease feeling stuck, and increase motivation to take healthy action.  Lj reported he's been mostly falling asleep in his chair, which leads to waking " "at 4-6 am, going to his bed and sleeping for a couple more hours, but never using cpap. [facility] developed discrepancy by reflecting the cpap helps with sleep and energy, but he doesn't use it?  Lj stated, \"I'm undisciplined\" and noted he will use it if he goes to his bed at normal bed time.  [facility] and Lj problem-solved ways to assist Lj going to bed at 2 am (which will lead to increase cpap use), Lj stated he could take his book to bed, rather than reading in his chair?  Lj could set an alarm in his living area for 2:30 am, so if he does fall asleep in his chair, he will be woken up and can then go to bed.  Or he could set an alarm for 2 am in his bedroom, so he has to walk to his bedroom to shut off the alarm at 2am, and then he can just go to bed       Progress on Treatment Objective(s) / Homework:  No improvement - CONTEMPLATION (Considering change and yet undecided); Intervened by assessing the negative and positive thinking (ambivalence) about behavior change    Motivational Interviewing    MI Intervention: Co-Developed Goal: reduce anxiety, Expressed Empathy/Understanding, Supported Autonomy, Collaboration, Evocation, Open-ended questions, Reflections: simple and complex and Change talk (evoked)     Change Talk Expressed by the Patient: Reasons to change    Provider Response to Change Talk: E - Evoked more info from patient about behavior change and R - Reflected patient's change talk      Solution-Focused Therapy    Explore patterns in patient's behaviors and relationships and discuss options for new behaviors    Explore new options for problem-solving, communication, managing stress, etc.    Care Plan review completed: Yes    Medication Review:  No current psychiatric medications prescribed    Medication Compliance:  NA    Changes in Health Issues:    None reported    Chemical Use Review:   Substance Use: Problem use continues with no change since last session, Stage of Change: Contemplation   "      Tobacco Use: No change in amount of tobacco use since last session.  Contemplation    Assessment: Current Emotional / Mental Status (status of significant symptoms):  Risk status (Self / Other harm or suicidal ideation)  Patient has had a history of suicidal ideation: passive SI in 2003; none since  Patient denies current fears or concerns for personal safety.  Patient denies current or recent suicidal ideation or behaviors.  Patient denies current or recent homicidal ideation or behaviors.  Patient denies current or recent self injurious behavior or ideation.  Patient denies other safety concerns.  A safety and risk management plan has not been developed at this time, however patient was encouraged to call [place] [phone] should there be a change in any of these risk factors.    Appearance:   Appropriate   Eye Contact:   Fair   Psychomotor Behavior: Normal   Attitude:   Cooperative   Orientation:   All  Speech   Rate / Production: Normal/ Responsive   Volume:  Normal   Mood:    Anxious , low    Affect:    tired   Thought Content:  Clear    Thought Form:  Coherent  Logical   Insight:    Good     Diagnoses:  1. NATHALIE (generalized anxiety disorder)        Collateral Reports Completed:  Routed note to PCP    Plan: (Homework, other):  Patient was given information about behavioral services and encouraged to schedule a follow up appointment with the clinic [facility] in 2 weeks.  He was also given information about mental health symptoms and treatment options  and strategies to more effectively manage anxiety.  CD Recommendations: abstain from alcohol use.  Shawn Ullrich LICSW, [facility]      ______________________________________________________________________    Integrated Primary Care Behavioral Health Treatment Plan    Patient's Name: Bertin Mcgill  YOB: 1980    Date of Creation: 11/22/22  Date Treatment Plan Last Reviewed/Revised: 11/22/22    DSM5 Diagnoses: 300.02 (F41.1) Generalized Anxiety Disorder  "or Adjustment Disorders  309.0 (F43.21) With depressed mood  Psychosocial / Contextual Factors: Single, , heterosexual male, resides in his own home; working from home  PROMIS (reviewed every 90 days): pt completed on 7/29/22    Referral / Collaboration:  Referral to another professional/service is not indicated at this time.; Lj followed through with medication evaluation, attempted medication and stopped medication    Anticipated number of session for this episode of care: 6  Anticipation frequency of session: Every other week  Anticipated Duration of each session: 38-52 minutes  Treatment plan will be reviewed in 90 days or when goals have been changed.       MeasurableTreatment Goal(s) related to diagnosis / functional impairment(s)  Goal 1: Patient will reduce anxiety and improve mood by improving health and relationship with parents.     I will know I've met my goal when:  Lj doesn't have to take 8 pills per day; sleeping more than 7-8 hours on weeknights, exercising at least 4 days per week, eating healthy dinner more days of the week than not, and decreased conflict with parents.        Sleep is his number one priority:  -not enough sleep; I can't fall asleep until past 2 o'clock; wakes around 8 am    -falling asleep at 2:30 am in front of the TV, wakes in front of the TV around 5 am, and then goes to the bedroom  -sleep goals: \"I would like to get more sleep during the week\"; catches up on sleep during the weekends  -potential strategy: make \"slight adjustments\"    -since starting niacin, Lj is consistently falling asleep after 2 am, before 2:30 am; previously didn't know when he'd fall asleep, therefore watched TV until got tired   -since Lj knows he will now fall asleep shortly after 2 am, he will be in bed before 2 am, rather than on the couch in front of the TV   -if this is successful, Lj will continue to adjustment bedtime by earlier increments     Diet:  -switched from salad to roasted " "vegetable medley for lunch  -still needs to work on dinner  -in the past was successful with having fish, brown rice and vegetable for dinner and believe he can return to this dinner  -barrier: takes time and effort, but once he gets into the habit, he can continue with it  -when will he make this dinner change? After Thanksgiving he will start making healthy dinner    Workout/Exercise:  -progress: \"I'm seeing the results\" from the increased walking (over the summer)  -as seasons change, he will shift to using treadmill and elliptical   -goal: 45 mins to 1 hour of treadmill or elliptical 4-5 days per week   -Push Ups: currently 40 in a set; 6-8 sets per day  -goal: \"maybe bump up\" to 45 and then if successful, would like to plateau at 50 per set     Medications:  -continues to take 8 pills per day  -Goal: reduce amount of daily pills      Interaction with parents (father):   -\"I feel much more aware of when I'm [getting frustrated]\"  -progress: able to recognize and step away from conflict/escalation  -goal: decreased conflict    Objective #A (Patient Action)    Patient will exercise at least 4 days per week.  Status: New - Date: 11/22/22     Intervention(s)  Therapist will assign homework to monitor level of exercise and mood on a daily basis    Objective #B  Patient will use effective sleep hygiene strategies.  Status: New - Date: 11/22/22     Intervention(s)  Therapist will teach sleep hygiene strategies.    Objective #C  Patient will increase mood and confidence by following through with diet.  Status: Continued - Date(s):11/22/22      Intervention(s)  Therapist will prompt patient to share how maintaining diet impacts mood/anxiety.    Objective D:  Patient will learn and practice at least one interpersonal skill  Status: new: 11/22/22  Intervention:  Therapist will teach interpersonal skills        Patient has reviewed and agreed to the above plan.      Shawn G. Ullrich, [facility]  November 22, 2022  "

## 2023-01-26 ASSESSMENT — ANXIETY QUESTIONNAIRES
IF YOU CHECKED OFF ANY PROBLEMS ON THIS QUESTIONNAIRE, HOW DIFFICULT HAVE THESE PROBLEMS MADE IT FOR YOU TO DO YOUR WORK, TAKE CARE OF THINGS AT HOME, OR GET ALONG WITH OTHER PEOPLE: SOMEWHAT DIFFICULT
GAD7 TOTAL SCORE: 12
8. IF YOU CHECKED OFF ANY PROBLEMS, HOW DIFFICULT HAVE THESE MADE IT FOR YOU TO DO YOUR WORK, TAKE CARE OF THINGS AT HOME, OR GET ALONG WITH OTHER PEOPLE?: SOMEWHAT DIFFICULT
7. FEELING AFRAID AS IF SOMETHING AWFUL MIGHT HAPPEN: SEVERAL DAYS
GAD7 TOTAL SCORE: 12
3. WORRYING TOO MUCH ABOUT DIFFERENT THINGS: MORE THAN HALF THE DAYS
GAD7 TOTAL SCORE: 12
4. TROUBLE RELAXING: NEARLY EVERY DAY
7. FEELING AFRAID AS IF SOMETHING AWFUL MIGHT HAPPEN: SEVERAL DAYS
2. NOT BEING ABLE TO STOP OR CONTROL WORRYING: SEVERAL DAYS
5. BEING SO RESTLESS THAT IT IS HARD TO SIT STILL: MORE THAN HALF THE DAYS
6. BECOMING EASILY ANNOYED OR IRRITABLE: SEVERAL DAYS
1. FEELING NERVOUS, ANXIOUS, OR ON EDGE: MORE THAN HALF THE DAYS

## 2023-01-26 ASSESSMENT — PATIENT HEALTH QUESTIONNAIRE - PHQ9
SUM OF ALL RESPONSES TO PHQ QUESTIONS 1-9: 15
10. IF YOU CHECKED OFF ANY PROBLEMS, HOW DIFFICULT HAVE THESE PROBLEMS MADE IT FOR YOU TO DO YOUR WORK, TAKE CARE OF THINGS AT HOME, OR GET ALONG WITH OTHER PEOPLE: SOMEWHAT DIFFICULT
SUM OF ALL RESPONSES TO PHQ QUESTIONS 1-9: 15

## 2023-01-27 ENCOUNTER — VIRTUAL VISIT (OUTPATIENT)
Dept: BEHAVIORAL HEALTH | Facility: CLINIC | Age: 43
End: 2023-01-27
Payer: COMMERCIAL

## 2023-01-27 DIAGNOSIS — F41.1 GAD (GENERALIZED ANXIETY DISORDER): Primary | ICD-10-CM

## 2023-02-09 ASSESSMENT — ANXIETY QUESTIONNAIRES
GAD7 TOTAL SCORE: 12
2. NOT BEING ABLE TO STOP OR CONTROL WORRYING: SEVERAL DAYS
7. FEELING AFRAID AS IF SOMETHING AWFUL MIGHT HAPPEN: SEVERAL DAYS
4. TROUBLE RELAXING: MORE THAN HALF THE DAYS
1. FEELING NERVOUS, ANXIOUS, OR ON EDGE: MORE THAN HALF THE DAYS
3. WORRYING TOO MUCH ABOUT DIFFERENT THINGS: MORE THAN HALF THE DAYS
8. IF YOU CHECKED OFF ANY PROBLEMS, HOW DIFFICULT HAVE THESE MADE IT FOR YOU TO DO YOUR WORK, TAKE CARE OF THINGS AT HOME, OR GET ALONG WITH OTHER PEOPLE?: SOMEWHAT DIFFICULT
GAD7 TOTAL SCORE: 12
GAD7 TOTAL SCORE: 12
6. BECOMING EASILY ANNOYED OR IRRITABLE: MORE THAN HALF THE DAYS
IF YOU CHECKED OFF ANY PROBLEMS ON THIS QUESTIONNAIRE, HOW DIFFICULT HAVE THESE PROBLEMS MADE IT FOR YOU TO DO YOUR WORK, TAKE CARE OF THINGS AT HOME, OR GET ALONG WITH OTHER PEOPLE: SOMEWHAT DIFFICULT
5. BEING SO RESTLESS THAT IT IS HARD TO SIT STILL: MORE THAN HALF THE DAYS
7. FEELING AFRAID AS IF SOMETHING AWFUL MIGHT HAPPEN: SEVERAL DAYS

## 2023-02-09 NOTE — PROGRESS NOTES
"[facility]  February 10, 2023    Behavioral Health Clinician Progress Note    Patient Name: Bertin Mcgill           Service Type:  Individual       Service Location:    In-Clinic     Session Start Time: 1:38 pm Session End Time: 2:29 pm      Session Length: 38 - 52      Attendees: Patient      Service Modality:  Face to Face      Visit Activities (Refresh list every visit): [facility] Only    Diagnostic Assessment Date: 4/14/22  Treatment Plan Review Date: 2/22/23  CGI Review Date:4/27/23  Promis 10 Review Date: 3/15/23    Clinical Global Impressions  First:  Considering your total clinical experience with this particular patient population, how severe are the patient's symptoms at this time?: 4 (1/27/2023  3:10 PM)    Most recent:  Compared to the patient's condition at the START of treatment, this patient's condition is: 4 (1/27/2023  3:10 PM)      See Flowsheets for today's PHQ-9 and NATHALIE-7 results  Previous PHQ-9:   PHQ-9 SCORE 1/12/2023 1/26/2023 2/9/2023   PHQ-9 Total Score MyChart 14 (Moderate depression) 15 (Moderately severe depression) 12 (Moderate depression)   PHQ-9 Total Score 14 15 12     Previous NATHALIE-7:   NATHALIE-7 SCORE 1/12/2023 1/26/2023 2/9/2023   Total Score 12 (moderate anxiety) 12 (moderate anxiety) 12 (moderate anxiety)   Total Score 12 12 12       KRISTINE LEVEL:  No flowsheet data found.    DATA  Extended Session (60+ minutes): No  Interactive Complexity: No  Crisis: No  [facility] Patient: No    Treatment Objective(s) Addressed in This Session:  Target Behavior(s): increased social activity/decreased isolation    Anxiety: will experience a reduction in anxiety, will develop more effective coping skills to manage anxiety symptoms, will develop healthy cognitive patterns and beliefs and will increase ability to function adaptively    Current Stressors / Issues:    Despite Lj reporting combination of work and family stress has resulted in \"exhaustion...mentally\", his overall mood was mildly " "improved since previous visit, as evidenced by quicker to smile, slightly more optimistic and open to long-term positive change.  [facility] used MI interventions to elicit change talk, increase motivation for change, and focus on identifying effective strategies.  Lj immediately shared positive change in sleep, resulting from creating new sleep routine.  Lj stated he is now having his first drink of the night around 11 pm, second drink around midnight, and then at 1 am he switches to water and starts reading a book.  Lj reported he reads until he's tired (time may vary, due to interest in book), and goes to bed.  Lj reported he has not fell asleep in his chair for at least one week.  Lj expressed change talk stating,  \"I'm definitely feeling better in the morning...Mornings I'm feeling more refreshed\".  Lj reported his tiredness sets in during the afternoon and early evening.  [facility] inquired about strategies he could use to further improve sleep/energy.  Lj stated he has not yet started using his CPAP machine.  Lj stated he has now installed replacement parts and intends on using CPAP going forward.      As Lj spoke improving sleep, [facility] focused conversation toward health in general.  Lj reported he obtained labs today and A1C lowered to 6.7.  Lj reported his diet has been poor and his activity level/exercise has significantly dropped off, so he is wondering why numbers improved (there's been a med change during this time).  Lj reported when he first was diagnosed with diabetes he made dramatic changes in health, which were effective.  Lj reported he would like to return to improving his diet, increasing his exercise, and obtaining quality sleep.  Lj reported his health goals are not \"numbers\" or weight, but rather how does he feel, his energy, and decreasing the number of medications he has to take.      Progress on Treatment Objective(s) / Homework:  Satisfactory progress - ACTION (Actively " working towards change); Intervened by reinforcing change plan / affirming steps taken    Motivational Interviewing    MI Intervention: Co-Developed Goal: reduce anxiety, Expressed Empathy/Understanding, Supported Autonomy, Collaboration, Evocation, Open-ended questions, Reflections: simple and complex and Change talk (evoked)     Change Talk Expressed by the Patient: Reasons to change    Provider Response to Change Talk: E - Evoked more info from patient about behavior change and R - Reflected patient's change talk      Solution-Focused Therapy    Explore patterns in patient's behaviors and relationships and discuss options for new behaviors    Explore new options for problem-solving, communication, managing stress, etc.    Care Plan review completed: Yes    Medication Review:  No current psychiatric medications prescribed    Medication Compliance:  NA    Changes in Health Issues:    None reported    Chemical Use Review:   Substance Use: Problem use continues with no change since last session, Stage of Change: Contemplation        Tobacco Use: No change in amount of tobacco use since last session.  Contemplation    Assessment: Current Emotional / Mental Status (status of significant symptoms):  Risk status (Self / Other harm or suicidal ideation)  Patient has had a history of suicidal ideation: passive SI in 2003; none since  Patient denies current fears or concerns for personal safety.  Patient denies current or recent suicidal ideation or behaviors.  Patient denies current or recent homicidal ideation or behaviors.  Patient denies current or recent self injurious behavior or ideation.  Patient denies other safety concerns.  A safety and risk management plan has not been developed at this time, however patient was encouraged to call [facility] should there be a change in any of these risk factors.    Appearance:   Appropriate   Eye Contact:   Fair   Psychomotor Behavior: Normal   Attitude:   Cooperative    Orientation:   All  Speech   Rate / Production: Normal/ Responsive   Volume:  Normal   Mood:    tired,     Affect:    congruent with mood ; displayed full range of affect  Thought Content:  Clear    Thought Form:  Coherent  Logical   Insight:    Good     Diagnoses:  1. Generalized anxiety disorder        Collateral Reports Completed:  Routed note to PCP    Plan: (Homework, other):  Patient was given information about behavioral services and encouraged to schedule a follow up appointment with the clinic [facility] in 2 weeks.  He was also given information about mental health symptoms and treatment options  and strategies to more effectively manage anxiety.  CD Recommendations: abstain from alcohol use.  Shawn Ullrich [facility], [facility]      ______________________________________________________________________    Integrated Primary Care Behavioral Health Treatment Plan    Patient's Name: Bertin Mcgill  YOB: 1980    Date of Creation: 11/22/22  Date Treatment Plan Last Reviewed/Revised: 11/22/22    DSM5 Diagnoses: 300.02 (F41.1) Generalized Anxiety Disorder or Adjustment Disorders  309.0 (F43.21) With depressed mood  Psychosocial / Contextual Factors: Single, , heterosexual male, resides in his own home; working from home  PROMIS (reviewed every 90 days): pt completed on 7/29/22    Referral / Collaboration:  Referral to another professional/service is not indicated at this time.; Lj followed through with medication evaluation, attempted medication and stopped medication    Anticipated number of session for this episode of care: 6  Anticipation frequency of session: Every other week  Anticipated Duration of each session: 38-52 minutes  Treatment plan will be reviewed in 90 days or when goals have been changed.       MeasurableTreatment Goal(s) related to diagnosis / functional impairment(s)  Goal 1: Patient will reduce anxiety and improve mood by improving health and relationship with parents.     I will  "know I've met my goal when:  Lj doesn't have to take 8 pills per day; sleeping more than 7-8 hours on weeknights, exercising at least 4 days per week, eating healthy dinner more days of the week than not, and decreased conflict with parents.        Sleep is his number one priority:  -not enough sleep; I can't fall asleep until past 2 o'clock; wakes around 8 am    -falling asleep at 2:30 am in front of the TV, wakes in front of the TV around 5 am, and then goes to the bedroom  -sleep goals: \"I would like to get more sleep during the week\"; catches up on sleep during the weekends  -potential strategy: make \"slight adjustments\"    -since starting niacin, Lj is consistently falling asleep after 2 am, before 2:30 am; previously didn't know when he'd fall asleep, therefore watched TV until got tired   -since Lj knows he will now fall asleep shortly after 2 am, he will be in bed before 2 am, rather than on the couch in front of the TV   -if this is successful, Lj will continue to adjustment bedtime by earlier increments     Diet:  -switched from salad to roasted vegetable medley for lunch  -still needs to work on dinner  -in the past was successful with having fish, brown rice and vegetable for dinner and believe he can return to this dinner  -barrier: takes time and effort, but once he gets into the habit, he can continue with it  -when will he make this dinner change? After Thanksgiving he will start making healthy dinner    Workout/Exercise:  -progress: \"I'm seeing the results\" from the increased walking (over the summer)  -as seasons change, he will shift to using treadmill and elliptical   -goal: 45 mins to 1 hour of treadmill or elliptical 4-5 days per week   -Push Ups: currently 40 in a set; 6-8 sets per day  -goal: \"maybe bump up\" to 45 and then if successful, would like to plateau at 50 per set     Medications:  -continues to take 8 pills per day  -Goal: reduce amount of daily pills      Interaction with " "parents (father):   -\"I feel much more aware of when I'm [getting frustrated]\"  -progress: able to recognize and step away from conflict/escalation  -goal: decreased conflict    Objective #A (Patient Action)    Patient will exercise at least 4 days per week.  Status: New - Date: 11/22/22     Intervention(s)  Therapist will assign homework to monitor level of exercise and mood on a daily basis    Objective #B  Patient will use effective sleep hygiene strategies.  Status: New - Date: 11/22/22     Intervention(s)  Therapist will teach sleep hygiene strategies.    Objective #C  Patient will increase mood and confidence by following through with diet.  Status: Continued - Date(s):11/22/22      Intervention(s)  Therapist will prompt patient to share how maintaining diet impacts mood/anxiety.    Objective D:  Patient will learn and practice at least one interpersonal skill  Status: new: 11/22/22  Intervention:  Therapist will teach interpersonal skills        Patient has reviewed and agreed to the above plan.      Shawn G. Ullrich, [facility]  November 22, 2022  "

## 2023-02-10 ENCOUNTER — OFFICE VISIT (OUTPATIENT)
Dept: BEHAVIORAL HEALTH | Facility: CLINIC | Age: 43
End: 2023-02-10
Payer: COMMERCIAL

## 2023-02-10 ENCOUNTER — LAB (OUTPATIENT)
Dept: LAB | Facility: CLINIC | Age: 43
End: 2023-02-10
Payer: COMMERCIAL

## 2023-02-10 DIAGNOSIS — E11.9 TYPE 2 DIABETES MELLITUS WITHOUT COMPLICATION, WITHOUT LONG-TERM CURRENT USE OF INSULIN (H): ICD-10-CM

## 2023-02-10 DIAGNOSIS — E78.2 MIXED HYPERLIPIDEMIA: ICD-10-CM

## 2023-02-10 DIAGNOSIS — E78.2 MIXED HYPERLIPIDEMIA: Primary | ICD-10-CM

## 2023-02-10 DIAGNOSIS — F41.1 GENERALIZED ANXIETY DISORDER: Primary | ICD-10-CM

## 2023-02-10 LAB
ALT SERPL W P-5'-P-CCNC: 53 U/L (ref 0–70)
ANION GAP SERPL CALCULATED.3IONS-SCNC: 6 MMOL/L (ref 3–14)
BUN SERPL-MCNC: 18 MG/DL (ref 7–30)
CALCIUM SERPL-MCNC: 9.9 MG/DL (ref 8.5–10.1)
CHLORIDE BLD-SCNC: 104 MMOL/L (ref 94–109)
CHOLEST SERPL-MCNC: 290 MG/DL
CO2 SERPL-SCNC: 28 MMOL/L (ref 20–32)
CREAT SERPL-MCNC: 0.86 MG/DL (ref 0.66–1.25)
FASTING STATUS PATIENT QL REPORTED: YES
GFR SERPL CREATININE-BSD FRML MDRD: >90 ML/MIN/1.73M2
GLUCOSE BLD-MCNC: 172 MG/DL (ref 70–99)
HBA1C MFR BLD: 6.7 % (ref 0–5.6)
HDLC SERPL-MCNC: 58 MG/DL
LDLC SERPL CALC-MCNC: 181 MG/DL
LDLC SERPL CALC-MCNC: ABNORMAL MG/DL
NONHDLC SERPL-MCNC: 232 MG/DL
POTASSIUM BLD-SCNC: 4.4 MMOL/L (ref 3.4–5.3)
SODIUM SERPL-SCNC: 138 MMOL/L (ref 133–144)
TRIGL SERPL-MCNC: 698 MG/DL

## 2023-02-10 PROCEDURE — 80048 BASIC METABOLIC PNL TOTAL CA: CPT

## 2023-02-10 PROCEDURE — 80061 LIPID PANEL: CPT

## 2023-02-10 PROCEDURE — 83721 ASSAY OF BLOOD LIPOPROTEIN: CPT | Mod: 59

## 2023-02-10 PROCEDURE — 84460 ALANINE AMINO (ALT) (SGPT): CPT

## 2023-02-10 PROCEDURE — 36415 COLL VENOUS BLD VENIPUNCTURE: CPT

## 2023-02-10 PROCEDURE — 83036 HEMOGLOBIN GLYCOSYLATED A1C: CPT

## 2023-02-10 RX ORDER — NIACIN 500 MG/1
TABLET, EXTENDED RELEASE ORAL
Qty: 90 TABLET | Refills: 1 | Status: SHIPPED | OUTPATIENT
Start: 2023-02-10 | End: 2023-03-07 | Stop reason: ALTCHOICE

## 2023-02-10 NOTE — TELEPHONE ENCOUNTER
"Last Written Prescription Date:  10/18/22  Last Fill Quantity: 30,  # refills: 5   Last office visit: 10/18/22 with Dr. Flores  Future Office Visit:  2/21/23        Requested Prescriptions   Pending Prescriptions Disp Refills     niacin ER (NIASPAN) 500 MG CR tablet [Pharmacy Med Name: NIACIN  MG TABLET] 90 tablet 1     Sig: TAKE 1 TABLET (500 MG) BY MOUTH AT BEDTIME       Antihyperlipidemic agents Failed - 2/10/2023 12:44 AM        Failed - Medication is active on med list        Passed - Lipid panel on file in past 12 mos     Recent Labs   Lab Test 10/04/22  0925 04/06/22  1205 06/29/20  1454   CHOL 217*   < > 204.0*   TRIG 463*   < > 343.0*   HDL 59   < > 67.0   *   < > 69.0   NHDL 158*   < >  --    VLDL  --   --  69.0*   CHOLHDLRATIO  --   --  3.1    < > = values in this interval not displayed.               Passed - Normal serum ALT on record in past 12 mos     Recent Labs   Lab Test 10/04/22  0925   ALT 53             Passed - Recent (12 mo) or future (30 days) visit within the authorizing provider's specialty     Patient has had an office visit with the authorizing provider or a provider within the authorizing providers department within the previous 12 mos or has a future within next 30 days. See \"Patient Info\" tab in inbasket, or \"Choose Columns\" in Meds & Orders section of the refill encounter.              Passed - Patient is age 18 years or older           Refills sent- 90 day supply requested  Aimee Delgadillo RN    "

## 2023-02-17 DIAGNOSIS — E11.9 TYPE 2 DIABETES MELLITUS WITHOUT COMPLICATION, WITHOUT LONG-TERM CURRENT USE OF INSULIN (H): ICD-10-CM

## 2023-02-17 RX ORDER — SITAGLIPTIN AND METFORMIN HYDROCHLORIDE 1000; 100 MG/1; MG/1
TABLET, FILM COATED, EXTENDED RELEASE ORAL
Qty: 30 TABLET | Refills: 2 | Status: SHIPPED | OUTPATIENT
Start: 2023-02-17 | End: 2023-05-15

## 2023-02-17 NOTE — TELEPHONE ENCOUNTER
"Last Written Prescription Date:  11/25/22  Last Fill Quantity: 30,  # refills: 2   Last office visit: 10/18/22 with Dr. Flores  Future Office Visit:  2/21/23        Requested Prescriptions   Pending Prescriptions Disp Refills     JANUMET -1000 MG TB24 [Pharmacy Med Name: JANUMET -1,000 MG TABLET] 30 tablet 2     Sig: TAKE 1 TABLET BY MOUTH EVERY DAY       Combination Oral Antihyperglycemic Agents Passed - 2/17/2023 12:52 AM        Passed - Patient has documented A1c within the specified period of time.     If HgbA1C is 8 or greater, it needs to be on file within the past 3 months.  If less than 8, must be on file within the past 6 months.     Recent Labs   Lab Test 02/10/23  0911   A1C 6.7*             Passed - Patient's CR is NOT>1.4 OR Patient's EGFR is NOT<45 within past 12 mos.     Recent Labs   Lab Test 02/10/23  0911 08/31/21  1020 11/08/19  0936   GFRESTIMATED >90   < > >90   GFRESTBLACK  --   --  >90    < > = values in this interval not displayed.       Recent Labs   Lab Test 02/10/23  0911   CR 0.86             Passed - Patient does not have a diagnosis of CHF.        Passed - Medication is active on med list        Passed - Patient is 18 years old or older.        Passed - Recent (6 mo) or future (30 days) visit within the authorizing provider's specialty     Patient had office visit in the last 6 months or has a visit in the next 30 days with authorizing provider or within the authorizing provider's specialty.  See \"Patient Info\" tab in inbasket, or \"Choose Columns\" in Meds & Orders section of the refill encounter.               Refills sent  Aimee Delgadillo RN    "

## 2023-02-21 ENCOUNTER — VIRTUAL VISIT (OUTPATIENT)
Dept: ENDOCRINOLOGY | Facility: CLINIC | Age: 43
End: 2023-02-21
Payer: COMMERCIAL

## 2023-02-21 DIAGNOSIS — E11.9 TYPE 2 DIABETES MELLITUS WITHOUT COMPLICATION, WITHOUT LONG-TERM CURRENT USE OF INSULIN (H): Primary | ICD-10-CM

## 2023-02-21 DIAGNOSIS — E78.2 MIXED HYPERLIPIDEMIA: ICD-10-CM

## 2023-02-21 PROCEDURE — 99214 OFFICE O/P EST MOD 30 MIN: CPT | Mod: VID | Performed by: INTERNAL MEDICINE

## 2023-02-21 NOTE — LETTER
2/21/2023         RE: Bertin Mcgill  [address] [facility] [facility] [facility] [address]        Dear Colleague,    Thank you for referring your patient, Bertin Mcgill, to the [facility] [place]. Please see a copy of my visit note below.    Video-Visit Details    Type of service:  Video Visit    Video Start Time (time video started): 3:15 pm    Video End Time (time video stopped): 3:28 pm    Originating Location (pt. Location): Home        Distant Location (provider location): Off-site    Mode of Communication:  Video Conference via [employer]        Recent issues:  Diabetes follow-up evaluation  Taking the JanumetXR and Jardiance medications per plan   Also taking the NiacinER lipid medication, per plan  Reviewed health history and his preappt labs        2017. Diagnosis of diabetes mellitus when med eval for torn right shoulder muscle  High glucose level noted during med evaluation, hgbA1c near 11.5%  Started Jardiance medication, took for approx 6 months  Recalls significant weight loss of approx 30#  Switched to metformin medication, then dose increases              Concerns about GI symptoms with nausea, nausea, also morning vomiting              Tried Prilosec, then Zantac  Has seen [facility] for medical evaluations  Recent discussion with his workplace team physician, Dr. Skye Mane              Advised to see me for medical evaluation    Previously on metforminXR 500 mg 2 tabs BID     Previous [facility] labs include:               8/12/19. Initial diabetes evaluation with me at [facility]  Reviewed health history and diabetes management  Medication change to JanumetXR  1/2022. Addition of Jardiance     Current DM medications:  JanumetXR 100/1000 1-tab each morning  Jardiance 10 mg  1-tab each morning     Blood glucose (BG) meter              Infrequent testing     Fam Hx Diabetes: None  Recent [facility] labs include:           Lab Results   Component Value Date    A1C 6.7 (H)  02/10/2023     02/10/2023    POTASSIUM 4.4 02/10/2023    CHLORIDE 104 02/10/2023    CO2 28 02/10/2023    ANIONGAP 6 02/10/2023     (H) 02/10/2023    BUN 18 02/10/2023    CR 0.86 02/10/2023    GFRESTIMATED >90 02/10/2023    GFRESTBLACK >90 11/08/2019    FLAVIO 9.9 02/10/2023    CHOL 290 (H) 02/10/2023    TRIG 698 (H) 02/10/2023    HDL 58 02/10/2023    LDL  02/10/2023      Comment:      Cannot estimate LDL when triglyceride exceeds 400 mg/dL     (H) 02/10/2023    NHDL 232 (H) 02/10/2023    UCRR 56 04/06/2022    MICROL 8 04/06/2022    UMALCR 14.29 04/06/2022     Lab Results   Component Value Date    TSH 1.62 07/25/2022     History of hyperlipidemia, takes simvastatin 20 mg daily   1/2023. Stopped statin med and started NiacinER (Niaspan) 500 mg 1-tab QPM along with aspirin 1-tab QPM    Has noted some head sweating symptoms  Last eye exam 5/2022 at [facility], no DR per patient  DM Complications:      None known        Lives in [facility]  Has seen [facility]   Also sees Dr. Jose/[facility] for general medicine evaluations.      PMH/PSH:  Past Medical History:   Diagnosis Date     Allergic rhinitis      Benign essential hypertension      Hyperlipidemia      Rotator cuff tear, left 2015     Rotator cuff tear, right 2017     Type 2 diabetes mellitus (H)      Past Surgical History:   Procedure Laterality Date     APPENDECTOMY       SHOULDER SURGERY      left and right previously       Family Hx:  No family history on file.      Social Hx:  Social History     Socioeconomic History     Marital status: Single     Spouse name: Not on file     Number of children: Not on file     Years of education: Not on file     Highest education level: Not on file   Occupational History     Not on file   Tobacco Use     Smoking status: Every Day     Smokeless tobacco: Never   Substance and Sexual Activity     Alcohol use: Yes     Drug use: Never     Sexual  "activity: Not on file   Other Topics Concern     Not on file   Social History Narrative    Single.     Works as \"\" for [employer] at [employer].     From [place]     Social Determinants of Health     Financial Resource Strain: Not on file   Food Insecurity: Not on file   Transportation Needs: Not on file   Physical Activity: Not on file   Stress: Not on file   Social Connections: Not on file   Intimate Partner Violence: Not on file   Housing Stability: Not on file          MEDICATIONS:  has a current medication list which includes the following prescription(s): acetaminophen, hydrochlorothiazide, janumet xr, jardiance, losartan, multiple vitamins-minerals, niacin, niacin er, vitamin d, and fluticasone.    ROS: 10 point ROS neg other than the symptoms noted above in the HPI.     GENERAL: energy good, wt stable; denies fevers, chills, night sweats.   HEENT: sinus congestion; no dysphagia, odonophagia, diplopia, neck pain  THYROID:  no apparent hyper or hypothyroid symptoms  CV: no chest pain, pressure, palpitations  LUNGS: no SOB, MEHTA, cough, wheezing   ABDOMEN: episode of rectal bleeding; occasional nausea; denies abdominal pain  EXTREMITIES: no rashes, ulcers, edema  NEUROLOGY: no headaches, denies changes in vision, tingling, extremitiy numbness   MSK: no muscle aches or pains, weakness  SKIN: no rashes or lesions  : denies nocturia  PSYCH:  stable mood, no significant anxiety or depression  ENDOCRINE: no heat or cold intolerance    Physical Exam (visual exam)  VS:  no vital signs taken for video visit  CONSTITUTIONAL: healthy, alert and NAD, well dressed, answering questions appropriately  ENT: no nose swelling or nasal discharge, mouth redness or gum changes.  EYES: eyes grossly normal to inspection, conjunctivae and sclerae normal, no exophthalmos or proptosis  THYROID:  no apparent nodules or goiter  LUNGS: no audible wheeze, cough or visible cyanosis, no visible retractions or " increased work of breathing  ABDOMEN: abdomen not evaluated  EXTREMITIES: no hand tremors, limited exam  NEUROLOGY: CN grossly intact, mentation intact and speech normal   SKIN:  no apparent skin lesions, rash, or edema with visualized skin appearance  PSYCH: mentation appears normal, affect normal/bright, judgement and insight intact,   normal speech and appearance well groomed    LABS:     All pertinent notes, labs, and images personally reviewed by me.      A/P:  Encounter Diagnoses   Name Primary?     Type 2 diabetes mellitus without complication, without long-term current use of insulin (H) Yes     Mixed hyperlipidemia        Comments:  Reviewed health history and diabetes issues.  Recent hgbA1c improved but cholesterol and TG levels high despite simvastatin med use  Reviewed and interpreted tests that I previously ordered.   Ordered appropriate tests for the endocrinology disease management.    Management options discussed and implemented after shared medical decision making with the patient.  T2DM problem is chronic-stable    Plan:  Discussed general issues with the diabetes diagnosis and management  We discussed the hgbA1c test which reflects previous overall glucose levels or control  Discussed the importance of blood glucose (BG) testing to assess glucose trends  Provided general overview of the diabetes medication options and medication treatment plan.  Reviewed lipid test results and the cholesterol, triglyceride lower medication options     Recommend:  Continue the current JanumetXR and Jardiance daily dose routine  Future options include change from Januvia (in Janumet) to a GLP1RA med such as Trulicity or Rybelsus     We have discussed his needle-phobia concerns  Reminded patient to begin glucose testing:    Test FBG at least 1-2x/week, goal target BG  mg/dl  Advised changes to the lipid medication treatment plan, goal LDL <100 mg/dl and TG <200 mg/dl    Increase the NiacinER as 500 mg 2-tabs  each evening    Watch for possible flushing SE's, then message me with feedback after starting new lipid med plan soon    Discussed future option using simvastatin (or atorvastatin) and fenofibrate alternative  Will need future fasting lipid panel and hgbA1c testing also.  Keep focus on diet, increased exercise, weight management.  Advise having fasting lipid panel testing and dilated eye examination, at least annually    Keep regular follow-up appointments with PCP  Addressed patient questions today    There are no Patient Instructions on file for this visit.    Future labs ordered today: No orders of the defined types were placed in this encounter.    Radiology/Consults ordered today: None    Total time spent on day of encounter:  26 min    Follow-up:  5/2023, ENRIQUED    LEO Flores MD, MS  Endocrinology  [facility]    CC:  MILES Jose                                    Again, thank you for allowing me to participate in the care of your patient.        Sincerely,        Anival Flores MD

## 2023-02-21 NOTE — PROGRESS NOTES
Video-Visit Details    Type of service:  Video Visit    Video Start Time (time video started): 3:15 pm    Video End Time (time video stopped): 3:28 pm    Originating Location (pt. Location): Home        Distant Location (provider location): Off-site    Mode of Communication:  Video Conference via [employer]        Recent issues:  Diabetes follow-up evaluation  Taking the JanumetXR and Jardiance medications per plan   Also taking the NiacinER lipid medication, per plan  Reviewed health history and his preappt labs        2017. Diagnosis of diabetes mellitus when med eval for torn right shoulder muscle  High glucose level noted during med evaluation, hgbA1c near 11.5%  Started Jardiance medication, took for approx 6 months  Recalls significant weight loss of approx 30#  Switched to metformin medication, then dose increases              Concerns about GI symptoms with nausea, nausea, also morning vomiting              Tried Prilosec, then Zantac  Has seen [facility] for medical evaluations  Recent discussion with his workplace team physician, Dr. Skye Mane              Advised to see me for medical evaluation    Previously on metforminXR 500 mg 2 tabs BID     Previous [facility] labs include:               8/12/19. Initial diabetes evaluation with me at [facility]  Reviewed health history and diabetes management  Medication change to JanumetXR  1/2022. Addition of Jardiance     Current DM medications:  JanumetXR 100/1000 1-tab each morning  Jardiance 10 mg  1-tab each morning     Blood glucose (BG) meter              Infrequent testing     Fam Hx Diabetes: None  Recent [facility] labs include:           Lab Results   Component Value Date    A1C 6.7 (H) 02/10/2023     02/10/2023    POTASSIUM 4.4 02/10/2023    CHLORIDE 104 02/10/2023    CO2 28 02/10/2023    ANIONGAP 6 02/10/2023     (H) 02/10/2023    BUN 18 02/10/2023    CR 0.86 02/10/2023    GFRESTIMATED >90 02/10/2023    GFRESTBLACK >90 11/08/2019    FLAVIO  "9.9 02/10/2023    CHOL 290 (H) 02/10/2023    TRIG 698 (H) 02/10/2023    HDL 58 02/10/2023    LDL  02/10/2023      Comment:      Cannot estimate LDL when triglyceride exceeds 400 mg/dL     (H) 02/10/2023    NHDL 232 (H) 02/10/2023    UCRR 56 04/06/2022    MICROL 8 04/06/2022    UMALCR 14.29 04/06/2022     Lab Results   Component Value Date    TSH 1.62 07/25/2022     History of hyperlipidemia, takes simvastatin 20 mg daily   1/2023. Stopped statin med and started NiacinER (Niaspan) 500 mg 1-tab QPM along with aspirin 1-tab QPM    Has noted some head sweating symptoms  Last [facility] exam [facility]22 at [facility], no DR per patient  DM Complications:      None known        Lives in [place]  Has seen [facility]   Also sees Dr. Jose/[facility] for general medicine evaluations.      PMH/PSH:  Past Medical History:   Diagnosis Date     Allergic rhinitis      Benign essential hypertension      Hyperlipidemia      Rotator cuff tear, left 2015     Rotator cuff tear, right 2017     Type 2 diabetes mellitus (H)      Past Surgical History:   Procedure Laterality Date     APPENDECTOMY       SHOULDER SURGERY      left and right previously       Family Hx:  No family history on file.      Social Hx:  Social History     Socioeconomic History     Marital status: Single     Spouse name: Not on file     Number of children: Not on file     Years of education: Not on file     Highest education level: Not on file   Occupational History     Not on file   Tobacco Use     Smoking status: Every Day     Smokeless tobacco: Never   Substance and Sexual Activity     Alcohol use: Yes     Drug use: Never     Sexual activity: Not on file   Other Topics Concern     Not on file   Social History Narrative    Single.     Works as \"\" for [employer] at [employer].     From [place]     Social Determinants of Health     Financial Resource Strain: Not on file   Food " Insecurity: Not on file   Transportation Needs: Not on file   Physical Activity: Not on file   Stress: Not on file   Social Connections: Not on file   Intimate Partner Violence: Not on file   Housing Stability: Not on file          MEDICATIONS:  has a current medication list which includes the following prescription(s): acetaminophen, hydrochlorothiazide, janumet xr, jardiance, losartan, multiple vitamins-minerals, niacin, niacin er, vitamin d, and fluticasone.    ROS: 10 point ROS neg other than the symptoms noted above in the HPI.     GENERAL: energy good, wt stable; denies fevers, chills, night sweats.   HEENT: sinus congestion; no dysphagia, odonophagia, diplopia, neck pain  THYROID:  no apparent hyper or hypothyroid symptoms  CV: no chest pain, pressure, palpitations  LUNGS: no SOB, MEHTA, cough, wheezing   ABDOMEN: episode of rectal bleeding; occasional nausea; denies abdominal pain  EXTREMITIES: no rashes, ulcers, edema  NEUROLOGY: no headaches, denies changes in vision, tingling, extremitiy numbness   MSK: no muscle aches or pains, weakness  SKIN: no rashes or lesions  : denies nocturia  PSYCH:  stable mood, no significant anxiety or depression  ENDOCRINE: no heat or cold intolerance    Physical Exam (visual exam)  VS:  no vital signs taken for video visit  CONSTITUTIONAL: healthy, alert and NAD, well dressed, answering questions appropriately  ENT: no nose swelling or nasal discharge, mouth redness or gum changes.  EYES: eyes grossly normal to inspection, conjunctivae and sclerae normal, no exophthalmos or proptosis  THYROID:  no apparent nodules or goiter  LUNGS: no audible wheeze, cough or visible cyanosis, no visible retractions or increased work of breathing  ABDOMEN: abdomen not evaluated  EXTREMITIES: no hand tremors, limited exam  NEUROLOGY: CN grossly intact, mentation intact and speech normal   SKIN:  no apparent skin lesions, rash, or edema with visualized skin appearance  PSYCH: mentation appears  normal, affect normal/bright, judgement and insight intact,   normal speech and appearance well groomed    LABS:     All pertinent notes, labs, and images personally reviewed by me.      A/P:  Encounter Diagnoses   Name Primary?     Type 2 diabetes mellitus without complication, without long-term current use of insulin (H) Yes     Mixed hyperlipidemia        Comments:  Reviewed health history and diabetes issues.  Recent hgbA1c improved but cholesterol and TG levels high despite simvastatin med use  Reviewed and interpreted tests that I previously ordered.   Ordered appropriate tests for the endocrinology disease management.    Management options discussed and implemented after shared medical decision making with the patient.  T2DM problem is chronic-stable    Plan:  Discussed general issues with the diabetes diagnosis and management  We discussed the hgbA1c test which reflects previous overall glucose levels or control  Discussed the importance of blood glucose (BG) testing to assess glucose trends  Provided general overview of the diabetes medication options and medication treatment plan.  Reviewed lipid test results and the cholesterol, triglyceride lower medication options     Recommend:  Continue the current JanumetXR and Jardiance daily dose routine  Future options include change from Januvia (in Janumet) to a GLP1RA med such as Trulicity or Rybelsus     We have discussed his needle-phobia concerns  Reminded patient to begin glucose testing:    Test FBG at least 1-2x/week, goal target BG  mg/dl  Advised changes to the lipid medication treatment plan, goal LDL <100 mg/dl and TG <200 mg/dl    Increase the NiacinER as 500 mg 2-tabs each evening    Watch for possible flushing SE's, then message me with feedback after starting new lipid med plan soon    Discussed future option using simvastatin (or atorvastatin) and fenofibrate alternative  Will need future fasting lipid panel and hgbA1c testing also.  Keep  focus on diet, increased exercise, weight management.  Advise having fasting lipid panel testing and dilated eye examination, at least annually    Keep regular follow-up appointments with PCP  Addressed patient questions today    There are no Patient Instructions on file for this visit.    Future labs ordered today: No orders of the defined types were placed in this encounter.    Radiology/Consults ordered today: None    Total time spent on day of encounter:  26 min    Follow-up:  5/2023, INGRID Flores MD, MS  Endocrinology  [facility]    CC:  MILES Jose

## 2023-02-22 ENCOUNTER — TELEPHONE (OUTPATIENT)
Dept: ENDOCRINOLOGY | Facility: CLINIC | Age: 43
End: 2023-02-22
Payer: COMMERCIAL

## 2023-02-23 ASSESSMENT — ANXIETY QUESTIONNAIRES
GAD7 TOTAL SCORE: 10
5. BEING SO RESTLESS THAT IT IS HARD TO SIT STILL: SEVERAL DAYS
GAD7 TOTAL SCORE: 10
4. TROUBLE RELAXING: MORE THAN HALF THE DAYS
3. WORRYING TOO MUCH ABOUT DIFFERENT THINGS: MORE THAN HALF THE DAYS
7. FEELING AFRAID AS IF SOMETHING AWFUL MIGHT HAPPEN: NOT AT ALL
2. NOT BEING ABLE TO STOP OR CONTROL WORRYING: MORE THAN HALF THE DAYS
1. FEELING NERVOUS, ANXIOUS, OR ON EDGE: MORE THAN HALF THE DAYS
8. IF YOU CHECKED OFF ANY PROBLEMS, HOW DIFFICULT HAVE THESE MADE IT FOR YOU TO DO YOUR WORK, TAKE CARE OF THINGS AT HOME, OR GET ALONG WITH OTHER PEOPLE?: SOMEWHAT DIFFICULT
IF YOU CHECKED OFF ANY PROBLEMS ON THIS QUESTIONNAIRE, HOW DIFFICULT HAVE THESE PROBLEMS MADE IT FOR YOU TO DO YOUR WORK, TAKE CARE OF THINGS AT HOME, OR GET ALONG WITH OTHER PEOPLE: SOMEWHAT DIFFICULT
6. BECOMING EASILY ANNOYED OR IRRITABLE: SEVERAL DAYS
GAD7 TOTAL SCORE: 10
7. FEELING AFRAID AS IF SOMETHING AWFUL MIGHT HAPPEN: NOT AT ALL

## 2023-02-24 ENCOUNTER — VIRTUAL VISIT (OUTPATIENT)
Dept: BEHAVIORAL HEALTH | Facility: CLINIC | Age: 43
End: 2023-02-24
Payer: COMMERCIAL

## 2023-02-24 DIAGNOSIS — F41.1 GAD (GENERALIZED ANXIETY DISORDER): Primary | ICD-10-CM
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
## 2023-03-07 ENCOUNTER — MYC MEDICAL ADVICE (OUTPATIENT)
Dept: ENDOCRINOLOGY | Facility: CLINIC | Age: 43
End: 2023-03-07
Payer: COMMERCIAL

## 2023-03-07 DIAGNOSIS — E78.2 MIXED HYPERLIPIDEMIA: Primary | ICD-10-CM

## 2023-03-07 DIAGNOSIS — E11.9 TYPE 2 DIABETES MELLITUS WITHOUT COMPLICATION, WITHOUT LONG-TERM CURRENT USE OF INSULIN (H): Primary | ICD-10-CM

## 2023-03-07 DIAGNOSIS — E78.2 MIXED HYPERLIPIDEMIA: ICD-10-CM

## 2023-03-07 RX ORDER — FENOFIBRATE 145 MG/1
145 TABLET, COATED ORAL DAILY
Qty: 30 TABLET | Refills: 5 | Status: SHIPPED | OUTPATIENT
Start: 2023-03-07 | End: 2023-06-28

## 2023-03-07 RX ORDER — SIMVASTATIN 20 MG
20 TABLET ORAL AT BEDTIME
Qty: 30 TABLET | Refills: 5 | Status: SHIPPED | OUTPATIENT
Start: 2023-03-07 | End: 2023-08-15 | Stop reason: ALTCHOICE

## 2023-03-14 ASSESSMENT — ANXIETY QUESTIONNAIRES
7. FEELING AFRAID AS IF SOMETHING AWFUL MIGHT HAPPEN: SEVERAL DAYS
GAD7 TOTAL SCORE: 13
1. FEELING NERVOUS, ANXIOUS, OR ON EDGE: MORE THAN HALF THE DAYS
6. BECOMING EASILY ANNOYED OR IRRITABLE: MORE THAN HALF THE DAYS
3. WORRYING TOO MUCH ABOUT DIFFERENT THINGS: MORE THAN HALF THE DAYS
GAD7 TOTAL SCORE: 13
5. BEING SO RESTLESS THAT IT IS HARD TO SIT STILL: MORE THAN HALF THE DAYS
4. TROUBLE RELAXING: MORE THAN HALF THE DAYS
7. FEELING AFRAID AS IF SOMETHING AWFUL MIGHT HAPPEN: SEVERAL DAYS
2. NOT BEING ABLE TO STOP OR CONTROL WORRYING: MORE THAN HALF THE DAYS
8. IF YOU CHECKED OFF ANY PROBLEMS, HOW DIFFICULT HAVE THESE MADE IT FOR YOU TO DO YOUR WORK, TAKE CARE OF THINGS AT HOME, OR GET ALONG WITH OTHER PEOPLE?: SOMEWHAT DIFFICULT
IF YOU CHECKED OFF ANY PROBLEMS ON THIS QUESTIONNAIRE, HOW DIFFICULT HAVE THESE PROBLEMS MADE IT FOR YOU TO DO YOUR WORK, TAKE CARE OF THINGS AT HOME, OR GET ALONG WITH OTHER PEOPLE: SOMEWHAT DIFFICULT
GAD7 TOTAL SCORE: 13

## 2023-03-14 ASSESSMENT — PATIENT HEALTH QUESTIONNAIRE - PHQ9
SUM OF ALL RESPONSES TO PHQ QUESTIONS 1-9: 16
SUM OF ALL RESPONSES TO PHQ QUESTIONS 1-9: 16
10. IF YOU CHECKED OFF ANY PROBLEMS, HOW DIFFICULT HAVE THESE PROBLEMS MADE IT FOR YOU TO DO YOUR WORK, TAKE CARE OF THINGS AT HOME, OR GET ALONG WITH OTHER PEOPLE: SOMEWHAT DIFFICULT

## 2023-03-14 NOTE — PROGRESS NOTES
"[facility]  March 15, 2023    Behavioral Health Clinician Progress Note    Patient Name: Bertin Mcgill           Service Type:  Individual       Service Location:    In-Clinic      Session Start Time: 3:37 pm Session End Time: 4:35 pm      Session Length: 53 - 60      Attendees: Patient      Service Modality:  Face to Face      Visit Activities (Refresh list every visit): [facility] Only     Diagnostic Assessment Date: 4/14/22  Treatment Plan Review Date:5/24/23  CGI Review Date:4/27/23  Promis 10 Review Date: 3/15/23    Clinical Global Impressions  First:  Considering your total clinical experience with this particular patient population, how severe are the patient's symptoms at this time?: 4 (1/27/2023  3:10 PM)    Most recent:  Compared to the patient's condition at the START of treatment, this patient's condition is: 4 (1/27/2023  3:10 PM)      See Flowsheets for today's PHQ-9 and NATHALIE-7 results  Previous PHQ-9:   PHQ-9 SCORE 2/9/2023 2/23/2023 3/14/2023   PHQ-9 Total Score MyChart 12 (Moderate depression) 13 (Moderate depression) 16 (Moderately severe depression)   PHQ-9 Total Score 12 13 16     Previous NATHALIE-7:   NATHALIE-7 SCORE 2/9/2023 2/23/2023 3/14/2023   Total Score 12 (moderate anxiety) 10 (moderate anxiety) 13 (moderate anxiety)   Total Score 12 10 13       KRISTINE LEVEL:  No flowsheet data found.    DATA  Extended Session (60+ minutes): No  Interactive Complexity: No  Crisis: No  [facility] Patient: No    Treatment Objective(s) Addressed in This Session:  Target Behavior(s): increased social activity/decreased isolation    Anxiety: will experience a reduction in anxiety, will develop more effective coping skills to manage anxiety symptoms, will develop healthy cognitive patterns and beliefs and will increase ability to function adaptively    Current Stressors / Issues:    When assessing his status, Lj stated, \"I'm probably not as healthy as I should be\".  Lj reported stressors persist, which " "negatively impacts ability to follow through with healthy changes/habits.  Lj shared recent, frequent interactions with parents were one of the stressors.  [facility] validated his experienced and assisted with processing interactions by reflecting emotions that have resulted from the years of behavioral patterns with parents.  Lj stated he felt sad for his father, guilt toward his mother, and also felt his own anger and resentment, b/c there's a history of lack of consideration for others.  Lj reported over the years he has set and attempted to maintain boundaries, while also communicating more directly.       [facility] and Lj also reviewed progress toward health goals.  Lj stated his sleep is \"better\", because he's been using the CPAP more often (50% of nights).  Lj reported he feels more \"alert in the morning\", but is still fatigued by afternoon/evening.  Lj reported his alcohol use and diet have not changed and he has not been active/exercising.  [facility] used MI interventions to address ambivalence toward health goals, Lj stated if he weather continues to improve he will have walked outside by next [facility] appt.     Progress on Treatment Objective(s) / Homework:  No improvement - CONTEMPLATION (Considering change and yet undecided); Intervened by assessing the negative and positive thinking (ambivalence) about behavior change    Motivational Interviewing    MI Intervention: Co-Developed Goal: reduce anxiety, Expressed Empathy/Understanding, Supported Autonomy, Collaboration, Evocation, Open-ended questions, Reflections: simple and complex and Change talk (evoked)     Change Talk Expressed by the Patient: Reasons to change    Provider Response to Change Talk: E - Evoked more info from patient about behavior change and R - Reflected patient's change talk      Solution-Focused Therapy    Explore patterns in patient's behaviors and relationships and discuss options for new behaviors    Explore new options for " problem-solving, communication, managing stress, etc.    Interpersonal Psychotherapy    Explore patterns in relationships that are effective or ineffective at helping patient reach their goals, find satisfying experience.    Discuss new patterns or behaviors to engage in for improved social functioning.      Care Plan review completed: Yes    Medication Review:  No current psychiatric medications prescribed    Medication Compliance:  NA    Changes in Health Issues:    None reported    Chemical Use Review:   Substance Use: Problem use continues with no change since last session, Stage of Change: Contemplation        Tobacco Use: No change in amount of tobacco use since last session.  Contemplation    Assessment: Current Emotional / Mental Status (status of significant symptoms):  Risk status (Self / Other harm or suicidal ideation)  Patient has had a history of suicidal ideation: passive SI in 2003; none since  Patient denies current fears or concerns for personal safety.  Patient denies current or recent suicidal ideation or behaviors.  Patient denies current or recent homicidal ideation or behaviors.  Patient denies current or recent self injurious behavior or ideation.  Patient denies other safety concerns.  A safety and risk management plan has not been developed at this time, however patient was encouraged to call [place] [phone] should there be a change in any of these risk factors.    Appearance:   Appropriate   Eye Contact:   Good   Psychomotor Behavior: Normal   Attitude:   Cooperative   Orientation:   All  Speech   Rate / Production: Normal/ Responsive   Volume:  Normal   Mood:    Anxious  frustrated, tired  Affect:    congruent with mood   Thought Content:  Clear    Thought Form:  Coherent  Logical   Insight:    Good     Diagnoses:  1. NATHALIE (generalized anxiety disorder)        Collateral Reports Completed:  Routed note to PCP    Plan: (Homework, other):  Patient was given information about behavioral  services and encouraged to schedule a follow up appointment with the clinic [facility] in 2 weeks.  He was also given information about mental health symptoms and treatment options  and strategies to more effectively manage anxiety.  CD Recommendations: abstain from alcohol use.  Shawn Ullrich [facility], [facility]      ______________________________________________________________________    Integrated Primary Care Behavioral Health Treatment Plan    Patient's Name: Bertin Mcgill  YOB: 1980    Date of Creation: 2/24/23  Date Treatment Plan Last Reviewed/Revised: 2/24/23    DSM5 Diagnoses: 300.02 (F41.1) Generalized Anxiety Disorder or Adjustment Disorders  309.0 (F43.21) With depressed mood  Psychosocial / Contextual Factors: Single, , heterosexual male, resides in his own home; working from home  PROMIS (reviewed every 90 days): pt completed on 12/16/22    Referral / Collaboration:  Referral to another professional/service is not indicated at this time.; Lj followed through with medication evaluation, attempted medication and stopped medication    Anticipated number of session for this episode of care: 8  Anticipation frequency of session: Every other week  Anticipated Duration of each session: 38-52 minutes  Treatment plan will be reviewed in 90 days or when goals have been changed.       MeasurableTreatment Goal(s) related to diagnosis / functional impairment(s)   Goal 1: Patient will reduce anxiety and improve mood by improving health and relationship with parents.     I will know I've met my goal when:  Lj doesn't have to take 8 pills per day; sleeping more than 7-8 hours on weeknights, exercising at least 4 days per week, eating healthy dinner more days of the week than not, and decreased conflict with parents.        Objective #A (Patient Action)    Patient will exercise at least 4 days per week.  Status: Continued - Date(s):2/24/23     Intervention(s)  Therapist will assign homework to monitor  level of exercise and mood on a daily basis    Objective #B  Patient will use effective sleep hygiene strategies.  Status: Continued - Date(s):2/24/23     Intervention(s)  Therapist will teach sleep hygiene strategies.    Objective #C  Patient will increase mood and confidence by following through with diet.  Status: Continued - Date(s): 2/24/23  Intervention(s)  Therapist will prompt patient to share how maintaining diet impacts mood/anxiety.    Objective D:  Patient will learn and practice at least one interpersonal skill  Status: continue: 2/24/23  Intervention:  Therapist will teach interpersonal skills        Patient has reviewed and agreed to the above plan.      Shawn G. Ullrich, [facility]  February 24, 2023

## 2023-03-15 ENCOUNTER — OFFICE VISIT (OUTPATIENT)
Dept: BEHAVIORAL HEALTH | Facility: CLINIC | Age: 43
End: 2023-03-15
Payer: COMMERCIAL

## 2023-03-15 DIAGNOSIS — F41.1 GAD (GENERALIZED ANXIETY DISORDER): Primary | ICD-10-CM

## 2023-03-30 DIAGNOSIS — E78.2 MIXED HYPERLIPIDEMIA: ICD-10-CM

## 2023-03-30 RX ORDER — FENOFIBRATE 145 MG/1
TABLET, COATED ORAL
Qty: 30 TABLET | Refills: 5 | OUTPATIENT
Start: 2023-03-30

## 2023-03-30 ASSESSMENT — ANXIETY QUESTIONNAIRES
IF YOU CHECKED OFF ANY PROBLEMS ON THIS QUESTIONNAIRE, HOW DIFFICULT HAVE THESE PROBLEMS MADE IT FOR YOU TO DO YOUR WORK, TAKE CARE OF THINGS AT HOME, OR GET ALONG WITH OTHER PEOPLE: SOMEWHAT DIFFICULT
3. WORRYING TOO MUCH ABOUT DIFFERENT THINGS: MORE THAN HALF THE DAYS
6. BECOMING EASILY ANNOYED OR IRRITABLE: NEARLY EVERY DAY
7. FEELING AFRAID AS IF SOMETHING AWFUL MIGHT HAPPEN: SEVERAL DAYS
GAD7 TOTAL SCORE: 13
2. NOT BEING ABLE TO STOP OR CONTROL WORRYING: MORE THAN HALF THE DAYS
GAD7 TOTAL SCORE: 13
GAD7 TOTAL SCORE: 13
7. FEELING AFRAID AS IF SOMETHING AWFUL MIGHT HAPPEN: SEVERAL DAYS
5. BEING SO RESTLESS THAT IT IS HARD TO SIT STILL: SEVERAL DAYS
1. FEELING NERVOUS, ANXIOUS, OR ON EDGE: MORE THAN HALF THE DAYS
8. IF YOU CHECKED OFF ANY PROBLEMS, HOW DIFFICULT HAVE THESE MADE IT FOR YOU TO DO YOUR WORK, TAKE CARE OF THINGS AT HOME, OR GET ALONG WITH OTHER PEOPLE?: SOMEWHAT DIFFICULT
4. TROUBLE RELAXING: MORE THAN HALF THE DAYS

## 2023-03-30 ASSESSMENT — PATIENT HEALTH QUESTIONNAIRE - PHQ9
10. IF YOU CHECKED OFF ANY PROBLEMS, HOW DIFFICULT HAVE THESE PROBLEMS MADE IT FOR YOU TO DO YOUR WORK, TAKE CARE OF THINGS AT HOME, OR GET ALONG WITH OTHER PEOPLE: SOMEWHAT DIFFICULT
SUM OF ALL RESPONSES TO PHQ QUESTIONS 1-9: 13
SUM OF ALL RESPONSES TO PHQ QUESTIONS 1-9: 13

## 2023-03-30 NOTE — PROGRESS NOTES
[facility]  March 31, 2023    Behavioral Health Clinician Progress Note    Patient Name: Bertin Mcgill           Service Type:  Individual        Service Location:    telehealth     Session Start Time: 3:31 pm Session End Time: 4:28 pm      Session Length: 53 - 60      Attendees: Patient      Service Modality:  Telehealth      Visit Activities (Refresh list every visit): [facility] Only     Telemedicine Visit: The patient's condition can be safely assessed and treated via synchronous audio and visual telemedicine encounter.      Reason for Telemedicine Visit: Patient has requested telehealth visit    Originating Site (Patient Location): Patient's home        Distant Location (provider location):  On-site    Consent:  The patient/guardian has verbally consented to: the potential risks and benefits of telemedicine (video visit) versus in person care; bill my insurance or make self-payment for services provided; and responsibility for payment of non-covered services.     Mode of Communication:  Video Conference via [facility]    As the provider I attest to compliance with applicable laws and regulations related to telemedicine.      Diagnostic Assessment Date: 4/14/22  Treatment Plan Review Date:5/24/23  CGI Review Date:4/27/23  Promis 10 Review Date: 3/15/23    Clinical Global Impressions  First:  Considering your total clinical experience with this particular patient population, how severe are the patient's symptoms at this time?: 4 (1/27/2023  3:10 PM)    Most recent:  Compared to the patient's condition at the START of treatment, this patient's condition is: 4 (1/27/2023  3:10 PM)      See Flowsheets for today's PHQ-9 and NATHALIE-7 results  Previous PHQ-9:   PHQ-9 SCORE 2/23/2023 3/14/2023 3/30/2023   PHQ-9 Total Score MyChart 13 (Moderate depression) 16 (Moderately severe depression) 13 (Moderate depression)   PHQ-9 Total Score 13 16 13     Previous NATHALIE-7:   NATHALIE-7 SCORE 2/23/2023 3/14/2023 3/30/2023  "  Total Score 10 (moderate anxiety) 13 (moderate anxiety) 13 (moderate anxiety)   Total Score 10 13 13       KRISTINE LEVEL:  No flowsheet data found.    DATA  Extended Session (60+ minutes): No  Interactive Complexity: No  Crisis: No  [facility] Patient: No    Treatment Objective(s) Addressed in This Session:  Target Behavior(s): increased social activity/decreased isolation    Anxiety: will experience a reduction in anxiety, will develop more effective coping skills to manage anxiety symptoms, will develop healthy cognitive patterns and beliefs and will increase ability to function adaptively    Current Stressors / Issues:    Lj presented with mildly improved mood and decreased anxiety since previous appt, including increased range of affect and intermittent smiling. [facility] used MI and Solution Focused interventions to assist Lj with assessing status of mood, progress toward goals, and maintaining motivation to follow through with use of effective coping skills/strategies.  Lj reported work stress was building as the season opener approached, explaining the stressed peaked and he eventually reached his \"limit\" on Weds.  [facility] and Lj discussed stress management strategies and managing frustration, Lj noted he finally got to the point where the timeframe of the requests became unreasonable.  This helped him to see how his inability to complete the requests in the specific timeframe was not a result of his incompetence, but rather it was a result of the incompetence of the person making the request, resulting in a shift in perspective.  Lj also shared he's managing work stress by enjoying his time outside of work.  Lj reported he's been using acceptance and gratitude to focus on what he has, rather than what he doesn't, which has led to a more enjoyment.   Lj reported family stress has decreased lately, which is typical of his family stress cycle, but he continues to work on setting boundaries.  When sharing about " healthy lifestyle goals, ke reported no change in alcohol use, he walked outside and is looking forward to more outdoors walking as the weather improves, and he has been cooking at home more (rather than getting take out).        Progress on Treatment Objective(s) / Homework:  Minimal progress - ACTION (Actively working towards change); Intervened by reinforcing change plan / affirming steps taken    Motivational Interviewing    MI Intervention: Co-Developed Goal: reduce anxiety, Expressed Empathy/Understanding, Supported Autonomy, Collaboration, Evocation, Open-ended questions, Reflections: simple and complex and Change talk (evoked)     Change Talk Expressed by the Patient: Reasons to change    Provider Response to Change Talk: E - Evoked more info from patient about behavior change and R - Reflected patient's change talk      Solution-Focused Therapy    Explore patterns in patient's behaviors and relationships and discuss options for new behaviors    Explore new options for problem-solving, communication, managing stress, etc.    Interpersonal Psychotherapy    Explore patterns in relationships that are effective or ineffective at helping patient reach their goals, find satisfying experience.    Discuss new patterns or behaviors to engage in for improved social functioning.      Care Plan review completed: Yes    Medication Review:  No current psychiatric medications prescribed    Medication Compliance:  NA    Changes in Health Issues:    None reported    Chemical Use Review:   Substance Use: Problem use continues with no change since last session, Stage of Change: Contemplation        Tobacco Use: No change in amount of tobacco use since last session.  Contemplation    Assessment: Current Emotional / Mental Status (status of significant symptoms):  Risk status (Self / Other harm or suicidal ideation)  Patient has had a history of suicidal ideation: passive SI in 2003; none since  Patient denies current fears or  concerns for personal safety.  Patient denies current or recent suicidal ideation or behaviors.  Patient denies current or recent homicidal ideation or behaviors.  Patient denies current or recent self injurious behavior or ideation.  Patient denies other safety concerns.  A safety and risk management plan has not been developed at this time, however patient was encouraged to call [place] [phone] should there be a change in any of these risk factors.    Appearance:   Appropriate   Eye Contact:   Good   Psychomotor Behavior: Normal   Attitude:   Cooperative   Orientation:   All  Speech   Rate / Production: Normal/ Responsive   Volume:  Normal   Mood:    Anxious   Affect:    primarily worrisome, with intermittent smiling   Thought Content:  Clear    Thought Form:  Coherent  Logical   Insight:    Good     Diagnoses:  1. NATHALIE (generalized anxiety disorder)        Collateral Reports Completed:  Routed note to PCP    Plan: (Homework, other):  Patient was given information about behavioral services and encouraged to schedule a follow up appointment with the clinic [facility] in 2 weeks.  He was also given information about mental health symptoms and treatment options  and strategies to more effectively manage anxiety.  CD Recommendations: abstain from alcohol use.  Shawn Ullrich [facility], [facility]      ______________________________________________________________________    Integrated Primary Care Behavioral Health Treatment Plan    Patient's Name: Bertin Mcgill  YOB: 1980    Date of Creation: 2/24/23  Date Treatment Plan Last Reviewed/Revised: 2/24/23    DSM5 Diagnoses: 300.02 (F41.1) Generalized Anxiety Disorder or Adjustment Disorders  309.0 (F43.21) With depressed mood  Psychosocial / Contextual Factors: Single, , heterosexual male, resides in his own home; working from home  PROMIS (reviewed every 90 days): pt completed on 12/16/22    Referral / Collaboration:  Referral to another professional/service is  not indicated at this time.; Lj followed through with medication evaluation, attempted medication and stopped medication    Anticipated number of session for this episode of care: 8  Anticipation frequency of session: Every other week  Anticipated Duration of each session: 38-52 minutes  Treatment plan will be reviewed in 90 days or when goals have been changed.       MeasurableTreatment Goal(s) related to diagnosis / functional impairment(s)   Goal 1: Patient will reduce anxiety and improve mood by improving health and relationship with parents.     I will know I've met my goal when:  Lj doesn't have to take 8 pills per day; sleeping more than 7-8 hours on weeknights, exercising at least 4 days per week, eating healthy dinner more days of the week than not, and decreased conflict with parents.        Objective #A (Patient Action)    Patient will exercise at least 4 days per week.  Status: Continued - Date(s):2/24/23     Intervention(s)  Therapist will assign homework to monitor level of exercise and mood on a daily basis    Objective #B  Patient will use effective sleep hygiene strategies.  Status: Continued - Date(s):2/24/23     Intervention(s)  Therapist will teach sleep hygiene strategies.    Objective #C  Patient will increase mood and confidence by following through with diet.  Status: Continued - Date(s): 2/24/23  Intervention(s)  Therapist will prompt patient to share how maintaining diet impacts mood/anxiety.    Objective D:  Patient will learn and practice at least one interpersonal skill  Status: continue: 2/24/23  Intervention:  Therapist will teach interpersonal skills        Patient has reviewed and agreed to the above plan.      Shawn G. Ullrich, [facility]  February 24, 2023

## 2023-03-31 ENCOUNTER — VIRTUAL VISIT (OUTPATIENT)
Dept: BEHAVIORAL HEALTH | Facility: CLINIC | Age: 43
End: 2023-03-31
Payer: COMMERCIAL

## 2023-03-31 DIAGNOSIS — F41.1 GAD (GENERALIZED ANXIETY DISORDER): Primary | ICD-10-CM

## 2023-04-07 DIAGNOSIS — E11.9 TYPE 2 DIABETES MELLITUS WITHOUT COMPLICATION, WITHOUT LONG-TERM CURRENT USE OF INSULIN (H): ICD-10-CM

## 2023-04-10 RX ORDER — EMPAGLIFLOZIN 10 MG/1
TABLET, FILM COATED ORAL
Qty: 90 TABLET | Refills: 0 | Status: SHIPPED | OUTPATIENT
Start: 2023-04-10 | End: 2023-06-26

## 2023-04-10 NOTE — TELEPHONE ENCOUNTER
"Last Written Prescription Date:  12/26/22  Last Fill Quantity: 90,  # refills: 0   Last office visit: 2/21/2023 with prescribing provider: Dr. Flores  Future Office Visit:  6/26/23        Requested Prescriptions   Pending Prescriptions Disp Refills     JARDIANCE 10 MG TABS tablet [Pharmacy Med Name: JARDIANCE 10 MG TABLET] 90 tablet 0     Sig: TAKE 1 TABLET BY MOUTH EVERY DAY       Sodium Glucose Co-Transport Inhibitor Agents Passed - 4/7/2023  5:53 PM        Passed - Patient has documented A1c within the specified period of time.     If HgbA1C is 8 or greater, it needs to be on file within the past 3 months.  If less than 8, must be on file within the past 6 months.     Recent Labs   Lab Test 02/10/23  0911   A1C 6.7*             Passed - No creatinine >1.4 or GFR <45 within the past 12 mos     Recent Labs   Lab Test 02/10/23  0911 08/31/21  1020 11/08/19  0936   GFRESTIMATED >90   < > >90   GFRESTBLACK  --   --  >90    < > = values in this interval not displayed.       Recent Labs   Lab Test 02/10/23  0911   CR 0.86             Passed - Medication is active on med list        Passed - Patient is age 18 or older        Passed - Patient has documented normal Potassium within the last 12 mos.     Recent Labs   Lab Test 02/10/23  0911   POTASSIUM 4.4             Passed - Recent (6 mo) or future (30 days) visit within the authorizing provider's specialty     Patient had office visit in the last 6 months or has a visit in the next 30 days with authorizing provider or within the authorizing provider's specialty.  See \"Patient Info\" tab in inbasket, or \"Choose Columns\" in Meds & Orders section of the refill encounter.               Refills sent  Aimee Delgadillo RN    "

## 2023-04-13 ASSESSMENT — ANXIETY QUESTIONNAIRES
7. FEELING AFRAID AS IF SOMETHING AWFUL MIGHT HAPPEN: SEVERAL DAYS
GAD7 TOTAL SCORE: 12
1. FEELING NERVOUS, ANXIOUS, OR ON EDGE: MORE THAN HALF THE DAYS
4. TROUBLE RELAXING: MORE THAN HALF THE DAYS
GAD7 TOTAL SCORE: 12
GAD7 TOTAL SCORE: 12
6. BECOMING EASILY ANNOYED OR IRRITABLE: MORE THAN HALF THE DAYS
2. NOT BEING ABLE TO STOP OR CONTROL WORRYING: SEVERAL DAYS
7. FEELING AFRAID AS IF SOMETHING AWFUL MIGHT HAPPEN: SEVERAL DAYS
5. BEING SO RESTLESS THAT IT IS HARD TO SIT STILL: MORE THAN HALF THE DAYS
3. WORRYING TOO MUCH ABOUT DIFFERENT THINGS: MORE THAN HALF THE DAYS

## 2023-04-14 ENCOUNTER — OFFICE VISIT (OUTPATIENT)
Dept: BEHAVIORAL HEALTH | Facility: CLINIC | Age: 43
End: 2023-04-14
Payer: COMMERCIAL

## 2023-04-14 DIAGNOSIS — F33.1 MAJOR DEPRESSIVE DISORDER, RECURRENT EPISODE, MODERATE (H): ICD-10-CM

## 2023-04-14 DIAGNOSIS — F41.1 GAD (GENERALIZED ANXIETY DISORDER): Primary | ICD-10-CM

## 2023-04-14 ASSESSMENT — COLUMBIA-SUICIDE SEVERITY RATING SCALE - C-SSRS
6. HAVE YOU EVER DONE ANYTHING, STARTED TO DO ANYTHING, OR PREPARED TO DO ANYTHING TO END YOUR LIFE?: NO
ATTEMPT LIFETIME: NO
REASONS FOR IDEATION LIFETIME: DOES NOT APPLY
1. IN THE PAST MONTH, HAVE YOU WISHED YOU WERE DEAD OR WISHED YOU COULD GO TO SLEEP AND NOT WAKE UP?: NO
2. HAVE YOU ACTUALLY HAD ANY THOUGHTS OF KILLING YOURSELF?: NO
TOTAL  NUMBER OF INTERRUPTED ATTEMPTS LIFETIME: NO
TOTAL  NUMBER OF ABORTED OR SELF INTERRUPTED ATTEMPTS LIFETIME: NO
1. HAVE YOU WISHED YOU WERE DEAD OR WISHED YOU COULD GO TO SLEEP AND NOT WAKE UP?: YES

## 2023-04-14 NOTE — PROGRESS NOTES
"      [facility]      PATIENT'S NAME: Bertin Mcgill  PREFERRED NAME: Lj  PRONOUNS: he/him     MRN: [id number]  : 1980  ADDRESS: [address] [place] [id number]  [facility]  [id number]  DATE OF SERVICE: 23  START TIME: 1:31 pm  END TIME: 2:29 pm  PREFERRED PHONE: [phone]  May we leave a program related message: Yes  SERVICE MODALITY:  In-person    UNIVERSAL ADULT Mental Health DIAGNOSTIC ASSESSMENT    Identifying Information:  Patient is a 43 year old, -American   individual.  Patient was referred for an assessment by self and primary care provider .  Patient attended the session alone.    Chief Complaint:   The reason for seeking services at this time is: \"I got so much shit in my head...(therapy services) help me sort things out, reset mentally\".  The problem(s) began during childhood, but were exacerbated and have continued at moderate to high level since .   Patient has attempted to resolve these concerns, including medication and therapy services.     Lj experiences anxiety symptoms on a daily basis, including excessive worry, irritability, decreased patience, fatigue, sleep challenges, decreased concentration and physical symptoms (GI, muscle tension, chest tightness, and heart palpitations). Lj clearly identified how these symptoms impact his life, sharing he doesn't want to be approached by people, doesn't want to talk to people, and doesn't want to be bothered by people.  Lj also stated, \"I hold onto that a little bit more than others\", \"I might have a little more built up rage\" and \"might be more short with [others]\" .  Lj's symptoms and experience has resulted in isolation, including not returning to work from the office and running errands during off times of the day/night (to decrease potential contact with others).  Lj denied HI, and has no history of harming others.     Lj initiated therapy services with [facility] " "[facility] in , after his employer began changing work from home policies.  While Lj initiated mental health services in , Lj reported a history of anxiety dating back to childhood, stating \"The first time I ever experienced anxiety was early on...probably elementary school\".  Lj cited examples throughout his life when he experienced anxiety, including childhood, teenager, during college and as an adult.  Lj believes he's an anxious person, stating, \"I always over think things\", \"I have a very active mind\", and at time has experienced \"extreme anxiety\".      Lj reported his anxiety and worry were exacerbated by the onset of the pandemic.  Lj reported due to his physical health condition (diabetes) and mental health symptoms, he took a very cautious approach to the pandemic, including he worked from home, decreased social contact, and only went to public places (eg grocery shopping) during off peak times to remain safe.   In addition to the excessive worrying, Lj noted how prominent the muscle tension and difficulty falling asleep have become, and is consuming 1-2 drinks most nights of the week (sometimes more on the weekend nights) to reduce the tension and increase his ability to fall asleep.      Lj does not endorse depressed mood, but has consistently scored moderately high on PHQ-9 for the past year.  Lj reported one past period of significantly low mood, immediately following the death of a close friend in .  Lj denied current or recent SI; most recent SI occurred in , was passive and fleeting.   Lj reported one other period of SI occurring after friend  in .  Lj denied current SI, intent or plan, was future oriented, has protective factors, and no firearms in the home.  Lj has been educated about and provided with contact information for Crisis Resources. Lj denied past/current SIB and denied past SA.     Prior to meeting with [facility] starting in , Lj " "denied history of mental health diagnoses, mental health services, or psychiatric hospitalizations.         Social/Family History:  Patient reported they grew up in [place] [facility] [place] [facility]  .  They were raised by adopted parents  .  Parents were always together.  Patient reported that their childhood was both patient and his sister are adopted, didn't like spending time in the home.  Patient described their current relationships with family of origin as \"I've never really gotten a long with my parents...we do better now that we don't live together\".      The patient describes their cultural background as [place], adopted.  Cultural influences and impact on patient's life structure, values, norms, and healthcare: \"Just run-of-the-mill racial generalizations growing up in a predominantly white neighborhood, school district, and community. Nothing that was life changing.\"  Contextual influences on patient's health include: Societal Factors Covid 19 Pandemic.    These factors will be addressed in the Preliminary Treatment plan. Patient identified their preferred language to be English. Patient reported they does not need the assistance of an  or other support involved in therapy.      Patient reported had no significant delays in developmental tasks.   Patient's highest education level was college graduate  .  Patient identified the following learning problems: none reported.  Modifications will not be used to assist communication in therapy.  Patient reports they are  able to understand written materials.     Patient reported the following relationship history: no marriages/divorces.  Patient's current relationship status is single .   Patient identified their sexual orientation as heterosexual.  Patient reported having no child(jose). Patient identified friends as part of their support system.  Patient identified the quality of these relationships as fair,  .       Patient's current living/housing situation involves " staying in own home/apartment.  The immediate members of family and household include none, and they report that housing is stable.     Patient is currently employed fulltime.  Patient reports their finances are obtained through employment. Patient does identify finances as a current stressor.       Patient reported that they have not been involved with the legal system. Patient does not report being under probation/ parole/ jurisdiction. They are not under any current court jurisdiction. .    Patient's Strengths and Limitations:  Patient identified the following strengths or resources that will help them succeed in treatment: friends / good social support, insight, intelligence and work ethic. Things that may interfere with the patient's success in treatment include: Lj's reluctance to address isolation.     Assessments:  The following assessments were completed by patient for this visit:  PHQ9:       1/12/2023     4:02 PM 1/26/2023     5:51 PM 2/9/2023     4:13 PM 2/23/2023     5:02 PM 3/14/2023     5:01 PM 3/30/2023     5:54 PM 4/13/2023     1:57 PM   PHQ-9 SCORE   PHQ-9 Total Score MyChart 14 (Moderate depression) 15 (Moderately severe depression) 12 (Moderate depression) 13 (Moderate depression) 16 (Moderately severe depression) 13 (Moderate depression) 14 (Moderate depression)   PHQ-9 Total Score 14 15 12 13 16 13 14     GAD7:       1/12/2023     4:03 PM 1/26/2023     5:51 PM 2/9/2023     4:13 PM 2/23/2023     5:03 PM 3/14/2023     5:02 PM 3/30/2023     5:55 PM 4/13/2023     1:59 PM   NATHALIE-7 SCORE   Total Score 12 (moderate anxiety) 12 (moderate anxiety) 12 (moderate anxiety) 10 (moderate anxiety) 13 (moderate anxiety) 13 (moderate anxiety) 12 (moderate anxiety)   Total Score 12 12 12 10 13 13 12     CAGE-AID:       4/13/2022     5:35 PM 4/14/2023     1:30 PM   CAGE-AID Total Score   Total Score 2 2   Total Score MyChart 2 (A total score of 2 or greater is considered clinically significant)      PROMIS  10-Global Health (only subscores and total score):       4/13/2022     5:35 PM 7/29/2022     3:27 PM 12/15/2022     5:10 PM 12/16/2022     1:26 PM   PROMIS-10 Scores Only   Global Mental Health Score 10 10 11 11   Global Physical Health Score 14 14 15 15   PROMIS TOTAL - SUBSCORES 24 24 26 26         Personal and Family Medical History:  Patient does not report a family history of mental health concerns; pt is adopted and unaware of biological family history; sister (who is also adopted) has mental health diagnosis and takes medication; pt believes mother (non-biological) has anxiety.  Patient reports family history is not on file..        Prior to meeting with [facility] starting in 2022, Lj denied history of mental health diagnoses, mental health services, or psychiatric hospitalizations.        Patient has had a physical exam to rule out medical causes for current symptoms.  Date of last physical exam was within the past year. Client was encouraged to follow up with PCP if symptoms were to develop. The patient has a [facility] Provider, who is named Miguel Jose..  Patient reports no current medical concerns.  Patient denies any issues with pain..   There are not significant appetite / nutritional concerns / weight changes.   Patient does not report a history of head injury / trauma / cognitive impairment.       Medication:  -no prescribed psychotropic medication; brief medication trial in 2022, reported intolerable GI side effects     Medication Adherence:  NA      Substance Use:  Patient did not report a family history of substance use concerns; pt is adopted and unaware of biological family history..  Patient has not received chemical dependency treatment in the past.  Patient has not ever been to detox.       Patient is not currently receiving any chemical dependency treatment.            Substance History of use Age of first use Date of last use       Pattern and duration of use (include  "amounts and frequency)   Alcohol currently use    19 2023 1-2 drinks (hard alcohol), approximately 6 nights per week   Cannabis    used in the past 21 2001 REPORTS SUBSTANCE USE: N/A      Amphetamines    never used   REPORTS SUBSTANCE USE: N/A   Cocaine/crack     never used       REPORTS SUBSTANCE USE: N/A   Hallucinogens never used         REPORTS SUBSTANCE USE: N/A   Inhalants never used         REPORTS SUBSTANCE USE: N/A   Heroin never used         REPORTS SUBSTANCE USE: N/A   Other Opiates used in the past 26 2017 REPORTS SUBSTANCE USE: N/A   Benzodiazepine    never used   REPORTS SUBSTANCE USE: N/A   Barbiturates never used   REPORTS SUBSTANCE USE: N/A   Over the counter meds used in the past 5? 4/10/2023 REPORTS SUBSTANCE USE: N/A   Caffeine currently use 9?     Half pot of coffee per day; 1-2 soda in the evening   Nicotine  currently use 16 2023 Cigarettes; 1 pack per day   Other substances not listed above:  Identify:  never used   REPORTS SUBSTANCE USE: N/A      Patient reported the following problems as a result of their substance use: no problems, not applicable.     Substance Use: using alcohol to address physical and mental health issues     Based on the positive CAGE score and clinical interview there  are indications of drug or alcohol abuse. Diagnostic assessment for substance use disorder completed. Therapist did recommend client to reduce use or abstain from alcohol or substance use. Therapist did not recommend structured treatment and or community support (AA, 12 step group, etc.). pt not receptive to additional services at this time.     Significant Losses / Trauma / Abuse / Neglect Issues:   Patient did not serve in the .  There are indications or report of significant loss, trauma, abuse or neglect issues related to: see below.  -\"some of the traumas that have shaped my life have been more on the emotional side\": friend  in a car accident  Concerns for possible " neglect are not present.      Safety Assessment:   Patient denies current homicidal ideation and behaviors.  Patient denies current self-injurious ideation and behaviors.    Patient denied risk behaviors associated with substance use.  Patient denies any high risk behaviors associated with mental health symptoms.  Patient reports the following current concerns for their personal safety: None.  Patient reports there are not firearms in the house.         History of Safety Concerns:  Patient denied a history of homicidal ideation.     Patient denied a history of personal safety concerns.    Patient denied a history of assaultive behaviors.    Patient denied a history of sexual assault behaviors.     Patient denied a history of risk behaviors associated with substance use.  Patient denies any history of high risk behaviors associated with mental health symptoms.  Patient reports the following protective factors: forward or future oriented thinking; safe and stable environment; effectively controls impulses     Risk Plan:  See Recommendations for Safety and Risk Management Plan    Review of Symptoms per patient report:   Depression: Change in sleep, Lack of interest, Excessive or inappropriate guilt, Change in energy level, Difficulties concentrating, Change in appetite, Psychomotor slowing or agitation, Ruminations, Irritability, Feeling sad, down, or depressed and Withdrawn  Noemy:  No Symptoms  Psychosis: No Symptoms  Anxiety: Excessive worry, Nervousness, Physical complaints, such as headaches, stomachaches, muscle tension, Social anxiety, Sleep disturbance, Psychomotor agitation, Ruminations, Poor concentration and Irritability  Panic:  Palpitations and chest tightness  Post Traumatic Stress Disorder:  No Symptoms   Eating Disorder: No Symptoms  ADD / ADHD:  No symptoms  Conduct Disorder: No symptoms  Autism Spectrum Disorder: No symptoms  Obsessive Compulsive Disorder: No Symptoms    Patient reports the following  compulsive behaviors and treatment history: None.        Diagnostic Criteria:   Generalized Anxiety Disorder  A. Excessive anxiety and worry about a number of events or activities (such as work or school performance).   B. The person finds it difficult to control the worry.  C. Select 3 or more symptoms (required for diagnosis). Only one item is required in children.   - Restlessness or feeling keyed up or on edge.    - Being easily fatigued.    - Difficulty concentrating or mind going blank.    - Irritability.    - Muscle tension.    - Sleep disturbance (difficulty falling or staying asleep, or restless unsatisfying sleep).   D. The focus of the anxiety and worry is not confined to features of an Axis I disorder.  E. The anxiety, worry, or physical symptoms cause clinically significant distress or impairment in social, occupational, or other important areas of functioning.   F. The disturbance is not due to the direct physiological effects of a substance (e.g., a drug of abuse, a medication) or a general medical condition (e.g., hyperthyroidism) and does not occur exclusively during a Mood Disorder, a Psychotic Disorder, or a Pervasive Developmental Disorder.    - The aformentioned symptoms began 20 year(s) ago and occurs 7 days per week and is experienced as moderate. Substance Use Disorder Use of the substance is continued despite knowledge of having a persistent or recurrent physical or psychological problem that is likely to have been cause or exacerbated by the substance.  Met for:  Alcohol Tolerance:  either a need for markedly increased amounts of the substance to achieve the desired effect or a markedly diminished effect with continued use of the dame amount of the substance.  Met for:  Alcohol Adjustment Disorder  A. The development of emotional or behavioral symptoms in response to an identifiable stressor(s) occurring within 3 months of the onset of the stressor(s)  B. These symptoms or behaviors are  clinically significant, as evidenced by one or both of the following:       - Marked distress that is out of proportion to the severity/intensity of the stressor (with consideration for external context & culture)       - Significant impairment in social, occupational, or other important areas of functioning  C. The stress-related disturbance does not meet criteria for another disorder & is not not an exacerbation of another mental disorder  D. The symptoms do not represent normal bereavement  E. Once the stressor or its consequences have terminated, the symptoms do not persist for more than an additional 6 months    Major Depressive Disorder  A) Recurrent episode(s) - symptoms have been present during the same 2-week period and represent a change from previous functioning 5 or more symptoms (required for diagnosis)   - Depressed mood. Note: In children and adolescents, can be irritable mood.     - Diminished interest or pleasure in all, or almost all, activities.    - Significant weight gainincrease in appetite.    - Decreased sleep.    - Psychomotor activity agitation.    - Fatigue or loss of energy.    - Feelings of worthlessness or inappropriate and excessive guilt.    - Diminished ability to think or concentrate, or indecisiveness.   B) The symptoms cause clinically significant distress or impairment in social, occupational, or other important areas of functioning  C) The episode is not attributable to the physiological effects of a substance or to another medical condition  D) The occurence of major depressive episode is not better explained by other thought / psychotic disorders  E) There has never been a manic episode or hypomanic episode      Functional Status:  Patient reports the following functional impairments:  self-care, social interactions and work / vocational responsibilities.     Nonprogrammatic care:  Patient is requesting basic services to address current mental health concerns.    Clinical  Summary:  1. Reason for assessment: referred by PCP.  2. Psychosocial, Cultural and Contextual Factors: [place]-American, Single, Male, heterosexual., adopted, employed, pandemic  3. Principal DSM5 Diagnoses  (Sustained by DSM5 Criteria Listed Above):   300.02 (F41.1) Generalized Anxiety Disorder.  4. Other Diagnoses that is relevant to services:   296.32 (F33.1) Major Depressive Disorder, Recurrent Episode, Moderate With anxious distress.  Substance-Related & Addictive Disorders Alcohol Use Disorder   305.00 (F10.10) Mild current.  5. Provisional Diagnosis: NA  6. Prognosis: Expect Improvement, Relieve Acute Symptoms and Maintain Current Status / Prevent Deterioration.  7. Likely consequences of symptoms if not treated: increased symptoms and worsening functional impairment  8. Client strengths include:  educated, employed, insightful, intelligent, support of family, friends and providers and work history .     Recommendations:     1. Plan for Safety and Risk Management:   Recommended that patient call 911 or go to the local ED should there be a change in any of these risk factors.  [facility] educated about and sent Crisis Resources material to pt.   Report to child / adult protection services was NA.     2. Patient's identified mental health concerns with a cultural influence will be addressed by recognizing his adoption and familial experience.     3. Initial Treatment will focus on:    Anxiety - excessive worry; physical manifestations of anxiety; inability to relax.     4. Resources/Service Plan:    services are not indicated.   Modifications to assist communication are not indicated.   Additional disability accommodations are not indicated.      5. Collaboration:   Collaboration / coordination of treatment will be initiated with the following  support professionals: primary care physician.      6.  Referrals:   The following referral(s) will be initiated: Outpatient [facility]      Next Scheduled  "Appointment: [facility] [place] appt on 23       A Release of Information has been obtained for the following: none.     Emergency Contact was not obtained.      Clinical Substantiation/medical necessity for the above recommendations: Lj experiences anxiety symptoms on a daily basis, including excessive worry, irritability, decreased patience, fatigue, sleep challenges, decreased concentration and physical symptoms (GI, muscle tension, chest tightness, and heart palpitations). Lj clearly identified how these symptoms impact his life, sharing he doesn't want to be approached by people, doesn't want to talk to people, and doesn't want to be bothered by people.  Lj also stated, \"I hold onto that a little bit more than others\", \"I might have a little more built up rage\" and \"might be more short with [others]\" .  Lj's symptoms and experience has resulted in isolation, including not returning to work from the office and running errands during off times of the day/night (to decrease potential contact with others).   While Lj initiated mental health services in , Lj reported a history of anxiety dating back to childhood, stating \"The first time I ever experienced anxiety was early on...probably elementary school\".  Lj reported his anxiety and worry were exacerbated by the onset of the pandemic.  Lj consumes 1-2 alcoholic drinks most nights of the week and smokes approximately 1 pack cigarettes per day.  Lj does not endorse depressed mood, but has consistently scored moderately high on PHQ-9 for the past year.  Lj reported one past period of significantly low mood. Lj denied current or recent SI; most recent SI occurred in , was passive and fleeting.   Lj reported one other period of SI occurring after friend  in .  Lj denied current SI, intent or plan, was future oriented, has protective factors, and no firearms in the home.  Lj denied past/current SIB and denied past SA. Lj denied HI, and " has no history of harming others.  [facility] is recommending patient participate in outpatient mental health therapy services, reduce/abstain from alcohol and tobacco use, and return to medication evaluation services.  Patient expressed no interest in medications or changing alcohol/tobacco use at this time, but he was receptive to engaging outpatient mental health therapy services.       7. CHELO:    CHELO:  Discussed the general effects of drugs and alcohol on health and well-being. Provider gave patient printed information about the effects of chemical use on their health and well being. Recommendations:  Reduce/abstain from alcohol use; pt denied wanting to make changes to alcohol or tobacco use at this time         8. Records:   These were reviewed at time of assessment.   Information in this assessment was obtained from the medical record and provided by patient who is a good historian.    Patient will have open access to their mental health medical record.    9.   Interactive Complexity: No      Provider Name/ Credentials:  Shawn Ullrich [facility], [facility]  April 14, 2023

## 2023-04-18 DIAGNOSIS — G47.33 OSA (OBSTRUCTIVE SLEEP APNEA): Primary | ICD-10-CM

## 2023-05-04 ASSESSMENT — ANXIETY QUESTIONNAIRES
6. BECOMING EASILY ANNOYED OR IRRITABLE: MORE THAN HALF THE DAYS
7. FEELING AFRAID AS IF SOMETHING AWFUL MIGHT HAPPEN: NOT AT ALL
7. FEELING AFRAID AS IF SOMETHING AWFUL MIGHT HAPPEN: NOT AT ALL
GAD7 TOTAL SCORE: 11
IF YOU CHECKED OFF ANY PROBLEMS ON THIS QUESTIONNAIRE, HOW DIFFICULT HAVE THESE PROBLEMS MADE IT FOR YOU TO DO YOUR WORK, TAKE CARE OF THINGS AT HOME, OR GET ALONG WITH OTHER PEOPLE: SOMEWHAT DIFFICULT
1. FEELING NERVOUS, ANXIOUS, OR ON EDGE: MORE THAN HALF THE DAYS
8. IF YOU CHECKED OFF ANY PROBLEMS, HOW DIFFICULT HAVE THESE MADE IT FOR YOU TO DO YOUR WORK, TAKE CARE OF THINGS AT HOME, OR GET ALONG WITH OTHER PEOPLE?: SOMEWHAT DIFFICULT
4. TROUBLE RELAXING: MORE THAN HALF THE DAYS
5. BEING SO RESTLESS THAT IT IS HARD TO SIT STILL: MORE THAN HALF THE DAYS
3. WORRYING TOO MUCH ABOUT DIFFERENT THINGS: MORE THAN HALF THE DAYS
2. NOT BEING ABLE TO STOP OR CONTROL WORRYING: SEVERAL DAYS
GAD7 TOTAL SCORE: 11
GAD7 TOTAL SCORE: 11

## 2023-05-04 NOTE — PROGRESS NOTES
[facility]  May 5, 2023    Behavioral Health Clinician Progress Note    Patient Name: Bertin Mcgill           Service Type:  Individual        Service Location:    telehealth     Session Start Time: 3:31 pm Session End Time: 4:30 pm      Session Length: 53 - 60      Attendees: Patient      Service Modality:  Telehealth      Visit Activities (Refresh list every visit): [facility] Only     Telemedicine Visit: The patient's condition can be safely assessed and treated via synchronous audio and visual telemedicine encounter.      Reason for Telemedicine Visit: Patient has requested telehealth visit    Originating Site (Patient Location): Patient's home        Distant Location (provider location):  On-site    Consent:  The patient/guardian has verbally consented to: the potential risks and benefits of telemedicine (video visit) versus in person care; bill my insurance or make self-payment for services provided; and responsibility for payment of non-covered services.     Mode of Communication:  Video Conference via [employer]    As the provider I attest to compliance with applicable laws and regulations related to telemedicine.      Diagnostic Assessment Date: 4/14/23  Treatment Plan Review Date: 8/5/23  CGI Review Date: 7/14/23  Promis 10 Review Date:  8/5/23    Clinical Global Impressions  First:  Considering your total clinical experience with this particular patient population, how severe are the patient's symptoms at this time?: 4 (4/14/2023  1:30 PM)    Most recent:  Compared to the patient's condition at the START of treatment, this patient's condition is: 3 (4/14/2023  1:30 PM)      See Flowsheets for today's PHQ-9 and NATHALIE-7 results  Previous PHQ-9:       3/30/2023     5:54 PM 4/13/2023     1:57 PM 5/4/2023     4:55 PM   PHQ-9 SCORE   PHQ-9 Total Score MyChart 13 (Moderate depression) 14 (Moderate depression) 13 (Moderate depression)   PHQ-9 Total Score 13 14 13     Previous NATHALIE-7:        "3/30/2023     5:55 PM 4/13/2023     1:59 PM 5/4/2023     4:56 PM   NATHALIE-7 SCORE   Total Score 13 (moderate anxiety) 12 (moderate anxiety) 11 (moderate anxiety)   Total Score 13 12 11       KRISTINE LEVEL:       View : No data to display.                DATA  Extended Session (60+ minutes): No  Interactive Complexity: No  Crisis: No  [facility] Patient: No    Treatment Objective(s) Addressed in This Session:  Target Behavior(s): increased social activity/decreased isolation    Anxiety: will experience a reduction in anxiety, will develop more effective coping skills to manage anxiety symptoms, will develop healthy cognitive patterns and beliefs and will increase ability to function adaptively    Current Stressors / Issues:    Lj reported presented as anxious, irritable, and stressed, endorsing fatigue and expressing negative thoughts about others.  Lj explained frustration with work colleagues because they are making the same mistake, even though they have already educated about the reason for the issue and potential remedies to the problem.   [facility] validated his experience, validated the frustration and decreased patience, and prompted Lj to share ways of managing frustration and general work stress.  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  Overall, Lj noted he's satisfied with how he's handling work stress, although he also shared the level of work stress ebbs and flows throughout the year.     [facility] and Lj reviewed goals, Lj identified goals and affirmed he's mostly likely doing mentally well mentally when he's exercising, sleeping well, eating healthy, effectively managing work stress, and engaged in enjoyable activities (writing book).  Lj reported decreased walking recently, [facility] used MI interventions to decrease ambivalence and increase motivation, Lj was " "responded by expressing change talk, stating, \"I know I feel better (when I walk)\".      Progress on Treatment Objective(s) / Homework:  Minimal progress - CONTEMPLATION (Considering change and yet undecided); Intervened by assessing the negative and positive thinking (ambivalence) about behavior change    Motivational Interviewing    MI Intervention: Co-Developed Goal: reduce anxiety, Expressed Empathy/Understanding, Supported Autonomy, Collaboration, Evocation, Open-ended questions, Reflections: simple and complex and Change talk (evoked)     Change Talk Expressed by the Patient: Reasons to change    Provider Response to Change Talk: E - Evoked more info from patient about behavior change and R - Reflected patient's change talk      Solution-Focused Therapy    Explore patterns in patient's behaviors and relationships and discuss options for new behaviors    Explore new options for problem-solving, communication, managing stress, etc.    Cognitive Behavioral Therapy:    Cognitive distortions    Counter thoughts    Perspective taking    Care Plan review completed: Yes    Medication Review:  No current psychiatric medications prescribed    Medication Compliance:  NA    Changes in Health Issues:    None reported    Chemical Use Review:   Substance Use: Problem use continues with no change since last session, Stage of Change: Contemplation        Tobacco Use: No change in amount of tobacco use since last session.  Contemplation    Assessment: Current Emotional / Mental Status (status of significant symptoms):  Risk status (Self / Other harm or suicidal ideation)  Patient has had a history of suicidal ideation: passive SI in 2003; none since  Patient denies current fears or concerns for personal safety.  Patient denies current or recent suicidal ideation or behaviors.  Patient denies current or recent homicidal ideation or behaviors.  Patient denies current or recent self injurious behavior or ideation.  Patient denies " other safety concerns.  A safety and risk management plan has not been developed at this time, however patient was encouraged to call [place] [phone] should there be a change in any of these risk factors.    Appearance:   Appropriate   Eye Contact:   Good   Psychomotor Behavior: Normal   Attitude:   Cooperative   Orientation:   All  Speech   Rate / Production: Normal/ Responsive   Volume:  Normal   Mood:    Anxious   Affect:    Worrisome   Thought Content:  Clear    Thought Form:  Coherent  Logical   Insight:    Good     Diagnoses:  1. NATHALIE (generalized anxiety disorder)        Collateral Reports Completed:  Routed note to PCP    Plan: (Homework, other):  Patient was given information about behavioral services and encouraged to schedule a follow up appointment with the clinic [facility] in 1 month.  He was also given information about mental health symptoms and treatment options  and strategies to more effectively manage anxiety.  CD Recommendations: abstain from alcohol use.  Shawn Ullrich LICSW, [facility]      ______________________________________________________________________    Integrated Primary Care Behavioral Health Treatment Plan    Patient's Name: Bertin Mcgill  YOB: 1980    Date of Creation: 5/5/23  Date Treatment Plan Last Reviewed/Revised: 5/5/23    DSM5 Diagnoses:   F41.1 Generalized Anxiety Disorder.  F33.1 Major Depressive Disorder, Recurrent Episode, Moderate With anxious distress.   F10.10  Alcohol Use Disorder, Mild  Psychosocial / Contextual Factors: Armenian-American, Single, Male, heterosexual., adopted, employed, works from home  PROMIS (reviewed every 90 days): pt completed on 5/5/23        Referral / Collaboration:  Referral to another professional/service is not indicated at this time.; Lj followed through with medication evaluation, attempted medication and stopped medication    Anticipated number of session for this episode of care: 10  Anticipation frequency of session: Every  other week  Anticipated Duration of each session: 38-52 minutes  Treatment plan will be reviewed in 90 days or when goals have been changed.       MeasurableTreatment Goal(s) related to diagnosis / functional impairment(s)   Goal 1: Patient will reduce anxiety and improve mood making healthy lifestyle choices (exercise, diet, sleep), engaging in enjoyable activity (book writing), and use CBT skills/strategies to manage work stress       Objective #A (Patient Action)    Patient will exercise at least 4 days per week.  Status: Continued - Date(s): 5/5/23     Intervention(s)  Therapist will assign homework to monitor level of exercise and mood on a daily basis    Objective #B  Patient will use effective sleep hygiene strategies (ie sleep in his bed, use cpap, obtain 7-8 hrs per night)  Status: Continued - Date(s):2/24/23     Intervention(s)  Therapist will teach sleep hygiene strategies.    Objective #C  Patient will increase number of meals cooked at home (decrease take out).   Status: New - Date: 5/5/23   Intervention(s)  Therapist will assign homework to track food.    Objective D:  Patient will use cognitive strategies to manage stress (identifying cognitive distortions, using counter thoughts, taking perspective, etc)  Status: New: 5/5/23  Intervention:  Therapist will teach cbt strategies/skills      Objective E:   Patient will engage in one book writing activity per week   Status: New: 5/5/23   Therapist will assign homework to identify and shared benefits of activity with [facility]       Patient has reviewed and agreed to the above plan.      Shawn G. Ullrich, [facility]  May 5, 2023

## 2023-05-05 ENCOUNTER — VIRTUAL VISIT (OUTPATIENT)
Dept: BEHAVIORAL HEALTH | Facility: CLINIC | Age: 43
End: 2023-05-05
Payer: COMMERCIAL

## 2023-05-05 DIAGNOSIS — F41.1 GAD (GENERALIZED ANXIETY DISORDER): Primary | ICD-10-CM

## 2023-05-15 DIAGNOSIS — E11.9 TYPE 2 DIABETES MELLITUS WITHOUT COMPLICATION, WITHOUT LONG-TERM CURRENT USE OF INSULIN (H): ICD-10-CM

## 2023-05-15 RX ORDER — SITAGLIPTIN AND METFORMIN HYDROCHLORIDE 1000; 100 MG/1; MG/1
TABLET, FILM COATED, EXTENDED RELEASE ORAL
Qty: 30 TABLET | Refills: 2 | Status: SHIPPED | OUTPATIENT
Start: 2023-05-15 | End: 2023-09-07

## 2023-05-15 NOTE — TELEPHONE ENCOUNTER
"Last Written Prescription Date:  2/17/23  Last Fill Quantity: 30,  # refills: 2   Last office visit:  2/21/2023 with prescribing provider: Dr. Flores  Future Office Visit:  6/26/23        Requested Prescriptions   Pending Prescriptions Disp Refills     JANUMET -1000 MG TB24 [Pharmacy Med Name: JANUMET -1,000 MG TABLET] 30 tablet 2     Sig: TAKE 1 TABLET BY MOUTH EVERY DAY       Combination Oral Antihyperglycemic Agents Passed - 5/15/2023  1:07 AM        Passed - Patient has documented A1c within the specified period of time.     If HgbA1C is 8 or greater, it needs to be on file within the past 3 months.  If less than 8, must be on file within the past 6 months.     Recent Labs   Lab Test 02/10/23  0911   A1C 6.7*             Passed - Patient's CR is NOT>1.4 OR Patient's EGFR is NOT<45 within past 12 mos.     Recent Labs   Lab Test 02/10/23  0911 08/31/21  1020 11/08/19  0936   GFRESTIMATED >90   < > >90   GFRESTBLACK  --   --  >90    < > = values in this interval not displayed.       Recent Labs   Lab Test 02/10/23  0911   CR 0.86             Passed - Patient does not have a diagnosis of CHF.        Passed - Medication is active on med list        Passed - Patient is 18 years old or older.        Passed - Recent (6 mo) or future (30 days) visit within the authorizing provider's specialty     Patient had office visit in the last 6 months or has a visit in the next 30 days with authorizing provider or within the authorizing provider's specialty.  See \"Patient Info\" tab in inbasket, or \"Choose Columns\" in Meds & Orders section of the refill encounter.               Refills sent  Aimee Delgadillo RN    "

## 2023-05-30 DIAGNOSIS — I10 BENIGN ESSENTIAL HYPERTENSION: ICD-10-CM

## 2023-05-30 RX ORDER — HYDROCHLOROTHIAZIDE 12.5 MG/1
TABLET ORAL
Qty: 90 TABLET | Refills: 0 | Status: SHIPPED | OUTPATIENT
Start: 2023-05-30 | End: 2023-08-14

## 2023-05-30 NOTE — TELEPHONE ENCOUNTER
Medication requested: hydrochlorothiazide (HYDRODIURIL) 12.5 MG tablet  Last office visit: 10/26/22  [facility] appointments: none  Medication last refilled: 9/9/22; 90 + 2 refills  Last qualifying labs:   BP Readings from Last 3 Encounters:   10/26/22 136/89   04/06/22 122/80   10/14/21 125/83     Medication is being filled for 1 time refill only due to:  Pt is due for annual preventive exam    Message sent to pt to schedule physical within next three months.    Bill RAMOS, RN  05/30/23 3:37 PM

## 2023-05-31 NOTE — TELEPHONE ENCOUNTER
"Last Written Prescription Date:  6/7/22  Last Fill Quantity: 30,  # refills: 1   Last office visit: 5/10/22 with Dr. Flores  Future Office Visit:  Today        Requested Prescriptions   Pending Prescriptions Disp Refills     JANUMET -1000 MG TB24 [Pharmacy Med Name: JANUMET -1,000 MG TABLET] 30 tablet 1     Sig: TAKE 1 TABLET BY MOUTH EVERY DAY       Combination Oral Antihyperglycemic Agents Passed - 8/2/2022 12:40 AM        Passed - Patient has documented A1c within the specified period of time.     If HgbA1C is 8 or greater, it needs to be on file within the past 3 months.  If less than 8, must be on file within the past 6 months.     Recent Labs   Lab Test 07/25/22  1020   A1C 7.6*             Passed - Patient's CR is NOT>1.4 OR Patient's EGFR is NOT<45 within past 12 mos.     Recent Labs   Lab Test 07/25/22  1020 08/31/21  1020 11/08/19  0936   GFRESTIMATED >90   < > >90   GFRESTBLACK  --   --  >90    < > = values in this interval not displayed.       Recent Labs   Lab Test 07/25/22  1020   CR 0.85             Passed - Patient does not have a diagnosis of CHF.        Passed - Medication is active on med list        Passed - Patient is 18 years old or older.        Passed - Recent (6 mo) or future (30 days) visit within the authorizing provider's specialty     Patient had office visit in the last 6 months or has a visit in the next 30 days with authorizing provider or within the authorizing provider's specialty.  See \"Patient Info\" tab in inbasket, or \"Choose Columns\" in Meds & Orders section of the refill encounter.                 " No (0)

## 2023-05-31 NOTE — PROGRESS NOTES
[facility]  June 2, 2023    Behavioral Health Clinician Progress Note    Patient Name: Bertin Mcgill           Service Type:  Individual        Service Location:    telehealth     Session Start Time: 3:29 pm Session End Time: 4:28pm      Session Length: 53 - 60      Attendees: Patient      Service Modality:   video visit      Visit Activities (Refresh list every visit): [facility] Only     Telemedicine Visit: The patient's condition can be safely assessed and treated via synchronous audio and visual telemedicine encounter.      Reason for Telemedicine Visit: Patient has requested telehealth visit    Originating Site (Patient Location): Patient's home        Distant Location (provider location):  On-site    Consent:  The patient/guardian has verbally consented to: the potential risks and benefits of telemedicine (video visit) versus in person care; bill my insurance or make self-payment for services provided; and responsibility for payment of non-covered services.     Mode of Communication:  Video Conference via [employer]ter[employer]    As the provider I attest to compliance with applicable laws and regulations related to telemedicine.      Diagnostic Assessment Date: 4/14/23  Treatment Plan Review Date: 8/5/23  CGI Review Date: 7/14/23  Promis 10 Review Date:  8/5/23      Clinical Global Impressions  First:  Considering your total clinical experience with this particular patient population, how severe are the patient's symptoms at this time?: 4 (4/14/2023  1:30 PM)    Most recent:  Compared to the patient's condition at the START of treatment, this patient's condition is: 3 (4/14/2023  1:30 PM)      See Flowsheets for today's PHQ-9 and NATHALIE-7 results  Previous PHQ-9:       4/13/2023     1:57 PM 5/4/2023     4:55 PM 6/1/2023     4:17 PM   PHQ-9 SCORE   PHQ-9 Total Score MyChart 14 (Moderate depression) 13 (Moderate depression) 12 (Moderate depression)   PHQ-9 Total Score 14 13 12     Previous NATHALIE-7:        "4/13/2023     1:59 PM 5/4/2023     4:56 PM 6/1/2023     4:18 PM   NATHALIE-7 SCORE   Total Score 12 (moderate anxiety) 11 (moderate anxiety) 10 (moderate anxiety)   Total Score 12 11 10       KRISTINE LEVEL:       View : No data to display.                DATA  Extended Session (60+ minutes): No  Interactive Complexity: No  Crisis: No  [facility] Patient: No    Treatment Objective(s) Addressed in This Session:  Target Behavior(s): increased social activity/decreased isolation    Anxiety: will experience a reduction in anxiety, will develop more effective coping skills to manage anxiety symptoms, will develop healthy cognitive patterns and beliefs and will increase ability to function adaptively    Current Stressors / Issues:    While Lj noted there have been some \"highs and lows\" regarding work stress over the past month, the current work stress level is \"average right now\".  Lj reported typical work frustrations and is satisfactorily managing them by improving his ability to be OK with uncertainty and accepting \"There's only so much you can do\". Lj reported he's also working toward health goals, noting progress toward exercise (\"Exercise is good\"; 4 days of walking already this week) and he would like to return to daily push up routine, which fell off over the holidays.  Lj attributed improvement to increased motivation and energy.  Lj also reported mild progress in sleep as well, sharing only one night last week when he fell asleep in his chair (which is a lot less than a couple months ago) and he obtained \"5.01\" hours of consecutive sleep with CPAP last night.  Lj acknowledged his sleep is \"probably not as great as could be\", but was satisfied with the recent improvements.  Lj agreed his total hours of sleep per night is lower than ideal, but he noted history of sleep issues dating back more than 2 decades (unable to fall asleep before 2 am). Lj reported using CPAP approximately half the nights, which is also " "improvement.  Lj believes sleeping in his bed and increased use of CPAP has improved quality of sleep, stating, \"The sleep I'm getting is better\".      When discussing the above, Lj's mood was primarily anxious, but only mildly, and he was able to use and engage in humor.  But Lj's mood became more anxious, frustrated and generally lower when sharing about family stress.  Lj shared how his mother \"goes thru her ups an downs and she's currently in a down\".  [facility] provided support, validated his experience, and reflected emotions/thoughts.  Lj reported she has been contacting him more frequently, sharing and ruminating on problems, and talking for 1-2 hours on the phone with him.  Lj expressed frustration b/c he doesn't know if he should listen, provide options, or try to fix the issue himself?  Lj reported when he directly asks his mother how he can help, she cannot tell him.  Lj reported he will not stop talking to his mother b/c he's her primary support.         Progress on Treatment Objective(s) / Homework:  Minimal progress - ACTION (Actively working towards change); Intervened by reinforcing change plan / affirming steps taken    Motivational Interviewing    MI Intervention: Co-Developed Goal: reduce anxiety, Expressed Empathy/Understanding, Supported Autonomy, Collaboration, Evocation, Open-ended questions, Reflections: simple and complex and Change talk (evoked)     Change Talk Expressed by the Patient: Reasons to change    Provider Response to Change Talk: E - Evoked more info from patient about behavior change and R - Reflected patient's change talk      Solution-Focused Therapy    Explore patterns in patient's behaviors and relationships and discuss options for new behaviors    Explore new options for problem-solving, communication, managing stress, etc.    Psychodynamic psychotherapy    Discuss patient's emotional dynamics and issues and how they impact behaviors    Explore patient's history " of relationships and how they impact present behaviors    Explore how to work with and make changes in these schemas and patterns      Care Plan review completed: Yes    Medication Review:  No current psychiatric medications prescribed    Medication Compliance:  NA    Changes in Health Issues:    None reported    Chemical Use Review:   Substance Use: Problem use continues with no change since last session, Stage of Change: Contemplation        Tobacco Use: No change in amount of tobacco use since last session.  Contemplation    Assessment: Current Emotional / Mental Status (status of significant symptoms):  Risk status (Self / Other harm or suicidal ideation)  Patient has had a history of suicidal ideation: passive SI in 2003; none since  Patient denies current fears or concerns for personal safety.  Patient denies current or recent suicidal ideation or behaviors.  Patient denies current or recent homicidal ideation or behaviors.  Patient denies current or recent self injurious behavior or ideation.  Patient denies other safety concerns.  A safety and risk management plan has not been developed at this time, however patient was encouraged to call [facility] should there be a change in any of these risk factors.    Appearance:   Appropriate   Eye Contact:   Good   Psychomotor Behavior: Normal   Attitude:   Cooperative   Orientation:   All  Speech   Rate / Production: Normal/ Responsive   Volume:  Normal   Mood:    Anxious  low  Affect:    congruent with mood   Thought Content:  Clear    Thought Form:  Coherent  Logical   Insight:    Good     Diagnoses:  1. NATHALIE (generalized anxiety disorder)        Collateral Reports Completed:  Routed note to PCP    Plan: (Homework, other):  Patient was given information about behavioral services and encouraged to schedule a follow up appointment with the clinic [facility] in 1 month.  He was also given information about mental health symptoms and treatment options  and strategies  to more effectively manage anxiety.  CD Recommendations: reduce alcohol use.  Dennis Ullrich [facility], [facility]      ______________________________________________________________________    Integrated Primary Care Behavioral Health Treatment Plan    Patient's Name: Bertin Mcgill  YOB: 1980    Date of Creation: 5/5/23  Date Treatment Plan Last Reviewed/Revised: 5/5/23    DSM5 Diagnoses:   F41.1 Generalized Anxiety Disorder.  F33.1 Major Depressive Disorder, Recurrent Episode, Moderate With anxious distress.   F10.10  Alcohol Use Disorder, Mild  Psychosocial / Contextual Factors: Indonesian-American, Single, Male, heterosexual., adopted, employed, works from home  PROMIS (reviewed every 90 days): pt completed on 5/5/23        Referral / Collaboration:  Referral to another professional/service is not indicated at this time.; Lj followed through with medication evaluation, attempted medication and stopped medication    Anticipated number of session for this episode of care: 10  Anticipation frequency of session: Every other week  Anticipated Duration of each session: 38-52 minutes  Treatment plan will be reviewed in 90 days or when goals have been changed.       MeasurableTreatment Goal(s) related to diagnosis / functional impairment(s)   Goal 1: Patient will reduce anxiety and improve mood making healthy lifestyle choices (exercise, diet, sleep), engaging in enjoyable activity (book writing), and use CBT skills/strategies to manage work stress       Objective #A (Patient Action)    Patient will exercise at least 4 days per week.  Status: Continued - Date(s): 5/5/23     Intervention(s)  Therapist will assign homework to monitor level of exercise and mood on a daily basis    Objective #B  Patient will use effective sleep hygiene strategies (ie sleep in his bed, use cpap, obtain 7-8 hrs per night)  Status: Continued - Date(s):2/24/23     Intervention(s)  Therapist will teach sleep hygiene strategies.    Objective  #C  Patient will increase number of meals cooked at home (decrease take out).   Status: New - Date: 5/5/23   Intervention(s)  Therapist will assign homework to track food.    Objective D:  Patient will use cognitive strategies to manage stress (identifying cognitive distortions, using counter thoughts, taking perspective, etc)  Status: New: 5/5/23  Intervention:  Therapist will teach cbt strategies/skills      Objective E:   Patient will engage in one book writing activity per week   Status: New: 5/5/23   Therapist will assign homework to identify and shared benefits of activity with [facility]       Patient has reviewed and agreed to the above plan.      Shawn G. Ullrich, [facility]  May 5, 2023

## 2023-06-01 ASSESSMENT — ANXIETY QUESTIONNAIRES
1. FEELING NERVOUS, ANXIOUS, OR ON EDGE: MORE THAN HALF THE DAYS
5. BEING SO RESTLESS THAT IT IS HARD TO SIT STILL: SEVERAL DAYS
IF YOU CHECKED OFF ANY PROBLEMS ON THIS QUESTIONNAIRE, HOW DIFFICULT HAVE THESE PROBLEMS MADE IT FOR YOU TO DO YOUR WORK, TAKE CARE OF THINGS AT HOME, OR GET ALONG WITH OTHER PEOPLE: SOMEWHAT DIFFICULT
7. FEELING AFRAID AS IF SOMETHING AWFUL MIGHT HAPPEN: NOT AT ALL
3. WORRYING TOO MUCH ABOUT DIFFERENT THINGS: MORE THAN HALF THE DAYS
GAD7 TOTAL SCORE: 10
GAD7 TOTAL SCORE: 10
2. NOT BEING ABLE TO STOP OR CONTROL WORRYING: SEVERAL DAYS
GAD7 TOTAL SCORE: 10
4. TROUBLE RELAXING: MORE THAN HALF THE DAYS
6. BECOMING EASILY ANNOYED OR IRRITABLE: MORE THAN HALF THE DAYS
7. FEELING AFRAID AS IF SOMETHING AWFUL MIGHT HAPPEN: NOT AT ALL
8. IF YOU CHECKED OFF ANY PROBLEMS, HOW DIFFICULT HAVE THESE MADE IT FOR YOU TO DO YOUR WORK, TAKE CARE OF THINGS AT HOME, OR GET ALONG WITH OTHER PEOPLE?: SOMEWHAT DIFFICULT

## 2023-06-02 ENCOUNTER — VIRTUAL VISIT (OUTPATIENT)
Dept: BEHAVIORAL HEALTH | Facility: CLINIC | Age: 43
End: 2023-06-02
Payer: COMMERCIAL

## 2023-06-02 ENCOUNTER — HEALTH MAINTENANCE LETTER (OUTPATIENT)
Age: 43
End: 2023-06-02

## 2023-06-02 DIAGNOSIS — F41.1 GAD (GENERALIZED ANXIETY DISORDER): Primary | ICD-10-CM

## 2023-06-16 ENCOUNTER — LAB (OUTPATIENT)
Dept: LAB | Facility: CLINIC | Age: 43
End: 2023-06-16
Payer: COMMERCIAL

## 2023-06-16 DIAGNOSIS — E11.9 TYPE 2 DIABETES MELLITUS WITHOUT COMPLICATION, WITHOUT LONG-TERM CURRENT USE OF INSULIN (H): ICD-10-CM

## 2023-06-16 DIAGNOSIS — E78.2 MIXED HYPERLIPIDEMIA: ICD-10-CM

## 2023-06-16 LAB
ALT SERPL W P-5'-P-CCNC: 21 U/L (ref 0–70)
CHOLEST SERPL-MCNC: 234 MG/DL
HBA1C MFR BLD: 7.7 % (ref 0–5.6)
HDLC SERPL-MCNC: 48 MG/DL
LDLC SERPL CALC-MCNC: ABNORMAL MG/DL
LDLC SERPL DIRECT ASSAY-MCNC: 126 MG/DL
NONHDLC SERPL-MCNC: 186 MG/DL
TRIGL SERPL-MCNC: 618 MG/DL

## 2023-06-16 PROCEDURE — 36415 COLL VENOUS BLD VENIPUNCTURE: CPT

## 2023-06-16 PROCEDURE — 83721 ASSAY OF BLOOD LIPOPROTEIN: CPT | Mod: 59

## 2023-06-16 PROCEDURE — 80061 LIPID PANEL: CPT

## 2023-06-16 PROCEDURE — 84460 ALANINE AMINO (ALT) (SGPT): CPT

## 2023-06-16 PROCEDURE — 83036 HEMOGLOBIN GLYCOSYLATED A1C: CPT

## 2023-06-20 DIAGNOSIS — I10 BENIGN ESSENTIAL HYPERTENSION: ICD-10-CM

## 2023-06-20 RX ORDER — LOSARTAN POTASSIUM 50 MG/1
TABLET ORAL
Qty: 90 TABLET | Refills: 1 | Status: SHIPPED | OUTPATIENT
Start: 2023-06-20 | End: 2023-12-05

## 2023-06-20 NOTE — TELEPHONE ENCOUNTER
Last visit 10/26/22, future appt 6/28/23  Prescription approved per [facility] Refill Protocol.  Codi Mai RN  [facility]

## 2023-06-24 DIAGNOSIS — E11.9 TYPE 2 DIABETES MELLITUS WITHOUT COMPLICATION, WITHOUT LONG-TERM CURRENT USE OF INSULIN (H): ICD-10-CM

## 2023-06-26 ENCOUNTER — VIRTUAL VISIT (OUTPATIENT)
Dept: ENDOCRINOLOGY | Facility: CLINIC | Age: 43
End: 2023-06-26
Payer: COMMERCIAL

## 2023-06-26 DIAGNOSIS — E78.2 MIXED HYPERLIPIDEMIA: ICD-10-CM

## 2023-06-26 DIAGNOSIS — E11.9 TYPE 2 DIABETES MELLITUS WITHOUT COMPLICATION, WITHOUT LONG-TERM CURRENT USE OF INSULIN (H): Primary | ICD-10-CM

## 2023-06-26 PROCEDURE — 99214 OFFICE O/P EST MOD 30 MIN: CPT | Mod: VID | Performed by: INTERNAL MEDICINE

## 2023-06-26 RX ORDER — EMPAGLIFLOZIN 10 MG/1
TABLET, FILM COATED ORAL
Qty: 90 TABLET | Refills: 0 | Status: SHIPPED | OUTPATIENT
Start: 2023-06-26 | End: 2023-09-25

## 2023-06-26 NOTE — TELEPHONE ENCOUNTER
"Last Written Prescription Date:  4/10/23  Last Fill Quantity: 90,  # refills: 0   Last office visit: 2/21/2023 with prescribing provider:  Dr. Flores   Future Office Visit:  Today        Requested Prescriptions   Pending Prescriptions Disp Refills     JARDIANCE 10 MG TABS tablet [Pharmacy Med Name: JARDIANCE 10 MG TABLET] 90 tablet 0     Sig: TAKE 1 TABLET BY MOUTH EVERY DAY       Sodium Glucose Co-Transport Inhibitor Agents Passed - 6/24/2023  2:27 PM        Passed - Patient has documented A1c within the specified period of time.     If HgbA1C is 8 or greater, it needs to be on file within the past 3 months.  If less than 8, must be on file within the past 6 months.     Recent Labs   Lab Test 06/16/23  0912   A1C 7.7*             Passed - No creatinine >1.4 or GFR <45 within the past 12 mos     Recent Labs   Lab Test 02/10/23  0911 08/31/21  1020 11/08/19  0936   GFRESTIMATED >90   < > >90   GFRESTBLACK  --   --  >90    < > = values in this interval not displayed.       Recent Labs   Lab Test 02/10/23  0911   CR 0.86             Passed - Medication is active on med list        Passed - Patient is age 18 or older        Passed - Patient has documented normal Potassium within the last 12 mos.     Recent Labs   Lab Test 02/10/23  0911   POTASSIUM 4.4             Passed - Recent (6 mo) or future (30 days) visit within the authorizing provider's specialty     Patient had office visit in the last 6 months or has a visit in the next 30 days with authorizing provider or within the authorizing provider's specialty.  See \"Patient Info\" tab in inbasket, or \"Choose Columns\" in Meds & Orders section of the refill encounter.               Refills sent  Aimee Delgadillo RN    "

## 2023-06-26 NOTE — LETTER
6/26/2023         RE: Bertin Mcgill  [address] [place] [address]        Dear Colleague,    Thank you for referring your patient, Bertin Mcgill, to the [facility] [place] [facility] [place]. Please see a copy of my visit note below.    Virtual Visit Details    Type of service:  Video Visit     Originating Location (pt. Location): Home  Distant Location (provider location):  Off-site  Platform used for Video Visit: [employer]        Recent issues:  Diabetes follow-up evaluation  Taking the JanumetXR and Jardiance medications   Lipid meds changed from NiacinER to fenofibrate + simvastatin, per plan  Reviewed health history and his preappt labs, but no recent glucose data available        2017. Diagnosis of diabetes mellitus when med eval for torn right shoulder muscle  High glucose level noted during med evaluation, hgbA1c near 11.5%  Started Jardiance medication, took for approx 6 months  Recalls significant weight loss of approx 30#  Switched to metformin medication, then dose increases              Concerns about GI symptoms with nausea, nausea, also morning vomiting              Tried Prilosec, then Zantac  Has seen [facility] for medical evaluations  Recent discussion with his workplace team physician, Dr. Skye Mane              Advised to see me for medical evaluation    Previously on metforminXR 500 mg 2 tabs BID     Previous [facility] labs include:               8/12/19. Initial diabetes evaluation with me at [place]  Reviewed health history and diabetes management  Medication change to JanumetXR  1/2022. Addition of Jardiance     Current DM medications:  JanumetXR 100/1000 1-tab each morning  Jardiance 10 mg  1-tab each morning     Blood glucose (BG) meter              Infrequent testing  CGM use:  Years ago, not recently (phobia?)  Fam Hx Diabetes: None  Recent [facility] labs include:           Lab Results   Component Value Date    A1C 7.7 (H) 06/16/2023     02/10/2023     POTASSIUM 4.4 02/10/2023    CHLORIDE 104 02/10/2023    CO2 28 02/10/2023    ANIONGAP 6 02/10/2023     (H) 02/10/2023    BUN 18 02/10/2023    CR 0.86 02/10/2023    GFRESTIMATED >90 02/10/2023    GFRESTBLACK >90 11/08/2019    FLAVIO 9.9 02/10/2023    CHOL 234 (H) 06/16/2023    TRIG 618 (H) 06/16/2023    HDL 48 06/16/2023    LDL  06/16/2023      Comment:      Cannot estimate LDL when triglyceride exceeds 400 mg/dL     (H) 06/16/2023    NHDL 186 (H) 06/16/2023    UCRR 56 04/06/2022    MICROL 8 04/06/2022    UMALCR 14.29 04/06/2022     Lab Results   Component Value Date    TSH 1.62 07/25/2022     [facility], no DR per patient  History of hyperlipidemia, takes simvastatin 20 mg daily   1/2023. Stopped statin med and started NiacinER (Niaspan) 500 mg 1-tab QPM along with aspirin 1-tab QPM, head sweating symptoms  Current lipid meds: Simvastatin 20 mg daily     Fenofibrate 145 mg daily  DM Complications:      None known        [place]  Has seen [facility]   Also sees Dr. Jose/[facility] for general medicine evaluations.      PMH/PSH:  Past Medical History:   Diagnosis Date     Allergic rhinitis      Benign essential hypertension      Hyperlipidemia      Rotator cuff tear, left 2015     Rotator cuff tear, right 2017     Type 2 diabetes mellitus (H)      Past Surgical History:   Procedure Laterality Date     APPENDECTOMY       SHOULDER SURGERY      left and right previously       Family Hx:  No family history on file.      Social Hx:  Social History     Socioeconomic History     Marital status: Single     Spouse name: Not on file     Number of children: Not on file     Years of education: Not on file     Highest education level: Not on file   Occupational History     Not on file   Tobacco Use     Smoking status: Every Day     Packs/day: 1.00     Types: Cigarettes, Cigars     Smokeless tobacco: Never   Substance and  "Sexual Activity     Alcohol use: Yes     Drug use: Never     Sexual activity: Not on file   Other Topics Concern     Not on file   Social History Narrative    Single.     Works as \"\" for [employer] at [employer].     From [place]     Social Determinants of Health     Financial Resource Strain: Not on file   Food Insecurity: Not on file   Transportation Needs: Not on file   Physical Activity: Not on file   Stress: Not on file   Social Connections: Not on file   Intimate Partner Violence: Not on file   Housing Stability: Not on file          MEDICATIONS:  has a current medication list which includes the following prescription(s): fenofibrate, hydrochlorothiazide, janumet xr, jardiance, losartan, multiple vitamins-minerals, simvastatin, vitamin d, and acetaminophen.    ROS: 10 point ROS neg other than the symptoms noted above in the HPI.     GENERAL: energy good, wt stable; denies fevers, chills, night sweats.   HEENT: sinus congestion; no dysphagia, odonophagia, diplopia, neck pain  THYROID:  no apparent hyper or hypothyroid symptoms  CV: no chest pain, pressure, palpitations  LUNGS: no SOB, MEHTA, cough, wheezing   ABDOMEN: episode of rectal bleeding; occasional nausea; denies abdominal pain  EXTREMITIES: no rashes, ulcers, edema  NEUROLOGY: no headaches, denies changes in vision, tingling, extremitiy numbness   MSK: no muscle aches or pains, weakness  SKIN: no rashes or lesions  : denies nocturia  PSYCH:  stable mood, no significant anxiety or depression  ENDOCRINE: no heat or cold intolerance    Physical Exam (visual exam)  VS:  no vital signs taken for video visit  CONSTITUTIONAL: healthy, alert and NAD, well dressed, answering questions appropriately  ENT: no nose swelling or nasal discharge, mouth redness or gum changes.  EYES: eyes grossly normal to inspection, conjunctivae and sclerae normal, no exophthalmos or proptosis  THYROID:  no apparent nodules or goiter  LUNGS: no audible " wheeze, cough or visible cyanosis, no visible retractions or increased work of breathing  ABDOMEN: abdomen not evaluated  EXTREMITIES: no hand tremors, limited exam  NEUROLOGY: CN grossly intact, mentation intact and speech normal   SKIN:  no apparent skin lesions, rash, or edema with visualized skin appearance  PSYCH: mentation appears normal, affect normal/bright, judgement and insight intact,   normal speech and appearance well groomed    LABS:     All pertinent notes, labs, and images personally reviewed by me.      A/P:  Encounter Diagnoses   Name Primary?     Type 2 diabetes mellitus without complication, without long-term current use of insulin (H) Yes     Mixed hyperlipidemia        Comments:  Reviewed health history and diabetes issues.  Recent hgbA1c improved but cholesterol and TG levels high despite simvastatin and fenofibrate med use  Previous hgbA1c levels elevated, often in the 7-8% range  Difficult to assess glycemic control without recent BGM or CGM data  Reviewed and interpreted tests that I previously ordered.   Ordered appropriate tests for the endocrinology disease management.    Management options discussed and implemented after shared medical decision making with the patient.  T2DM problem is chronic-stable    Plan:  Discussed general issues with the diabetes diagnosis and management  We discussed the hgbA1c test which reflects previous overall glucose levels or control  Discussed the importance of blood glucose (BG) testing to assess glucose trends  Provided general overview of the diabetes medication options and medication treatment plan.  We have reviewed lipid test results and the cholesterol, triglyceride lower medication options     Recommend:  Continue the current JanumetXR and Jardiance daily dose routine  Future options include change from Januvia (in Janumet) to a GLP1RA med such as Trulicity or Rybelsus     We have discussed his needle-phobia concerns  Reminded patient to begin  glucose testing:    Test FBG at least 1-2x/week, goal target BG  mg/dl    Wear a diagnostic Krystyna CGM or a sample Libre3 from our office  We discussed the value of wearing a CGM sensor, painless attachment to the arm skin for 2-14 days, clinic sample option    He was instructed to watch the YouTube Libre3 video, then message me regarding its use  Continue the simvastatin and fenofibrate med use, goal LDL <100 mg/dl and TG <200 mg/dl  Keep focus on diet, increased exercise, weight management.  Advise having fasting lipid panel testing and dilated eye examination, at least annually    Keep regular follow-up appointments with PCP  Addressed patient questions today    There are no Patient Instructions on file for this visit.    Future labs ordered today: No orders of the defined types were placed in this encounter.    Radiology/Consults ordered today: None    Total time spent on day of encounter:  21 min    Follow-up:  INGRID Flores MD, MS  Endocrinology  [facility]    CC:  MILES Joes                                    Again, thank you for allowing me to participate in the care of your patient.        Sincerely,        Anival Flores MD

## 2023-06-26 NOTE — PROGRESS NOTES
Virtual Visit Details    Type of service:  Video Visit     Originating Location (pt. Location): Home  Distant Location (provider location):  Off-site  Platform used for Video Visit: Diego        Recent issues:  Diabetes follow-up evaluation  Taking the JanumetXR and Jardiance medications   Lipid meds changed from NiacinER to fenofibrate + simvastatin, per plan  Reviewed health history and his preappt labs, but no recent glucose data available        2017. Diagnosis of diabetes mellitus when med eval for torn right shoulder muscle  High glucose level noted during med evaluation, hgbA1c near 11.5%  Started Jardiance medication, took for approx 6 months  Recalls significant weight loss of approx 30#  Switched to metformin medication, then dose increases              Concerns about GI symptoms with nausea, nausea, also morning vomiting              Tried Prilosec, then Zantac  Has seen [facility] for medical evaluations  Recent discussion with his workplace team physician, Dr. Skye Mane              Advised to see me for medical evaluation    Previously on metforminXR 500 mg 2 tabs BID     Previous [facility] labs include:               8/12/19. Initial diabetes evaluation with me at [place]  Reviewed health history and diabetes management  Medication change to JanumetXR  1/2022. Addition of Jardiance     Current DM medications:  JanumetXR 100/1000 1-tab each morning  Jardiance 10 mg  1-tab each morning     Blood glucose (BG) meter              Infrequent testing  CGM use:  Years ago, not recently (phobia?)  Fam Hx Diabetes: None  Recent [facility] labs include:           Lab Results   Component Value Date    A1C 7.7 (H) 06/16/2023     02/10/2023    POTASSIUM 4.4 02/10/2023    CHLORIDE 104 02/10/2023    CO2 28 02/10/2023    ANIONGAP 6 02/10/2023     (H) 02/10/2023    BUN 18 02/10/2023    CR 0.86 02/10/2023    GFRESTIMATED >90 02/10/2023    GFRESTBLACK >90 11/08/2019    FLAVIO 9.9 02/10/2023    CHOL 234 (H)  "06/16/2023    TRIG 618 (H) 06/16/2023    HDL 48 06/16/2023    LDL  06/16/2023      Comment:      Cannot estimate LDL when triglyceride exceeds 400 mg/dL     (H) 06/16/2023    NHDL 186 (H) 06/16/2023    UCRR 56 04/06/2022    MICROL 8 04/06/2022    UMALCR 14.29 04/06/2022     Lab Results   Component Value Date    TSH 1.62 07/25/2022     [facility] lila DR per patient  History of hyperlipidemia, takes simvastatin 20 mg daily   1/2023. Stopped statin med and started NiacinER (Niaspan) 500 mg 1-tab QPM along with aspirin 1-tab QPM, head sweating symptoms  Current lipid meds: Simvastatin 20 mg daily     Fenofibrate 145 mg daily  DM Complications:      None known        Lives in [place]  Has seen [facility]   Also sees Dr. Jose/[facility] for general medicine evaluations.      PMH/PSH:  Past Medical History:   Diagnosis Date     Allergic rhinitis      Benign essential hypertension      Hyperlipidemia      Rotator cuff tear, left 2015     Rotator cuff tear, right 2017     Type 2 diabetes mellitus (H)      Past Surgical History:   Procedure Laterality Date     APPENDECTOMY       SHOULDER SURGERY      left and right previously       Family Hx:  No family history on file.      Social Hx:  Social History     Socioeconomic History     Marital status: Single     Spouse name: Not on file     Number of children: Not on file     Years of education: Not on file     Highest education level: Not on file   Occupational History     Not on file   Tobacco Use     Smoking status: Every Day     Packs/day: 1.00     Types: Cigarettes, Cigars     Smokeless tobacco: Never   Substance and Sexual Activity     Alcohol use: Yes     Drug use: Never     Sexual activity: Not on file   Other Topics Concern     Not on file   Social History Narrative    Single.     Works as \"\" for [employer].     From [place]     Social " Determinants of Health     Financial Resource Strain: Not on file   Food Insecurity: Not on file   Transportation Needs: Not on file   Physical Activity: Not on file   Stress: Not on file   Social Connections: Not on file   Intimate Partner Violence: Not on file   Housing Stability: Not on file          MEDICATIONS:  has a current medication list which includes the following prescription(s): fenofibrate, hydrochlorothiazide, janumet xr, jardiance, losartan, multiple vitamins-minerals, simvastatin, vitamin d, and acetaminophen.    ROS: 10 point ROS neg other than the symptoms noted above in the HPI.     GENERAL: energy good, wt stable; denies fevers, chills, night sweats.   HEENT: sinus congestion; no dysphagia, odonophagia, diplopia, neck pain  THYROID:  no apparent hyper or hypothyroid symptoms  CV: no chest pain, pressure, palpitations  LUNGS: no SOB, MEHTA, cough, wheezing   ABDOMEN: episode of rectal bleeding; occasional nausea; denies abdominal pain  EXTREMITIES: no rashes, ulcers, edema  NEUROLOGY: no headaches, denies changes in vision, tingling, extremitiy numbness   MSK: no muscle aches or pains, weakness  SKIN: no rashes or lesions  : denies nocturia  PSYCH:  stable mood, no significant anxiety or depression  ENDOCRINE: no heat or cold intolerance    Physical Exam (visual exam)  VS:  no vital signs taken for video visit  CONSTITUTIONAL: healthy, alert and NAD, well dressed, answering questions appropriately  ENT: no nose swelling or nasal discharge, mouth redness or gum changes.  EYES: eyes grossly normal to inspection, conjunctivae and sclerae normal, no exophthalmos or proptosis  THYROID:  no apparent nodules or goiter  LUNGS: no audible wheeze, cough or visible cyanosis, no visible retractions or increased work of breathing  ABDOMEN: abdomen not evaluated  EXTREMITIES: no hand tremors, limited exam  NEUROLOGY: CN grossly intact, mentation intact and speech normal   SKIN:  no apparent skin lesions, rash,  or edema with visualized skin appearance  PSYCH: mentation appears normal, affect normal/bright, judgement and insight intact,   normal speech and appearance well groomed    LABS:     All pertinent notes, labs, and images personally reviewed by me.      A/P:  Encounter Diagnoses   Name Primary?     Type 2 diabetes mellitus without complication, without long-term current use of insulin (H) Yes     Mixed hyperlipidemia        Comments:  Reviewed health history and diabetes issues.  Recent hgbA1c improved but cholesterol and TG levels high despite simvastatin and fenofibrate med use  Previous hgbA1c levels elevated, often in the 7-8% range  Difficult to assess glycemic control without recent BGM or CGM data  Reviewed and interpreted tests that I previously ordered.   Ordered appropriate tests for the endocrinology disease management.    Management options discussed and implemented after shared medical decision making with the patient.  T2DM problem is chronic-stable    Plan:  Discussed general issues with the diabetes diagnosis and management  We discussed the hgbA1c test which reflects previous overall glucose levels or control  Discussed the importance of blood glucose (BG) testing to assess glucose trends  Provided general overview of the diabetes medication options and medication treatment plan.  We have reviewed lipid test results and the cholesterol, triglyceride lower medication options     Recommend:  Continue the current JanumetXR and Jardiance daily dose routine  Future options include change from Januvia (in Janumet) to a GLP1RA med such as Trulicity or Rybelsus     We have discussed his needle-phobia concerns  Reminded patient to begin glucose testing:    Test FBG at least 1-2x/week, goal target BG  mg/dl    Wear a diagnostic Krystyna CGM or a sample Libre3 from our office  We discussed the value of wearing a CGM sensor, painless attachment to the arm skin for 2-14 days, clinic sample option    He was  instructed to watch the [employer] video, then message me regarding its use  Continue the simvastatin and fenofibrate med use, goal LDL <100 mg/dl and TG <200 mg/dl  Keep focus on diet, increased exercise, weight management.  Advise having fasting lipid panel testing and dilated eye examination, at least annually    Keep regular follow-up appointments with PCP  Addressed patient questions today    There are no Patient Instructions on file for this visit.    Future labs ordered today: No orders of the defined types were placed in this encounter.    Radiology/Consults ordered today: None    Total time spent on day of encounter:  21 min    Follow-up:  INGRID Flores MD, MS  Endocrinology  [facility]    CC:  MILES Jose

## 2023-06-27 ASSESSMENT — ANXIETY QUESTIONNAIRES
GAD7 TOTAL SCORE: 10
GAD7 TOTAL SCORE: 10
7. FEELING AFRAID AS IF SOMETHING AWFUL MIGHT HAPPEN: NOT AT ALL
GAD7 TOTAL SCORE: 10
6. BECOMING EASILY ANNOYED OR IRRITABLE: MORE THAN HALF THE DAYS
IF YOU CHECKED OFF ANY PROBLEMS ON THIS QUESTIONNAIRE, HOW DIFFICULT HAVE THESE PROBLEMS MADE IT FOR YOU TO DO YOUR WORK, TAKE CARE OF THINGS AT HOME, OR GET ALONG WITH OTHER PEOPLE: SOMEWHAT DIFFICULT
5. BEING SO RESTLESS THAT IT IS HARD TO SIT STILL: MORE THAN HALF THE DAYS
1. FEELING NERVOUS, ANXIOUS, OR ON EDGE: MORE THAN HALF THE DAYS
8. IF YOU CHECKED OFF ANY PROBLEMS, HOW DIFFICULT HAVE THESE MADE IT FOR YOU TO DO YOUR WORK, TAKE CARE OF THINGS AT HOME, OR GET ALONG WITH OTHER PEOPLE?: SOMEWHAT DIFFICULT
2. NOT BEING ABLE TO STOP OR CONTROL WORRYING: SEVERAL DAYS
3. WORRYING TOO MUCH ABOUT DIFFERENT THINGS: SEVERAL DAYS
7. FEELING AFRAID AS IF SOMETHING AWFUL MIGHT HAPPEN: NOT AT ALL
4. TROUBLE RELAXING: MORE THAN HALF THE DAYS

## 2023-06-27 ASSESSMENT — ENCOUNTER SYMPTOMS
DIZZINESS: 0
ABDOMINAL PAIN: 0
NAUSEA: 0
WEAKNESS: 0
FEVER: 0
SORE THROAT: 0
ARTHRALGIAS: 0
FREQUENCY: 1
SHORTNESS OF BREATH: 0
COUGH: 0
JOINT SWELLING: 0
NERVOUS/ANXIOUS: 1
PALPITATIONS: 0
CONSTIPATION: 0
HEMATURIA: 0
PARESTHESIAS: 0
MYALGIAS: 0
HEMATOCHEZIA: 0
HEARTBURN: 0
HEADACHES: 0
CHILLS: 0
EYE PAIN: 0
DIARRHEA: 0
DYSURIA: 0

## 2023-06-27 NOTE — PROGRESS NOTES
[facility]  June 28, 2023    Behavioral Health Clinician Progress Note    Patient Name: Bertin Mcgill           Service Type:  Individual        Service Location:    telehealth     Session Start Time: 3:48 pm Session End Time: 4:46 pm      Session Length: 53 - 60      Attendees: Patient      Service Modality:   In Person, In-Clinic      Visit Activities (Refresh list every visit): [facility] Only       Diagnostic Assessment Date: 4/14/23  Treatment Plan Review Date: 8/5/23  CGI Review Date: 7/14/23  Promis 10 Review Date:  8/5/23      Clinical Global Impressions  First:  Considering your total clinical experience with this particular patient population, how severe are the patient's symptoms at this time?: 4 (4/14/2023  1:30 PM)    Most recent:  Compared to the patient's condition at the START of treatment, this patient's condition is: 3 (4/14/2023  1:30 PM)      See Flowsheets for today's PHQ-9 and NATHALIE-7 results  Previous PHQ-9:       5/4/2023     4:55 PM 6/1/2023     4:17 PM 6/27/2023    10:22 AM   PHQ-9 SCORE   PHQ-9 Total Score MyChart 13 (Moderate depression) 12 (Moderate depression) 10 (Moderate depression)   PHQ-9 Total Score 13 12 10     Previous NATHALIE-7:       5/4/2023     4:56 PM 6/1/2023     4:18 PM 6/27/2023    10:22 AM   NATHALIE-7 SCORE   Total Score 11 (moderate anxiety) 10 (moderate anxiety) 10 (moderate anxiety)   Total Score 11 10 10       KRISTINE LEVEL:       No data to display                DATA  Extended Session (60+ minutes): No  Interactive Complexity: No  Crisis: No  [facility] Patient: No    Treatment Objective(s) Addressed in This Session:  Target Behavior(s): increased social activity/decreased isolation    Anxiety: will experience a reduction in anxiety, will develop more effective coping skills to manage anxiety symptoms, will develop healthy cognitive patterns and beliefs and will increase ability to function adaptively    Current Stressors / Issues:    Lj reported ongoing problems at  "work, sharing how some co-workers/colleagues continue to make mistakes, do not learn to from past experience, and/or fail to anticipate problems, which then requires more communication and work on shorter timeline for him.  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  [facility] used Solution Focused interventions to assist Lj with managing work stress, he reported using acceptance to manage frustration with colleagues, while focusing on the benefits of working with new staff (ie supporting them and helping them to grow) to manage new employee stress.     [facility] used MI interventions to focus conversation toward and increase motivation regarding health goals.  Lj shared mixed progress, sharing his activity level/exercise has been and remained \"good\", but his A1C was trending in the wrong direction. Lj expressed change talk when discussing both exercise and A1C, sharing he enjoys walks and has been consistent with walking b/c of weather and increased daylight, which should continue.  Lj reported the following:  Walkin days of walking during first week, 6 days of walking the second week, 4 days of walking third week, and already 2 days of walking this week.  Lj reported the decreased days of walking during the third week was primarily related to air quality.  When discussing diet, Lj stated during the past several months, \"I was eating like trash\", but knows he needs to change his diet and knows how to do it.  Lj stated he needs to focus on his dinners, because his lunches are pretty good (salad),  so he is returning to the diet he used when first being dx'ed with diabetes, specifically fish (some chix), brown rice and steamed vegetables.  Finally, [facility] focused conversation on smoking and alcohol use, Lj expressed sustain talk and is firmly in the precontemplation stage regarding both.      Progress on Treatment Objective(s) / " Homework:  No improvement - PREPARATION (Decided to change - considering how); Intervened by negotiating a change plan and determining options / strategies for behavior change, identifying triggers, exploring social supports, and working towards setting a date to begin behavior change    Motivational Interviewing    MI Intervention: Co-Developed Goal: reduce anxiety, Expressed Empathy/Understanding, Supported Autonomy, Collaboration, Evocation, Open-ended questions, Reflections: simple and complex and Change talk (evoked)     Change Talk Expressed by the Patient: Reasons to change    Provider Response to Change Talk: E - Evoked more info from patient about behavior change and R - Reflected patient's change talk      Solution-Focused Therapy    Explore patterns in patient's behaviors and relationships and discuss options for new behaviors    Explore new options for problem-solving, communication, managing stress, etc.    Care Plan review completed: Yes    Medication Review:  No current psychiatric medications prescribed    Medication Compliance:  NA    Changes in Health Issues:    None reported    Chemical Use Review:   Substance Use: Problem use continues with no change since last session, Stage of Change: Contemplation        Tobacco Use: No change in amount of tobacco use since last session.  Contemplation    Assessment: Current Emotional / Mental Status (status of significant symptoms):  Risk status (Self / Other harm or suicidal ideation)  Patient has had a history of suicidal ideation: passive SI in 2003; none since  Patient denies current fears or concerns for personal safety.  Patient denies current or recent suicidal ideation or behaviors.  Patient denies current or recent homicidal ideation or behaviors.  Patient denies current or recent self injurious behavior or ideation.  Patient denies other safety concerns.  A safety and risk management plan has not been developed at this time, however patient was  encouraged to call [facility] should there be a change in any of these risk factors.    Appearance:   Appropriate   Eye Contact:   Good   Psychomotor Behavior: Normal   Attitude:   Cooperative   Orientation:   All  Speech   Rate / Production: Normal/ Responsive   Volume:  Normal   Mood:    Anxious  frustrated  Affect:    congruent with mood   Thought Content:  Clear    Thought Form:  Coherent  Logical   Insight:    Good     Diagnoses:  1. Generalized anxiety disorder        Collateral Reports Completed:  Routed note to PCP    Plan: (Homework, other):  Patient was given information about behavioral services and encouraged to schedule a follow up appointment with the clinic [facility] in 1 month.  He was also given information about mental health symptoms and treatment options  and strategies to more effectively manage anxiety.  CD Recommendations: reduce alcohol use.  Shawn Ullrich [facility], [facility]      ______________________________________________________________________    Integrated Primary Care Behavioral Health Treatment Plan    Patient's Name: Bertin Mcgill  YOB: 1980    Date of Creation: 5/5/23  Date Treatment Plan Last Reviewed/Revised: 5/5/23    DSM5 Diagnoses:   F41.1 Generalized Anxiety Disorder.  F33.1 Major Depressive Disorder, Recurrent Episode, Moderate With anxious distress.   F10.10  Alcohol Use Disorder, Mild  Psychosocial / Contextual Factors: [place]-American, Single, Male, heterosexual., adopted, employed, works from home  PROMIS (reviewed every 90 days): pt completed on 5/5/23        Referral / Collaboration:  Referral to another professional/service is not indicated at this time.; Lj followed through with medication evaluation, attempted medication and stopped medication    Anticipated number of session for this episode of care: 10  Anticipation frequency of session: Every other week  Anticipated Duration of each session: 38-52 minutes  Treatment plan will be reviewed in 90 days  or when goals have been changed.       MeasurableTreatment Goal(s) related to diagnosis / functional impairment(s)   Goal 1: Patient will reduce anxiety and improve mood making healthy lifestyle choices (exercise, diet, sleep), engaging in enjoyable activity (book writing), and use CBT skills/strategies to manage work stress       Objective #A (Patient Action)    Patient will exercise at least 4 days per week.  Status: Continued - Date(s): 5/5/23     Intervention(s)  Therapist will assign homework to monitor level of exercise and mood on a daily basis    Objective #B  Patient will use effective sleep hygiene strategies (ie sleep in his bed, use cpap, obtain 7-8 hrs per night)  Status: Continued - Date(s):2/24/23     Intervention(s)  Therapist will teach sleep hygiene strategies.    Objective #C  Patient will increase number of meals cooked at home (decrease take out).   Status: New - Date: 5/5/23   Intervention(s)  Therapist will assign homework to track food.    Objective D:  Patient will use cognitive strategies to manage stress (identifying cognitive distortions, using counter thoughts, taking perspective, etc)  Status: New: 5/5/23  Intervention:  Therapist will teach cbt strategies/skills      Objective E:   Patient will engage in one book writing activity per week   Status: New: 5/5/23   Therapist will assign homework to identify and shared benefits of activity with [facility]       Patient has reviewed and agreed to the above plan.      Shawn G. Ullrich, [facility]  May 5, 2023

## 2023-06-28 ENCOUNTER — OFFICE VISIT (OUTPATIENT)
Dept: FAMILY MEDICINE | Facility: CLINIC | Age: 43
End: 2023-06-28
Payer: COMMERCIAL

## 2023-06-28 ENCOUNTER — OFFICE VISIT (OUTPATIENT)
Dept: BEHAVIORAL HEALTH | Facility: CLINIC | Age: 43
End: 2023-06-28
Payer: COMMERCIAL

## 2023-06-28 VITALS
BODY MASS INDEX: 29.41 KG/M2 | DIASTOLIC BLOOD PRESSURE: 84 MMHG | SYSTOLIC BLOOD PRESSURE: 119 MMHG | TEMPERATURE: 98.3 F | OXYGEN SATURATION: 98 % | WEIGHT: 183 LBS | HEIGHT: 66 IN | HEART RATE: 103 BPM

## 2023-06-28 DIAGNOSIS — E11.9 TYPE 2 DIABETES MELLITUS WITHOUT COMPLICATION, WITHOUT LONG-TERM CURRENT USE OF INSULIN (H): ICD-10-CM

## 2023-06-28 DIAGNOSIS — Z23 NEED FOR PNEUMOCOCCAL 20-VALENT CONJUGATE VACCINATION: ICD-10-CM

## 2023-06-28 DIAGNOSIS — Z23 NEED FOR VACCINATION: ICD-10-CM

## 2023-06-28 DIAGNOSIS — E78.2 MIXED HYPERLIPIDEMIA: ICD-10-CM

## 2023-06-28 DIAGNOSIS — F41.8 MIXED ANXIETY DEPRESSIVE DISORDER: ICD-10-CM

## 2023-06-28 DIAGNOSIS — I10 BENIGN ESSENTIAL HYPERTENSION: ICD-10-CM

## 2023-06-28 DIAGNOSIS — F41.1 GENERALIZED ANXIETY DISORDER: Primary | ICD-10-CM

## 2023-06-28 DIAGNOSIS — Z00.00 ROUTINE GENERAL MEDICAL EXAMINATION AT A HEALTH CARE FACILITY: Primary | ICD-10-CM

## 2023-06-28 LAB
CREAT UR-MCNC: 61.6 MG/DL
MICROALBUMIN UR-MCNC: <12 MG/L
MICROALBUMIN/CREAT UR: NORMAL MG/G{CREAT}

## 2023-06-28 PROCEDURE — 82570 ASSAY OF URINE CREATININE: CPT | Performed by: FAMILY MEDICINE

## 2023-06-28 RX ORDER — SITAGLIPTIN AND METFORMIN HYDROCHLORIDE 1000; 100 MG/1; MG/1
1 TABLET, FILM COATED, EXTENDED RELEASE ORAL DAILY
Qty: 30 TABLET | Refills: 2 | Status: CANCELLED | OUTPATIENT
Start: 2023-06-28

## 2023-06-28 RX ORDER — FENOFIBRATE 145 MG/1
TABLET, COATED ORAL
Qty: 90 TABLET | Refills: 1 | Status: SHIPPED | OUTPATIENT
Start: 2023-06-28 | End: 2023-12-26

## 2023-06-28 NOTE — NURSING NOTE
"43 year old    Chief Complaint   Patient presents with     Physical     No major concerns, except patient has noticed \"ear pressure after nasal rinses\"  Noticed bright red blood in his stool around noon today, but with next BM around 2pm there was none            Blood pressure 119/84, pulse 103, temperature 98.3  F (36.8  C), temperature source Temporal, height 1.676 m (5' 6\"), weight 83 kg (183 lb), SpO2 98 %. Body mass index is 29.54 kg/m .    Patient Active Problem List   Diagnosis     Type 2 diabetes mellitus without complication, without long-term current use of insulin (H)     Mixed hyperlipidemia     Benign essential hypertension     Overweight (BMI 25.0-29.9)     Seasonal allergies     Smoker              Wt Readings from Last 2 Encounters:   06/28/23 83 kg (183 lb)   10/26/22 83.5 kg (184 lb)       BP Readings from Last 3 Encounters:   06/28/23 119/84   10/26/22 136/89   04/06/22 122/80                Current Outpatient Medications   Medication     Acetaminophen (TYLENOL EXTRA STRENGTH PO)     fenofibrate (TRICOR) 145 MG tablet     hydrochlorothiazide (HYDRODIURIL) 12.5 MG tablet     JANUMET -1000 MG TB24     JARDIANCE 10 MG TABS tablet     losartan (COZAAR) 50 MG tablet     Multiple Vitamins-Minerals (CENTRUM MEN PO)     simvastatin (ZOCOR) 20 MG tablet     VITAMIN D PO     No current facility-administered medications for this visit.              Social History     Tobacco Use     Smoking status: Every Day     Packs/day: 1.00     Types: Cigarettes, Cigars     Smokeless tobacco: Never   Substance Use Topics     Alcohol use: Yes     Drug use: Never              Health Maintenance Due   Topic Date Due     ADVANCE CARE PLANNING  Never done     DEPRESSION ACTION PLAN  Never done     EYE EXAM  Never done     HEPATITIS B IMMUNIZATION (1 of 3 - 3-dose series) Never done     HIV SCREENING  Never done     Pneumococcal Vaccine: Pediatrics (0 to 5 Years) and At-Risk Patients (6 to 64 Years) (2 - PCV) 10/17/2018 "     DIABETIC FOOT EXAM  08/12/2020     MICROALBUMIN  04/06/2023            No results found for: PAP           June 28, 2023 2:40 PM

## 2023-06-28 NOTE — TELEPHONE ENCOUNTER
"Last Written Prescription Date:  3/2023  Last Fill Quantity: 90,  # refills: 0   Last office visit:  6/26/2023 with prescribing provider:  Dr. Flores   Future Office Visit:  10/31/23        Requested Prescriptions   Pending Prescriptions Disp Refills     fenofibrate (TRICOR) 145 MG tablet [Pharmacy Med Name: FENOFIBRATE 145 MG TABLET] 90 tablet 1     Sig: TAKE 1 TABLET BY MOUTH EVERY DAY       Fibrates Passed - 6/28/2023 12:40 AM        Passed - Lipid panel on file in past 12 months     Recent Labs   Lab Test 06/16/23  0912 04/06/22  1205 06/29/20  1454   CHOL 234*   < > 204.0*   TRIG 618*   < > 343.0*   HDL 48   < > 67.0   *   < > 69.0   NHDL 186*   < >  --    VLDL  --   --  69.0*   CHOLHDLRATIO  --   --  3.1    < > = values in this interval not displayed.               Passed - No abnormal creatine kinase in past 12 months     No lab results found.             Passed - Recent (12 mo) or future (30 days) visit within the authorizing provider's specialty     Patient has had an office visit with the authorizing provider or a provider within the authorizing providers department within the previous 12 mos or has a future within next 30 days. See \"Patient Info\" tab in inbasket, or \"Choose Columns\" in Meds & Orders section of the refill encounter.              Passed - Medication is active on med list        Passed - Patient is age 18 or older           Refills sent (requeting 90 day supply)  Aimee Delgadillo RN    "

## 2023-06-28 NOTE — NURSING NOTE
Screening Questionnaire for Adult Immunization    Are you sick today?   No   Do you have allergies to medications, food, a vaccine component or latex?   No   Have you ever had a serious reaction after receiving a vaccination?   No   Do you have a long-term health problem with heart disease, lung disease, asthma, kidney disease, metabolic disease (e.g. diabetes), anemia, or other blood disorder?   No   Do you have cancer, leukemia, HIV/AIDS, or any other immune system problem?   No   In the past 3 months, have you taken medications that affect  your immune system, such as prednisone, other steroids, or anticancer drugs; drugs for the treatment of rheumatoid arthritis, Crohn s disease, or psoriasis; or have you had radiation treatments?   No   Have you had a seizure, or a brain or other nervous system problem?   No   During the past year, have you received a transfusion of blood or blood     products, or been given immune (gamma) globulin or antiviral drug?   No   For women: Are you pregnant or is there a chance you could become        pregnant during the next month?   No   Have you received any vaccinations in the past 4 weeks?   No     Immunization questionnaire answers were all negative.        Per orders of Dr. Jose. Patient instructed to remain in clinic for 15 minutes afterwards, and to report any adverse reaction to me immediately.       Screening performed by Aminata Crowder LPN on 6/28/2023 at 3:44 PM.

## 2023-06-28 NOTE — PROGRESS NOTES
Bertin Mcgill presents to [facility] today to have an annual exam.     ASSESSMENT/PLAN:    Annual Exam/Preventive Issues   -Just had labs with Endocrinology  Will check Urine albumin  - In need of PCV 20. Give today    -Specific concerns:     1. DM2. Managed by Endocrinology. Encouraged to follow a diabetic diet  a. Will be going for eye exam in the next few weeks  2. Hyperlipidemia. On Simvastatin at only 20 mg/day. His last LDL was 126 and TG were still above 600. He's intolerant of Niacin. On Fenofibrate. I suggested that he talk with Endocrinology about the following plan as they manage his medication  a. Continue Fenofibrate  b. Stop Simivastatin  c. Start Crestor at 10 mg/day   3. Depression/Anxiety: Stable  a. He's tried medication in the past but did not find it helpful  b. Continue in therapy with Shawn Ullrich  4. Tobacco abuse. Discussed smoking cessation, but Lj is uninterested at this time. Discussed the hazards on the cardiovascular system  5. Likely hemorrhoid causing random rectal bleed. If it returns, come back to clinic    -Follow up: in 6 months. Sooner if needed    Miguel Jose MD  Family Medicine and Sports Medicine    Introduction:   Bertin is a 43 year old male who I have seen over the past 4 years for hypertension, depression/anxiety and diabetes2. This is his first annual exam with me.       He was Diagnosed with DM2 at age 36 in Oct 2017.   A1C was over 11. He was weighing around 195lbs. Dropped to around 170 lbs and A1C normalized but then drifted back up. He sees Endocrinology for DM2    He has been doing fairly well recently.  He still leads a fairly solitary life.  He works from home since the pandemic began.  He has been trying to take long walks.  His only physical complaint is that he noticed some bright red blood per rectum on occasion recently but this has since cleared.  He has had this happen in the past once or twice that is also cleared..        6/27/2023     "10:22 AM   PHQ   PHQ-9 Total Score 10   Q9: Thoughts of better off dead/self-harm past 2 weeks Not at all         5/4/2023     4:56 PM 6/1/2023     4:18 PM 6/27/2023    10:22 AM   NATHALIE-7 SCORE   Total Score 11 (moderate anxiety) 10 (moderate anxiety) 10 (moderate anxiety)   Total Score 11 10 10           Patient Active Problem List   Diagnosis     Type 2 diabetes mellitus without complication, without long-term current use of insulin (H)     Mixed hyperlipidemia     Benign essential hypertension     Overweight (BMI 25.0-29.9)     Seasonal allergies     Smoker     Mixed anxiety depressive disorder       Current Medications include:   Current Outpatient Medications   Medication Sig Dispense Refill     Acetaminophen (TYLENOL EXTRA STRENGTH PO)        fenofibrate (TRICOR) 145 MG tablet TAKE 1 TABLET BY MOUTH EVERY DAY 90 tablet 1     hydrochlorothiazide (HYDRODIURIL) 12.5 MG tablet TAKE 1 TABLET BY MOUTH EVERY DAY 90 tablet 0     JANUMET -1000 MG TB24 TAKE 1 TABLET BY MOUTH EVERY DAY 30 tablet 2     JARDIANCE 10 MG TABS tablet TAKE 1 TABLET BY MOUTH EVERY DAY 90 tablet 0     losartan (COZAAR) 50 MG tablet TAKE 1 TABLET BY MOUTH EVERY DAY 90 tablet 1     Multiple Vitamins-Minerals (CENTRUM MEN PO) One a day Mens       simvastatin (ZOCOR) 20 MG tablet Take 1 tablet (20 mg) by mouth At Bedtime 30 tablet 5     VITAMIN D PO Take 2,000 Units by mouth       Allergies   Allergen Reactions     Seasonal Allergies        Social  Social History     Social History Narrative    Single. 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     From [place], [place]. Lives in [place] and works mostly from home.     Enjoys filling time doing \"lazy things\", e.g. binge watching shows and reading.     Also, enjoys yard work and walking around neighborhood. Used to enjoy taking long drives.          Lifestyle habits and Preventive health issues:     Physical activity - Mainly walking. Does not monitor steps. Has a " routine that is between 1-5 miles.   Diet is generally healthy, but realized that he's been overindulging in pizza, Chinese foods and Bagels.   He's recognized that this is not good for his diabetes. Now, more on a diabetic diet.     Alcohol intake involves about 7 nights/week and about 2 drinks/night.    He smokes cigarettes. About 1 ppd.      His sleep is interrupted with AFTAB. Uses CPAP      MALE ROS  No sexual partners in past 12 or so years.     Dental -- has regular care.     ROS  PHQ-2 Score:         10/26/2022     2:27 PM 7/28/2022     6:01 PM   PHQ-2 ( 1999 Pfizer)   Q1: Little interest or pleasure in doing things 1 1   Q2: Feeling down, depressed or hopeless 0 0   PHQ-2 Score 1 1   Q1: Little interest or pleasure in doing things  Several days   Q2: Feeling down, depressed or hopeless  Not at all   PHQ-2 Score  1     Major other issues mentioned above. Otherwise, see patient intake questionnaire.   CONSTITUTIONAL:NEGATIVE for fever, chills, change in weight  INTEGUMENTARY/SKIN: NEGATIVE for worrisome rashes, moles or lesions  EYES: NEGATIVE for vision changes or irritation  ENT/MOUTH: NEGATIVE for ear, mouth and throat problems  RESP:NEGATIVE for significant cough or SOB  CV: NEGATIVE for chest pain, palpitations, MEHTA, orthopnea, PND  or peripheral edema  GI: NEGATIVE for nausea, abdominal pain, heartburn, or change in bowel habits  :NEGATIVE for frequency, dysuria, or hematuria  MUSCULOSKELETAL:NEGATIVE for significant arthralgias or myalgia  NEURO: NEGATIVE for weakness, dizziness or paresthesias  ENDOCRINE: NEGATIVE for polyuria/dipsia,  temperature intolerance, skin/hair changes  HEME/ALLERGY/IMMUNE: NEGATIVE for bleeding problems  PSYCHIATRIC: NEGATIVE for changes in mood or affect        Health Maintenance   Topic Date Due     ADVANCE CARE PLANNING  Never done     DEPRESSION ACTION PLAN  Never done     EYE EXAM  Never done     HEPATITIS B IMMUNIZATION (1 of 3 - 3-dose series) Never done     HIV  "SCREENING  Never done     Pneumococcal Vaccine: Pediatrics (0 to 5 Years) and At-Risk Patients (6 to 64 Years) (2 - PCV) 10/17/2018     DIABETIC FOOT EXAM  08/12/2020     MICROALBUMIN  04/06/2023     A1C  09/16/2023     NICOTINE/TOBACCO CESSATION COUNSELING Q 1 YR  09/26/2023     PHQ-9  12/28/2023     BMP  02/10/2024     LIPID  06/16/2024     YEARLY PREVENTIVE VISIT  06/28/2024     DTAP/TDAP/TD IMMUNIZATION (3 - Td or Tdap) 10/17/2027     HEPATITIS C SCREENING  Completed     INFLUENZA VACCINE  Completed     COVID-19 Vaccine  Completed     IPV IMMUNIZATION  Aged Out     MENINGITIS IMMUNIZATION  Aged Out         Past Surgical History:   Procedure Laterality Date     APPENDECTOMY       SHOULDER SURGERY      left and right previously       No family history on file.      Immunizations are as follows:      Immunization History   Administered Date(s) Administered     COVID-19 Bivalent 12+ (Pfizer) 10/22/2022     COVID-19 Monovalent 18+ (Moderna) 12/17/2021     COVID-19 Vaccine (John) 04/09/2021     Influenza (IIV3) PF 10/23/2012, 10/31/2013     Influenza Intranasal Vaccine 10/01/2014     Influenza Vaccine 50-64 or 18-64 w/egg allergy (Flublok) 09/30/2020     Influenza Vaccine >6 months (Alfuria,Fluzone) 10/23/2012, 10/20/2016, 10/24/2017, 09/25/2018, 10/20/2019, 09/23/2021     Influenza Vaccine, 6+MO IM (QUADRIVALENT W/PRESERVATIVES) 10/20/2019     Influenza,INJ,MDCK,PF,Quad >6mo(Flucelvax) 10/22/2022     Nasal Influenza Vaccine 2-49 (FluMist) 10/01/2014, 10/29/2014, 10/22/2015     Pneumococcal 23 valent 10/17/2017     TD,PF 7+ (Tenivac) 10/17/2017     Tdap (Adult) Unspecified Formulation 09/17/2007         EXAM  /84 (BP Location: Right arm, Patient Position: Sitting, Cuff Size: Adult Regular)   Pulse 103   Temp 98.3  F (36.8  C) (Temporal)   Ht 1.676 m (5' 6\")   Wt 83 kg (183 lb)   SpO2 98%   BMI 29.54 kg/m      GENERAL APPEARANCE: He appears in his usual state of health  HEENT: Neck is supple. No " adenopathy, thyroid normal to palpation  RESP: Lungs clear to auscultation bilaterally.  Axillae: no palpable axillary masses or adenopathy  CV: regular rate and rhythm, normal S1 S2, no murmur, no carotid bruits  ABDOMEN: soft, nontender, without HSM or masses. Bowel sounds normal  : Deferred. Reported no concerns.   Rectal exam: We discussed an evaluation but he felt that it was unnecessary today and that he likely has a hemorrhoid and we discussed briefly treatments for hemorrhoids.  MS: Gait -normal. Extremities with generally normal range of motion.   SKIN: No suspicious lesions or rashes  NEURO: Normal strength and tone, sensory exam grossly normal  PSYCH: mentation and affect appear normal.   EXT: No peripheral edema      See above for PHQ-9 and NATHALIE-7 scores.    SEE TOP OF NOTE FOR ASSESSMENT AND PLAN      Miguel Jose MD  Family Medicine and Sports Medicine  [facility] Department of Family Medicine and Community Health        Answers for HPI/ROS submitted by the patient on 6/27/2023  Frequency of exercise:: 4-5 days/week  Getting at least 3 servings of Calcium per day:: Yes  Diet:: Diabetic  Taking medications regularly:: Yes  Medication side effects:: None  Bi-annual eye exam:: Yes  Dental care twice a year:: Yes  Sleep apnea or symptoms of sleep apnea:: Daytime drowsiness, Sleep apnea  abdominal pain: No  Blood in stool: No  Blood in urine: No  chest pain: No  chills: No  congestion: No  constipation: No  cough: No  diarrhea: No  dizziness: No  ear pain: Yes  eye pain: No  nervous/anxious: Yes  fever: No  frequency: Yes  genital sores: No  headaches: No  hearing loss: No  heartburn: No  arthralgias: No  joint swelling: No  peripheral edema: No  mood changes: No  myalgias: No  nausea: No  dysuria: No  palpitations: No  Skin sensation changes: No  sore throat: No  urgency: Yes  rash: No  shortness of breath: No  visual disturbance: No  weakness: No  impotence: Yes  penile discharge:  Yes  Additional concerns today:: No  Duration of exercise:: Greater than 60 minutes

## 2023-06-28 NOTE — PATIENT INSTRUCTIONS
ASSESSMENT/PLAN:    Annual Exam/Preventive Issues   -Just had labs with Endocrinology  Will check Urine albumin  - In need of PCV 20. Give today      -Specific concerns:     DM2. Managed by Endocrinology. Encouraged to follow a diabetic diet  Will be going for eye exam in the next few weeks  Hyperlipidemia. On Simvastatin at only 20 mg/day. His last LDL was 126 and TG were still above 600. He's intolerant of Niacin. On Fenofibrate. I suggested that he talk with Endocrinology about the following plan as they manage his medication  Continue Fenofibrate  Stop Simivastatin  Start Crestor at 10 mg/day   Depression/Anxiety: Stable  He's tried medication in the past but did not find it helpful  Continue in therapy with Shawn Ullrich  Tobacco abuse. Discussed smoking cessation, but Lj is uninterested at this time. Discussed the hazards on the cardiovascular system  Likely hemorrhoid causing random rectal bleed. If it returns, come back to clinic    -Follow up: in 6 months. Sooner if needed    Miguel Jose MD  Family Medicine and Sports Medicine        Preventive Health Recommendations  Male Ages 40 to 49    Yearly exam:             See your health care provider every year in order to  o   Review health changes.   o   Discuss preventive care.    o   Review your medicines if your doctor has prescribed any.  You should be tested each year for STDs (sexually transmitted diseases) if you re at risk.   Have a cholesterol test every 5 years.   Have a colonoscopy (test for colon cancer) if someone in your family has had colon cancer or polyps before age 50.   After age 45, have a diabetes test (fasting glucose). If you are at risk for diabetes, you should have this test every 3 years.    Talk with your health care provider about whether or not a prostate cancer screening test (PSA) is right for you.    Shots: Get a flu shot each year. Get a tetanus shot every 10 years.     Nutrition:  Eat at least 5 servings of fruits and  vegetables daily.   Eat whole-grain bread, whole-wheat pasta and brown rice instead of white grains and rice.   Get adequate Calcium and Vitamin D.     Lifestyle  Exercise for at least 150 minutes a week (30 minutes a day, 5 days a week). This will help you control your weight and prevent disease.   Limit alcohol to one drink per day.   No smoking.   Wear sunscreen to prevent skin cancer.   See your dentist every six months for an exam and cleaning.

## 2023-07-26 ASSESSMENT — ANXIETY QUESTIONNAIRES
6. BECOMING EASILY ANNOYED OR IRRITABLE: MORE THAN HALF THE DAYS
5. BEING SO RESTLESS THAT IT IS HARD TO SIT STILL: SEVERAL DAYS
7. FEELING AFRAID AS IF SOMETHING AWFUL MIGHT HAPPEN: NOT AT ALL
IF YOU CHECKED OFF ANY PROBLEMS ON THIS QUESTIONNAIRE, HOW DIFFICULT HAVE THESE PROBLEMS MADE IT FOR YOU TO DO YOUR WORK, TAKE CARE OF THINGS AT HOME, OR GET ALONG WITH OTHER PEOPLE: SOMEWHAT DIFFICULT
2. NOT BEING ABLE TO STOP OR CONTROL WORRYING: SEVERAL DAYS
GAD7 TOTAL SCORE: 10
4. TROUBLE RELAXING: MORE THAN HALF THE DAYS
GAD7 TOTAL SCORE: 10
1. FEELING NERVOUS, ANXIOUS, OR ON EDGE: MORE THAN HALF THE DAYS
3. WORRYING TOO MUCH ABOUT DIFFERENT THINGS: MORE THAN HALF THE DAYS

## 2023-07-26 ASSESSMENT — PATIENT HEALTH QUESTIONNAIRE - PHQ9
SUM OF ALL RESPONSES TO PHQ QUESTIONS 1-9: 12
10. IF YOU CHECKED OFF ANY PROBLEMS, HOW DIFFICULT HAVE THESE PROBLEMS MADE IT FOR YOU TO DO YOUR WORK, TAKE CARE OF THINGS AT HOME, OR GET ALONG WITH OTHER PEOPLE: SOMEWHAT DIFFICULT
SUM OF ALL RESPONSES TO PHQ QUESTIONS 1-9: 12

## 2023-07-26 NOTE — PROGRESS NOTES
[facility]  July 27, 2023    Behavioral Health Clinician Progress Note    Patient Name: Bertin Mcgill           Service Type:  Individual        Service Location:    telehealth     Session Start Time: 4:01 pm Session End Time: 4:59 pm      Session Length: 53 - 60      Attendees: Patient      Service Modality:   In Person, In-Clinic      Visit Activities (Refresh list every visit): [facility] Only       Diagnostic Assessment Date: 4/14/23  Treatment Plan Review Date: 8/5/23  CGI Review Date:  10/27/23  Promis 10 Review Date:  8/5/23      Clinical Global Impressions  First:  Considering your total clinical experience with this particular patient population, how severe are the patient's symptoms at this time?: 4 (4/14/2023  1:30 PM)    Most recent:  Compared to the patient's condition at the START of treatment, this patient's condition is: 3 (4/14/2023  1:30 PM)      See Flowsheets for today's PHQ-9 and NATHALIE-7 results  Previous PHQ-9:       6/1/2023     4:17 PM 6/27/2023    10:22 AM 7/26/2023     4:42 PM   PHQ-9 SCORE   PHQ-9 Total Score MyChart 12 (Moderate depression) 10 (Moderate depression) 12 (Moderate depression)   PHQ-9 Total Score 12 10 12     Previous NATHALIE-7:       6/1/2023     4:18 PM 6/27/2023    10:22 AM 7/26/2023     4:42 PM   NATHALIE-7 SCORE   Total Score 10 (moderate anxiety) 10 (moderate anxiety) 10 (moderate anxiety)   Total Score 10 10 10       KRISTINE LEVEL:       No data to display                DATA  Extended Session (60+ minutes): No  Interactive Complexity: No  Crisis: No  [facility] Patient: No    Treatment Objective(s) Addressed in This Session:  Target Behavior(s):  increased social activity/decreased isolation    Anxiety: will experience a reduction in anxiety, will develop more effective coping skills to manage anxiety symptoms, will develop healthy cognitive patterns and beliefs and will increase ability to function adaptively    Current Stressors / Issues:    Lj displayed and reported  "higher level of frustration with co-workers, stating its the same people, same problems, and no changes.  [facility] provided support, validated his experienced and reflected this pattern over time in his work.  Lj acknowledged his role entails some problem-solving which naturally lends itself to people being accustomed to bringing challenges to him.  Lj reported while he cannot change the system, he has attempted to use strategies such as setting boundaries, positive self-talk, reminders of how things have improved, etc.  [facility] used MI interventions to focus on how healthy strategies in his homelife can positively impact ability to manage work stress, Lj acknowledged \"The walks help (to manage stress)\".  Lj shared additional benefits of walking stating, \"I know that once I'm walking I'll feel better and have more energy\".  Lj reported he's been walking consistently and then expressed change talk, stating he is planning to integrate jogging and elliptical into his weekly exercise routine with the hopes of being fully into to jogging and elliptical by the winter so the transition to the treadmill and elliptical machine is seamless.  Lj reported he jogged on the treadmill this week for 15 mins (first time).   Lj When assessing the change in his exercise, Lj stated, \"Its progressing\".  Lj also reported significant progress regarding diet, sharing he's consistently eating fish, vegetables and rice; his \"trash days aren't as trashy\", and he's only ate fast food once in the past month.    Progress on Treatment Objective(s) / Homework:  Satisfactory progress - ACTION (Actively working towards change); Intervened by reinforcing change plan / affirming steps taken    Motivational Interviewing    MI Intervention: Co-Developed Goal: reduce anxiety, Expressed Empathy/Understanding, Supported Autonomy, Collaboration, Evocation, Open-ended questions, Reflections: simple and complex and Change talk (evoked)     Change " Talk Expressed by the Patient: Reasons to change    Provider Response to Change Talk: E - Evoked more info from patient about behavior change and R - Reflected patient's change talk      Solution-Focused Therapy  Explore patterns in patient's behaviors and relationships and discuss options for new behaviors  Explore new options for problem-solving, communication, managing stress, etc.    Care Plan review completed: Yes    Medication Review:  No current psychiatric medications prescribed    Medication Compliance:  NA    Changes in Health Issues:    None reported    Chemical Use Review:   Substance Use: Problem use continues with no change since last session, Stage of Change: Contemplation        Tobacco Use: No change in amount of tobacco use since last session.  Contemplation    Assessment: Current Emotional / Mental Status (status of significant symptoms):  Risk status (Self / Other harm or suicidal ideation)  Patient has had a history of suicidal ideation: passive SI in 2003; none since  Patient denies current fears or concerns for personal safety.  Patient denies current or recent suicidal ideation or behaviors.  Patient denies current or recent homicidal ideation or behaviors.  Patient denies current or recent self injurious behavior or ideation.  Patient denies other safety concerns.  A safety and risk management plan has not been developed at this time, however patient was encouraged to call [facility] should there be a change in any of these risk factors.    Appearance:   Appropriate   Eye Contact:   Good   Psychomotor Behavior: Normal   Attitude:   Cooperative   Orientation:   All  Speech   Rate / Production: Normal/ Responsive   Volume:  Normal   Mood:    Anxious  frustrated  Affect:    congruent with mood   Thought Content:  Clear    Thought Form:  Coherent  Logical   Insight:    Good     Diagnoses:  1. NATHALIE (generalized anxiety disorder)          Collateral Reports Completed:  Routed note to  PCP    Plan: (Homework, other):  Patient was given information about behavioral services and encouraged to schedule a follow up appointment with the clinic [facility] in 1 month.  He was also given information about mental health symptoms and treatment options  and strategies to more effectively manage anxiety .  CD Recommendations:  reduce alcohol use .   Shawn Ullrich [facility]      ______________________________________________________________________    Integrated Primary Care Behavioral Health Treatment Plan    Patient's Name: Bertin Mcgill  YOB: 1980    Date of Creation: 5/5/23  Date Treatment Plan Last Reviewed/Revised: 5/5/23    DSM5 Diagnoses:   F41.1 Generalized Anxiety Disorder.  F33.1 Major Depressive Disorder, Recurrent Episode, Moderate With anxious distress.   F10.10  Alcohol Use Disorder, Mild  Psychosocial / Contextual Factors: Romanian-American, Single, Male, heterosexual., adopted, employed, works from home  PROMIS (reviewed every 90 days): pt completed on 5/5/23        Referral / Collaboration:  Referral to another professional/service is not indicated at this time.; Lj followed through with medication evaluation, attempted medication and stopped medication    Anticipated number of session for this episode of care: 10  Anticipation frequency of session: Every other week  Anticipated Duration of each session: 38-52 minutes  Treatment plan will be reviewed in 90 days or when goals have been changed.       MeasurableTreatment Goal(s) related to diagnosis / functional impairment(s)   Goal 1: Patient will reduce anxiety and improve mood making healthy lifestyle choices (exercise, diet, sleep), engaging in enjoyable activity (book writing), and use CBT skills/strategies to manage work stress       Objective #A (Patient Action)    Patient will  exercise at least 4 days per week .  Status: Continued - Date(s): 5/5/23     Intervention(s)  Therapist will assign homework to monitor level of  exercise and mood on a daily basis    Objective #B  Patient will  use effective sleep hygiene strategies  (ie sleep in his bed, use cpap, obtain 7-8 hrs per night)  Status: Continued - Date(s):2/24/23     Intervention(s)  Therapist will teach sleep hygiene strategies .    Objective #C  Patient will  increase number of meals cooked at home (decrease take out) .   Status: New - Date: 5/5/23    Intervention(s)  Therapist will  assign homework to track food .    Objective D:  Patient will use cognitive strategies to manage stress (identifying cognitive distortions, using counter thoughts, taking perspective, etc)  Status: New: 5/5/23  Intervention:  Therapist will teach cbt strategies/skills      Objective E:   Patient will engage in one book writing activity per week   Status: New: 5/5/23   Therapist will assign homework to identify and shared benefits of activity with [facility]       Patient has reviewed and agreed to the above plan.      Shawn G. Ullrich, [facility]  May 5, 2023

## 2023-07-27 ENCOUNTER — VIRTUAL VISIT (OUTPATIENT)
Dept: BEHAVIORAL HEALTH | Facility: CLINIC | Age: 43
End: 2023-07-27
Payer: COMMERCIAL

## 2023-07-27 DIAGNOSIS — F41.1 GAD (GENERALIZED ANXIETY DISORDER): Primary | ICD-10-CM

## 2023-08-13 DIAGNOSIS — I10 BENIGN ESSENTIAL HYPERTENSION: ICD-10-CM

## 2023-08-14 ENCOUNTER — MYC MEDICAL ADVICE (OUTPATIENT)
Dept: ENDOCRINOLOGY | Facility: CLINIC | Age: 43
End: 2023-08-14
Payer: COMMERCIAL

## 2023-08-14 RX ORDER — HYDROCHLOROTHIAZIDE 12.5 MG/1
TABLET ORAL
Qty: 90 TABLET | Refills: 2 | Status: SHIPPED | OUTPATIENT
Start: 2023-08-14 | End: 2024-05-06

## 2023-08-14 NOTE — TELEPHONE ENCOUNTER
Medication requested: hydrochlorothiazide (HYDRODIURIL) 12.5 MG tablet   Last office visit: 6/28/2023  [facility] appointments: none  Medication last refilled: 5/30/2023; 90 + 0 refills  Last qualifying labs:     BP Readings from Last 3 Encounters:   06/28/23 119/84   10/26/22 136/89   04/06/22 122/80     Component      Latest Ref Rng 2/10/2023  9:11 AM   Sodium      133 - 144 mmol/L 138    Potassium      3.4 - 5.3 mmol/L 4.4      Component      Latest Ref Rng 2/10/2023  9:11 AM   Creatinine      0.66 - 1.25 mg/dL 0.86      Prescription approved per [facility] Refill Protocol.    RACHEL Urbina, RN  08/14/23, 3:34 PM

## 2023-08-15 ENCOUNTER — MYC MEDICAL ADVICE (OUTPATIENT)
Dept: FAMILY MEDICINE | Facility: CLINIC | Age: 43
End: 2023-08-15

## 2023-08-15 DIAGNOSIS — E78.2 MIXED HYPERLIPIDEMIA: Primary | ICD-10-CM

## 2023-08-15 RX ORDER — ROSUVASTATIN CALCIUM 10 MG/1
10 TABLET, COATED ORAL DAILY
Qty: 30 TABLET | Refills: 1 | Status: SHIPPED | OUTPATIENT
Start: 2023-08-15 | End: 2023-10-03

## 2023-08-15 NOTE — TELEPHONE ENCOUNTER
Pt notifying Dr. Jose that Dr. Flores is ok with switching from Simvastatin to Rosuvastatin. Order placed for future fasting lipid panel in 1-2 months.  Routing to Dr. Jose for approval.    RACHEL Urbina, RN  08/15/23, 12:36 PM

## 2023-08-21 ASSESSMENT — ANXIETY QUESTIONNAIRES
GAD7 TOTAL SCORE: 14
3. WORRYING TOO MUCH ABOUT DIFFERENT THINGS: MORE THAN HALF THE DAYS
1. FEELING NERVOUS, ANXIOUS, OR ON EDGE: NEARLY EVERY DAY
5. BEING SO RESTLESS THAT IT IS HARD TO SIT STILL: SEVERAL DAYS
6. BECOMING EASILY ANNOYED OR IRRITABLE: MORE THAN HALF THE DAYS
4. TROUBLE RELAXING: MORE THAN HALF THE DAYS
GAD7 TOTAL SCORE: 14
7. FEELING AFRAID AS IF SOMETHING AWFUL MIGHT HAPPEN: MORE THAN HALF THE DAYS
IF YOU CHECKED OFF ANY PROBLEMS ON THIS QUESTIONNAIRE, HOW DIFFICULT HAVE THESE PROBLEMS MADE IT FOR YOU TO DO YOUR WORK, TAKE CARE OF THINGS AT HOME, OR GET ALONG WITH OTHER PEOPLE: SOMEWHAT DIFFICULT
2. NOT BEING ABLE TO STOP OR CONTROL WORRYING: MORE THAN HALF THE DAYS

## 2023-08-22 ENCOUNTER — VIRTUAL VISIT (OUTPATIENT)
Dept: BEHAVIORAL HEALTH | Facility: CLINIC | Age: 43
End: 2023-08-22
Payer: COMMERCIAL

## 2023-08-22 DIAGNOSIS — F41.1 GAD (GENERALIZED ANXIETY DISORDER): Primary | ICD-10-CM
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
## 2023-08-31 ENCOUNTER — TRANSFERRED RECORDS (OUTPATIENT)
Dept: MULTI SPECIALTY CLINIC | Facility: CLINIC | Age: 43
End: 2023-08-31

## 2023-08-31 LAB — RETINOPATHY: NORMAL

## 2023-09-06 DIAGNOSIS — E78.2 MIXED HYPERLIPIDEMIA: ICD-10-CM

## 2023-09-07 ENCOUNTER — MYC MEDICAL ADVICE (OUTPATIENT)
Dept: ENDOCRINOLOGY | Facility: CLINIC | Age: 43
End: 2023-09-07
Payer: COMMERCIAL

## 2023-09-07 DIAGNOSIS — E11.9 TYPE 2 DIABETES MELLITUS WITHOUT COMPLICATION, WITHOUT LONG-TERM CURRENT USE OF INSULIN (H): ICD-10-CM

## 2023-09-07 RX ORDER — ROSUVASTATIN CALCIUM 10 MG/1
10 TABLET, COATED ORAL DAILY
Qty: 30 TABLET | Refills: 1 | OUTPATIENT
Start: 2023-09-07

## 2023-09-07 RX ORDER — SITAGLIPTIN AND METFORMIN HYDROCHLORIDE 1000; 100 MG/1; MG/1
1 TABLET, FILM COATED, EXTENDED RELEASE ORAL DAILY
Qty: 90 TABLET | Refills: 0 | Status: SHIPPED | OUTPATIENT
Start: 2023-09-07 | End: 2023-12-07

## 2023-09-07 NOTE — TELEPHONE ENCOUNTER
Last Written Prescription Date:  5/15/23  Last Fill Quantity: 30,  # refills: 2   Last office visit: 6/26/2023 with prescribing provider:  Dr. Flores   Future Office Visit:  10/31/23        Requested Prescriptions   Pending Prescriptions Disp Refills    SitaGLIPtin-MetFORMIN HCl (JANUMET XR) 100-1000 MG TB24 30 tablet 2     Sig: Take 1 tablet by mouth daily       There is no refill protocol information for this order        Refills sent  Aimee Delgadillo RN

## 2023-09-11 ASSESSMENT — ANXIETY QUESTIONNAIRES
7. FEELING AFRAID AS IF SOMETHING AWFUL MIGHT HAPPEN: NOT AT ALL
5. BEING SO RESTLESS THAT IT IS HARD TO SIT STILL: MORE THAN HALF THE DAYS
GAD7 TOTAL SCORE: 12
6. BECOMING EASILY ANNOYED OR IRRITABLE: MORE THAN HALF THE DAYS
2. NOT BEING ABLE TO STOP OR CONTROL WORRYING: MORE THAN HALF THE DAYS
IF YOU CHECKED OFF ANY PROBLEMS ON THIS QUESTIONNAIRE, HOW DIFFICULT HAVE THESE PROBLEMS MADE IT FOR YOU TO DO YOUR WORK, TAKE CARE OF THINGS AT HOME, OR GET ALONG WITH OTHER PEOPLE: SOMEWHAT DIFFICULT
3. WORRYING TOO MUCH ABOUT DIFFERENT THINGS: MORE THAN HALF THE DAYS
4. TROUBLE RELAXING: MORE THAN HALF THE DAYS
1. FEELING NERVOUS, ANXIOUS, OR ON EDGE: MORE THAN HALF THE DAYS
GAD7 TOTAL SCORE: 12

## 2023-09-11 ASSESSMENT — PATIENT HEALTH QUESTIONNAIRE - PHQ9
10. IF YOU CHECKED OFF ANY PROBLEMS, HOW DIFFICULT HAVE THESE PROBLEMS MADE IT FOR YOU TO DO YOUR WORK, TAKE CARE OF THINGS AT HOME, OR GET ALONG WITH OTHER PEOPLE: SOMEWHAT DIFFICULT
SUM OF ALL RESPONSES TO PHQ QUESTIONS 1-9: 14
SUM OF ALL RESPONSES TO PHQ QUESTIONS 1-9: 14

## 2023-09-12 ENCOUNTER — VIRTUAL VISIT (OUTPATIENT)
Dept: BEHAVIORAL HEALTH | Facility: CLINIC | Age: 43
End: 2023-09-12
Payer: COMMERCIAL

## 2023-09-12 DIAGNOSIS — F41.1 GAD (GENERALIZED ANXIETY DISORDER): Primary | ICD-10-CM

## 2023-09-12 NOTE — PROGRESS NOTES
[facility]  September 12, 2023    Behavioral Health Clinician Progress Note    Patient Name: Bertin Mcgill           Service Type:  Individual        Service Location:    telehealth     Session Start Time: 12:31 pm Session End Time: 1:30 pm      Session Length: 53 - 60      Attendees: Patient      Service Modality:   Telehealth via video visit      Visit Activities (Refresh list every visit): [facility] Only       Telemedicine Visit: The patient's condition can be safely assessed and treated via synchronous audio and visual telemedicine encounter.      Reason for Telemedicine Visit: Patient has requested telehealth visit    Originating Site (Patient Location): Patient's home      Distant Location (provider location):  On-site    Consent:  The patient/guardian has verbally consented to: the potential risks and benefits of telemedicine (video visit) versus in person care; bill my insurance or make self-payment for services provided; and responsibility for payment of non-covered services.     Mode of Communication:  Video Conference via [employer]    As the provider I attest to compliance with applicable laws and regulations related to telemedicine.        Diagnostic Assessment Date: 4/14/23  Treatment Plan Review Date: 11/1/23  CGI Review Date:  10/27/23  Promis 10 Review Date: 12/12/23    Clinical Global Impressions  First:  Considering your total clinical experience with this particular patient population, how severe are the patient's symptoms at this time?: 4 (7/27/2023  5:04 PM)    Most recent:  Compared to the patient's condition at the START of treatment, this patient's condition is: 3 (7/27/2023  5:04 PM)      See Flowsheets for today's PHQ-9 and NATHALIE-7 results  Previous PHQ-9:       7/26/2023     4:42 PM 8/21/2023     3:20 PM 9/11/2023     3:39 PM   PHQ-9 SCORE   PHQ-9 Total Score MyChart 12 (Moderate depression) 12 (Moderate depression) 14 (Moderate depression)   PHQ-9 Total Score 12 12 14      Previous NATHALIE-7:       7/26/2023     4:42 PM 8/21/2023     3:20 PM 9/11/2023     3:40 PM   NATHALIE-7 SCORE   Total Score 10 (moderate anxiety) 14 (moderate anxiety) 12 (moderate anxiety)   Total Score 10 14 12       KRISTINE LEVEL:       No data to display                DATA  Extended Session (60+ minutes): No  Interactive Complexity: No  Crisis: No  [facility] Patient: No    Treatment Objective(s) Addressed in This Session:  Target Behavior(s):  increased social activity/decreased isolation    Anxiety: will experience a reduction in anxiety, will develop more effective coping skills to manage anxiety symptoms, will develop healthy cognitive patterns and beliefs and will increase ability to function adaptively    Current Stressors / Issues:  Lj reported no change in symptoms, sharing his friend spent several days at Lj's home and while everything went OK, Lj reported at times he was uncomfortable and he was exhausted.  Lj reported the experience did not change his thoughts regarding being around others or returning to the office, rather it reinforced thoughts that being others and being public is difficult, tiring, and often times irritating.   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   Lj expressed change talk sharing he has the contact information for scheduling psychiatric med eval and he has reviewed the list of anxiety specific therapy services and whittled the list to 3 possibilities.  But Lj has not scheduled either service, b/c he had questions about which to schedule first.  [facility] informed Lj that he can move forward with each service independent of the other, even though each service may recommend the other.  Lj affirmed understanding and will reach out to schedule services.      [facility] then used MI and Solution Focused Strategies to focus on and prompt identify of currently used  coping strategies.  Lj shared he takes breaks, takes action when he can, he engages in enjoyable activities, he uses his foam roller to reduce muscle tension, walks, etc.   Lj reported exercise has been less consistent due to foot/ankle injury, but has been walking when he can.  Lj will continue to monitor foot/ankle health and seek services if he does not see enough progress with rest, ice, elevation and ibuprofen      [facility] will continue to provide services until Lj initiates new services/treatment providers.     Progress on Treatment Objective(s) / Homework:  No improvement - PREPARATION (Decided to change - considering how); Intervened by negotiating a change plan and determining options / strategies for behavior change, identifying triggers, exploring social supports, and working towards setting a date to begin behavior change    Motivational Interviewing    MI Intervention: Co-Developed Goal: reduce anxiety, Expressed Empathy/Understanding, Supported Autonomy, Collaboration, Evocation, Open-ended questions, Reflections: simple and complex and Change talk (evoked)     Change Talk Expressed by the Patient: Reasons to change    Provider Response to Change Talk: E - Evoked more info from patient about behavior change and R - Reflected patient's change talk      Solution-Focused Therapy  Explore patterns in patient's behaviors and relationships and discuss options for new behaviors  Explore new options for problem-solving, communication, managing stress, etc.    Psycho-education regarding mental health diagnoses and treatment options    Care Plan review completed: Yes    Medication Review:  No current psychiatric medications prescribed    Medication Compliance:  NA    Changes in Health Issues:    None reported    Chemical Use Review:   Substance Use: Problem use continues with no change since last session, Stage of Change: Contemplation        Tobacco Use: No change in amount of tobacco use since last  session.  Contemplation    Assessment: Current Emotional / Mental Status (status of significant symptoms):  Risk status (Self / Other harm or suicidal ideation)  Patient has had a history of suicidal ideation: passive SI in 2003; none since  Patient denies current fears or concerns for personal safety.  Patient denies current or recent suicidal ideation or behaviors.  Patient denies current or recent homicidal ideation or behaviors.  Patient denies current or recent self injurious behavior or ideation.  Patient denies other safety concerns.  A safety and risk management plan has not been developed at this time, however patient was encouraged to call [place] [phone] should there be a change in any of these risk factors.    Appearance:   Appropriate   Eye Contact:   Good   Psychomotor Behavior: Normal   Attitude:   Cooperative   Orientation:   All  Speech   Rate / Production: Normal/ Responsive   Volume:  Normal   Mood:    Anxious  Irritable   Affect:    Congruent with mood   Thought Content:  Clear    Thought Form:  Coherent  Logical   Insight:    Good     Diagnoses:  1. NATHALIE (generalized anxiety disorder)          Collateral Reports Completed:  Routed note to PCP    Plan: (Homework, other):  Patient was given information about behavioral services and encouraged to schedule a follow up appointment with the clinic [facility] in 1 month.  He was also given information about mental health symptoms and treatment options  and strategies to more effectively manage anxiety .  CD Recommendations:  reduce alcohol use .   Shawn Ullrich [facility], [facility]      ______________________________________________________________________    Integrated Primary Care Behavioral Health Treatment Plan    Patient's Name: Bertin Mcgill  YOB: 1980    Date of Creation: 8/22/23  Date Treatment Plan Last Reviewed/Revised: 8/22/23    DSM5 Diagnoses:   F41.1 Generalized Anxiety Disorder.  F33.1 Major Depressive Disorder, Recurrent Episode,  Moderate With anxious distress.   F10.10  Alcohol Use Disorder, Mild  Psychosocial / Contextual Factors: [place]-American, Single, Male, heterosexual., adopted, employed, works from home  PROMIS (reviewed every 90 days): pt completed on 5/5/23    Referral / Collaboration:  The following referral(s) will be initiated: Psychiatric Medication Evaluation and Anxiety Specific Psychotherapy Services ;    Anticipated number of session for this episode of care: 6  Anticipation frequency of session: Every other week  Anticipated Duration of each session: 38-52 minutes  Treatment plan will be reviewed in 90 days or when goals have been changed.       MeasurableTreatment Goal(s) related to diagnosis / functional impairment(s)   Goal 1: Patient will reduce anxiety by engaging in Anxiety Specific Psychotherapy Services and complete psychiatric medication evaluation        Objective #A (Patient Action)    Patient will  schedule services with anxiety specific psychotherapy and psychiatric medication evaluation services  Status: New - Date: 8/22/23      Intervention(s)  [facility] will make referrals to outpatient therapist and psychiatry.        Goal 2: Patient will Patient will reduce anxiety and improve mood making healthy lifestyle choices (exercise, diet, sleep), engaging in enjoyable activity (book writing), and use CBT skills/strategies to manage work stress       Objective #A (Patient Action)    Patient will  exercise at least 4 days per week .  Status: Continued - Date(s): 5/5/23     Intervention(s)  Therapist will assign homework to monitor level of exercise and mood on a daily basis    Objective #B  Patient will  use effective sleep hygiene strategies  (ie sleep in his bed, use cpap, obtain 7-8 hrs per night)  Status: Continued - Date(s):2/24/23     Intervention(s)  Therapist will teach sleep hygiene strategies .    Objective #C  Patient will  increase number of meals cooked at home (decrease take out) .   Status: New - Date:  5/5/23    Intervention(s)  Therapist will  assign homework to track food .    Objective D:  Patient will use cognitive strategies to manage stress (identifying cognitive distortions, using counter thoughts, taking perspective, etc)  Status: New: 5/5/23  Intervention:  Therapist will teach cbt strategies/skills      Objective E:   Patient will engage in one book writing activity per week   Status: New: 5/5/23   Therapist will assign homework to identify and shared benefits of activity with [facility]       Patient has reviewed and agreed to the above plan.      Shawn G. Ullrich, [facility]  8/22/23

## 2023-09-25 DIAGNOSIS — E11.9 TYPE 2 DIABETES MELLITUS WITHOUT COMPLICATION, WITHOUT LONG-TERM CURRENT USE OF INSULIN (H): ICD-10-CM

## 2023-09-25 RX ORDER — EMPAGLIFLOZIN 10 MG/1
TABLET, FILM COATED ORAL
Qty: 90 TABLET | Refills: 1 | Status: SHIPPED | OUTPATIENT
Start: 2023-09-25 | End: 2024-04-01

## 2023-09-25 NOTE — TELEPHONE ENCOUNTER
"Requested Prescriptions   Pending Prescriptions Disp Refills    JARDIANCE 10 MG TABS tablet [Pharmacy Med Name: JARDIANCE 10 MG TABLET] 90 tablet 0     Sig: TAKE 1 TABLET BY MOUTH EVERY DAY       Sodium Glucose Co-Transport Inhibitor Agents Passed - 9/25/2023 12:59 AM        Passed - Patient has documented A1c within the specified period of time.     If HgbA1C is 8 or greater, it needs to be on file within the past 3 months.  If less than 8, must be on file within the past 6 months.     Recent Labs   Lab Test 06/16/23  0912   A1C 7.7*             Passed - No creatinine >1.4 or GFR <45 within the past 12 mos     Recent Labs   Lab Test 02/10/23  0911 08/31/21  1020 11/08/19  0936   GFRESTIMATED >90   < > >90   GFRESTBLACK  --   --  >90    < > = values in this interval not displayed.       Recent Labs   Lab Test 02/10/23  0911   CR 0.86             Passed - Medication is active on med list        Passed - Patient is age 18 or older        Passed - Patient has documented normal Potassium within the last 12 mos.     Recent Labs   Lab Test 02/10/23  0911   POTASSIUM 4.4             Passed - Recent (6 mo) or future (30 days) visit within the authorizing provider's specialty     Patient had office visit in the last 6 months or has a visit in the next 30 days with authorizing provider or within the authorizing provider's specialty.  See \"Patient Info\" tab in inbasket, or \"Choose Columns\" in Meds & Orders section of the refill encounter.               Last Written Prescription Date:  6/26/23  Last Fill Quantity: 90 tab,  # refills: 0   Last office visit: Visit date not found ; last virtual visit: 6/26/2023 with prescribing provider:  Dr Flores   Future Office Visit:  10/31/23    Refill sent  Bill Pena RN              "

## 2023-09-30 ENCOUNTER — HEALTH MAINTENANCE LETTER (OUTPATIENT)
Age: 43
End: 2023-09-30

## 2023-10-01 DIAGNOSIS — E78.2 MIXED HYPERLIPIDEMIA: ICD-10-CM

## 2023-10-03 RX ORDER — ROSUVASTATIN CALCIUM 10 MG/1
10 TABLET, COATED ORAL DAILY
Qty: 30 TABLET | Refills: 1 | Status: SHIPPED | OUTPATIENT
Start: 2023-10-03 | End: 2023-12-01

## 2023-10-03 NOTE — TELEPHONE ENCOUNTER
Medication requested: rosuvastatin (CRESTOR) 10 MG tablet   Last office visit: 6/28/2023  [facility] appointments: none  Medication last refilled: 8/15/2023; 30 + 1 refill  Last qualifying labs:     Component      Latest Ref Rng 6/16/2023  9:12 AM   LDL Cholesterol Direct      <100 mg/dL 126 (H)       Prescription approved per [facility] Refill Protocol.    RACHEL Urbina, RN  10/03/23, 10:20 AM

## 2023-10-13 ENCOUNTER — ALLIED HEALTH/NURSE VISIT (OUTPATIENT)
Dept: FAMILY MEDICINE | Facility: CLINIC | Age: 43
End: 2023-10-13
Payer: COMMERCIAL

## 2023-10-13 ENCOUNTER — OFFICE VISIT (OUTPATIENT)
Dept: BEHAVIORAL HEALTH | Facility: CLINIC | Age: 43
End: 2023-10-13
Payer: COMMERCIAL

## 2023-10-13 DIAGNOSIS — Z23 NEED FOR PROPHYLACTIC VACCINATION AND INOCULATION AGAINST INFLUENZA: Primary | ICD-10-CM

## 2023-10-13 DIAGNOSIS — Z23 HIGH PRIORITY FOR 2019-NCOV VACCINE: ICD-10-CM

## 2023-10-13 DIAGNOSIS — F41.1 GAD (GENERALIZED ANXIETY DISORDER): Primary | ICD-10-CM

## 2023-10-13 ASSESSMENT — ANXIETY QUESTIONNAIRES
GAD7 TOTAL SCORE: 10
GAD7 TOTAL SCORE: 10
IF YOU CHECKED OFF ANY PROBLEMS ON THIS QUESTIONNAIRE, HOW DIFFICULT HAVE THESE PROBLEMS MADE IT FOR YOU TO DO YOUR WORK, TAKE CARE OF THINGS AT HOME, OR GET ALONG WITH OTHER PEOPLE: SOMEWHAT DIFFICULT
3. WORRYING TOO MUCH ABOUT DIFFERENT THINGS: MORE THAN HALF THE DAYS
6. BECOMING EASILY ANNOYED OR IRRITABLE: MORE THAN HALF THE DAYS
5. BEING SO RESTLESS THAT IT IS HARD TO SIT STILL: SEVERAL DAYS
7. FEELING AFRAID AS IF SOMETHING AWFUL MIGHT HAPPEN: NOT AT ALL
2. NOT BEING ABLE TO STOP OR CONTROL WORRYING: SEVERAL DAYS
1. FEELING NERVOUS, ANXIOUS, OR ON EDGE: MORE THAN HALF THE DAYS

## 2023-10-13 ASSESSMENT — PATIENT HEALTH QUESTIONNAIRE - PHQ9
SUM OF ALL RESPONSES TO PHQ QUESTIONS 1-9: 12
5. POOR APPETITE OR OVEREATING: MORE THAN HALF THE DAYS

## 2023-10-13 NOTE — PROGRESS NOTES
[facility]  October 13, 2023    Behavioral Health Clinician Progress Note    Patient Name: Bertin Mcgill           Service Type:  Individual        Service Location:    In-clinic     Session Start Time: 1:28 pm Session End Time: 2:28 pm      Session Length: 53 - 60      Attendees: Patient      Service Modality:   Face to Face, in-clinic      Visit Activities (Refresh list every visit): [facility] Only     Diagnostic Assessment Date: 4/14/23  Treatment Plan Review Date: 11/1/23  CGI Review Date:  10/27/23  Promis 10 Review Date: 12/12/23    Clinical Global Impressions  First:  Considering your total clinical experience with this particular patient population, how severe are the patient's symptoms at this time?: 4 (7/27/2023  5:04 PM)    Most recent:  Compared to the patient's condition at the START of treatment, this patient's condition is: 3 (7/27/2023  5:04 PM)      See Flowsheets for today's PHQ-9 and NATHALIE-7 results  Previous PHQ-9:       8/21/2023     3:20 PM 9/11/2023     3:39 PM 10/13/2023     1:25 PM   PHQ-9 SCORE   PHQ-9 Total Score MyChart 12 (Moderate depression) 14 (Moderate depression)    PHQ-9 Total Score 12 14 12     Previous NATHALIE-7:       8/21/2023     3:20 PM 9/11/2023     3:40 PM 10/13/2023     1:25 PM   NATHALIE-7 SCORE   Total Score 14 (moderate anxiety) 12 (moderate anxiety)    Total Score 14 12 10       KRISTINE LEVEL:       No data to display                DATA  Extended Session (60+ minutes): No  Interactive Complexity: No  Crisis: No  [facility] Patient: No    Treatment Objective(s) Addressed in This Session:  Target Behavior(s):  increased social activity/decreased isolation    Anxiety: will experience a reduction in anxiety, will develop more effective coping skills to manage anxiety symptoms, will develop healthy cognitive patterns and beliefs and will increase ability to function adaptively    Current Stressors / Issues:  Initially, Lj presented with improved mood, since previous appt,  "citing several contributing factors, including continued exercise (walking outside and transitioning to using treadmills/elliptical in the home as the seasons change), mild slow down at work, [employer] postseason run, and moving toward resolution of roof replacement.  But when [facility] focused conversation toward potentially returning to in-person work, Lj's mood worsened.  Lj's initial response was he doesn't want to think about working from the office because its stressful and anxiety provoking.  Lj then shared laundry list of worries, stressors, and irritations that will result from working in the office (ie \"its loud, its distracting, its very closed in...\").  While Lj still expresses concern about physical health when returning to working in the office, his primary concern is lack of confidence in maintaining calm when he interacts with co-workers.  Lj had difficulty identifying any future interactions with co-workers when in the office that did not trigger irritation and negative thoughts/emotions.  When imagining how he would respond to co-workers, Lj's responses entailed being short with co-workers, raising his voice at co-workers, responding out of frustration and annoyance, etc., all of which he doesn't want to do.  Lj is professional and very good at his job, but is worried he will not be able to continue to perform at the same level if he is constantly anxious, irritated, and frustrated, due to working in the office.   [facility] validated his symptoms and reflected the impact, and then focused on accuracy of thoughts, including his ability to respond in a different manner. Lj expressed skepticism about responding in professional manner, so [facility] focused conversation toward increasing confidence by developing skills, specifically interpersonal (ie boundaries, communication, etc), but Lj was ambivalent about ability to use skills effectively.      [facility] continued to recommend specialized anxiety treatment " and medication evaluation, Lj reported he scheduled [facility] appt on December 15, but has not yet scheduled specialized anxiety treatment.  Lj reported he will contact and schedule services.         Progress on Treatment Objective(s) / Homework:  No improvement - PREPARATION (Decided to change - considering how); Intervened by negotiating a change plan and determining options / strategies for behavior change, identifying triggers, exploring social supports, and working towards setting a date to begin behavior change    Motivational Interviewing    MI Intervention: Co-Developed Goal: reduce anxiety, Expressed Empathy/Understanding, Supported Autonomy, Collaboration, Evocation, Open-ended questions, Reflections: simple and complex and Change talk (evoked)     Change Talk Expressed by the Patient: Reasons to change    Provider Response to Change Talk: E - Evoked more info from patient about behavior change and R - Reflected patient's change talk      Solution-Focused Therapy  Explore patterns in patient's behaviors and relationships and discuss options for new behaviors  Explore new options for problem-solving, communication, managing stress, etc.    Psycho-education regarding mental health diagnoses and treatment options    Care Plan review completed: Yes    Medication Review:  No current psychiatric medications prescribed    Medication Compliance:  NA    Changes in Health Issues:    None reported    Chemical Use Review:   Substance Use: Problem use continues with no change since last session, Stage of Change: Contemplation        Tobacco Use: No change in amount of tobacco use since last session.  Contemplation    Assessment: Current Emotional / Mental Status (status of significant symptoms):  Risk status (Self / Other harm or suicidal ideation)  Patient has had a history of suicidal ideation: passive SI in 2003; none since  Patient denies current fears or concerns for personal safety.  Patient denies current or  recent suicidal ideation or behaviors.  Patient denies current or recent homicidal ideation or behaviors.  Patient denies current or recent self injurious behavior or ideation.  Patient denies other safety concerns.  A safety and risk management plan has not been developed at this time, however patient was encouraged to call [place] [phone] should there be a change in any of these risk factors.    Appearance:   Appropriate   Eye Contact:   Good   Psychomotor Behavior: Normal   Attitude:   Cooperative   Orientation:   All  Speech   Rate / Production: Normal/ Responsive   Volume:  Normal   Mood:    Anxious  Irritable   Affect:    Congruent with mood   Thought Content:  Clear    Thought Form:  Coherent  Logical   Insight:    Good     Diagnoses:  1. NATHALIE (generalized anxiety disorder)        Collateral Reports Completed:  Routed note to PCP    Plan: (Homework, other):  Patient was given information about behavioral services and encouraged to schedule a follow up appointment with the clinic [facility] in 1 month.  He was also given information about mental health symptoms and treatment options  and strategies to more effectively manage anxiety .  CD Recommendations:  reduce alcohol use .   Shawn Ullrich [facility], [facility] [place]      ______________________________________________________________________    Integrated Primary Care Behavioral Health Treatment Plan    Patient's Name: Bertin Mcgill  YOB: 1980    Date of Creation: 8/22/23  Date Treatment Plan Last Reviewed/Revised: 8/22/23    DSM5 Diagnoses:   F41.1 Generalized Anxiety Disorder.  F33.1 Major Depressive Disorder, Recurrent Episode, Moderate With anxious distress.   F10.10  Alcohol Use Disorder, Mild  Psychosocial / Contextual Factors: [place]-American, Single, Male, heterosexual., adopted, employed, works from home  PROMIS (reviewed every 90 days): pt completed on 5/5/23    Referral / Collaboration:  The following referral(s) will be initiated: Psychiatric  Medication Evaluation and Anxiety Specific Psychotherapy Services ;    Anticipated number of session for this episode of care: 6  Anticipation frequency of session: Every other week  Anticipated Duration of each session: 38-52 minutes  Treatment plan will be reviewed in 90 days or when goals have been changed.       MeasurableTreatment Goal(s) related to diagnosis / functional impairment(s)   Goal 1: Patient will reduce anxiety by engaging in Anxiety Specific Psychotherapy Services and complete psychiatric medication evaluation        Objective #A (Patient Action)    Patient will  schedule services with anxiety specific psychotherapy and psychiatric medication evaluation services  Status: New - Date: 8/22/23      Intervention(s)  [facility] will make referrals to outpatient therapist and psychiatry.        Goal 2: Patient will Patient will reduce anxiety and improve mood making healthy lifestyle choices (exercise, diet, sleep), engaging in enjoyable activity (book writing), and use CBT skills/strategies to manage work stress       Objective #A (Patient Action)    Patient will  exercise at least 4 days per week .  Status: Continued - Date(s): 5/5/23     Intervention(s)  Therapist will assign homework to monitor level of exercise and mood on a daily basis    Objective #B  Patient will  use effective sleep hygiene strategies  (ie sleep in his bed, use cpap, obtain 7-8 hrs per night)  Status: Continued - Date(s):2/24/23     Intervention(s)  Therapist will teach sleep hygiene strategies .    Objective #C  Patient will  increase number of meals cooked at home (decrease take out) .   Status: New - Date: 5/5/23    Intervention(s)  Therapist will  assign homework to track food .    Objective D:  Patient will use cognitive strategies to manage stress (identifying cognitive distortions, using counter thoughts, taking perspective, etc)  Status: New: 5/5/23  Intervention:  Therapist will teach cbt strategies/skills      Objective  E:   Patient will engage in one book writing activity per week   Status: New: 5/5/23   Therapist will assign homework to identify and shared benefits of activity with [facility] [place]       Patient has reviewed and agreed to the above plan.      Shawn G. Ullrich, TAQUERIA  8/22/23

## 2023-10-13 NOTE — PROGRESS NOTES
Prior to immunization administration, verified patients identity using patient s name and date of birth. Please see Immunization Activity for additional information.     Screening Questionnaire for Adult Immunization    Are you sick today?   No   Do you have allergies to medications, food, a vaccine component or latex?   No   Have you ever had a serious reaction after receiving a vaccination?   No   Do you have a long-term health problem with heart, lung, kidney, or metabolic disease (e.g., diabetes), asthma, a blood disorder, no spleen, complement component deficiency, a cochlear implant, or a spinal fluid leak?  Are you on long-term aspirin therapy?   No   Do you have cancer, leukemia, HIV/AIDS, or any other immune system problem?   No   Do you have a parent, brother, or sister with an immune system problem?   No   In the past 3 months, have you taken medications that affect  your immune system, such as prednisone, other steroids, or anticancer drugs; drugs for the treatment of rheumatoid arthritis, Crohn s disease, or psoriasis; or have you had radiation treatments?   No   Have you had a seizure, or a brain or other nervous system problem?   No   During the past year, have you received a transfusion of blood or blood    products, or been given immune (gamma) globulin or antiviral drug?   No   For women: Are you pregnant or is there a chance you could become       pregnant during the next month?   No   Have you received any vaccinations in the past 4 weeks?   No     Immunization questionnaire answers were all negative.    I have reviewed the following standing orders:   This patient is due and qualifies for the Covid-19 vaccine.     Click here for COVID-19 Standing Order    I have reviewed the vaccines inclusion and exclusion criteria; No concerns regarding eligibility.     This patient is due and qualifies for the Influenza vaccine.    Click here for Influenza Vaccine Standing Order    I have reviewed the  vaccines inclusion and exclusion criteria; No concerns regarding eligibility.     Patient instructed to remain in clinic for 15 minutes afterwards, and to report any adverse reactions.     Screening performed by Soco Ruiz CMA on 10/13/2023 at 2:48 PM.    Bertin Mcgill comes into clinic today at the request of Dr. Jose Ordering Provider for Flu shot and Covid vaccine.      This service provided today was under the supervising provider of the day Dr. Bates, who was available if needed.    Soco Ruiz CMA

## 2023-10-20 ENCOUNTER — LAB (OUTPATIENT)
Dept: LAB | Facility: CLINIC | Age: 43
End: 2023-10-20
Payer: COMMERCIAL

## 2023-10-20 DIAGNOSIS — E78.2 MIXED HYPERLIPIDEMIA: ICD-10-CM

## 2023-10-20 LAB
CHOLEST SERPL-MCNC: 199 MG/DL
HDLC SERPL-MCNC: 45 MG/DL
LDLC SERPL CALC-MCNC: ABNORMAL MG/DL
LDLC SERPL DIRECT ASSAY-MCNC: 95 MG/DL
NONHDLC SERPL-MCNC: 154 MG/DL
TRIGL SERPL-MCNC: 540 MG/DL

## 2023-10-20 PROCEDURE — 36415 COLL VENOUS BLD VENIPUNCTURE: CPT

## 2023-10-23 DIAGNOSIS — E78.2 MIXED HYPERLIPIDEMIA: Primary | ICD-10-CM

## 2023-10-30 DIAGNOSIS — E78.2 MIXED HYPERLIPIDEMIA: ICD-10-CM

## 2023-10-31 ENCOUNTER — VIRTUAL VISIT (OUTPATIENT)
Dept: ENDOCRINOLOGY | Facility: CLINIC | Age: 43
End: 2023-10-31
Payer: COMMERCIAL

## 2023-10-31 DIAGNOSIS — E11.9 TYPE 2 DIABETES MELLITUS WITHOUT COMPLICATION, WITHOUT LONG-TERM CURRENT USE OF INSULIN (H): Primary | ICD-10-CM

## 2023-10-31 DIAGNOSIS — E78.2 MIXED HYPERLIPIDEMIA: ICD-10-CM

## 2023-10-31 PROCEDURE — 99213 OFFICE O/P EST LOW 20 MIN: CPT | Mod: VID | Performed by: INTERNAL MEDICINE

## 2023-10-31 RX ORDER — ROSUVASTATIN CALCIUM 10 MG/1
10 TABLET, COATED ORAL DAILY
Qty: 90 TABLET | Refills: 1 | OUTPATIENT
Start: 2023-10-31

## 2023-10-31 NOTE — PROGRESS NOTES
Virtual Visit Details    Type of service:  Video Visit     Originating Location (pt. Location): Home  Distant Location (provider location):  Off-site  Platform used for Video Visit: Diego        Recent issues:  Diabetes follow-up evaluation  Taking the JanumetXR and Jardiance medications   Reports the lipid meds changed from NiacinER to fenofibrate + rosuvastatin med combo          2017. Diagnosis of diabetes mellitus when med eval for torn right shoulder muscle  High glucose level noted during med evaluation, hgbA1c near 11.5%  Started Jardiance medication, took for approx 6 months  Recalls significant weight loss of approx 30#  Switched to metformin medication, then dose increases              Concerns about GI symptoms with nausea, nausea, also morning vomiting              Tried Prilosec, then Zantac  Has seen [facility] for medical evaluations  Recent discussion with his workplace team physician, Dr. Skye Mane              Advised to see me for medical evaluation    Previously on metforminXR 500 mg 2 tabs BID     Previous [facility] labs include:               8/12/19. Initial diabetes evaluation with me at [place]  Reviewed health history and diabetes management  Medication change to JanumetXR  1/2022. Addition of Jardiance     Current DM medications:  JanumetXR 100/1000 1-tab each morning  Jardiance 10 mg  1-tab each morning     Blood glucose (BG) meter              Infrequent testing  CGM use:  Years ago, not recently (phobia?)  Fam Hx Diabetes: None  Recent [facility] labs include:           Lab Results   Component Value Date    A1C 7.7 (H) 06/16/2023     02/10/2023    POTASSIUM 4.4 02/10/2023    CHLORIDE 104 02/10/2023    CO2 28 02/10/2023    ANIONGAP 6 02/10/2023     (H) 02/10/2023    BUN 18 02/10/2023    CR 0.86 02/10/2023    GFRESTIMATED >90 02/10/2023    GFRESTBLACK >90 11/08/2019    FLAVIO 9.9 02/10/2023    CHOL 199 10/20/2023    TRIG 540 (H) 10/20/2023    HDL 45 10/20/2023    LDL   10/20/2023      Comment:      Cannot estimate LDL when triglyceride exceeds 400 mg/dL    LDL 95 10/20/2023    NHDL 154 (H) 10/20/2023    UCRR 61.6 06/28/2023    MICROL <12.0 06/28/2023    UMALCR  06/28/2023      Comment:      Unable to calculate, urine albumin and/or urine creatinine is outside detectable limits.  Microalbuminuria is defined as an albumin:creatinine ratio of 17 to 299 for males and 25 to 299 for females. A ratio of albumin:creatinine of 300 or higher is indicative of overt proteinuria.  Due to biologic variability, positive results should be confirmed by a second, first-morning random or 24-hour timed urine specimen. If there is discrepancy, a third specimen is recommended. When 2 out of 3 results are in the microalbuminuria range, this is evidence for incipient nephropathy and warrants increased efforts at glucose control, blood pressure control, and institution of therapy with an angiotensin-converting-enzyme (ACE) inhibitor (if the patient can tolerate it).       Lab Results   Component Value Date    TSH 1.62 07/25/2022     Last eye exam 5/2022 at [facility], no DR per patient  History of hyperlipidemia, takes simvastatin 20 mg daily   1/2023. Stopped statin med and started NiacinER (Niaspan) 500 mg 1-tab QPM along with aspirin 1-tab QPM, head sweating symptoms   ~8/2023. Continued the fenofibrate but changed from simvastatin to rosuvastatin medication  DM Complications:      None known          Lives in [facility]  Has seen [facility]   Also sees [facility] for general medicine evaluations.      PMH/PSH:  Past Medical History:   Diagnosis Date    Allergic rhinitis     Benign essential hypertension     Hyperlipidemia     Rotator cuff tear, left 2015    Rotator cuff tear, right 2017    Type 2 diabetes mellitus (H)      Past Surgical History:   Procedure Laterality Date    APPENDECTOMY      SHOULDER SURGERY      left and  "right previously       Family Hx:  No family history on file.      Social Hx:  Social History     Socioeconomic History    Marital status: Single     Spouse name: Not on file    Number of children: Not on file    Years of education: Not on file    Highest education level: Not on file   Occupational History    Not on file   Tobacco Use    Smoking status: Every Day     Packs/day: 1     Types: Cigarettes, Cigars    Smokeless tobacco: Never   Substance and Sexual Activity    Alcohol use: Yes    Drug use: Never    Sexual activity: Not on file   Other Topics Concern    Not on file   Social History Narrative    Single. 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     From [place], [place]. Lives in [place] and works mostly from home.     Enjoys filling time doing \"lazy things\", e.g. binge watching shows and reading.     Also, enjoys yard work and walking around neighborhood. Used to enjoy taking long drives.      Social Determinants of Health     Financial Resource Strain: Not on file   Food Insecurity: Not on file   Transportation Needs: Not on file   Physical Activity: Not on file   Stress: Not on file   Social Connections: Not on file   Interpersonal Safety: Not on file   Housing Stability: Not on file          MEDICATIONS:  has a current medication list which includes the following prescription(s): acetaminophen, fenofibrate, hydrochlorothiazide, jardiance, losartan, multiple vitamins-minerals, rosuvastatin, janumet xr, and vitamin d.    ROS: 10 point ROS neg other than the symptoms noted above in the HPI.     GENERAL: energy good, wt stable; denies fevers, chills, night sweats.   HEENT: sinus congestion; no dysphagia, odonophagia, diplopia, neck pain  THYROID:  no apparent hyper or hypothyroid symptoms  CV: no chest pain, pressure, palpitations  LUNGS: no SOB, MEHTA, cough, wheezing   ABDOMEN: episode of rectal bleeding; occasional nausea; denies abdominal pain  EXTREMITIES: no rashes, " ulcers, edema  NEUROLOGY: no headaches, denies changes in vision, tingling, extremitiy numbness   MSK: no muscle aches or pains, weakness  SKIN: no rashes or lesions  : denies nocturia  PSYCH:  stable mood, no significant anxiety or depression  ENDOCRINE: no heat or cold intolerance    Physical Exam (visual exam)  VS:  no vital signs taken for video visit  CONSTITUTIONAL: healthy, alert and NAD, well dressed, answering questions appropriately  ENT: no nose swelling or nasal discharge, mouth redness or gum changes.  EYES: eyes grossly normal to inspection, conjunctivae and sclerae normal, no exophthalmos or proptosis  THYROID:  no apparent nodules or goiter  LUNGS: no audible wheeze, cough or visible cyanosis, no visible retractions or increased work of breathing  ABDOMEN: abdomen not evaluated  EXTREMITIES: no hand tremors, limited exam  NEUROLOGY: CN grossly intact, mentation intact and speech normal   SKIN:  no apparent skin lesions, rash, or edema with visualized skin appearance  PSYCH: mentation appears normal, affect normal/bright, judgement and insight intact,   normal speech and appearance well groomed    LABS:     All pertinent notes, labs, and images personally reviewed by me.      A/P:  Encounter Diagnoses   Name Primary?    Type 2 diabetes mellitus without complication, without long-term current use of insulin (H) Yes    Mixed hyperlipidemia        Comments:  Reviewed health history and diabetes issues.  Recent hgbA1c improved but cholesterol and TG levels high despite rosuvastatin and fenofibrate med use  Previous hgbA1c levels elevated, often in the 7-8% range  Difficult to assess glycemic control without recent BGM or CGM data  Reviewed and interpreted tests that I previously ordered.   Ordered appropriate tests for the endocrinology disease management.    Management options discussed and implemented after shared medical decision making with the patient.  T2DM problem is  chronic-stable    Plan:  Discussed general issues with the diabetes diagnosis and management  We discussed the hgbA1c test which reflects previous overall glucose levels or control  Discussed the importance of blood glucose (BG) testing to assess glucose trends  Provided general overview of the diabetes medication options and medication treatment plan.  We have reviewed lipid test results and the cholesterol, triglyceride lower medication options     Recommend:  Continue the current JanumetXR and Jardiance daily dose routine  Future options include change from Januvia (in Janumet) to a GLP1RA med such as Trulicity or Rybelsus     We have discussed his needle-phobia concerns  Discussed ideal plan to begin glucose testing:  Test FBG at least 1-2x/week, goal target BG  mg/dl  We discussed the value of wearing a CGM sensor, painless attachment to the arm skin for 2-14 days, clinic Libre3 sample option    Consider watching the mSpote3 video, then message me regarding its use  Continue the current lipid medications, goal LDL <100 mg/dl and TG <200 mg/dl    Consider adding Vascepa 2 gm BID for the hypertriglyceridemia  Consider seeing a lipid specialist at [facility], such as Dr. ZAHRA Farr  Keep focus on diet, increased exercise, weight management.  Advise having fasting lipid panel testing and dilated eye examination, at least annually    Keep regular follow-up appointments with PCP  Addressed patient questions today    There are no Patient Instructions on file for this visit.    Future labs ordered today:   Orders Placed This Encounter   Procedures    Hemoglobin A1c     Radiology/Consults ordered today: None    Total time spent on day of encounter:  24 min    Follow-up:  2/2024 or 3/2024    LEO Flores MD, MS  Endocrinology  [facility]    CC:  Care Team

## 2023-10-31 NOTE — Clinical Note
"    10/31/2023         RE: Bertin Mcgill  [address], [place]. \[facility].\"" [id number]        Dear Colleague,    Thank you for referring your patient, Bertin Mcgill, to the [facility] [place]. Please see a copy of my visit note below.    Virtual Visit Details    Type of service:  Video Visit     Originating Location (pt. Location): {video visit patient location:194687::\"Home\"}  {PROVIDER LOCATION On-site should be selected for visits conducted from your clinic location or adjoining [facility] hospital, academic office, or other nearby [facility] building. Off-site should be selected for all other provider locations, including home:[id number]}  Distant Location (provider location):  {virtual location provider:316189}  Platform used for Video Visit: {Virtual Visit Platforms:288996::\"LifeNexus\"}        Recent issues:  Diabetes follow-up evaluation  Taking the JanumetXR and Jardiance medications   Lipid meds changed from NiacinER to fenofibrate + simvastatin, per plan  Reviewed health history and his preappt labs, but no recent glucose data available        2017. Diagnosis of diabetes mellitus when med eval for torn right shoulder muscle  High glucose level noted during med evaluation, hgbA1c near 11.5%  Started Jardiance medication, took for approx 6 months  Recalls significant weight loss of approx 30#  Switched to metformin medication, then dose increases              Concerns about GI symptoms with nausea, nausea, also morning vomiting              Tried Prilosec, then Zantac  Has seen Lele [facility] for medical evaluations  Recent discussion with his workplace team physician, Dr. Skye Mane              Advised to see me for medical evaluation    Previously on metforminXR 500 mg 2 tabs BID     Previous [facility] labs include:               8/12/19. Initial diabetes evaluation with me at [place]  Reviewed health history and diabetes management  Medication change to JanumetXR  1/2022. Addition of " Jardiance     Current DM medications:  JanumetXR 100/1000 1-tab each morning  Jardiance 10 mg  1-tab each morning     Blood glucose (BG) meter              Infrequent testing  CGM use:  Years ago, not recently (phobia?)  Fam Hx Diabetes: None  Recent [facility] labs include:           Lab Results   Component Value Date    A1C 7.7 (H) 06/16/2023     02/10/2023    POTASSIUM 4.4 02/10/2023    CHLORIDE 104 02/10/2023    CO2 28 02/10/2023    ANIONGAP 6 02/10/2023     (H) 02/10/2023    BUN 18 02/10/2023    CR 0.86 02/10/2023    GFRESTIMATED >90 02/10/2023    GFRESTBLACK >90 11/08/2019    FLAVIO 9.9 02/10/2023    CHOL 199 10/20/2023    TRIG 540 (H) 10/20/2023    HDL 45 10/20/2023    LDL  10/20/2023      Comment:      Cannot estimate LDL when triglyceride exceeds 400 mg/dL    LDL 95 10/20/2023    NHDL 154 (H) 10/20/2023    UCRR 61.6 06/28/2023    MICROL <12.0 06/28/2023    UMALCR  06/28/2023      Comment:      Unable to calculate, urine albumin and/or urine creatinine is outside detectable limits.  Microalbuminuria is defined as an albumin:creatinine ratio of 17 to 299 for males and 25 to 299 for females. A ratio of albumin:creatinine of 300 or higher is indicative of overt proteinuria.  Due to biologic variability, positive results should be confirmed by a second, first-morning random or 24-hour timed urine specimen. If there is discrepancy, a third specimen is recommended. When 2 out of 3 results are in the microalbuminuria range, this is evidence for incipient nephropathy and warrants increased efforts at glucose control, blood pressure control, and institution of therapy with an angiotensin-converting-enzyme (ACE) inhibitor (if the patient can tolerate it).       Lab Results   Component Value Date    TSH 1.62 07/25/2022     Last eye exam 5/2022 at [facility], no DR per patient  History of hyperlipidemia, takes simvastatin 20 mg daily   1/2023. Stopped statin med and started NiacinER (Niaspan) 500 mg  "1-tab QPM along with aspirin 1-tab QPM, head sweating symptoms   ~8/2023. Continued the fenofibrate but changed from simvastatin to rosuvastatin medication  DM Complications:      None known          Lives in [facility]  Has seen Lele [facility]   Also sees Dr. Jose/[facility] for general medicine evaluations.      PMH/PSH:  Past Medical History:   Diagnosis Date    Allergic rhinitis     Benign essential hypertension     Hyperlipidemia     Rotator cuff tear, left 2015    Rotator cuff tear, right 2017    Type 2 diabetes mellitus (H)      Past Surgical History:   Procedure Laterality Date    APPENDECTOMY      SHOULDER SURGERY      left and right previously       Family Hx:  No family history on file.      Social Hx:  Social History     Socioeconomic History    Marital status: Single     Spouse name: Not on file    Number of children: Not on file    Years of education: Not on file    Highest education level: Not on file   Occupational History    Not on file   Tobacco Use    Smoking status: Every Day     Packs/day: 1     Types: Cigarettes, Cigars    Smokeless tobacco: Never   Substance and Sexual Activity    Alcohol use: Yes    Drug use: Never    Sexual activity: Not on file   Other Topics Concern    Not on file   Social History Narrative    Single. 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     From [place], [place]. Lives in [place] and works mostly from home.     Enjoys filling time doing \"lazy things\", e.g. binge watching shows and reading.     Also, enjoys yard work and walking around neighborhood. Used to enjoy taking long drives.      Social Determinants of Health     Financial Resource Strain: Not on file   Food Insecurity: Not on file   Transportation Needs: Not on file   Physical Activity: Not on file   Stress: Not on file   Social Connections: Not on file   Interpersonal Safety: Not on file   Housing Stability: Not on file        "   MEDICATIONS:  has a current medication list which includes the following prescription(s): acetaminophen, fenofibrate, hydrochlorothiazide, jardiance, losartan, multiple vitamins-minerals, rosuvastatin, janumet xr, and vitamin d.    ROS: 10 point ROS neg other than the symptoms noted above in the HPI.     GENERAL: energy good, wt stable; denies fevers, chills, night sweats.   HEENT: sinus congestion; no dysphagia, odonophagia, diplopia, neck pain  THYROID:  no apparent hyper or hypothyroid symptoms  CV: no chest pain, pressure, palpitations  LUNGS: no SOB, MEHTA, cough, wheezing   ABDOMEN: episode of rectal bleeding; occasional nausea; denies abdominal pain  EXTREMITIES: no rashes, ulcers, edema  NEUROLOGY: no headaches, denies changes in vision, tingling, extremitiy numbness   MSK: no muscle aches or pains, weakness  SKIN: no rashes or lesions  : denies nocturia  PSYCH:  stable mood, no significant anxiety or depression  ENDOCRINE: no heat or cold intolerance    Physical Exam (visual exam)  VS:  no vital signs taken for video visit  CONSTITUTIONAL: healthy, alert and NAD, well dressed, answering questions appropriately  ENT: no nose swelling or nasal discharge, mouth redness or gum changes.  EYES: eyes grossly normal to inspection, conjunctivae and sclerae normal, no exophthalmos or proptosis  THYROID:  no apparent nodules or goiter  LUNGS: no audible wheeze, cough or visible cyanosis, no visible retractions or increased work of breathing  ABDOMEN: abdomen not evaluated  EXTREMITIES: no hand tremors, limited exam  NEUROLOGY: CN grossly intact, mentation intact and speech normal   SKIN:  no apparent skin lesions, rash, or edema with visualized skin appearance  PSYCH: mentation appears normal, affect normal/bright, judgement and insight intact,   normal speech and appearance well groomed    LABS:     All pertinent notes, labs, and images personally reviewed by me.      A/P:  Encounter Diagnoses   Name Primary?     Type 2 diabetes mellitus without complication, without long-term current use of insulin (H) Yes    Mixed hyperlipidemia        Comments:  Reviewed health history and diabetes issues.  Recent hgbA1c improved but cholesterol and TG levels high despite rosuvastatin and fenofibrate med use  Previous hgbA1c levels elevated, often in the 7-8% range  Difficult to assess glycemic control without recent BGM or CGM data  Reviewed and interpreted tests that I previously ordered.   Ordered appropriate tests for the endocrinology disease management.    Management options discussed and implemented after shared medical decision making with the patient.  T2DM problem is chronic-stable    Plan:  Discussed general issues with the diabetes diagnosis and management  We discussed the hgbA1c test which reflects previous overall glucose levels or control  Discussed the importance of blood glucose (BG) testing to assess glucose trends  Provided general overview of the diabetes medication options and medication treatment plan.  We have reviewed lipid test results and the cholesterol, triglyceride lower medication options     Recommend:  Continue the current JanumetXR and Jardiance daily dose routine  Future options include change from Januvia (in Janumet) to a GLP1RA med such as Trulicity or Rybelsus     We have discussed his needle-phobia concerns  Discussed ideal plan to begin glucose testing:  Test FBG at least 1-2x/week, goal target BG  mg/dl  We discussed the value of wearing a CGM sensor, painless attachment to the arm skin for 2-14 days, clinic Libre3 sample option    Consider watching the [employer] video, then message me regarding its use  Continue the current lipid medications, goal LDL <100 mg/dl and TG <200 mg/dl    Consider adding Vascepa 2 gm BID for the hypertriglyceridemia  Consider seeing a lipid specialist at [facility], such as Dr. ZAHRA Farr  Keep focus on diet, increased exercise, weight  management.  Advise having fasting lipid panel testing and dilated eye examination, at least annually    Keep regular follow-up appointments with PCP  Addressed patient questions today    There are no Patient Instructions on file for this visit.    Future labs ordered today:   Orders Placed This Encounter   Procedures    Hemoglobin A1c     Radiology/Consults ordered today: None    Total time spent on day of encounter:  24 min    Follow-up:  2/2024 or 3/2024    LEO Flores MD, MS  Endocrinology  [facility]    CC:  MILES Jose                                  Virtual Visit Details    Type of service:  Video Visit     Originating Location (pt. Location): Home  Distant Location (provider location):  Off-site  Platform used for Video Visit: Diego        Recent issues:  Diabetes follow-up evaluation  Taking the JanumetXR and Jardiance medications   Reports the lipid meds changed from NiacinER to fenofibrate + rosuvastatin med combo          2017. Diagnosis of diabetes mellitus when med eval for torn right shoulder muscle  High glucose level noted during med evaluation, hgbA1c near 11.5%  Started Jardiance medication, took for approx 6 months  Recalls significant weight loss of approx 30#  Switched to metformin medication, then dose increases              Concerns about GI symptoms with nausea, nausea, also morning vomiting              Tried Prilosec, then Zantac  Has seen [facility]le [facility] for medical evaluations  Recent discussion with his workplace team physician, Dr. Skye Mane              Advised to see me for medical evaluation    Previously on metforminXR 500 mg 2 tabs BID     Previous [facility] labs include:               8/12/19. Initial diabetes evaluation with me at [place]  Reviewed health history and diabetes management  Medication change to JanumetXR  1/2022. Addition of Jardiance     Current DM medications:  JanumetXR 100/1000 1-tab each morning  Jardiance 10 mg  1-tab each morning     Blood  glucose (BG) meter              Infrequent testing  CGM use:  Years ago, not recently (phobia?)  Fam Hx Diabetes: None  Recent [facility] labs include:           Lab Results   Component Value Date    A1C 7.7 (H) 06/16/2023     02/10/2023    POTASSIUM 4.4 02/10/2023    CHLORIDE 104 02/10/2023    CO2 28 02/10/2023    ANIONGAP 6 02/10/2023     (H) 02/10/2023    BUN 18 02/10/2023    CR 0.86 02/10/2023    GFRESTIMATED >90 02/10/2023    GFRESTBLACK >90 11/08/2019    FLAVIO 9.9 02/10/2023    CHOL 199 10/20/2023    TRIG 540 (H) 10/20/2023    HDL 45 10/20/2023    LDL  10/20/2023      Comment:      Cannot estimate LDL when triglyceride exceeds 400 mg/dL    LDL 95 10/20/2023    NHDL 154 (H) 10/20/2023    UCRR 61.6 06/28/2023    MICROL <12.0 06/28/2023    UMALCR  06/28/2023      Comment:      Unable to calculate, urine albumin and/or urine creatinine is outside detectable limits.  Microalbuminuria is defined as an albumin:creatinine ratio of 17 to 299 for males and 25 to 299 for females. A ratio of albumin:creatinine of 300 or higher is indicative of overt proteinuria.  Due to biologic variability, positive results should be confirmed by a second, first-morning random or 24-hour timed urine specimen. If there is discrepancy, a third specimen is recommended. When 2 out of 3 results are in the microalbuminuria range, this is evidence for incipient nephropathy and warrants increased efforts at glucose control, blood pressure control, and institution of therapy with an angiotensin-converting-enzyme (ACE) inhibitor (if the patient can tolerate it).       Lab Results   Component Value Date    TSH 1.62 07/25/2022     Last [facility] exam [facility]/2022 at [facility], no DR per patient  History of hyperlipidemia, takes simvastatin 20 mg daily   1/2023. Stopped statin med and started NiacinER (Niaspan) 500 mg 1-tab QPM along with aspirin 1-tab QPM, head sweating symptoms   ~8/2023. Continued the fenofibrate but changed from  "simvastatin to rosuvastatin medication  DM Complications:      None known          Lives in [facility]  Has seen [facility]   Also sees Dr. Jose/[facility] for general medicine evaluations.      PMH/PSH:  Past Medical History:   Diagnosis Date     Allergic rhinitis      Benign essential hypertension      Hyperlipidemia      Rotator cuff tear, left 2015     Rotator cuff tear, right 2017     Type 2 diabetes mellitus (H)      Past Surgical History:   Procedure Laterality Date     APPENDECTOMY       SHOULDER SURGERY      left and right previously       Family Hx:  No family history on file.      Social Hx:  Social History     Socioeconomic History     Marital status: Single     Spouse name: Not on file     Number of children: Not on file     Years of education: Not on file     Highest education level: Not on file   Occupational History     Not on file   Tobacco Use     Smoking status: Every Day     Packs/day: 1     Types: Cigarettes, Cigars     Smokeless tobacco: Never   Substance and Sexual Activity     Alcohol use: Yes     Drug use: Never     Sexual activity: Not on file   Other Topics Concern     Not on file   Social History Narrative    Single. 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     From [place], [place]. Lives in [place] and works mostly from home.     Enjoys filling time doing \"lazy things\", e.g. binge watching shows and reading.     Also, enjoys yard work and walking around neighborhood. Used to enjoy taking long drives.      Social Determinants of Health     Financial Resource Strain: Not on file   Food Insecurity: Not on file   Transportation Needs: Not on file   Physical Activity: Not on file   Stress: Not on file   Social Connections: Not on file   Interpersonal Safety: Not on file   Housing Stability: Not on file          MEDICATIONS:  has a current medication list which includes the following prescription(s): " acetaminophen, fenofibrate, hydrochlorothiazide, jardiance, losartan, multiple vitamins-minerals, rosuvastatin, janumet xr, and vitamin d.    ROS: 10 point ROS neg other than the symptoms noted above in the HPI.     GENERAL: energy good, wt stable; denies fevers, chills, night sweats.   HEENT: sinus congestion; no dysphagia, odonophagia, diplopia, neck pain  THYROID:  no apparent hyper or hypothyroid symptoms  CV: no chest pain, pressure, palpitations  LUNGS: no SOB, MEHTA, cough, wheezing   ABDOMEN: episode of rectal bleeding; occasional nausea; denies abdominal pain  EXTREMITIES: no rashes, ulcers, edema  NEUROLOGY: no headaches, denies changes in vision, tingling, extremitiy numbness   MSK: no muscle aches or pains, weakness  SKIN: no rashes or lesions  : denies nocturia  PSYCH:  stable mood, no significant anxiety or depression  ENDOCRINE: no heat or cold intolerance    Physical Exam (visual exam)  VS:  no vital signs taken for video visit  CONSTITUTIONAL: healthy, alert and NAD, well dressed, answering questions appropriately  ENT: no nose swelling or nasal discharge, mouth redness or gum changes.  EYES: eyes grossly normal to inspection, conjunctivae and sclerae normal, no exophthalmos or proptosis  THYROID:  no apparent nodules or goiter  LUNGS: no audible wheeze, cough or visible cyanosis, no visible retractions or increased work of breathing  ABDOMEN: abdomen not evaluated  EXTREMITIES: no hand tremors, limited exam  NEUROLOGY: CN grossly intact, mentation intact and speech normal   SKIN:  no apparent skin lesions, rash, or edema with visualized skin appearance  PSYCH: mentation appears normal, affect normal/bright, judgement and insight intact,   normal speech and appearance well groomed    LABS:     All pertinent notes, labs, and images personally reviewed by me.      A/P:  Encounter Diagnoses   Name Primary?     Type 2 diabetes mellitus without complication, without long-term current use of insulin (H)  Yes     Mixed hyperlipidemia        Comments:  Reviewed health history and diabetes issues.  Recent hgbA1c improved but cholesterol and TG levels high despite rosuvastatin and fenofibrate med use  Previous hgbA1c levels elevated, often in the 7-8% range  Difficult to assess glycemic control without recent BGM or CGM data  Reviewed and interpreted tests that I previously ordered.   Ordered appropriate tests for the endocrinology disease management.    Management options discussed and implemented after shared medical decision making with the patient.  T2DM problem is chronic-stable    Plan:  Discussed general issues with the diabetes diagnosis and management  We discussed the hgbA1c test which reflects previous overall glucose levels or control  Discussed the importance of blood glucose (BG) testing to assess glucose trends  Provided general overview of the diabetes medication options and medication treatment plan.  We have reviewed lipid test results and the cholesterol, triglyceride lower medication options     Recommend:  Continue the current JanumetXR and Jardiance daily dose routine  Future options include change from Januvia (in Janumet) to a GLP1RA med such as Trulicity or Rybelsus     We have discussed his needle-phobia concerns  Discussed ideal plan to begin glucose testing:  Test FBG at least 1-2x/week, goal target BG  mg/dl  We discussed the value of wearing a CGM sensor, painless attachment to the arm skin for 2-14 days, clinic Libre3 sample option    Consider watching the [employer] Libre3 video, then message me regarding its use  Continue the current lipid medications, goal LDL <100 mg/dl and TG <200 mg/dl    Consider adding Vascepa 2 gm BID for the hypertriglyceridemia  Consider seeing a lipid specialist at [facility], such as Dr. ZAHRA Farr  Keep focus on diet, increased exercise, weight management.  Advise having fasting lipid panel testing and dilated eye examination, at least  annually    Keep regular follow-up appointments with PCP  Addressed patient questions today    There are no Patient Instructions on file for this visit.    Future labs ordered today:   Orders Placed This Encounter   Procedures     Hemoglobin A1c     Radiology/Consults ordered today: None    Total time spent on day of encounter:  24 min    Follow-up:  2/2024 or 3/2024    LEO Flores MD, MS  Endocrinology  [facility]    CC:  MILES Jose                                    Again, thank you for allowing me to participate in the care of your patient.        Sincerely,        Anival Flores MD

## 2023-11-08 ENCOUNTER — LAB (OUTPATIENT)
Dept: LAB | Facility: CLINIC | Age: 43
End: 2023-11-08
Payer: COMMERCIAL

## 2023-11-08 DIAGNOSIS — E11.9 TYPE 2 DIABETES MELLITUS WITHOUT COMPLICATION, WITHOUT LONG-TERM CURRENT USE OF INSULIN (H): ICD-10-CM

## 2023-11-08 LAB — HBA1C MFR BLD: 7.9 % (ref 0–5.6)

## 2023-11-08 PROCEDURE — 36415 COLL VENOUS BLD VENIPUNCTURE: CPT

## 2023-11-08 PROCEDURE — 83036 HEMOGLOBIN GLYCOSYLATED A1C: CPT

## 2023-11-09 ASSESSMENT — ANXIETY QUESTIONNAIRES
3. WORRYING TOO MUCH ABOUT DIFFERENT THINGS: MORE THAN HALF THE DAYS
7. FEELING AFRAID AS IF SOMETHING AWFUL MIGHT HAPPEN: SEVERAL DAYS
5. BEING SO RESTLESS THAT IT IS HARD TO SIT STILL: MORE THAN HALF THE DAYS
1. FEELING NERVOUS, ANXIOUS, OR ON EDGE: MORE THAN HALF THE DAYS
IF YOU CHECKED OFF ANY PROBLEMS ON THIS QUESTIONNAIRE, HOW DIFFICULT HAVE THESE PROBLEMS MADE IT FOR YOU TO DO YOUR WORK, TAKE CARE OF THINGS AT HOME, OR GET ALONG WITH OTHER PEOPLE: SOMEWHAT DIFFICULT
2. NOT BEING ABLE TO STOP OR CONTROL WORRYING: SEVERAL DAYS
6. BECOMING EASILY ANNOYED OR IRRITABLE: MORE THAN HALF THE DAYS
GAD7 TOTAL SCORE: 12
GAD7 TOTAL SCORE: 12
4. TROUBLE RELAXING: MORE THAN HALF THE DAYS

## 2023-11-10 ENCOUNTER — VIRTUAL VISIT (OUTPATIENT)
Dept: BEHAVIORAL HEALTH | Facility: CLINIC | Age: 43
End: 2023-11-10
Payer: COMMERCIAL

## 2023-11-10 DIAGNOSIS — F41.1 GAD (GENERALIZED ANXIETY DISORDER): Primary | ICD-10-CM

## 2023-11-10 NOTE — PROGRESS NOTES
[facility]  November 10, 2023    Behavioral Health Clinician Progress Note    Patient Name: Bertin Mcgill           Service Type:  Individual        Service Location:  telehealth     Session Start Time: 3:31 pm Session End Time: 4:25 pm      Session Length: 53 - 60      Attendees: Patient       Visit Activities (Refresh list every visit): [facility] Only     Telemedicine Visit: The patient's condition can be safely assessed and treated via synchronous audio and visual telemedicine encounter.      Reason for Telemedicine Visit: Patient has requested telehealth visit    Originating Site (Patient Location): Patient's home      Distant Location (provider location):  On-site    Consent:  The patient/guardian has verbally consented to: the potential risks and benefits of telemedicine (video visit) versus in person care; bill my insurance or make self-payment for services provided; and responsibility for payment of non-covered services.     Mode of Communication:  Video Conference via [employer]    As the provider I attest to compliance with applicable laws and regulations related to telemedicine.      Diagnostic Assessment Date: 4/14/23  Treatment Plan Review Date: 2/10/23  CGI Review Date:  2/10/23  Promis 10 Review Date: 12/12/23    Clinical Global Impressions  First:  Considering your total clinical experience with this particular patient population, how severe are the patient's symptoms at this time?: 4 (7/27/2023  5:04 PM)    Most recent:  Compared to the patient's condition at the START of treatment, this patient's condition is: 3 (7/27/2023  5:04 PM)      See Flowsheets for today's PHQ-9 and NATHALIE-7 results  Previous PHQ-9:       9/11/2023     3:39 PM 10/13/2023     1:25 PM 11/9/2023     1:24 PM   PHQ-9 SCORE   PHQ-9 Total Score MyChart 14 (Moderate depression)  12 (Moderate depression)   PHQ-9 Total Score 14 12 12     Previous NATHALIE-7:       9/11/2023     3:40 PM 10/13/2023     1:25 PM 11/9/2023      "1:25 PM   NATHALIE-7 SCORE   Total Score 12 (moderate anxiety)  12 (moderate anxiety)   Total Score 12 10 12       KRISTINE LEVEL:       No data to display                DATA  Extended Session (60+ minutes): No  Interactive Complexity: No  Crisis: No  [facility] Patient: No    Treatment Objective(s) Addressed in This Session:  Target Behavior(s):  increased social activity/decreased isolation    Anxiety: will experience a reduction in anxiety, will develop more effective coping skills to manage anxiety symptoms, will develop healthy cognitive patterns and beliefs and will increase ability to function adaptively    Current Stressors / Issues:  Lj presented with anxious and tired mood, stating, \"there have been ups and downs...family stuff\".  Lj reported father's behaviors have resulted in Lj and his Mother becoming more frustrated with him.  Lj reported his mother may have reached her limit with Lj's father and asked if she could potentially stay with Lj for a little while.  Lj is still communicating with father, while also supporting his mother.  Lj expressed anxiety, stating family stress will only increase as the holidays approach, not only involving interactions with parents, but also sister and extended family.  Lj reported no changes at work, he continues to work from home, continues to have days that are more stressful, and is not able to consider working in the office.   [facility] used MI interventions to focus on use of strategies and progress toward options to reduce stress.  Lj reported decreased exercise due to recent foot injury, but is planning on returning to exercise this weekend.  Lj reported he also reached out to anxiety therapist, but they have a waitlist with potential openings in January.  Lj reported he has not yet reached out to second choice therapist.  [facility] encouraged Lj to get on waitlist while exploring other therapy options.  Lj stated he will continue to follow up on securing appt " "with anxiety therapist and \"Go in with an open mind \".  [facility] attempted to address barriers and limited progress to securing appt with anxiety therapist, Lj expressed ambivalence stating he was taking was \"baby steps\", but also explaining its a slow process because he has a lot on his plate right now.  Lj reported he is still planning on attending [facility] appt in December.         Progress on Treatment Objective(s) / Homework:  No improvement - PREPARATION (Decided to change - considering how); Intervened by negotiating a change plan and determining options / strategies for behavior change, identifying triggers, exploring social supports, and working towards setting a date to begin behavior change    Motivational Interviewing    MI Intervention: Co-Developed Goal: reduce anxiety, Expressed Empathy/Understanding, Supported Autonomy, Collaboration, Evocation, Open-ended questions, Reflections: simple and complex and Change talk (evoked)     Change Talk Expressed by the Patient: Reasons to change    Provider Response to Change Talk: E - Evoked more info from patient about behavior change and R - Reflected patient's change talk      Solution-Focused Therapy  Explore patterns in patient's behaviors and relationships and discuss options for new behaviors  Explore new options for problem-solving, communication, managing stress, etc.    Psycho-education regarding mental health diagnoses and treatment options    Care Plan review completed: Yes    Medication Review:  No current psychiatric medications prescribed    Medication Compliance:  NA    Changes in Health Issues:    None reported    Chemical Use Review:   Substance Use: Problem use continues with no change since last session, Stage of Change: Contemplation        Tobacco Use: No change in amount of tobacco use since last session.  Contemplation    Assessment: Current Emotional / Mental Status (status of significant symptoms):  Risk status (Self / Other harm or suicidal " ideation)  Patient has had a history of suicidal ideation: passive SI in 2003; none since  Patient denies current fears or concerns for personal safety.  Patient denies current or recent suicidal ideation or behaviors.  Patient denies current or recent homicidal ideation or behaviors.  Patient denies current or recent self injurious behavior or ideation.  Patient denies other safety concerns.  A safety and risk management plan has not been developed at this time, however patient was encouraged to call [place] [phone] should there be a change in any of these risk factors.    Appearance:   Appropriate   Eye Contact:   Good   Psychomotor Behavior: Normal   Attitude:   Cooperative   Orientation:   All  Speech   Rate / Production: Normal/ Responsive   Volume:  Normal   Mood:    Anxious  tired  Affect:    Worrisome   Thought Content:  Clear    Thought Form:  Coherent  Logical   Insight:    Good     Diagnoses:  1. NATHALIE (generalized anxiety disorder)          Collateral Reports Completed:  Routed note to PCP    Plan: (Homework, other):  Patient was given information about behavioral services and encouraged to schedule a follow up appointment with the clinic [facility] in 1 month.  He was also given information about mental health symptoms and treatment options  and strategies to more effectively manage anxiety .  CD Recommendations:  reduce alcohol use .   Shawn Ullrich LIC, [facility]      ______________________________________________________________________    Integrated Primary Care Behavioral Health Treatment Plan    Patient's Name: Bertin Mcgill  YOB: 1980    Date of Creation: 8/22/23  Date Treatment Plan Last Reviewed/Revised:11/10/23    DSM5 Diagnoses:   F41.1 Generalized Anxiety Disorder.  F33.1 Major Depressive Disorder, Recurrent Episode, Moderate With anxious distress.   F10.10  Alcohol Use Disorder, Mild  Psychosocial / Contextual Factors: Welsh-American, Single, Male, heterosexual., adopted,  employed, works from home  PROMIS (reviewed every 90 days): pt completed on 5/5/23    Referral / Collaboration:  The following referral(s) will be initiated: Psychiatric Medication Evaluation and Anxiety Specific Psychotherapy Services ;    Anticipated number of session for this episode of care: 6  Anticipation frequency of session: Every other week  Anticipated Duration of each session: 38-52 minutes  Treatment plan will be reviewed in 90 days or when goals have been changed.       MeasurableTreatment Goal(s) related to diagnosis / functional impairment(s)   Goal 1: Patient will reduce anxiety by engaging in Anxiety Specific Psychotherapy Services and complete psychiatric medication evaluation        Objective #A (Patient Action)    Patient will  schedule services with anxiety specific psychotherapy and psychiatric medication evaluation services  Status: New - Date: 8/22/23      Intervention(s)  [facility] will make referrals to outpatient therapist and psychiatry.        Goal 2: Patient will Patient will reduce anxiety and improve mood making healthy lifestyle choices (exercise, diet, sleep)      Objective #A (Patient Action)    Patient will  exercise at least 4 days per week .  Status: Continued - Date(s): 5/5/23     Intervention(s)  Therapist will assign homework to monitor level of exercise and mood on a daily basis    Objective #C  Patient will  increase number of meals cooked at home (decrease take out) .   Status: New - Date: 5/5/23    Intervention(s)  Therapist will  assign homework to track food .           Patient has reviewed and agreed to the above plan.      Shawn G. Ullrich, [facility]  11/10/23

## 2023-11-21 ENCOUNTER — OFFICE VISIT (OUTPATIENT)
Dept: CARDIOLOGY | Facility: CLINIC | Age: 43
End: 2023-11-21
Attending: FAMILY MEDICINE
Payer: COMMERCIAL

## 2023-11-21 VITALS
SYSTOLIC BLOOD PRESSURE: 124 MMHG | OXYGEN SATURATION: 100 % | HEART RATE: 91 BPM | DIASTOLIC BLOOD PRESSURE: 88 MMHG | BODY MASS INDEX: 29.7 KG/M2 | WEIGHT: 184 LBS

## 2023-11-21 DIAGNOSIS — Z78.9 ALCOHOL USE: ICD-10-CM

## 2023-11-21 DIAGNOSIS — F17.200 CURRENT SMOKER: ICD-10-CM

## 2023-11-21 DIAGNOSIS — E78.2 MIXED HYPERLIPIDEMIA: Primary | ICD-10-CM

## 2023-11-21 DIAGNOSIS — E11.9 TYPE 2 DIABETES, HBA1C GOAL < 7% (H): ICD-10-CM

## 2023-11-21 PROCEDURE — 99215 OFFICE O/P EST HI 40 MIN: CPT | Performed by: INTERNAL MEDICINE

## 2023-11-21 NOTE — LETTER
11/21/2023      RE: Bertin Mcgill  [address] [place] [facility] [address]       Dear Colleague,    Thank you for the opportunity to participate in the care of your patient, Bertin Mcgill, at the [facility]. Please see a copy of my visit note below.                                                                                             General Cardiology Clinic-[place]      Referring provider:Miguel Jose MD     HPI: Mr. Bertin Mcgill is a 43 year old  male with PMH significant for  -Hypertension  -Hyperlipidemia  -Current smoker 1 PPD  -Everyday alcohol use 2-3 alcoholic beverages per night  -Diabetes type 2 not well controlled    Patient was seen in cardiology clinic in 2021 for palpitations.  Given occasional intermittent symptoms he was referred to sleep clinic.  He was diagnosed with sleep apnea.  He uses a CPAP machine.  Otherwise patient is asymptomatic from cardiac standpoint.  Denies chest pain, shortness of breath, dizziness, palpitations or syncope.  He exercises on treadmill and elliptical machine 3-4 times a week.    I am seeing patient today for hyperlipidemia.  He is currently on fenofibrate 145 mg every day and Crestor 10 mg.    Most recent lipid profile on 10/20/2023 shows triglycerides 540, total cholesterol 199, HDL 45.  LDL is 95.    Diabetes type 2 is not well controlled.  Most recent hemoglobin A1c is 7.9.  He is a current smoker.  He has been drinking alcohol since his college years.    Medications, personal, family, and social history reviewed with patient and revised.    PAST MEDICAL HISTORY:  Past Medical History:   Diagnosis Date     Allergic rhinitis      Benign essential hypertension      Hyperlipidemia      Rotator cuff tear, left 2015     Rotator cuff tear, right 2017     Type 2 diabetes mellitus (H)        CURRENT MEDICATIONS:  Current Outpatient Medications   Medication  Sig Dispense Refill     Acetaminophen (TYLENOL EXTRA STRENGTH PO)        fenofibrate (TRICOR) 145 MG tablet TAKE 1 TABLET BY MOUTH EVERY DAY 90 tablet 1     hydrochlorothiazide (HYDRODIURIL) 12.5 MG tablet TAKE 1 TABLET BY MOUTH EVERY DAY 90 tablet 2     JARDIANCE 10 MG TABS tablet TAKE 1 TABLET BY MOUTH EVERY DAY 90 tablet 1     losartan (COZAAR) 50 MG tablet TAKE 1 TABLET BY MOUTH EVERY DAY 90 tablet 1     Multiple Vitamins-Minerals (CENTRUM MEN PO) One a day Mens       rosuvastatin (CRESTOR) 10 MG tablet TAKE 1 TABLET (10 MG) BY MOUTH DAILY. 30 tablet 1     SitaGLIPtin-MetFORMIN HCl (JANUMET XR) 100-1000 MG TB24 Take 1 tablet by mouth daily 90 tablet 0     VITAMIN D PO Take 2,000 Units by mouth         PAST SURGICAL HISTORY:  Past Surgical History:   Procedure Laterality Date     APPENDECTOMY       SHOULDER SURGERY      left and right previously       ALLERGIES:     Allergies   Allergen Reactions     Seasonal Allergies        FAMILY HISTORY:  No family history on file.      SOCIAL HISTORY:  Social History     Tobacco Use     Smoking status: Every Day     Packs/day: 1     Types: Cigarettes, Cigars     Smokeless tobacco: Never   Substance Use Topics     Alcohol use: Yes     Drug use: Never       ROS:   Constitutional: No fever, chills, or sweats. Weight stable.   Cardiovascular: As per HPI.       Exam:  /88 (BP Location: Left arm, Patient Position: Chair, Cuff Size: Adult Large)   Pulse 91   Wt 83.5 kg (184 lb)   SpO2 100%   BMI 29.70 kg/m    GENERAL APPEARANCE: alert and no distress  HEENT: no icterus, no central cyanosis  LYMPH/NECK: no adenopathy, no asymmetry, JVP not elevated, no carotid bruits.  RESPIRATORY: lungs clear to auscultation - no rales, rhonchi or wheezes, no use of accessory muscles, no retractions, respirations are unlabored, normal respiratory rate  CARDIOVASCULAR: regular rhythm, normal S1, S2, no S3 or S4 and no murmur, click or rub, precordium quiet with normal PMI.  GI: soft, non  "tender  EXTREMITIES: peripheral pulses normal, no edema  NEURO: alert, normal speech,and affect  SKIN: no ecchymoses, no rashes     I have reviewed the labs and personally reviewed the imaging below and made my comment in the assessment and plan.    Labs:  CBC RESULTS:   No results found for: \"WBC\", \"RBC\", \"HGB\", \"HCT\", \"MCV\", \"MCH\", \"MCHC\", \"RDW\", \"PLT\"    BMP RESULTS:  Lab Results   Component Value Date     02/10/2023    .0 06/29/2020    POTASSIUM 4.4 02/10/2023    POTASSIUM 4.7 06/29/2020    CHLORIDE 104 02/10/2023    CHLORIDE 103.0 06/29/2020    CO2 28 02/10/2023    CO2 28.0 06/29/2020    ANIONGAP 6 02/10/2023    ANIONGAP 9 11/08/2019     (H) 02/10/2023    .0 (H) 06/29/2020    BUN 18 02/10/2023    BUN 12.0 06/29/2020    CR 0.86 02/10/2023    CR 0.9 06/29/2020    GFRESTIMATED >90 02/10/2023    GFRESTIMATED >90 11/08/2019    GFRESTBLACK >90 11/08/2019    FLAVIO 9.9 02/10/2023    FLAVIO 9.8 06/29/2020      Recent Labs   Lab Test 10/20/23  0926 06/16/23  0912 04/06/22  1205 06/29/20  1454   CHOL 199 234*   < > 204.0*   HDL 45 48   < > 67.0   LDL 95 126*   < > 69.0   TRIG 540* 618*   < > 343.0*   CHOLHDLRATIO  --   --   --  3.1    < > = values in this interval not displayed.      EKG 10/14/2021 shows sinus rhythm otherwise normal.        Echocardiogram  No results found for this or any previous visit (from the past 8760 hour(s)).    Assessment and Plan:     #Hyperlipidemia (mainly hypertriglyceridemia)  #Alcohol abuse  -Patient is asymptomatic from cardiac standpoint.  He has no history of cardiac/cardiovascular disease.  I discussed with the patient the future implications of multiple cardiovascular risk factors including obesity, hypertension, diabetes and current smoking status.  Patient's diabetes is not well controlled.  Currently working with endocrinology to improve diabetes control with different types of medications.  I think once his diabetes is well controlled triglycerides will " improve.  In the meantime I recommend CT calcium screening which will help results to decide about the Crestor dose.  The patient is agreeable.  -Continue Crestor 10 mg and fenofibrate with no change  -Do not recommend (fish oil/Vascepa) for recent documented association with atrial fibrillation.  -Counseled to stop drinking alcohol which certainly affects triglyceride levels  -Recheck lipid level in 6 months    #Diabetes type 2  -Patient follows with endocrinology.   -Currently not well controlled    #Current smoker  -Counseled to stop smoking    #Hypertension  -Well-controlled with losartan 50 and hydrochlorothiazide 12.5 mg.    Return to clinic to be determined.    Total time spent today for this visit is 64 minutes including precharting, face-to-face clinic visit, review of labs/imaging and medical documentation.    Usman PIPER MD  [facility] Division of Cardiology  Pager [phone]       Please do not hesitate to contact me if you have any questions/concerns.     Sincerely,     Usman Piper MD

## 2023-11-21 NOTE — PROGRESS NOTES
General Cardiology Clinic-[place]      Referring provider:Miguel Jose MD     HPI: Mr. Bertin Mcgill is a 43 year old  male with PMH significant for  -Hypertension  -Hyperlipidemia  -Current smoker 1 PPD  -Everyday alcohol use 2-3 alcoholic beverages per night  -Diabetes type 2 not well controlled    Patient was seen in cardiology clinic in 2021 for palpitations.  Given occasional intermittent symptoms he was referred to sleep clinic.  He was diagnosed with sleep apnea.  He uses a CPAP machine.  Otherwise patient is asymptomatic from cardiac standpoint.  Denies chest pain, shortness of breath, dizziness, palpitations or syncope.  He exercises on treadmill and elliptical machine 3-4 times a week.    I am seeing patient today for hyperlipidemia.  He is currently on fenofibrate 145 mg every day and Crestor 10 mg.    Most recent lipid profile on 10/20/2023 shows triglycerides 540, total cholesterol 199, HDL 45.  LDL is 95.    Diabetes type 2 is not well controlled.  Most recent hemoglobin A1c is 7.9.  He is a current smoker.  He has been drinking alcohol since his college years.    Medications, personal, family, and social history reviewed with patient and revised.    PAST MEDICAL HISTORY:  Past Medical History:   Diagnosis Date    Allergic rhinitis     Benign essential hypertension     Hyperlipidemia     Rotator cuff tear, left 2015    Rotator cuff tear, right 2017    Type 2 diabetes mellitus (H)        CURRENT MEDICATIONS:  Current Outpatient Medications   Medication Sig Dispense Refill    Acetaminophen (TYLENOL EXTRA STRENGTH PO)       fenofibrate (TRICOR) 145 MG tablet TAKE 1 TABLET BY MOUTH EVERY DAY 90 tablet 1    hydrochlorothiazide (HYDRODIURIL) 12.5 MG tablet TAKE 1 TABLET BY MOUTH EVERY DAY 90 tablet 2    JARDIANCE 10 MG TABS tablet TAKE 1 TABLET BY MOUTH EVERY DAY 90 tablet 1    losartan (COZAAR) 50 MG tablet  "TAKE 1 TABLET BY MOUTH EVERY DAY 90 tablet 1    Multiple Vitamins-Minerals (CENTRUM MEN PO) One a day Mens      rosuvastatin (CRESTOR) 10 MG tablet TAKE 1 TABLET (10 MG) BY MOUTH DAILY. 30 tablet 1    SitaGLIPtin-MetFORMIN HCl (JANUMET XR) 100-1000 MG TB24 Take 1 tablet by mouth daily 90 tablet 0    VITAMIN D PO Take 2,000 Units by mouth         PAST SURGICAL HISTORY:  Past Surgical History:   Procedure Laterality Date    APPENDECTOMY      SHOULDER SURGERY      left and right previously       ALLERGIES:     Allergies   Allergen Reactions    Seasonal Allergies        FAMILY HISTORY:  No family history on file.      SOCIAL HISTORY:  Social History     Tobacco Use    Smoking status: Every Day     Packs/day: 1     Types: Cigarettes, Cigars    Smokeless tobacco: Never   Substance Use Topics    Alcohol use: Yes    Drug use: Never       ROS:   Constitutional: No fever, chills, or sweats. Weight stable.   Cardiovascular: As per HPI.       Exam:  /88 (BP Location: Left arm, Patient Position: Chair, Cuff Size: Adult Large)   Pulse 91   Wt 83.5 kg (184 lb)   SpO2 100%   BMI 29.70 kg/m    GENERAL APPEARANCE: alert and no distress  HEENT: no icterus, no central cyanosis  LYMPH/NECK: no adenopathy, no asymmetry, JVP not elevated, no carotid bruits.  RESPIRATORY: lungs clear to auscultation - no rales, rhonchi or wheezes, no use of accessory muscles, no retractions, respirations are unlabored, normal respiratory rate  CARDIOVASCULAR: regular rhythm, normal S1, S2, no S3 or S4 and no murmur, click or rub, precordium quiet with normal PMI.  GI: soft, non tender  EXTREMITIES: peripheral pulses normal, no edema  NEURO: alert, normal speech,and affect  SKIN: no ecchymoses, no rashes     I have reviewed the labs and personally reviewed the imaging below and made my comment in the assessment and plan.    Labs:  CBC RESULTS:   No results found for: \"WBC\", \"RBC\", \"HGB\", \"HCT\", \"MCV\", \"MCH\", \"MCHC\", \"RDW\", \"PLT\"    BMP " RESULTS:  Lab Results   Component Value Date     02/10/2023    .0 06/29/2020    POTASSIUM 4.4 02/10/2023    POTASSIUM 4.7 06/29/2020    CHLORIDE 104 02/10/2023    CHLORIDE 103.0 06/29/2020    CO2 28 02/10/2023    CO2 28.0 06/29/2020    ANIONGAP 6 02/10/2023    ANIONGAP 9 11/08/2019     (H) 02/10/2023    .0 (H) 06/29/2020    BUN 18 02/10/2023    BUN 12.0 06/29/2020    CR 0.86 02/10/2023    CR 0.9 06/29/2020    GFRESTIMATED >90 02/10/2023    GFRESTIMATED >90 11/08/2019    GFRESTBLACK >90 11/08/2019    FLAVIO 9.9 02/10/2023    FLAVIO 9.8 06/29/2020      Recent Labs   Lab Test 10/20/23  0926 06/16/23  0912 04/06/22  1205 06/29/20  1454   CHOL 199 234*   < > 204.0*   HDL 45 48   < > 67.0   LDL 95 126*   < > 69.0   TRIG 540* 618*   < > 343.0*   CHOLHDLRATIO  --   --   --  3.1    < > = values in this interval not displayed.      EKG 10/14/2021 shows sinus rhythm otherwise normal.        Echocardiogram  No results found for this or any previous visit (from the past 8760 hour(s)).    Assessment and Plan:     #Hyperlipidemia (mainly hypertriglyceridemia)  #Alcohol abuse  -Patient is asymptomatic from cardiac standpoint.  He has no history of cardiac/cardiovascular disease.  I discussed with the patient the future implications of multiple cardiovascular risk factors including obesity, hypertension, diabetes and current smoking status.  Patient's diabetes is not well controlled.  Currently working with endocrinology to improve diabetes control with different types of medications.  I think once his diabetes is well controlled triglycerides will improve.  In the meantime I recommend CT calcium screening which will help results to decide about the Crestor dose.  The patient is agreeable.  -Continue Crestor 10 mg and fenofibrate with no change  -Do not recommend (fish oil/Vascepa) for recent documented association with atrial fibrillation.  -Counseled to stop drinking alcohol which certainly affects triglyceride  levels  -Recheck lipid level in 6 months    #Diabetes type 2  -Patient follows with endocrinology.   -Currently not well controlled    #Current smoker  -Counseled to stop smoking    #Hypertension  -Well-controlled with losartan 50 and hydrochlorothiazide 12.5 mg.    Return to clinic to be determined.    Total time spent today for this visit is 64 minutes including precharting, face-to-face clinic visit, review of labs/imaging and medical documentation.    Usman PIPER MD  [facility] Division of Cardiology  Pager [phone]

## 2023-11-21 NOTE — PATIENT INSTRUCTIONS
Thank you for coming to the [facility] @ [facility]; please note the following instructions:    1. CT Calcium Score Exam. This exam is $105 out of pocket and insurance may cover the exam. Please call your insurance prior to your exam and let the  know if the insurance will be paying or not. If your insurance is covering it, and you have already paid the $105, you will be reimbursed. Please do not consume any caffeine 8 hours prior to your test. Locations for this exam include [place], [facility], or [place]. Please call [phone] to schedule.     2. Recommending to stop alcohol and smoking    3. Fasting lipids in 6 months.10-12 hour fast    4. Cardiology follow up as needed        If you have any questions regarding your visit please contact your care team:     Cardiology  Telephone Number   Odalis TRIVEDI, RN  Gini METZ, RN  Jasmine RIVERA, RN  Zulay CHICAS, RMA  Kourtney SWEENEY, RMA  Hawa LUCAS, Visit Facilitator  Skye MODI [facility] [phone] (option 1)   For scheduling appts:     [phone] (select option 1)       For the Device Clinic (Pacemakers and ICD's)  RN's :  Alycia Spencer   During business hours: [phone]    *After business hours:  [phone] (select option 4)      Normal test result notifications will be released via [employer] or mailed within 7 business days.  All other test results, will be communicated via telephone once reviewed by your cardiologist.    If you need a medication refill please contact your pharmacy.  Please allow 3 business days for your refill to be completed.    As always, thank you for trusting us with your health care needs!

## 2023-12-01 ENCOUNTER — HOSPITAL ENCOUNTER (OUTPATIENT)
Dept: CT IMAGING | Facility: CLINIC | Age: 43
Discharge: HOME | End: 2023-12-01
Attending: INTERNAL MEDICINE | Admitting: INTERNAL MEDICINE
Payer: COMMERCIAL

## 2023-12-01 DIAGNOSIS — E78.2 MIXED HYPERLIPIDEMIA: ICD-10-CM

## 2023-12-01 PROCEDURE — 75571 CT HRT W/O DYE W/CA TEST: CPT | Mod: 26 | Performed by: INTERNAL MEDICINE

## 2023-12-01 PROCEDURE — 75571 CT HRT W/O DYE W/CA TEST: CPT

## 2023-12-01 RX ORDER — ROSUVASTATIN CALCIUM 10 MG/1
10 TABLET, COATED ORAL DAILY
Qty: 90 TABLET | Refills: 0 | Status: SHIPPED | OUTPATIENT
Start: 2023-12-01 | End: 2023-12-11

## 2023-12-01 NOTE — TELEPHONE ENCOUNTER
Medication requested: rosuvastatin (CRESTOR) 10 MG tablet   Last office visit: 6/28/23  [facility] appointments: none  Medication last refilled: 10/3/23; 30 + 1 refill  Last qualifying labs:   Component      Latest Ref Rng 10/20/2023  9:26 AM   Cholesterol      <200 mg/dL 199    Triglycerides      <150 mg/dL 540 (H)    HDL Cholesterol      >=40 mg/dL 45    LDL Cholesterol Calculated --    Non HDL Cholesterol      <130 mg/dL 154 (H)      Component      Latest Ref Rng 10/20/2023  9:26 AM   LDL Cholesterol Direct      <100 mg/dL 95      Pt had CT coronary artery calcium screening today so results may cause change in tx.    Bill Mendoza - RACHEL, RN  12/01/23 10:50 AM

## 2023-12-04 DIAGNOSIS — I10 BENIGN ESSENTIAL HYPERTENSION: ICD-10-CM

## 2023-12-05 RX ORDER — LOSARTAN POTASSIUM 50 MG/1
TABLET ORAL
Qty: 30 TABLET | Refills: 0 | Status: SHIPPED | OUTPATIENT
Start: 2023-12-05 | End: 2023-12-18

## 2023-12-05 NOTE — TELEPHONE ENCOUNTER
Medication requested: losartan (COZAAR) 50 MG tablet   Last office visit: 6/28/23  [facility] appointments: none  Medication last refilled: 6/20/23; 90 + 1 refill  Last qualifying labs:   BP Readings from Last 3 Encounters:   11/21/23 124/88   06/28/23 119/84   10/26/22 136/89     Component      Latest Ref Rng 2/10/2023  9:11 AM   Potassium      3.4 - 5.3 mmol/L 4.4      Component      Latest Ref Rng 2/10/2023  9:11 AM   Creatinine      0.66 - 1.25 mg/dL 0.86      Medication is being filled for 1 time refill only due to:  Patient needs to be seen because due for follow-up appt as advised by Dr. Jose.    Message sent to pt to schedule appt.    Bill RAMOS, RN  12/05/23 9:02 AM

## 2023-12-06 DIAGNOSIS — E11.9 TYPE 2 DIABETES MELLITUS WITHOUT COMPLICATION, WITHOUT LONG-TERM CURRENT USE OF INSULIN (H): ICD-10-CM

## 2023-12-07 ENCOUNTER — MYC MEDICAL ADVICE (OUTPATIENT)
Dept: CARDIOLOGY | Facility: CLINIC | Age: 43
End: 2023-12-07
Payer: COMMERCIAL

## 2023-12-07 RX ORDER — SITAGLIPTIN AND METFORMIN HYDROCHLORIDE 1000; 100 MG/1; MG/1
1 TABLET, FILM COATED, EXTENDED RELEASE ORAL DAILY
Qty: 90 TABLET | Refills: 0 | Status: SHIPPED | OUTPATIENT
Start: 2023-12-07 | End: 2024-03-05

## 2023-12-07 NOTE — TELEPHONE ENCOUNTER
"Last Written Prescription Date:  9/7/23  Last Fill Quantity: 90,  # refills: 0   Last office visit: 10/31/2023 with prescribing provider:  Dr. Flores   Future Office Visit:  2/12/24        Requested Prescriptions   Pending Prescriptions Disp Refills    JANUMET -1000 MG TB24 [Pharmacy Med Name: JANUMET -1,000 MG TABLET] 90 tablet 0     Sig: TAKE 1 TABLET BY MOUTH EVERY DAY       Combination Oral Antihyperglycemic Agents Passed - 12/6/2023 10:55 AM        Passed - Patient has documented A1c within the specified period of time.     If HgbA1C is 8 or greater, it needs to be on file within the past 3 months.  If less than 8, must be on file within the past 6 months.     Recent Labs   Lab Test 11/08/23  1446   A1C 7.9*             Passed - Patient's CR is NOT>1.4 OR Patient's EGFR is NOT<45 within past 12 mos.     Recent Labs   Lab Test 02/10/23  0911 08/31/21  1020 11/08/19  0936   GFRESTIMATED >90   < > >90   GFRESTBLACK  --   --  >90    < > = values in this interval not displayed.       Recent Labs   Lab Test 02/10/23  0911   CR 0.86             Passed - Patient does not have a diagnosis of CHF.        Passed - Medication is active on med list        Passed - Patient is 18 years old or older.        Passed - Recent (6 mo) or future (30 days) visit within the authorizing provider's specialty     Patient had office visit in the last 6 months or has a visit in the next 30 days with authorizing provider or within the authorizing provider's specialty.  See \"Patient Info\" tab in inbasket, or \"Choose Columns\" in Meds & Orders section of the refill encounter.               Refills sent  Aimee Delgadillo RN    "

## 2023-12-07 NOTE — TELEPHONE ENCOUNTER
Patient saw Dr. Harper on 11/21/23. Patient was to have a CT Calcium Score Exam. [employer] message sent to patient with number to call to schedule.    Gini Huntley, RN, BSN  Cardiology RN Care Coordinator   [place]   Phone: [phone]  Fax: [phone] ([place]) [phone] ([place])

## 2023-12-08 ENCOUNTER — MYC MEDICAL ADVICE (OUTPATIENT)
Dept: CARDIOLOGY | Facility: CLINIC | Age: 43
End: 2023-12-08
Payer: COMMERCIAL

## 2023-12-08 DIAGNOSIS — E78.2 MIXED HYPERLIPIDEMIA: ICD-10-CM

## 2023-12-08 NOTE — TELEPHONE ENCOUNTER
Patient saw [facility] message. No questions at this time.    Gini Huntley, RN, BSN  Cardiology RN Care Coordinator   [place]   Phone: [phone]  Fax: [phone] ([place]) [phone] ([place])

## 2023-12-11 RX ORDER — ROSUVASTATIN CALCIUM 20 MG/1
20 TABLET, COATED ORAL DAILY
Qty: 90 TABLET | Refills: 3 | Status: SHIPPED | OUTPATIENT
Start: 2023-12-11

## 2023-12-11 NOTE — TELEPHONE ENCOUNTER
Yes, agree.  LDL is 95.  Lets double up the dose of Crestor to 20 mg.     Thank you for your help.    DR PIPER    Cardiology

## 2023-12-11 NOTE — TELEPHONE ENCOUNTER
Dr. Piper, do you recommend patient to take asaa 81 mg daily?  Any other recommendations?    Odalis Enamorado RN  Cardiology Care Coordinator  [facility]  [phone] option 1   Usman Piper MD  12/2/2023  9:09 PM CST       Lj,     Your CT test result came back abnormal.  You have coronary artery disease.  Your calcium score is significantly elevated at 384.  As it is mentioned you need aggressive risk factor modification that we have discussed in clinic.  Lifestyle modification, smoking cessation is part of this.  Let me know if you have questions.     DR PIPER     Cardiology            PACS Images     Show images for CT

## 2023-12-11 NOTE — TELEPHONE ENCOUNTER
Good question.  Thank you for reaching out.  There are differing opinions on this but probably he will benefit.    Considering his ongoing alcohol use he might be at higher risk of bleeding from aspirin.  He needs to keep that in mind as well.    So my opinion is he might benefit but it can come with high risk of bleeding as well.    Dr CAN

## 2023-12-11 NOTE — TELEPHONE ENCOUNTER
Thank you, one more question.  Should he increase his crestor?  He is currently on 10 mg daily.  Your OV notes mention that this may be increased after reviewing the CT.  Odalis Enamorado RN

## 2023-12-12 ASSESSMENT — ANXIETY QUESTIONNAIRES
1. FEELING NERVOUS, ANXIOUS, OR ON EDGE: MORE THAN HALF THE DAYS
7. FEELING AFRAID AS IF SOMETHING AWFUL MIGHT HAPPEN: SEVERAL DAYS
2. NOT BEING ABLE TO STOP OR CONTROL WORRYING: SEVERAL DAYS
IF YOU CHECKED OFF ANY PROBLEMS ON THIS QUESTIONNAIRE, HOW DIFFICULT HAVE THESE PROBLEMS MADE IT FOR YOU TO DO YOUR WORK, TAKE CARE OF THINGS AT HOME, OR GET ALONG WITH OTHER PEOPLE: SOMEWHAT DIFFICULT
GAD7 TOTAL SCORE: 11
4. TROUBLE RELAXING: MORE THAN HALF THE DAYS
6. BECOMING EASILY ANNOYED OR IRRITABLE: SEVERAL DAYS
5. BEING SO RESTLESS THAT IT IS HARD TO SIT STILL: MORE THAN HALF THE DAYS
GAD7 TOTAL SCORE: 11
3. WORRYING TOO MUCH ABOUT DIFFERENT THINGS: MORE THAN HALF THE DAYS

## 2023-12-12 NOTE — PROGRESS NOTES
[facility]  December 13, 2023    Behavioral Health Clinician Progress Note    Patient Name: Bertin Mcgill           Service Type:  Individual        Service Location:  telehealth     Session Start Time: 4:01 pm Session End Time: 4:58 pm      Session Length: 53 - 60      Attendees: Patient       Visit Activities (Refresh list every visit): [facility] Only     Telemedicine Visit: The patient's condition can be safely assessed and treated via synchronous audio and visual telemedicine encounter.      Reason for Telemedicine Visit: Patient has requested telehealth visit    Originating Site (Patient Location): Patient's home      Distant Location (provider location):  On-site    Consent:  The patient/guardian has verbally consented to: the potential risks and benefits of telemedicine (video visit) versus in person care; bill my insurance or make self-payment for services provided; and responsibility for payment of non-covered services.     Mode of Communication:  Video Conference via [employer]    As the provider I attest to compliance with applicable laws and regulations related to telemedicine.      Diagnostic Assessment Date: 4/14/23  Treatment Plan Review Date: 2/10/23  CGI Review Date:  2/10/23  Promis 10 Review Date: 3/12/24    Clinical Global Impressions  First:  Considering your total clinical experience with this particular patient population, how severe are the patient's symptoms at this time?: 5 (11/10/2023  5:02 PM)    Most recent:  Compared to the patient's condition at the START of treatment, this patient's condition is: 4 (11/10/2023  5:02 PM)      See Flowsheets for today's PHQ-9 and NATHALIE-7 results  Previous PHQ-9:       10/13/2023     1:25 PM 11/9/2023     1:24 PM 12/12/2023    10:14 AM   PHQ-9 SCORE   PHQ-9 Total Score MyChart  12 (Moderate depression) 13 (Moderate depression)   PHQ-9 Total Score 12 12 13     Previous NATHALIE-7:       10/13/2023     1:25 PM 11/9/2023     1:25 PM 12/12/2023  "   10:14 AM   NATHALIE-7 SCORE   Total Score  12 (moderate anxiety) 11 (moderate anxiety)   Total Score 10 12 11       KRISTINE LEVEL:       No data to display                DATA  Extended Session (60+ minutes): No  Interactive Complexity: No  Crisis: No  [facility] Patient: No    Treatment Objective(s) Addressed in This Session:  Target Behavior(s):  increased social activity/decreased isolation    Anxiety: will experience a reduction in anxiety, will develop more effective coping skills to manage anxiety symptoms, will develop healthy cognitive patterns and beliefs and will increase ability to function adaptively    Current Stressors / Issues:  When inquiring about mood, Lj reported experiencing \"ups and downs\" since previous appt, but was able to take off some time which helped.  While work appears to be more stable, Lj reported continued challenges navigating family stress during Thanksgiving and anticipating more challenges at [employer].   IRASEMA and Lj explored family dynamics, and then focused on how to effectively navigate challenges.   Lj displayed good insight sharing how bringing an awareness and understanding of the family dynamics and pattern, and their impacts is helpful, so he can properly set expectations.  Lj also shared he intentionally searched out and purchased fun gifts for family members in order to promote positive family experience.      Then IRASEMA and Lj followed up on referrals, scheduling and establishing psychiatry and anxiety specific therapy services.  Lj reported he will attend psychiatry appt ([facility]) on Friday and has appt scheduled with new anxiety specific therapist next Tuesday (12/19/23).  IRASEMA and Lj scheduled appt in January to follow up on services and plan moving forward. .         Progress on Treatment Objective(s) / Homework:  Minimal progress - PREPARATION (Decided to change - considering how); Intervened by negotiating a change plan and determining options / strategies for behavior " change, identifying triggers, exploring social supports, and working towards setting a date to begin behavior change    Motivational Interviewing    MI Intervention: Co-Developed Goal: reduce anxiety, Expressed Empathy/Understanding, Supported Autonomy, Collaboration, Evocation, Open-ended questions, Reflections: simple and complex and Change talk (evoked)     Change Talk Expressed by the Patient: Reasons to change    Provider Response to Change Talk: E - Evoked more info from patient about behavior change and R - Reflected patient's change talk      Solution-Focused Therapy  Explore patterns in patient's behaviors and relationships and discuss options for new behaviors  Explore new options for problem-solving, communication, managing stress, etc.    Interpersonal Psychotherapy  Explore patterns in relationships that are effective or ineffective at helping patient reach their goals, find satisfying experience.  Discuss new patterns or behaviors to engage in for improved social functioning.    Care Plan review completed: Yes    Medication Review:  No current psychiatric medications prescribed    Medication Compliance:  NA    Changes in Health Issues:    None reported    Chemical Use Review:   Substance Use: Problem use continues with no change since last session, Stage of Change: Contemplation        Tobacco Use: No change in amount of tobacco use since last session.  Contemplation    Assessment: Current Emotional / Mental Status (status of significant symptoms):  Risk status (Self / Other harm or suicidal ideation)  Patient has had a history of suicidal ideation: passive SI in 2003; none since  Patient denies current fears or concerns for personal safety.  Patient denies current or recent suicidal ideation or behaviors.  Patient denies current or recent homicidal ideation or behaviors.  Patient denies current or recent self injurious behavior or ideation.  Patient denies other safety concerns.  A safety and risk  management plan has not been developed at this time, however patient was encouraged to call [place] [phone] should there be a change in any of these risk factors.    Appearance:   Appropriate   Eye Contact:   Good   Psychomotor Behavior: Normal   Attitude:   Cooperative   Orientation:   All  Speech   Rate / Production: Normal/ Responsive   Volume:  Normal   Mood:    Anxious   Affect:    Congruent with mood   Thought Content:  Clear    Thought Form:  Coherent  Logical   Insight:    Good     Diagnoses:  No diagnosis found.        Collateral Reports Completed:  Routed note to PCP    Plan: (Homework, other):  Patient was given information about behavioral services and encouraged to schedule a follow up appointment with the clinic [facility] in 1 month.  He was also given information about mental health symptoms and treatment options  and strategies to more effectively manage anxiety .  CD Recommendations:  reduce alcohol use .   Shawn Ullrich LICSW, [facility]      ______________________________________________________________________    Integrated Primary Care Behavioral Health Treatment Plan    Patient's Name: Bertin Mcgill  YOB: 1980    Date of Creation: 8/22/23  Date Treatment Plan Last Reviewed/Revised:11/10/23    DSM5 Diagnoses:   F41.1 Generalized Anxiety Disorder.  F33.1 Major Depressive Disorder, Recurrent Episode, Moderate With anxious distress.   F10.10  Alcohol Use Disorder, Mild  Psychosocial / Contextual Factors: [place]ese-American, Single, Male, heterosexual., adopted, employed, works from home  PROMIS (reviewed every 90 days): pt completed on 5/5/23    Referral / Collaboration:  The following referral(s) will be initiated: Psychiatric Medication Evaluation and Anxiety Specific Psychotherapy Services ;    Anticipated number of session for this episode of care: 6  Anticipation frequency of session: Every other week  Anticipated Duration of each session: 38-52 minutes  Treatment plan will be reviewed  in 90 days or when goals have been changed.       MeasurableTreatment Goal(s) related to diagnosis / functional impairment(s)   Goal 1: Patient will reduce anxiety by engaging in Anxiety Specific Psychotherapy Services and complete psychiatric medication evaluation        Objective #A (Patient Action)    Patient will  schedule services with anxiety specific psychotherapy and psychiatric medication evaluation services  Status: New - Date: 8/22/23      Intervention(s)  [facility] will make referrals to outpatient therapist and psychiatry.        Goal 2: Patient will Patient will reduce anxiety and improve mood making healthy lifestyle choices (exercise, diet, sleep)      Objective #A (Patient Action)    Patient will  exercise at least 4 days per week .  Status: Continued - Date(s): 5/5/23     Intervention(s)  Therapist will assign homework to monitor level of exercise and mood on a daily basis    Objective #C  Patient will  increase number of meals cooked at home (decrease take out) .   Status: New - Date: 5/5/23    Intervention(s)  Therapist will  assign homework to track food .           Patient has reviewed and agreed to the above plan.      Shawn G. Ullrich, [facility]  11/10/23

## 2023-12-12 NOTE — TELEPHONE ENCOUNTER
Patient saw [employer] message regarding increasing crestor. No questions at this time. Prescription already sent in.    Gini Huntley, RN, BSN  Cardiology RN Care Coordinator   [place]   Phone: [phone]  Fax: [phone] ([place]) [phone] ([place])

## 2023-12-13 ENCOUNTER — VIRTUAL VISIT (OUTPATIENT)
Dept: BEHAVIORAL HEALTH | Facility: CLINIC | Age: 43
End: 2023-12-13
Payer: COMMERCIAL

## 2023-12-13 DIAGNOSIS — F41.1 GAD (GENERALIZED ANXIETY DISORDER): Primary | ICD-10-CM

## 2023-12-14 ASSESSMENT — PATIENT HEALTH QUESTIONNAIRE - PHQ9
SUM OF ALL RESPONSES TO PHQ QUESTIONS 1-9: 14
SUM OF ALL RESPONSES TO PHQ QUESTIONS 1-9: 14
10. IF YOU CHECKED OFF ANY PROBLEMS, HOW DIFFICULT HAVE THESE PROBLEMS MADE IT FOR YOU TO DO YOUR WORK, TAKE CARE OF THINGS AT HOME, OR GET ALONG WITH OTHER PEOPLE: SOMEWHAT DIFFICULT
SUM OF ALL RESPONSES TO PHQ QUESTIONS 1-9: 14
SUM OF ALL RESPONSES TO PHQ QUESTIONS 1-9: 14
10. IF YOU CHECKED OFF ANY PROBLEMS, HOW DIFFICULT HAVE THESE PROBLEMS MADE IT FOR YOU TO DO YOUR WORK, TAKE CARE OF THINGS AT HOME, OR GET ALONG WITH OTHER PEOPLE: SOMEWHAT DIFFICULT

## 2023-12-14 NOTE — PROGRESS NOTES
[facility] Psychiatry Services [facility]  December 15, 2023      Behavioral Health Clinician Progress Note    Patient Name: Bertin Mcgill           Service Type:  Individual      Service Location:   MyChart / Email (patient reached)     Session Start Time: 200 PM  Session End Time: 225PM      Session Length: 16 - 37      Attendees: Patient     Service Modality:  Video Visit:      Provider verified identity through the following two step process.  Patient provided:  Patient  and Patient address    Telemedicine Visit: The patient's condition can be safely assessed and treated via synchronous audio and visual telemedicine encounter.      Reason for Telemedicine Visit: Services only offered telehealth    Originating Site (Patient Location): Patient's home    Distant Site (Provider Location): [facility] MENTAL HEALTH & AD[facility]    Consent:  The patient/guardian has verbally consented to: the potential risks and benefits of telemedicine (video visit) versus in person care; bill my insurance or make self-payment for services provided; and responsibility for payment of non-covered services.     Patient would like the video invitation sent by:  My Chart    Mode of Communication:  Video Conference via [facility]    Distant Location (Provider):  On-site    As the provider I attest to compliance with applicable laws and regulations related to telemedicine.    Visit Activities (Refresh list every visit): [facility] Only    Diagnostic Assessment Date: 2023 by Shawn Ullrich [facility]  Treatment Plan Review Date: 03/15/2024  See Flowsheets for today's PHQ-9 and NATHALIE-7 results  Previous PHQ-9:       2023     1:24 PM 2023    10:14 AM 2023     9:39 AM   PHQ-9 SCORE   PHQ-9 Total Score [facility] 12 (Moderate depression) 13 (Moderate depression) 14 (Moderate depression)   PHQ-9 Total Score 12 13 14    14     Previous NATHALIE-7:       10/13/2023     1:25 PM 2023     1:25 PM  "12/12/2023    10:14 AM   NATHALIE-7 SCORE   Total Score  12 (moderate anxiety) 11 (moderate anxiety)   Total Score 10 12 11       KRISTINE LEVEL:       No data to display                DATA  Extended Session (60+ minutes): No  Interactive Complexity: No  Crisis: No  [facility] Patient: No    Treatment Objective(s) Addressed in This Session:  Target Behavior(s):  reduce rumination    Anxiety: will experience a reduction in anxiety, will develop more effective coping skills to manage anxiety symptoms, and will increase ability to function adaptively    Current Stressors / Issues:   update: [facility] reviewed DA completed by Shawn Ullrich on 04/14/2023. Pt reports that he's had trouble transitioning back into the office following the pandemic. He reports his current symptoms have been higher but attributes this to the holiday season. [facility] reviewed skills he's developed to manage his anxiety. He identifies improving boundaries between work and personal life. [facility] explored social anxiety. He's intentional when he goes out and goes out when he needs to. [facility] explored thoughts that contribute to his social anxiety. He finds that he tends to \"share\" stress his mother's stress over the holidays. [facility] explored possible thoughts that underpin his anxiety. He identifies the he tends to  over-thinks in order to be prepared for multiple possible problems.   Stresses: work, holidays  Appetite: unremarkable  Sleep: reports being more of a night owl, he uses a CPAP  Therapy: Dennis [facility]ENG at [facility], he starts with a new therapist next week, Lupe at [facility]  CHELO: drinks nightly 1-2 drinks, varies, he would like that to change  Preg: NA  Interventions: CBT for anxiety management strategies, mindfulness, [facility] sent resources  Most important: he reports that some medications have made him feel worse         Progress on Treatment Objective(s) / Homework:  Unable to assess-first appt    Motivational Interviewing    MI Intervention: Co-Developed " Goal: reduce rumination, Expressed Empathy/Understanding, Open-ended questions, Reflections: simple and complex, Change talk (evoked), and Reframe     Change Talk Expressed by the Patient: Desire to change Ability to change Reasons to change Need to change Committment to change Activation Taking steps    Provider Response to Change Talk: E - Evoked more info from patient about behavior change, A - Affirmed patient's thoughts, decisions, or attempts at behavior change, R - Reflected patient's change talk, and S - Summarized patient's change talk statements    Also provided psychoeducation about behavioral health condition, symptoms, and treatment options    Assessments completed prior to visit:  The following assessments were completed by patient for this visit:  PROMIS 10-Global Health (all questions and answers displayed):       4/13/2022     5:35 PM 7/29/2022     3:27 PM 12/15/2022     5:10 PM 12/16/2022     1:26 PM 5/4/2023     4:58 PM 9/12/2023    12:30 PM 12/12/2023    10:16 AM   PROMIS 10   In general, would you say your health is: Good Good  Good Good Good Good   In general, would you say your quality of life is: Good Good  Good Good Good Good   In general, how would you rate your physical health? Good Fair  Good Fair Good Good   In general, how would you rate your mental health, including your mood and your ability to think? Good Fair  Fair Good Good Good   In general, how would you rate your satisfaction with your social activities and relationships? Good Good  Very good Good Good Very good   In general, please rate how well you carry out your usual social activities and roles Good Fair  Very good Good Very good Very good   To what extent are you able to carry out your everyday physical activities such as walking, climbing stairs, carrying groceries, or moving a chair? Completely Completely  Completely Completely Completely Completely   In the past 7 days, how often have you been bothered by emotional  problems such as feeling anxious, depressed, or irritable? Always Often  Often Often Often Often   In the past 7 days, how would you rate your fatigue on average? Moderate Moderate  Moderate Severe Moderate Moderate   In the past 7 days, how would you rate your pain on average, where 0 means no pain, and 10 means worst imaginable pain? 6 3  2 3 4 4   In general, would you say your health is: 3 3 3 3 3 3 3    3    3   In general, would you say your quality of life is: 3 3 3 3 3 3 3    3    3   In general, how would you rate your physical health? 3 2 3 3 2 3 3    3    3   In general, how would you rate your mental health, including your mood and your ability to think? 3 2 2 2 3 3 3    3    3   In general, how would you rate your satisfaction with your social activities and relationships? 3 3 4 4 3 3 4    4    4   In general, please rate how well you carry out your usual social activities and roles. (This includes activities at home, at work and in your community, and responsibilities as a parent, child, spouse, employee, friend, etc.) 3 2 4 4 3 4 4    4    4   To what extent are you able to carry out your everyday physical activities such as walking, climbing stairs, carrying groceries, or moving a chair? 5 5 5 5 5 5 5    5    5   In the past 7 days, how often have you been bothered by emotional problems such as feeling anxious, depressed, or irritable? 5 4 4 4 4 4 4    4    4   In the past 7 days, how would you rate your fatigue on average? 3 3 3 3 4 3 3    3    3   In the past 7 days, how would you rate your pain on average, where 0 means no pain, and 10 means worst imaginable pain? 6 3 2 2 3 4 4    4    4   Global Mental Health Score 10 10 11 11 11 11 12    12    12   Global Physical Health Score 14 14 15 15 13 14 14    14    14   PROMIS TOTAL - SUBSCORES 24 24 26 26 24 25 26    26    26     PROMIS 10-Global Health (only subscores and total score):       4/13/2022     5:35 PM 7/29/2022     3:27 PM 12/15/2022      5:10 PM 12/16/2022     1:26 PM 5/4/2023     4:58 PM 9/12/2023    12:30 PM 12/12/2023    10:16 AM   PROMIS-10 Scores Only   Global Mental Health Score 10 10 11 11 11 11 12    12    12   Global Physical Health Score 14 14 15 15 13 14 14    14    14   PROMIS TOTAL - SUBSCORES 24 24 26 26 24 25 26    26    26       Care Plan review completed: Yes    Medication Review:  No changes to current psychiatric medication(s)    Medication Compliance:  Yes    Changes in Health Issues:   None reported    Chemical Use Review:   Substance Use: pt reports that he drinks daily and would like to reduce his use. States that he's beginning to have some physical consequences related to his drinking     Tobacco Use: No current tobacco use.      Assessment: Current Emotional / Mental Status (status of significant symptoms):  Risk status (Self / Other harm or suicidal ideation)  Patient denies a history of suicidal ideation, suicide attempts, self-injurious behavior, homicidal ideation, homicidal behavior, and and other safety concerns  Patient denies current fears or concerns for personal safety.  Patient denies current or recent suicidal ideation or behaviors.  Patient denies current or recent homicidal ideation or behaviors.  Patient denies current or recent self injurious behavior or ideation.  Patient denies other safety concerns.  A safety and risk management plan has not been developed at this time, however patient was encouraged to call [place] [phone] should there be a change in any of these risk factors.    Appearance:   Appropriate   Eye Contact:   Good   Psychomotor Behavior: Normal   Attitude:   Cooperative   Orientation:   All  Speech   Rate / Production: Normal    Volume:  Normal   Mood:    Anxious  Depressed   Affect:    Blunted   Thought Content:  Clear   Thought Form:  Coherent  Logical   Insight:    Good     Diagnoses:  1. NATHALIE (generalized anxiety disorder)    2. Major depressive disorder, recurrent episode, moderate  (H)        Collateral Reports Completed:  Collaborated with [facility] team     Plan: (Homework, other):  Patient was given information about behavioral services and encouraged to schedule a follow up appointment with the clinic [facility] in 1 month.  He was also given information about mental health symptoms and treatment options .  CD Recommendations:  [facility] will further evaluate .     Ranjana Melvin, [facility]    ______________________________________________________________________    Integrated Primary Care Behavioral Health Treatment Plan    Patient's Name: Bertin Mcgill  YOB: 1980    Date of Creation: 12/15/2023  Date Treatment Plan Last Reviewed/Revised: pending    DSM5 Diagnoses: 296.32 (F33.1) Major Depressive Disorder, Recurrent Episode, Moderate With anxious distress or 300.02 (F41.1) Generalized Anxiety Disorder  Psychosocial / Contextual Factors: work stress  PROMIS (reviewed every 90 days):   The following assessments were completed by patient for this visit:  PROMIS 10-Global Health (all questions and answers displayed):       4/13/2022     5:35 PM 7/29/2022     3:27 PM 12/15/2022     5:10 PM 12/16/2022     1:26 PM 5/4/2023     4:58 PM 9/12/2023    12:30 PM 12/12/2023    10:16 AM   PROMIS 10   In general, would you say your health is: Good Good  Good Good Good Good   In general, would you say your quality of life is: Good Good  Good Good Good Good   In general, how would you rate your physical health? Good Fair  Good Fair Good Good   In general, how would you rate your mental health, including your mood and your ability to think? Good Fair  Fair Good Good Good   In general, how would you rate your satisfaction with your social activities and relationships? Good Good  Very good Good Good Very good   In general, please rate how well you carry out your usual social activities and roles Good Fair  Very good Good Very good Very good   To what extent are you able to carry out your everyday physical  activities such as walking, climbing stairs, carrying groceries, or moving a chair? Completely Completely  Completely Completely Completely Completely   In the past 7 days, how often have you been bothered by emotional problems such as feeling anxious, depressed, or irritable? Always Often  Often Often Often Often   In the past 7 days, how would you rate your fatigue on average? Moderate Moderate  Moderate Severe Moderate Moderate   In the past 7 days, how would you rate your pain on average, where 0 means no pain, and 10 means worst imaginable pain? 6 3  2 3 4 4   In general, would you say your health is: 3 3 3 3 3 3 3    3    3   In general, would you say your quality of life is: 3 3 3 3 3 3 3    3    3   In general, how would you rate your physical health? 3 2 3 3 2 3 3    3    3   In general, how would you rate your mental health, including your mood and your ability to think? 3 2 2 2 3 3 3    3    3   In general, how would you rate your satisfaction with your social activities and relationships? 3 3 4 4 3 3 4    4    4   In general, please rate how well you carry out your usual social activities and roles. (This includes activities at home, at work and in your community, and responsibilities as a parent, child, spouse, employee, friend, etc.) 3 2 4 4 3 4 4    4    4   To what extent are you able to carry out your everyday physical activities such as walking, climbing stairs, carrying groceries, or moving a chair? 5 5 5 5 5 5 5    5    5   In the past 7 days, how often have you been bothered by emotional problems such as feeling anxious, depressed, or irritable? 5 4 4 4 4 4 4    4    4   In the past 7 days, how would you rate your fatigue on average? 3 3 3 3 4 3 3    3    3   In the past 7 days, how would you rate your pain on average, where 0 means no pain, and 10 means worst imaginable pain? 6 3 2 2 3 4 4    4    4   Global Mental Health Score 10 10 11 11 11 11 12    12    12   Global Physical Health Score 14  14 15 15 13 14 14    14    14   PROMIS TOTAL - SUBSCORES 24 24 26 26 24 25 26    26    26     PROMIS 10-Global Health (only subscores and total score):       4/13/2022     5:35 PM 7/29/2022     3:27 PM 12/15/2022     5:10 PM 12/16/2022     1:26 PM 5/4/2023     4:58 PM 9/12/2023    12:30 PM 12/12/2023    10:16 AM   PROMIS-10 Scores Only   Global Mental Health Score 10 10 11 11 11 11 12    12    12   Global Physical Health Score 14 14 15 15 13 14 14    14    14   PROMIS TOTAL - SUBSCORES 24 24 26 26 24 25 26    26    26     Referral / Collaboration:  Collaborated with [facility] team .    Anticipated number of session for this episode of care: 10-12  Anticipation frequency of session: Monthly  Anticipated Duration of each session: 16-37 minutes  Treatment plan will be reviewed in 90 days or when goals have been changed.       MeasurableTreatment Goal(s) related to diagnosis / functional impairment(s)  Goal 1: Patient will experience improved anxiety   Pt will know his anxiety is improved when he ruminates less    Objective #A (Patient Action)    Patient will use at least 4 coping skills for anxiety management in the next 4 weeks.  Status: New - Date: 12/15/2023      Intervention(s)  Therapist will teach skills to manage anxiety.      Patient has reviewed and agreed to the above plan.      Ranjana Melvin, [facility]  December 15, 2023

## 2023-12-15 ENCOUNTER — VIRTUAL VISIT (OUTPATIENT)
Dept: PSYCHIATRY | Facility: CLINIC | Age: 43
End: 2023-12-15
Payer: COMMERCIAL

## 2023-12-15 ENCOUNTER — VIRTUAL VISIT (OUTPATIENT)
Dept: BEHAVIORAL HEALTH | Facility: CLINIC | Age: 43
End: 2023-12-15
Payer: COMMERCIAL

## 2023-12-15 DIAGNOSIS — F33.1 MAJOR DEPRESSIVE DISORDER, RECURRENT EPISODE, MODERATE (H): ICD-10-CM

## 2023-12-15 DIAGNOSIS — F41.1 GAD (GENERALIZED ANXIETY DISORDER): ICD-10-CM

## 2023-12-15 DIAGNOSIS — F41.1 GAD (GENERALIZED ANXIETY DISORDER): Primary | ICD-10-CM

## 2023-12-15 PROCEDURE — 90832 PSYTX W PT 30 MINUTES: CPT | Mod: 95 | Performed by: SOCIAL WORKER

## 2023-12-15 PROCEDURE — 99204 OFFICE O/P NEW MOD 45 MIN: CPT | Mod: 95 | Performed by: NURSE PRACTITIONER

## 2023-12-15 NOTE — ASSESSMENT & PLAN NOTE
"Discussed gabapentin, he will consider.  Favoe message sent with the following info    https://www.accessdata.fda.gov/drugsatfda_docs/label/2017/[email or web address]_020882s047_021129s046lbl.pdf        Remember this states for child and adolescents, but the information on the medication used for anxiety applies to adults also     CHILD ANXIETY GUIDE:  Please access the \"Anxiety: Parents Medication Guide\" put together by the American Academy of Child and Adolescent Psychiatry and American Psychiatric Association.  You can find it at the following link: https://www.aacap.org/App_Themes/AACAP/docs/resource_centers/resources/med_guides/anxiety-parents-medication-guide.pdf     Lisha Murphy DNP, APRN, FMHNP-BC,     "

## 2023-12-15 NOTE — NURSING NOTE
Is the patient currently in the state of [place]? YES    Visit mode:VIDEO    If the visit is dropped, the patient can be reconnected by: VIDEO VISIT: Text to cell phone:   Telephone Information:   Mobile [phone]       Will anyone else be joining the visit? NO  (If patient encounters technical issues they should call [phone]    How would you like to obtain your AVS? MyChart    Are changes needed to the allergy or medication list? No    Reason for visit: No chief complaint on file.    Zari PACE

## 2023-12-15 NOTE — PROGRESS NOTES
Medical assistant intake:  Bertin Mcgill is a 43 year old male who presents to [facility] today for Recheck Medication (Losartan, hydrochlorothiazide., ) and Follow Up (Calcium screen 121/23.)       -  ASSESSMENT and PLAN:      Lj is a 44 yo with DM, Hypertension, hypercholesterolemia and Coronary artery calcium who also smokes about 1 ppd and drinks alcohol nightly (just 1-2 drinks).     - Hypertension  Continue on Losartan (50 mg/day). Renewal sent.   Continue on 12.5 mg hydrochlorothiazide  Aim to decrease alcohol intake and also weight      - Coronary Artery calcium score, elevated  Crestor has been increased from 10 to 20 mg/day  Also, asked to ADD 81 mg Aspirin/day  Will decrease alcohol intake to decrease risk of bleeding  Ask your Cardiologist about repeat Lipid test.       - DM2   Follow instructions from Endocrinology. Please let them know about your fears and past adverse effects with needles and Metformin.   Ask your Endocrinologist about new A1C test.     Again, discouraged smoking.       - Anxiety: Seeing Dennis here at our clinic for counseling. About to have a visit with a new Psychologist within the next 1-2 weeks.       Follow-up 3-6 months. I can order the above tests if needed    Miguel Jose MD  Family Medicine and Sports Medicine  [facility]      SUBJECTIVE:   Lj is here primarily to obtain refills for Losartan.   Also, wishes to discuss his diabetes, and his CAC score. CAC done 2.5 weeks ago.     Last A1C was 7.9.   Endocrinology is recommending weekly injections, but Lj has a fear of needles. This dates back to when he was first diagnosed in 2017.   Endo has also recommended switching Janumet for Metoformin, but Metformin had caused Lj tremendous GI problems.       Review Of Systems:    He is feeling otherwise in his usual state of health.  Still suffering from anxiety.  He is about to see a new psychologist for that.  He has been working diligently with Dennis here at  "our clinic    Problem list per EMR:  Patient Active Problem List   Diagnosis    Type 2 diabetes mellitus without complication, without long-term current use of insulin (H)    Mixed hyperlipidemia    Benign essential hypertension    Overweight (BMI 25.0-29.9)    Seasonal allergies    Smoker    Mixed anxiety depressive disorder    Coronary artery calcinosis       Current Outpatient Medications   Medication Sig Dispense Refill    Acetaminophen (TYLENOL EXTRA STRENGTH PO)       fenofibrate (TRICOR) 145 MG tablet TAKE 1 TABLET BY MOUTH EVERY DAY 90 tablet 1    hydrochlorothiazide (HYDRODIURIL) 12.5 MG tablet TAKE 1 TABLET BY MOUTH EVERY DAY 90 tablet 2    JANUMET -1000 MG TB24 TAKE 1 TABLET BY MOUTH EVERY DAY 90 tablet 0    JARDIANCE 10 MG TABS tablet TAKE 1 TABLET BY MOUTH EVERY DAY 90 tablet 1    [START ON 1/2/2024] losartan (COZAAR) 50 MG tablet Take 1 tablet (50 mg) by mouth daily 90 tablet 3    Multiple Vitamins-Minerals (CENTRUM MEN PO) One a day Mens      rosuvastatin (CRESTOR) 20 MG tablet Take 1 tablet (20 mg) by mouth daily 90 tablet 3    VITAMIN D PO Take 2,000 Units by mouth         Allergies   Allergen Reactions    Seasonal Allergies     Lisinopril Rash        Social:   Social History     Social History Narrative    Single. 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     From [place], [place]. Lives in [place] and works mostly from home.     Enjoys filling time doing \"lazy things\", e.g. binge watching shows and reading.     Also, enjoys yard work and walking around neighborhood. Used to enjoy taking long drives.          OBJECTIVE    Vitals: BP (!) 133/92 (BP Location: Left arm, Patient Position: Sitting, Cuff Size: Adult Large)   Pulse 87   Temp 97.7  F (36.5  C) (Temporal)   Resp 17   Wt 83.5 kg (184 lb)   SpO2 99%   BMI 29.70 kg/m    BMI= Body mass index is 29.7 kg/m .     he appears in his usual state of health.  He has lost about 10 pounds over the past few " years.    SEE TOP OF NOTE FOR ASSESSMENT AND PLAN  30 minutes spent on the date of the encounter doing chart review, history and exam, documentation and further activities as noted in the note.       --Miguel Jose MD  [facility], Department of Family Medicine and Community Health

## 2023-12-15 NOTE — NURSING NOTE
Is the patient currently in the state of [place]? YES    Visit mode:VIDEO    If the visit is dropped, the patient can be reconnected by: VIDEO VISIT: Text to cell phone:   Telephone Information:   Mobile [phone]       Will anyone else be joining the visit? NO  (If patient encounters technical issues they should call [phone]    How would you like to obtain your AVS? MyChart    Are changes needed to the allergy or medication list? No    Reason for visit: Consult    Zari PACE      
No

## 2023-12-15 NOTE — PROGRESS NOTES
"Virtual Visit Details    Type of service:  Video Visit     Originating Location (pt. Location): Home    Distant Location (provider location):  On-site  Platform used for Video Visit: [facility]        PSYCHIATRIC DIAGNOSTIC ASSESSMENT      Name:  Bertin Mcgill  : 1980    Referred by: Ullrich, Shawn G, LICSW      History was provided by patient  who were  good historian(s).    Patient attended the session alone    RECORDS AVAILABLE FOR REVIEW: EHR records through [facility] .  In addition, reviewed the assessment today completed by TAQUERIA Zuleta, Behavioral Health Consultant                                    Diagnostic Assessment with date of service 2023 available for review [facility]ll[facility]          Electronic Health Record shows the following on 2022 with correspondence from the psychiatry consult by Dr. Jose            CHIEF COMPLAINT   Patient is a 43 year old,   Choose not to answer male  who presents for initial psychiatric evaluation. Referred by   their Primary Care Provider: Miguel Jose MD to the [facility] for evaluation of anxiety.  Our psychiatry providers act as a specialty service for Primary Care Providers in the [facility] who seek to optimize medications for unstable patients.  Once medications have been optimized, our providers discharge the patient back to the referring Primary Care Provider for ongoing medication management.  This type of system allows our providers to serve a high volume of patients.      Note by Behavioral Health Consultant day of referral:  \"Lj reported he will attend psychiatry appt ([facility]) on Friday and has appt scheduled with new anxiety specific therapist next Tuesday (23).\" Patient experiencing Anxiety, previous unsuccessful medication trial    HISTORY OF PRESENT ILLNESS   Per [facility], Ranjana Melvin, during today's team-based visit:  \"MH update: [facility] " "reviewed DA completed by Shawn Ullrich on 04/14/2023. Pt reports that he's had trouble transitioning back into the office following the pandemic. He reports his current symptoms have been higher but attributes this to the holiday season. [facility] reviewed skills he's developed to manage his anxiety. He identifies improving boundaries between work and personal life. [facility] explored social anxiety. He's intentional when he goes out and goes out when he needs to. [facility] explored thoughts that contribute to his social anxiety. He finds that he tends to \"share\" stress his mother's stress over the holidays. [facility] explored possible thoughts that underpin his anxiety. He identifies the he tends to  over-thinks in order to be prepared for multiple possible problems.   Stresses: work, holidays  Appetite: unremarkable  Sleep: reports being more of a night owl, he uses a CPAP  Therapy: Dennis MENG at [facility], he starts with a new therapist next week, Lupe at [facility]  CHELO: drinks nightly 1-2 drinks, varies, he would like that to change  Preg: NA  Interventions: CBT for anxiety management strategies, mindfulness, [facility] sent resources  Most important: he reports that some medications have made him feel worse\"    Reports past diagnosis of anxiety.  Feels more of an introvert and gets energized from being alone.  Anxiety comes with things he is not comfortable with such as going back to work or going into social events.  Hard to go into busy stores.  When the COVID 19 pandemic came this reinforced the need to isolate. Does not want to be exposed.      First sought treatment in early 2021 when he started to have to be in the office in a hybrid model.  There was a point it caused intense anxiety.      PSYCHIATRIC HISTORY:   Previous psychiatry: one brief encounter with psychiatry,   Patrick Schoenecker, ALINE CNP PMHNP at [facility]     1. SSRI like Prozac - start low at 10 mg for anxiety. Would need to take daily in the morning.   " "  2. BuSpar, also called buspirone. Beneficial for anxiety, but best to take 2 or 3 times per day and would need to be taken daily.     3. Propranolol, this is a blood pressure med that can help with anxiety. Can be taken \"as needed\"     4. Gabapentin, can help with anxiety and can be taken as needed     5. Hydroxyzine, is a type of antihistamine medication that can help with anxiety. Can be taken as needed, but may cause excessive sleepiness.     I would recommend trying Prozac at a low dose to start and Propranolol as needed. Would be good to confirm with your primary care provider considering you are currently being treated for hypertension with HTZ    [facility] therapist[facility]   History of Psychiatric Hospitalizations:   - Inpatient:  denies   - IOP/PHP/Day treatment:  denies   History of Suicidal Ideation: denies   History of Suicide Attempts:  denies     History of Self-injurious Behavior: denies   History of Violence/Aggression: denies    History of Commitment?  Denies    Electroconvulsive Therapy (ECT) or Transcranial Magnetic Stimulation (TMS): denies    PharmacogenomicTesting (such as GeneSight): denies      PSYCHIATRIC REVIEW OF SYSTEMS:   Sleep: varies         Depression:     Change in sleep, Lack of interest, Excessive or inappropriate guilt, Change in energy level, Difficulties concentrating, Change in appetite, Psychomotor slowing or agitation, Ruminations, Irritability, Feeling sad, down, or depressed and Withdrawn  Noemy:             No Symptoms  Psychosis:       No Symptoms  Anxiety:           Excessive worry, Nervousness, Physical complaints, such as headaches, stomachaches, muscle tension, Social anxiety, Sleep disturbance, Psychomotor agitation, Ruminations, Poor concentration and Irritability  Panic:              Palpitations and chest tightness  Post Traumatic Stress Disorder:  No Symptoms   Eating Disorder:          No Symptoms  ADD / ADHD: "              No symptoms  Conduct Disorder:       No symptoms  Autism Spectrum Disorder:     No symptoms  Obsessive Compulsive Disorder:       No Symptoms       ASSESSMENT SCALES:  PHQ-9:       11/9/2023     1:24 PM 12/12/2023    10:14 AM 12/14/2023     9:39 AM   PHQ-9 SCORE   PHQ-9 Total Score MyChart 12 (Moderate depression) 13 (Moderate depression) 14 (Moderate depression)   PHQ-9 Total Score 12 13 14    14      PHQ9 score is 13 indicating moderate depression.  Suicidal ideation:  Denies  GAD7:        10/13/2023     1:25 PM 11/9/2023     1:25 PM 12/12/2023    10:14 AM   NATHALIE-7 SCORE   Total Score  12 (moderate anxiety) 11 (moderate anxiety)   Total Score 10 12 11     GAD7 score is is 11 indicating moderate anxiety.      Assessments:  The following assessments were completed by patient for this visit:  PROMIS 10-Global Health (only subscores and total score):       4/13/2022     5:35 PM 7/29/2022     3:27 PM 12/15/2022     5:10 PM 12/16/2022     1:26 PM 5/4/2023     4:58 PM 9/12/2023    12:30 PM 12/12/2023    10:16 AM   PROMIS-10 Scores Only   Global Mental Health Score 10 10 11 11 11 11 12    12    12   Global Physical Health Score 14 14 15 15 13 14 14    14    14   PROMIS TOTAL - SUBSCORES 24 24 26 26 24 25 26    26    26         FAMILY, MEDICAL, SURGICAL HISTORY REVIEWED.  MEDICATION HAVE BEEN REVIEWED AND ARE CURRENT TO THE BEST OF MY KNOWLEDGE AND ABILITY.  Single  Working full time     MEDICATIONS                                                                                                Current Outpatient Medications   Medication Sig    Acetaminophen (TYLENOL EXTRA STRENGTH PO)     fenofibrate (TRICOR) 145 MG tablet TAKE 1 TABLET BY MOUTH EVERY DAY    hydrochlorothiazide (HYDRODIURIL) 12.5 MG tablet TAKE 1 TABLET BY MOUTH EVERY DAY    JANUMET -1000 MG TB24 TAKE 1 TABLET BY MOUTH EVERY DAY    JARDIANCE 10 MG TABS tablet TAKE 1 TABLET BY MOUTH EVERY DAY    losartan (COZAAR) 50 MG tablet TAKE 1 TABLET  "BY MOUTH EVERY DAY    Multiple Vitamins-Minerals (CENTRUM MEN PO) One a day Mens    rosuvastatin (CRESTOR) 20 MG tablet Take 1 tablet (20 mg) by mouth daily    VITAMIN D PO Take 2,000 Units by mouth     No current facility-administered medications for this visit.       [place] Prescription Monitoring Program evaluating controlled substances in the last year in [place]:  none noted     NOTES ABOUT CURRENT PSYCHOTROPIC MEDICATIONS:   None at this time.  Nervous to start    SUPPLEMENTS: denies   SIDE EFFECTS:   not applicable    COMPLIANCE:   not applicable     PAST PSYCHOTROPIC MEDICATIONS:  Trazodone   Escitalopram, nausea     PSYCHOTROPIC DRUG INTERACTIONS                                           none    MANAGEMENT:  routine monitoring    VITALS   There were no vitals taken for this visit.     BP Readings from Last 1 Encounters:   11/21/23 124/88     Pulse Readings from Last 1 Encounters:   11/21/23 91     Wt Readings from Last 1 Encounters:   11/21/23 83.5 kg (184 lb)     Ht Readings from Last 1 Encounters:   06/28/23 1.676 m (5' 6\")     Estimated body mass index is 29.7 kg/m  as calculated from the following:    Height as of 6/28/23: 1.676 m (5' 6\").    Weight as of 11/21/23: 83.5 kg (184 lb).      PERTINENT HISTORY     Patient Active Problem List   Diagnosis    Type 2 diabetes mellitus without complication, without long-term current use of insulin (H)    Mixed hyperlipidemia    Benign essential hypertension    Overweight (BMI 25.0-29.9)    Seasonal allergies    Smoker    Mixed anxiety depressive disorder        Seizures or Head Injury: Denies history of head injury. Denies history of seizures.  Me     Past Surgical History:   Procedure Laterality Date    APPENDECTOMY      SHOULDER SURGERY      left and right previously        SOCIAL HISTORY   Patient reported they grew up in [place]  .  They were raised by adopted parents  .  Parents were always together.  Patient reported that their childhood was both " "patient and his sister are adopted, didn't like spending time in the home.  Patient described their current relationships with family of origin as \"I've never really gotten a long with my parents...we do better now that we don't live together\".     Relationship status: single  Children: denies   Highest education level was college graduate.    Service: denies   Employment status: full time     Trauma history: Denies  ACES (Adverse Childhood Experiences):  None.  Grew up in an intact home with all basic needs being met       LEGAL:  Denies      SUBSTANCE USE HISTORY  Social History     Tobacco Use    Smoking status: Every Day     Packs/day: 1     Types: Cigarettes, Cigars    Smokeless tobacco: Never   Substance Use Topics    Alcohol use: Yes       CAGE:       4/13/2022     5:35 PM 4/14/2023     1:30 PM   CAGE-AID Flowsheet   Have you ever felt you should Cut down on your drinking or drug use? 1 1   Have people Annoyed you by criticizing your drinking or drug use? 1 1   Have you ever felt bad or Guilty about your drinking or drug use? 0 0   Have you ever had a drink or used drugs first thing in the morning to steady your nerves or to get rid of a hangover? (Eye opener) 0 0   CAGE-AID SCORE 2 2   Have you ever felt you should Cut down on your drinking or drug use? Yes    Have people Annoyed you by criticizing your drinking or drug use? Yes    Have you ever felt bad or Guilty about your drinking or drug use? No    Have you ever had a drink or used drugs first thing in the morning to steady your nerves or to get rid of a hangover? (Eye opener) No    CAGE-AID SCORE 2 (A total score of 2 or greater is considered clinically significant)    Caffeine:  unremarkable   Alcohol:  will be see PCP regarding drinking this next week.  Other substance use: denies   Past use alcohol/substance use: denies      Chemical dependency history: Patient has not received chemical dependency treatment in the past       No family history " "on file.         PERTINENT FAMILY PSYCHIATRIC HISTORY NOTES  unaware of biological family history; sister (who is also adopted) has mental health diagnosis and takes medication; pt believes mother (non-biological) has anxiety.     Based on the clinical interview, there   are indications alcohol use . Continue to monitor. Discussed morbidity and mortality of continued substance abuse.  and Encouraged sobriety supports in the community. .     LABS & IMAGING                                                                                                                   No lab results found.  Recent Labs   Lab Test 06/16/23  0912 02/10/23  0911 07/25/22  1020 04/06/22  1205   NA  --  138   < > 135   POTASSIUM  --  4.4   < > 4.2   CHLORIDE  --  104   < > 104   CO2  --  28   < > 25   GLC  --  172*   < > 164*   FLAVIO  --  9.9   < > 9.8   BUN  --  18   < > 19   CR  --  0.86   < > 0.92   GFRESTIMATED  --  >90   < > >90   ALBUMIN  --   --   --  4.7   PROTTOTAL  --   --   --  7.9   AST  --   --   --  36   ALT 21 53   < > 71*   ALKPHOS  --   --   --  42   BILITOTAL  --   --   --  0.8    < > = values in this interval not displayed.     Recent Labs   Lab Test 11/08/23  1446 10/20/23  0926   CHOL  --  199   LDL  --  95   HDL  --  45   TRIG  --  540*   A1C 7.9*  --      Recent Labs   Lab Test 07/25/22  1020   TSH 1.62     No results found for: \"QCX106\", \"IIDQ129\", \"DSUB97FPPEC\", \"VITD3\", \"D2VIT\", \"D3VIT\", \"DTOT\", \"[id number]\", \"[id number]\", \"[id number]\", \"[id number]\", \"[id number]\", \"[id number]\"     ALLERGY & IMMUNIZATIONS       Allergies   Allergen Reactions    Seasonal Allergies            MEDICAL REVIEW OF SYSTEMS:   Ten system review was completed with pertinent positives noted above    MENTAL STATUS EXAM:   General/Constitutional:  Appearance:   awake, alert, adequately groomed, appeared stated age and no apparent distress  Attitude:    cooperative   Eye Contact:  good  Musculoskeletal:  Psychomotor Behavior:  no evidence of " tardive dyskinesia, dystonia, or tics from the head up  Psychiatric:  Speech:  clear, coherent, regular rate, rhythm, and volume,   No pressure speech noted.  Associations:  no loose associations  Thought Process:   logical, linear and goal oriented  Thought Content:    No evidence of suicidal ideation or homicidal ideation, no evidence of psychotic thought, no auditory hallucinations present and no visual hallucinations present  Mood:  anxious when in positions that he is uncomfortable  Affect:  full range/stable (normal variation of emotions during exam) and was congruent to speech content.  Insight:  good  Judgment:  intact, adequate for safety  Impulse Control:  intact  Neurological:  Oriented to:  person, place, time, and situation  Attention Span and Concentration:  Able to attend to the interview      Language: intact     Recent and Remote Memory:  Intact to interview. Not formally assessed. No amnesia.    Fund of Knowledge: appropriate       SAFETY   Feels safe in home: YES     Suicidal ideation: Denies  History of suicide attempts:  No   Hx of impulsivity: No   Hope for the future: present    Hx of Command hallucinations or current psychosis: None endorsed    History of Self-injurious behaviors: denies    Family member  by suicide:  not discussed      SAFETY ASSESSMENT:   Based on all available evidence including the factors cited above, overall Risk for harm is low and is appropriate for outpatient level of care.   Recommended that patient call 911 or go to the local ED should there be a change in any of these risk factors.          LANGUAGE OR COMMUNICATION BARRIERS   Are there language or communication issues or need for modification in treatment? NO     Are there ethnic, cultural or Jewish factors that may be relevant for therapy? NO      Client identified their preferred language to be fluent English in conversational context  Does the client need the assistance of an  or other  "support involved in therapy? NO       DSM 5 DIAGNOSIS:   300.23 (F40.10) Social Anxiety Disorder    MEETS CRITERIA PER DSM 5 AS FOLLOWS:  DSM-5 criteria for social anxiety disorder:    Persistent, intense fear or anxiety about specific social situations because they believe they may be judged, embarrassed or humiliated  Avoidance of anxiety-producing social situations or enduring them with intense fear or anxiety  Excessive anxiety that's out of proportion to the situation  Anxiety or distress that interferes with your daily living  Fear or anxiety that is not better explained by a medical condition, medication or substance abuse      MEDICAL COMORBIDITY IMPACTING CLINICAL PICTURE:  diabetes mellitus and hypertension.  Known issue that I take into account for their medical decisions       ASSESSMENT AND PLAN    Bertin Mcgill is a 43 year old  Choose not to answer male presenting for psychiatric evaluation and medication management through the [facility].  Information is obtained from patient and available records.  Reports history of anxiety for many years. Denies prior psychiatric hospitalizations. No history of suicidal thoughts or attempts. No history of self-injurious behaviors. Unknown identified genetic load for psychiatric illnesses as he is adopted. Grew up in an intact home with all basic needs being met.        Problem List Items Addressed This Visit          Behavioral     Discussed gabapentin, he will consider.  [employer] message sent with the following info    https://www.accessdata.fda.gov/drugsatfda_docs/label/2017/[email or web address]_021129s046lbl.pdf        Remember this states for child and adolescents, but the information on the medication used for anxiety applies to adults also     CHILD ANXIETY GUIDE:  Please access the \"Anxiety: Parents Medication Guide\" put together by the American Academy of Child and Adolescent Psychiatry and American Psychiatric " Association.  You can find it at the following link: https://www.aacap.org/App_Themes/AACAP/docs/resource_centers/resources/med_guides/anxiety-parents-medication-guide.pdf     Lisha Murphy, DNP, APRN, FMHNP-BC,          NATHALIE (generalized anxiety disorder)             CONSULTS/REFERRALS:   Continue therapy   Coordinate care with therapist as needed      MEDICAL:   None at this time  Coordinate care with PCP (Miguel Jose) as needed  Follow up with primary care provider as planned or for acute medical concerns.    PSYCHOEDUCATION:  Medication side effects and alternatives reviewed. Health promotion activities recommended and reviewed today. All questions addressed. Education and counseling completed regarding risks and benefits of medications and psychotherapy options.  Consent provided by patient/guardian  Call the psychiatric nurse line with medication questions or concerns at [phone].  Zoomy may be used to communicate with your provider, but this is not intended to be used for emergencies.  Idibon.gov is information for patients.  It is run by the [employer] of Medicine and it contains information about all disorders, diseases and all medications.      COMMUNITY RESOURCES:    CRISIS NUMBERS: Provided in AVS 12/15/2023  National Suicide Prevention Lifeline: 0-328-302-LVXU (416-088-0177)  SpeakingImage Searcher/resources for a list of additional resources (SOS)            [facility] - [phone]   Urgent Care Adult Mental Svbubh-[phone] mobile unit/ 24/7 crisis line  [facility] -[phone]   COPE 24/7 [place] Mobile Team -[phone] (adults)/ [phone] (child)  Poison Control Center - 1-147.428.8721    OR  go to nearest ER  Crisis Text Line for any crisis 24/7 send this-   To: 110502   [facility]  [phone]  National Suicide Prevention Lifeline: 800.489.4002 (TTY: 745.241.8704). Call anytime for help.   (www.suicidepreventionlifeline.org)  National [place] on Mental Illness (www.rocael.org): 574.829.3764 or 228-869-2836.   Mental Health Association (www.mentalhealth.org): 955.931.4787 or 203-807-1717.  [place] Crisis Text Line: Text [place] to 166069  Suicide LifeLine Chat: suicideBusyEvent.org/chat    ADMINISTRATIVE BILLING:   Level of Medical Decision Making:   - At least 1 chronic problem that is not stable  - Engaged in prescription drug management during visit (discussed any medication benefits, side effects, alternatives, etc.)          Patient Status:  Our psychiatry providers act as a specialty service for Primary Care Providers in the [place] [facility] that seek to optimize medications for unstable patients.  Once medications have been optimized, our providers discharge the patient back to the referring Primary Care Provider for ongoing medication management.  This type of system allows our providers to serve a high volume of patients. At this time  Patient will continue to be seen for ongoing consultation and stabilization.    Signed:   Lisha Murphy, MILTON, APRN, FMNATALIEP-[facility] ([facility])   Chart documentation done in part with Dragon Voice Recognition software.  Although reviewed after completion, some word and grammatical errors may remain.

## 2023-12-18 ENCOUNTER — OFFICE VISIT (OUTPATIENT)
Dept: FAMILY MEDICINE | Facility: CLINIC | Age: 43
End: 2023-12-18
Payer: COMMERCIAL

## 2023-12-18 VITALS
TEMPERATURE: 97.7 F | BODY MASS INDEX: 29.7 KG/M2 | WEIGHT: 184 LBS | SYSTOLIC BLOOD PRESSURE: 133 MMHG | DIASTOLIC BLOOD PRESSURE: 92 MMHG | OXYGEN SATURATION: 99 % | RESPIRATION RATE: 17 BRPM | HEART RATE: 87 BPM

## 2023-12-18 DIAGNOSIS — F41.8 MIXED ANXIETY DEPRESSIVE DISORDER: Primary | ICD-10-CM

## 2023-12-18 DIAGNOSIS — I25.10 CORONARY ARTERY CALCINOSIS: ICD-10-CM

## 2023-12-18 DIAGNOSIS — I10 BENIGN ESSENTIAL HYPERTENSION: ICD-10-CM

## 2023-12-18 RX ORDER — LOSARTAN POTASSIUM 50 MG/1
50 TABLET ORAL DAILY
Qty: 90 TABLET | Refills: 3 | Status: SHIPPED | OUTPATIENT
Start: 2024-01-02 | End: 2024-02-07

## 2023-12-18 RX ORDER — HYDROCHLOROTHIAZIDE 12.5 MG/1
12.5 TABLET ORAL DAILY
Qty: 90 TABLET | Refills: 2 | Status: CANCELLED | OUTPATIENT
Start: 2023-12-18

## 2023-12-18 RX ORDER — LOSARTAN POTASSIUM 50 MG/1
50 TABLET ORAL DAILY
Qty: 30 TABLET | Refills: 0 | Status: CANCELLED | OUTPATIENT
Start: 2023-12-18

## 2023-12-18 NOTE — PATIENT INSTRUCTIONS
-  ASSESSMENT and PLAN:    - Hypertension  Continue on Losartan (50 mg/day). Renewal sent.   Continue on 12.5 mg hydrochlorothiazide  Aim to decrease alcohol intake and also weight      - Coronary Artery calcium score, elevated  Crestor has been increased from 10 to 20 mg/day  Also, asked to ADD 81 mg Aspirin/day  Will decrease alcohol intake to decrease risk of bleeding  Ask your Cardiologist about repeat Lipid test.       - DM2   Follow instructions from Endocrinology. Please let them know about your fears and past adverse effects with needles and Metformin.   Ask your Endocrinologist about new A1C test.       Follow-up 3-6 months. I can order the above tests if needed    Miguel Jose MD  Family Medicine and Sports Medicine  [facility]

## 2023-12-18 NOTE — NURSING NOTE
"43 year old  Chief Complaint   Patient presents with    Recheck Medication     Losartan, hydrochlorothiazide.,     Follow Up     Calcium screen 121/23.       Blood pressure (!) 133/92, pulse 87, temperature 97.7  F (36.5  C), temperature source Temporal, resp. rate 17, weight 83.5 kg (184 lb), SpO2 99%. Body mass index is 29.7 kg/m .  Patient Active Problem List   Diagnosis    Type 2 diabetes mellitus without complication, without long-term current use of insulin (H)    Mixed hyperlipidemia    Benign essential hypertension    Overweight (BMI 25.0-29.9)    Seasonal allergies    Smoker    Mixed anxiety depressive disorder       Wt Readings from Last 2 Encounters:   12/18/23 83.5 kg (184 lb)   11/21/23 83.5 kg (184 lb)     BP Readings from Last 3 Encounters:   12/18/23 (!) 133/92   11/21/23 124/88   06/28/23 119/84         Current Outpatient Medications   Medication    Acetaminophen (TYLENOL EXTRA STRENGTH PO)    fenofibrate (TRICOR) 145 MG tablet    hydrochlorothiazide (HYDRODIURIL) 12.5 MG tablet    JANUMET -1000 MG TB24    JARDIANCE 10 MG TABS tablet    losartan (COZAAR) 50 MG tablet    Multiple Vitamins-Minerals (CENTRUM MEN PO)    rosuvastatin (CRESTOR) 20 MG tablet    VITAMIN D PO     No current facility-administered medications for this visit.       Social History     Tobacco Use    Smoking status: Every Day     Packs/day: 1     Types: Cigarettes, Cigars    Smokeless tobacco: Never   Substance Use Topics    Alcohol use: Yes    Drug use: Never       Health Maintenance Due   Topic Date Due    ADVANCE CARE PLANNING  Never done    DEPRESSION ACTION PLAN  Never done    EYE EXAM  Never done    HEPATITIS B IMMUNIZATION (1 of 3 - 3-dose series) Never done    HIV SCREENING  Never done    DIABETIC FOOT EXAM  08/12/2020    NICOTINE/TOBACCO CESSATION COUNSELING Q 1 YR  09/26/2023       No results found for: \"PAP\"      December 18, 2023 2:05 PM    "

## 2023-12-20 PROBLEM — F41.1 GAD (GENERALIZED ANXIETY DISORDER): Status: ACTIVE | Noted: 2023-12-20

## 2023-12-25 DIAGNOSIS — E78.2 MIXED HYPERLIPIDEMIA: ICD-10-CM

## 2023-12-26 RX ORDER — FENOFIBRATE 145 MG/1
TABLET, COATED ORAL
Qty: 90 TABLET | Refills: 1 | Status: SHIPPED | OUTPATIENT
Start: 2023-12-26 | End: 2024-06-17

## 2023-12-26 NOTE — TELEPHONE ENCOUNTER
Requested Prescriptions   Pending Prescriptions Disp Refills    fenofibrate (TRICOR) 145 MG tablet [Pharmacy Med Name: FENOFIBRATE 145 MG TABLET] 90 tablet 1     Sig: TAKE 1 TABLET BY MOUTH EVERY DAY       Fibrates Passed - 12/25/2023 12:54 AM        Passed - Lipid panel on file in past 12 months     Recent Labs   Lab Test 10/20/23  0926 04/06/22  1205 06/29/20  1454   CHOL 199   < > 204.0*   TRIG 540*   < > 343.0*   HDL 45   < > 67.0   LDL 95   < > 69.0   NHDL 154*   < >  --    VLDL  --   --  69.0*   CHOLHDLRATIO  --   --  3.1    < > = values in this interval not displayed.               Passed - No abnormal creatine kinase in past 12 months     No lab results found.             Passed - Recent (12 mo) or future (30 days) visit within the authorizing provider's specialty     The patient must have completed an in-person or virtual visit within the past 12 months or has a future visit scheduled within the next 90 days with the authorizing provider s specialty.  Urgent care and e-visits do not quality as an office visit for this protocol.          Passed - Medication is active on med list        Passed - Patient is age 18 or older           Last Written Prescription Date:  6/28/23  Last Fill Quantity: 90 tab,  # refills: 1   Last office visit: Visit date not found ; last virtual visit: 10/31/2023 with prescribing provider:  Dr Flores   Future Office Visit:  2/12/24    Refill sent  Bill Pena RN

## 2024-01-08 ASSESSMENT — PATIENT HEALTH QUESTIONNAIRE - PHQ9
SUM OF ALL RESPONSES TO PHQ QUESTIONS 1-9: 15
SUM OF ALL RESPONSES TO PHQ QUESTIONS 1-9: 15
10. IF YOU CHECKED OFF ANY PROBLEMS, HOW DIFFICULT HAVE THESE PROBLEMS MADE IT FOR YOU TO DO YOUR WORK, TAKE CARE OF THINGS AT HOME, OR GET ALONG WITH OTHER PEOPLE: SOMEWHAT DIFFICULT

## 2024-01-08 ASSESSMENT — ANXIETY QUESTIONNAIRES
4. TROUBLE RELAXING: NEARLY EVERY DAY
GAD7 TOTAL SCORE: 14
2. NOT BEING ABLE TO STOP OR CONTROL WORRYING: MORE THAN HALF THE DAYS
6. BECOMING EASILY ANNOYED OR IRRITABLE: MORE THAN HALF THE DAYS
5. BEING SO RESTLESS THAT IT IS HARD TO SIT STILL: NEARLY EVERY DAY
GAD7 TOTAL SCORE: 14
7. FEELING AFRAID AS IF SOMETHING AWFUL MIGHT HAPPEN: NOT AT ALL
3. WORRYING TOO MUCH ABOUT DIFFERENT THINGS: MORE THAN HALF THE DAYS
8. IF YOU CHECKED OFF ANY PROBLEMS, HOW DIFFICULT HAVE THESE MADE IT FOR YOU TO DO YOUR WORK, TAKE CARE OF THINGS AT HOME, OR GET ALONG WITH OTHER PEOPLE?: SOMEWHAT DIFFICULT
GAD7 TOTAL SCORE: 14
7. FEELING AFRAID AS IF SOMETHING AWFUL MIGHT HAPPEN: NOT AT ALL
IF YOU CHECKED OFF ANY PROBLEMS ON THIS QUESTIONNAIRE, HOW DIFFICULT HAVE THESE PROBLEMS MADE IT FOR YOU TO DO YOUR WORK, TAKE CARE OF THINGS AT HOME, OR GET ALONG WITH OTHER PEOPLE: SOMEWHAT DIFFICULT
1. FEELING NERVOUS, ANXIOUS, OR ON EDGE: MORE THAN HALF THE DAYS

## 2024-01-08 NOTE — TELEPHONE ENCOUNTER
Medication filled 1 time as pt is due for a follow-up in clinic. note on Pt sig line    
09-Jan-2024 04:54

## 2024-01-09 ENCOUNTER — VIRTUAL VISIT (OUTPATIENT)
Dept: BEHAVIORAL HEALTH | Facility: CLINIC | Age: 44
End: 2024-01-09
Payer: COMMERCIAL

## 2024-01-09 DIAGNOSIS — F41.1 GAD (GENERALIZED ANXIETY DISORDER): Primary | ICD-10-CM

## 2024-01-09 NOTE — PROGRESS NOTES
[facility]  January 9, 2024    Behavioral Health Clinician Progress Note    Patient Name: Bertin Mcgill           Service Type:  Individual        Service Location:  telehealth     Session Start Time: 4:01 pm Session End Time: 4:55 pm      Session Length: 53 - 60      Attendees: Patient       Visit Activities (Refresh list every visit): [facility] Only     Telemedicine Visit: The patient's condition can be safely assessed and treated via synchronous audio and visual telemedicine encounter.      Reason for Telemedicine Visit: Patient has requested telehealth visit    Originating Site (Patient Location): Patient's home      Distant Location (provider location):  On-site    Consent:  The patient/guardian has verbally consented to: the potential risks and benefits of telemedicine (video visit) versus in person care; bill my insurance or make self-payment for services provided; and responsibility for payment of non-covered services.     Mode of Communication:  Video Conference via [employer]    As the provider I attest to compliance with applicable laws and regulations related to telemedicine.      Diagnostic Assessment Date: 4/14/23  Treatment Plan Review Date: 2/10/23  CGI Review Date:  2/10/23  Promis 10 Review Date: 3/12/24    Clinical Global Impressions  First:  Considering your total clinical experience with this particular patient population, how severe are the patient's symptoms at this time?: 5 (11/10/2023  5:02 PM)    Most recent:  Compared to the patient's condition at the START of treatment, this patient's condition is: 4 (11/10/2023  5:02 PM)      See Flowsheets for today's PHQ-9 and NATHALIE-7 results  Previous PHQ-9:       12/12/2023    10:14 AM 12/14/2023     9:39 AM 1/8/2024     2:38 PM   PHQ-9 SCORE   PHQ-9 Total Score MyChart 13 (Moderate depression) 14 (Moderate depression) 15 (Moderately severe depression)   PHQ-9 Total Score 13 14    14 15     Previous NATHALIE-7:       11/9/2023     1:25  PM 12/12/2023    10:14 AM 1/8/2024     2:39 PM   NATHALIE-7 SCORE   Total Score 12 (moderate anxiety) 11 (moderate anxiety) 14 (moderate anxiety)   Total Score 12 11 14       KRISTINE LEVEL:       No data to display                DATA  Extended Session (60+ minutes): No  Interactive Complexity: No  Crisis: No  [facility] Patient: No    Treatment Objective(s) Addressed in This Session:  Target Behavior(s):  increased social activity/decreased isolation    Anxiety: will experience a reduction in anxiety, will develop more effective coping skills to manage anxiety symptoms, will develop healthy cognitive patterns and beliefs and will increase ability to function adaptively    Current Stressors / Issues:  Ke presented as frustrated, irritable and anxious stating work 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  Ke also noted how the lead up to the holidays was stressful, although the actual holiday was OK.  [facility] used MI interventions to focus on change, motivation, and strategies for more effective management of symptoms, specifically following through with [facility] and anxiety therapy appts/services.   Ke reported he met with [facility], but expressed ambivalence about moving forward with medication, stating he will not make a decision about taking recommended medication without first reading informational material, but he not yet looked at the material.   Ke expressed less ambivalence about therapy services, stating he met with anxiety therapist for initial/informational appt, and then met with the therapist for a follow up appt.  Ke reported he has appt scheduled for next week and they plan on meeting  every other week.      While Ke stated things with new therapist have gone OK, he inquired about how many sessions before he decides to stay with or seek new therapist.  Rather than thinking about number of sessions, [facility] encouraged ke to clearly share why  he's presenting to therapy and his goals for therapy, so then he's giving the therapist the best opportunity to help him and he can also better assess whether he is progressing toward his goals.  [facility] prompted Lj to identify current goals, Lj initially reported returning to work. But as Lj contemplated returning to work, he acknowledged feeling uncomfortable and getting anxious.  Lj stated he immediately wanted to take a nap (ie his avoidance strategy).  As [facility] and Lj further explored this goal, Lj stated he's not certain that he wants to return to working in the office.  [facility] reflected how if he's not committed to returning to the office, he won't be committed to the therapy, therefore he needs to first make a decision about whether to return to the office.     As Lj contemplated the decision, he listed the issues that follow from this decision.   Lj affirmed he will bring this topic to his new therapist.      Due to Lj's plan to move forward with new therapist, [facility] and Lj paused/ended services for the time being.  [facility] encouraged Lj to return to [facility] services in the future if needed, and if he needs further assistance to reach out.  Lj thanked [facility] for services, reported they were helpful, and will reach out in the future if needed.      Progress on Treatment Objective(s) / Homework:  Minimal progress - CONTEMPLATION (Considering change and yet undecided); Intervened by assessing the negative and positive thinking (ambivalence) about behavior change    Motivational Interviewing    MI Intervention: Co-Developed Goal: reduce anxiety, Expressed Empathy/Understanding, Supported Autonomy, Collaboration, Evocation, Open-ended questions, Reflections: simple and complex and Change talk (evoked)     Change Talk Expressed by the Patient: Reasons to change    Provider Response to Change Talk: E - Evoked more info from patient about behavior change and R - Reflected patient's change  talk      Solution-Focused Therapy  Explore patterns in patient's behaviors and relationships and discuss options for new behaviors  Explore new options for problem-solving, communication, managing stress, etc.      Care Plan review completed: Yes    Medication Review:  No current psychiatric medications prescribed    Medication Compliance:  NA    Changes in Health Issues:    None reported    Chemical Use Review:   Substance Use: Problem use continues with no change since last session, Stage of Change: Contemplation        Tobacco Use: No change in amount of tobacco use since last session.  Contemplation    Assessment: Current Emotional / Mental Status (status of significant symptoms):  Risk status (Self / Other harm or suicidal ideation)  Patient has had a history of suicidal ideation: passive SI in 2003; none since  Patient denies current fears or concerns for personal safety.  Patient denies current or recent suicidal ideation or behaviors.  Patient denies current or recent homicidal ideation or behaviors.  Patient denies current or recent self injurious behavior or ideation.  Patient denies other safety concerns.  A safety and risk management plan has not been developed at this time, however patient was encouraged to call [place] [phone] should there be a change in any of these risk factors.    Appearance:   Appropriate   Eye Contact:   Good   Psychomotor Behavior: Normal   Attitude:   Cooperative   Orientation:   All  Speech   Rate / Production: Normal/ Responsive   Volume:  Normal   Mood:    Anxious , frustrated, irritable  Affect:    Worrisome   Thought Content:  Clear    Thought Form:  Coherent  Logical   Insight:    Good     Diagnoses:  1. NATHALIE (generalized anxiety disorder)            Collateral Reports Completed:  Routed note to PCP    Plan: (Homework, other):  Patient was given information about behavioral services and encouraged to schedule a follow up appointment with the clinic [facility] as needed.  He  was also given information about mental health symptoms and treatment options  and strategies to more effectively manage anxiety .  CD Recommendations:  reduce alcohol use .   Shawn Ullrich [facility], [facility]      ______________________________________________________________________    Integrated Primary Care Behavioral Health Treatment Plan    Patient's Name: Bertin Mcgill  YOB: 1980    Date of Creation: 8/22/23  Date Treatment Plan Last Reviewed/Revised:11/10/23    DSM5 Diagnoses:   F41.1 Generalized Anxiety Disorder.  F33.1 Major Depressive Disorder, Recurrent Episode, Moderate With anxious distress.   F10.10  Alcohol Use Disorder, Mild  Psychosocial / Contextual Factors: Upper sorbian-American, Single, Male, heterosexual., adopted, employed, works from home  PROMIS (reviewed every 90 days): pt completed on 5/5/23    Referral / Collaboration:  The following referral(s) will be initiated: Psychiatric Medication Evaluation and Anxiety Specific Psychotherapy Services ;    Anticipated number of session for this episode of care: 6  Anticipation frequency of session: Every other week  Anticipated Duration of each session: 38-52 minutes  Treatment plan will be reviewed in 90 days or when goals have been changed.       MeasurableTreatment Goal(s) related to diagnosis / functional impairment(s)   Goal 1: Patient will reduce anxiety by engaging in Anxiety Specific Psychotherapy Services and complete psychiatric medication evaluation        Objective #A (Patient Action)    Patient will  schedule services with anxiety specific psychotherapy and psychiatric medication evaluation services  Status: New - Date: 8/22/23      Intervention(s)  [facility] will make referrals to outpatient therapist and psychiatry.        Goal 2: Patient will Patient will reduce anxiety and improve mood making healthy lifestyle choices (exercise, diet, sleep)      Objective #A (Patient Action)    Patient will  exercise at least 4 days per week  .  Status: Continued - Date(s): 5/5/23     Intervention(s)  Therapist will assign homework to monitor level of exercise and mood on a daily basis    Objective #C  Patient will  increase number of meals cooked at home (decrease take out) .   Status: New - Date: 5/5/23    Intervention(s)  Therapist will  assign homework to track food .           Patient has reviewed and agreed to the above plan.      Shawn G. Ullrich, [facility]  11/10/23

## 2024-01-18 ENCOUNTER — MYC MEDICAL ADVICE (OUTPATIENT)
Dept: CARDIOLOGY | Facility: CLINIC | Age: 44
End: 2024-01-18
Payer: COMMERCIAL

## 2024-01-18 DIAGNOSIS — I10 BENIGN ESSENTIAL HYPERTENSION: Primary | ICD-10-CM

## 2024-01-22 NOTE — TELEPHONE ENCOUNTER
Patient returned message with update blood pressure reading.      BP Readings from Last 3 Encounters:   12/18/23 (!) 133/92   11/21/23 124/88   06/28/23 119/84       Today's Blood Pressure: 134/100    Location: [facility]    Patient reported blood pressure updated in Epic. Blood pressure continues to fall outside of the [place] Community Measures guidelines.  Message sent to primary cardiology team for further review.    BONITA He

## 2024-01-24 NOTE — TELEPHONE ENCOUNTER
Called and spoke with patient. Patient states he is going to look into getting a new blood pressure cuff as his has been giving unreliable readings. Patient to keep track of blood pressures for approximately a week and update clinic. Postponing encounter to follow up on blood pressure readings.    Gini Huntley, RN, BSN  Cardiology RN Care Coordinator   [place]   Phone: [phone]  Fax: [phone] ([place]) [phone] ([place])

## 2024-02-01 NOTE — CONFIDENTIAL NOTE
Attempted to call patient. Unable to reach patient. [employer] message already sent to patient.    Gini Huntley, RN, BSN  Cardiology RN Care Coordinator   [place]   Phone: [phone]  Fax: [phone] ([place]) [phone] ([place])

## 2024-02-05 NOTE — TELEPHONE ENCOUNTER
Dr. Harper,  pt sent some BP readings.  Is on losartan 50 mg daily  and hydrochlorothiazide 12.5 mg daily.    CT CAlcium screening does show mild CAD.    Please review BP reading and advise.    Odalis Enamorado RN  Cardiology Care Coordinator  [facility]  [phone] option 1

## 2024-02-07 RX ORDER — LOSARTAN POTASSIUM 100 MG/1
100 TABLET ORAL DAILY
Qty: 90 TABLET | Refills: 1 | Status: SHIPPED | OUTPATIENT
Start: 2024-02-07 | End: 2024-02-07

## 2024-02-07 RX ORDER — LOSARTAN POTASSIUM 100 MG/1
100 TABLET ORAL DAILY
Qty: 90 TABLET | Refills: 3 | Status: SHIPPED | OUTPATIENT
Start: 2024-02-07

## 2024-02-07 NOTE — TELEPHONE ENCOUNTER
Spoke to patient.  Patient is agreeable to increase losartan to 100 mg daily.    Advised patient to continue to monitor BP.  Will reach out in 2-4 weeks to see how BP readings are.    BMP ordered.  Labs scheduled for 2/21.    Mark Harper.    Odalis Enamorado, RN  Cardiology Care Coordinator  [facility]  [phone] option 1

## 2024-02-12 ENCOUNTER — VIRTUAL VISIT (OUTPATIENT)
Dept: ENDOCRINOLOGY | Facility: CLINIC | Age: 44
End: 2024-02-12
Payer: COMMERCIAL

## 2024-02-12 DIAGNOSIS — F40.298 NEEDLE PHOBIA: ICD-10-CM

## 2024-02-12 DIAGNOSIS — E11.9 TYPE 2 DIABETES MELLITUS WITHOUT COMPLICATION, WITHOUT LONG-TERM CURRENT USE OF INSULIN (H): Primary | ICD-10-CM

## 2024-02-12 DIAGNOSIS — E78.2 MIXED HYPERLIPIDEMIA: ICD-10-CM

## 2024-02-12 PROCEDURE — 99214 OFFICE O/P EST MOD 30 MIN: CPT | Mod: 95 | Performed by: INTERNAL MEDICINE

## 2024-02-12 PROCEDURE — G2211 COMPLEX E/M VISIT ADD ON: HCPCS | Mod: 95 | Performed by: INTERNAL MEDICINE

## 2024-02-12 NOTE — Clinical Note
2/12/2024         RE: Bertin Mcgill  [address] [facility] [address]        Dear Colleague,    Thank you for referring your patient, Bertin Mcgill, to the [facility] [place]. Please see a copy of my visit note below.    Virtual Visit Details    Type of service:  Video Visit     Originating Location (pt. Location): Home  Distant Location (provider location):  Off-site  Platform used for Video Visit: TarynTelkonet        Recent issues:  Diabetes follow-up evaluation  Taking the JanumetXR and Jardiance medications   Reports the lipid meds changed from NiacinER to fenofibrate + rosuvastatin med combo          2017. Diagnosis of diabetes mellitus when med eval for torn right shoulder muscle  High glucose level noted during med evaluation, hgbA1c near 11.5%  Started Jardiance medication, took for approx 6 months  Recalls significant weight loss of approx 30#  Switched to metformin medication, then dose increases              Concerns about GI symptoms with nausea, nausea, also morning vomiting              Tried Prilosec, then Zantac  Has seen [facility] for medical evaluations  Recent discussion with his workplace team physician, Dr. Skye Mane              Advised to see me for medical evaluation  Previously on metforminXR 500 mg 2 tabs BID     Previous [facility] labs include:               8/12/19. Initial diabetes evaluation with me at [place]  Reviewed health history and diabetes management  Medication change to JanumetXR  1/2022. Addition of Jardiance     Current DM medications:  JanumetXR 100/1000 1-tab each morning  Jardiance 10 mg  1-tab each morning     Blood glucose (BG) meter              Infrequent testing  CGM use:  Years ago, not recently (phobia?)  Fam Hx Diabetes: none  Previous [facility] hgbA1c trends include:  Lab Results   Component Value Date    A1C 7.9 (H) 11/08/2023    A1C 7.7 (H) 06/16/2023    A1C 6.7 (H) 02/10/2023    A1C 7.2 (H) 10/04/2022    A1C 7.6 (H)  07/25/2022      Recent [facility] labs include:           Lab Results   Component Value Date    A1C 7.9 (H) 11/08/2023     02/10/2023    POTASSIUM 4.4 02/10/2023    CHLORIDE 104 02/10/2023    CO2 28 02/10/2023    ANIONGAP 6 02/10/2023     (H) 02/10/2023    BUN 18 02/10/2023    CR 0.86 02/10/2023    GFRESTIMATED >90 02/10/2023    GFRESTBLACK >90 11/08/2019    FLAVIO 9.9 02/10/2023    CHOL 199 10/20/2023    TRIG 540 (H) 10/20/2023    HDL 45 10/20/2023    LDL  10/20/2023      Comment:      Cannot estimate LDL when triglyceride exceeds 400 mg/dL    LDL 95 10/20/2023    NHDL 154 (H) 10/20/2023    UCRR 61.6 06/28/2023    MICROL <12.0 06/28/2023    UMALCR  06/28/2023      Comment:      Unable to calculate, urine albumin and/or urine creatinine is outside detectable limits.  Microalbuminuria is defined as an albumin:creatinine ratio of 17 to 299 for males and 25 to 299 for females. A ratio of albumin:creatinine of 300 or higher is indicative of overt proteinuria.  Due to biologic variability, positive results should be confirmed by a second, first-morning random or 24-hour timed urine specimen. If there is discrepancy, a third specimen is recommended. When 2 out of 3 results are in the microalbuminuria range, this is evidence for incipient nephropathy and warrants increased efforts at glucose control, blood pressure control, and institution of therapy with an angiotensin-converting-enzyme (ACE) inhibitor (if the patient can tolerate it).       Lab Results   Component Value Date    TSH 1.62 07/25/2022     [facility], no DR per patient  History of hyperlipidemia, takes simvastatin 20 mg daily   1/2023. Stopped statin med and started NiacinER (Niaspan) 500 mg 1-tab QPM along with aspirin 1-tab QPM, head sweating symptoms   ~8/2023. Continued the fenofibrate but changed from simvastatin to rosuvastatin medication  DM Complications:      None known          Lives in [place]  [place]  Has seen [facility]   Also sees Dr. LANCE Jsoe/[facility] for general medicine evaluations.      PMH/PSH:  Past Medical History:   Diagnosis Date    Allergic rhinitis     Benign essential hypertension     Hyperlipidemia     Rotator cuff tear, left 2015    Rotator cuff tear, right 2017    Type 2 diabetes mellitus (H)      Past Surgical History:   Procedure Laterality Date    APPENDECTOMY      SHOULDER SURGERY      left and right previously       Family Hx:  No family history on file.      Social Hx:  Social History     Socioeconomic History    Marital status: Single     Spouse name: Not on file    Number of children: Not on file    Years of education: Not on file    Highest education level: Not on file   Occupational History    Not on file   Tobacco Use    Smoking status: Every Day     Packs/day: 1     Types: Cigarettes, Cigars    Smokeless tobacco: Never   Substance and Sexual Activity    Alcohol use: Yes    Drug use: Never    Sexual activity: Not on file   Other Topics Concern    Not on file   Social History Narrative    Single. 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     From [place], [place]. Lives in [place] and works mostly from home.     Enjoys filling time doing \"lazy things\", e.g. binge watching shows and reading.     Also, enjoys yard work and walking around neighborhood. Used to enjoy taking long drives.      Social Determinants of Health     Financial Resource Strain: Not on file   Food Insecurity: Not on file   Transportation Needs: Not on file   Physical Activity: Not on file   Stress: Not on file   Social Connections: Not on file   Interpersonal Safety: Low Risk  (12/18/2023)    Interpersonal Safety     Do you feel physically and emotionally safe where you currently live?: Yes     Within the past 12 months, have you been hit, slapped, kicked or otherwise physically hurt by someone?: No     Within the past 12 months, have you " been humiliated or emotionally abused in other ways by your partner or ex-partner?: No   Housing Stability: Not on file          MEDICATIONS:  has a current medication list which includes the following prescription(s): acetaminophen, fenofibrate, hydrochlorothiazide, janumet xr, jardiance, losartan, multiple vitamins-minerals, rosuvastatin, and vitamin d.    ROS: 10 point ROS neg other than the symptoms noted above in the HPI.     GENERAL: energy good, wt stable; denies fevers, chills, night sweats.   HEENT: sinus congestion; no dysphagia, odonophagia, diplopia, neck pain  THYROID:  no apparent hyper or hypothyroid symptoms  CV: no chest pain, pressure, palpitations  LUNGS: no SOB, MEHTA, cough, wheezing   ABDOMEN: episode of rectal bleeding; occasional nausea; denies abdominal pain  EXTREMITIES: no rashes, ulcers, edema  NEUROLOGY: no headaches, denies changes in vision, tingling, extremitiy numbness   MSK: no muscle aches or pains, weakness  SKIN: no rashes or lesions  : denies nocturia  PSYCH:  anxiety  ENDOCRINE: no heat or cold intolerance    Physical Exam (visual exam)  VS:  no vital signs taken for video visit  CONSTITUTIONAL: healthy, alert and NAD, well dressed, answering questions appropriately  ENT: no nose swelling or nasal discharge, mouth redness or gum changes.  EYES: eyes grossly normal to inspection, conjunctivae and sclerae normal, no exophthalmos or proptosis  THYROID:  no apparent nodules or goiter  LUNGS: no audible wheeze, cough or visible cyanosis, no visible retractions or increased work of breathing  ABDOMEN: abdomen not evaluated  EXTREMITIES: no hand tremors, limited exam  NEUROLOGY: CN grossly intact, mentation intact and speech normal   SKIN:  no apparent skin lesions, rash, or edema with visualized skin appearance  PSYCH: mentation appears normal, affect normal/bright, judgement and insight intact,   normal speech and appearance well groomed    LABS:     All pertinent notes, labs, and  images personally reviewed by me.      A/P:  Encounter Diagnoses   Name Primary?    Type 2 diabetes mellitus without complication, without long-term current use of insulin (H) Yes    Mixed hyperlipidemia     Needle phobia        Comments:  Reviewed health history and diabetes issues.  Recent hgbA1c improved but cholesterol and TG levels high despite rosuvastatin and fenofibrate med use  Previous hgbA1c levels elevated, often in the 7-8% range  Difficult to assess glycemic control without recent BGM or CGM data  Reviewed and interpreted tests that I previously ordered.   Ordered appropriate tests for the endocrinology disease management.    Management options discussed and implemented after shared medical decision making with the patient.  T2DM problem is chronic-stable    Plan:  Discussed general issues with the diabetes diagnosis and management  We discussed the hgbA1c test which reflects previous overall glucose levels or control  Discussed the importance of blood glucose (BG) testing to assess glucose trends  Provided general overview of the diabetes medication options and medication treatment plan.  We have reviewed lipid test results and the cholesterol, triglyceride lower medication options     Recommend:  Continue the current JanumetXR and Jardiance daily dose routine  Plan repeat fasting lab testing soon    Scheduled 2/21/24 lab appointment    Lab orders placed  Needs diabetes education appt soon    Show him pen options (Ozempic, Mounjaro) though unlikely to agree on use    Review the Rybelsus pill option, unique morning dosing routine with shifting other pills to evening dosing    Discussed starting dose Rybelsus 3 mg with slow titration to 7 mg daily dose    If agrees to Rybelsus, would stop JanumetXR, change to metforminXR 1000 mg daily and continue Jardiance use  Reviewed ideal plan to begin glucose testing:  Test FBG at least 1-2x/week, goal target BG  mg/dl  We discussed the value of wearing a  CGM sensor, painless attachment to the arm skin for 2-14 days, clinic Libre3 sample option  Continue the current lipid medications, goal LDL <100 mg/dl and TG <200 mg/dl    Consider adding Vascepa 2 gm BID for the hypertriglyceridemia  Consider seeing a lipid specialist at [facility], such as Dr. ZAHRA Farr  Keep focus on diet, increased exercise, weight management.  Advise having fasting lipid panel testing and dilated eye examination, at least annually    Keep regular follow-up appointments with PCP  Addressed patient questions today    The longitudinal plan of care for the endocrine problem(s) were addressed during this visit.  Due to added complexity of care,   we will continue to support the patient and the subsequent management of this condition with ongoing continuity of care.    There are no Patient Instructions on file for this visit.    Future labs ordered today:   Orders Placed This Encounter   Procedures    Hemoglobin A1c    Basic metabolic panel    Lipid panel reflex to direct LDL Fasting    ALT    TSH    Adult Diabetes Education  Referral     Radiology/Consults ordered today: ADULT DIABETES EDUCATION  REFERRAL    Total time spent on day of encounter:  28 min    Follow-up:  6/2024, VVAm?    LEO Flores MD, MS  Endocrinology  [facility]    CC:  Care Team                                    Again, thank you for allowing me to participate in the care of your patient.        Sincerely,        Anival Flores MD

## 2024-02-12 NOTE — PROGRESS NOTES
Virtual Visit Details    Type of service:  Video Visit     Originating Location (pt. Location): Home  Distant Location (provider location):  Off-site  Platform used for Video Visit: [place]        Recent issues:  Diabetes follow-up evaluation  Taking the JanumetXR and Jardiance medications   Reports the lipid meds changed from NiacinER to fenofibrate + rosuvastatin med combo          2017. Diagnosis of diabetes mellitus when med eval for torn right shoulder muscle  High glucose level noted during med evaluation, hgbA1c near 11.5%  Started Jardiance medication, took for approx 6 months  Recalls significant weight loss of approx 30#  Switched to metformin medication, then dose increases              Concerns about GI symptoms with nausea, nausea, also morning vomiting              Tried Prilosec, then Zantac  Has seen [facility] for medical evaluations  Recent discussion with his workplace team physician, Dr. Skye Mane              Advised to see me for medical evaluation  Previously on metforminXR 500 mg 2 tabs BID     Previous [facility] labs include:               8/12/19. Initial diabetes evaluation with me at [place]  Reviewed health history and diabetes management  Medication change to JanumetXR  1/2022. Addition of Jardiance     Current DM medications:  JanumetXR 100/1000 1-tab each morning  Jardiance 10 mg  1-tab each morning     Blood glucose (BG) meter              Infrequent testing  CGM use:  Years ago, not recently (phobia?)  Fam Hx Diabetes: none  Previous [facility] hgbA1c trends include:  Lab Results   Component Value Date    A1C 7.9 (H) 11/08/2023    A1C 7.7 (H) 06/16/2023    A1C 6.7 (H) 02/10/2023    A1C 7.2 (H) 10/04/2022    A1C 7.6 (H) 07/25/2022      Recent [facility] labs include:           Lab Results   Component Value Date    A1C 7.9 (H) 11/08/2023     02/10/2023    POTASSIUM 4.4 02/10/2023    CHLORIDE 104 02/10/2023    CO2 28 02/10/2023    ANIONGAP 6 02/10/2023     (H) 02/10/2023    BUN  18 02/10/2023    CR 0.86 02/10/2023    GFRESTIMATED >90 02/10/2023    GFRESTBLACK >90 11/08/2019    FLAVIO 9.9 02/10/2023    CHOL 199 10/20/2023    TRIG 540 (H) 10/20/2023    HDL 45 10/20/2023    LDL  10/20/2023      Comment:      Cannot estimate LDL when triglyceride exceeds 400 mg/dL    LDL 95 10/20/2023    NHDL 154 (H) 10/20/2023    UCRR 61.6 06/28/2023    MICROL <12.0 06/28/2023    UMALCR  06/28/2023      Comment:      Unable to calculate, urine albumin and/or urine creatinine is outside detectable limits.  Microalbuminuria is defined as an albumin:creatinine ratio of 17 to 299 for males and 25 to 299 for females. A ratio of albumin:creatinine of 300 or higher is indicative of overt proteinuria.  Due to biologic variability, positive results should be confirmed by a second, first-morning random or 24-hour timed urine specimen. If there is discrepancy, a third specimen is recommended. When 2 out of 3 results are in the microalbuminuria range, this is evidence for incipient nephropathy and warrants increased efforts at glucose control, blood pressure control, and institution of therapy with an angiotensin-converting-enzyme (ACE) inhibitor (if the patient can tolerate it).       Lab Results   Component Value Date    TSH 1.62 07/25/2022     [facility] patient  History of hyperlipidemia, takes simvastatin 20 mg daily   1/2023. Stopped statin med and started NiacinER (Niaspan) 500 mg 1-tab QPM along with aspirin 1-tab QPM, head sweating symptoms   ~8/2023. Continued the fenofibrate but changed from simvastatin to rosuvastatin medication  DM Complications:      None known          Lives in [facility]  Has seen [facility]   Also sees Dr. LANCE Jose/[facility] for general medicine evaluations.      PMH/PSH:  Past Medical History:   Diagnosis Date    Allergic rhinitis     Benign essential hypertension     Hyperlipidemia      "Rotator cuff tear, left 2015    Rotator cuff tear, right 2017    Type 2 diabetes mellitus (H)      Past Surgical History:   Procedure Laterality Date    APPENDECTOMY      SHOULDER SURGERY      left and right previously       Family Hx:  No family history on file.      Social Hx:  Social History     Socioeconomic History    Marital status: Single     Spouse name: Not on file    Number of children: Not on file    Years of education: Not on file    Highest education level: Not on file   Occupational History    Not on file   Tobacco Use    Smoking status: Every Day     Packs/day: 1     Types: Cigarettes, Cigars    Smokeless tobacco: Never   Substance and Sexual Activity    Alcohol use: Yes    Drug use: Never    Sexual activity: Not on file   Other Topics Concern    Not on file   Social History Narrative    Single. 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     From [place], [place]. Lives in [place] and works mostly from home.     Enjoys filling time doing \"lazy things\", e.g. binge watching shows and reading.     Also, enjoys yard work and walking around neighborhood. Used to enjoy taking long drives.      Social Determinants of Health     Financial Resource Strain: Not on file   Food Insecurity: Not on file   Transportation Needs: Not on file   Physical Activity: Not on file   Stress: Not on file   Social Connections: Not on file   Interpersonal Safety: Low Risk  (12/18/2023)    Interpersonal Safety     Do you feel physically and emotionally safe where you currently live?: Yes     Within the past 12 months, have you been hit, slapped, kicked or otherwise physically hurt by someone?: No     Within the past 12 months, have you been humiliated or emotionally abused in other ways by your partner or ex-partner?: No   Housing Stability: Not on file          MEDICATIONS:  has a current medication list which includes the following prescription(s): acetaminophen, fenofibrate, hydrochlorothiazide, " janumet xr, jardiance, losartan, multiple vitamins-minerals, rosuvastatin, and vitamin d.    ROS: 10 point ROS neg other than the symptoms noted above in the HPI.     GENERAL: energy good, wt stable; denies fevers, chills, night sweats.   HEENT: sinus congestion; no dysphagia, odonophagia, diplopia, neck pain  THYROID:  no apparent hyper or hypothyroid symptoms  CV: no chest pain, pressure, palpitations  LUNGS: no SOB, MEHTA, cough, wheezing   ABDOMEN: episode of rectal bleeding; occasional nausea; denies abdominal pain  EXTREMITIES: no rashes, ulcers, edema  NEUROLOGY: no headaches, denies changes in vision, tingling, extremitiy numbness   MSK: no muscle aches or pains, weakness  SKIN: no rashes or lesions  : denies nocturia  PSYCH:  anxiety  ENDOCRINE: no heat or cold intolerance    Physical Exam (visual exam)  VS:  no vital signs taken for video visit  CONSTITUTIONAL: healthy, alert and NAD, well dressed, answering questions appropriately  ENT: no nose swelling or nasal discharge, mouth redness or gum changes.  EYES: eyes grossly normal to inspection, conjunctivae and sclerae normal, no exophthalmos or proptosis  THYROID:  no apparent nodules or goiter  LUNGS: no audible wheeze, cough or visible cyanosis, no visible retractions or increased work of breathing  ABDOMEN: abdomen not evaluated  EXTREMITIES: no hand tremors, limited exam  NEUROLOGY: CN grossly intact, mentation intact and speech normal   SKIN:  no apparent skin lesions, rash, or edema with visualized skin appearance  PSYCH: mentation appears normal, affect normal/bright, judgement and insight intact,   normal speech and appearance well groomed    LABS:     All pertinent notes, labs, and images personally reviewed by me.      A/P:  Encounter Diagnoses   Name Primary?    Type 2 diabetes mellitus without complication, without long-term current use of insulin (H) Yes    Mixed hyperlipidemia     Needle phobia        Comments:  Reviewed health history and  diabetes issues.  Recent hgbA1c improved but cholesterol and TG levels high despite rosuvastatin and fenofibrate med use  Previous hgbA1c levels elevated, often in the 7-8% range  Difficult to assess glycemic control without recent BGM or CGM data  Reviewed and interpreted tests that I previously ordered.   Ordered appropriate tests for the endocrinology disease management.    Management options discussed and implemented after shared medical decision making with the patient.  T2DM problem is chronic-stable    Plan:  Discussed general issues with the diabetes diagnosis and management  We discussed the hgbA1c test which reflects previous overall glucose levels or control  Discussed the importance of blood glucose (BG) testing to assess glucose trends  Provided general overview of the diabetes medication options and medication treatment plan.  We have reviewed lipid test results and the cholesterol, triglyceride lower medication options     Recommend:  Continue the current JanumetXR and Jardiance daily dose routine  Plan repeat fasting lab testing soon    Scheduled 2/21/24 lab appointment    Lab orders placed  Needs diabetes education appt soon    Show him pen options (Ozempic, Mounjaro) though unlikely to agree on use    Review the Rybelsus pill option, unique morning dosing routine with shifting other pills to evening dosing    Discussed starting dose Rybelsus 3 mg with slow titration to 7 mg daily dose    If agrees to Rybelsus, would stop JanumetXR, change to metforminXR 1000 mg daily and continue Jardiance use  Reviewed ideal plan to begin glucose testing:  Test FBG at least 1-2x/week, goal target BG  mg/dl  We discussed the value of wearing a CGM sensor, painless attachment to the arm skin for 2-14 days, clinic Libre3 sample option  Continue the current lipid medications, goal LDL <100 mg/dl and TG <200 mg/dl    Consider adding Vascepa 2 gm BID for the hypertriglyceridemia  Consider seeing a lipid specialist  at [facility] clinic, such as Dr. ZAHRA Farr  Keep focus on diet, increased exercise, weight management.  Advise having fasting lipid panel testing and dilated eye examination, at least annually    Keep regular follow-up appointments with PCP  Addressed patient questions today    The longitudinal plan of care for the endocrine problem(s) were addressed during this visit.  Due to added complexity of care,   we will continue to support the patient and the subsequent management of this condition with ongoing continuity of care.    There are no Patient Instructions on file for this visit.    Future labs ordered today:   Orders Placed This Encounter   Procedures    Hemoglobin A1c    Basic metabolic panel    Lipid panel reflex to direct LDL Fasting    ALT    TSH    Adult Diabetes Education  Referral     Radiology/Consults ordered today: ADULT DIABETES EDUCATION  REFERRAL    Total time spent on day of encounter:  28 min    Follow-up:  6/2024, VVAm?    LEO Flores MD, MS  Endocrinology  [facility]    CC:  Care Team

## 2024-02-17 ENCOUNTER — HEALTH MAINTENANCE LETTER (OUTPATIENT)
Age: 44
End: 2024-02-17

## 2024-02-19 ENCOUNTER — MYC MEDICAL ADVICE (OUTPATIENT)
Dept: CARDIOLOGY | Facility: CLINIC | Age: 44
End: 2024-02-19
Payer: COMMERCIAL

## 2024-02-20 NOTE — TELEPHONE ENCOUNTER
Patient returned message with update blood pressure reading.      BP Readings from Last 3 Encounters:   12/18/23 (!) 133/92   11/21/23 124/88   06/28/23 119/84       Today's Blood Pressure: 113/78    Location: Home BP    Patient reported blood pressure updated in Epic. Blood pressure falls within [place] Community Measures guidelines.  Patient will follow up as previously advised.    BONITA He

## 2024-02-21 ENCOUNTER — LAB (OUTPATIENT)
Dept: LAB | Facility: CLINIC | Age: 44
End: 2024-02-21
Payer: COMMERCIAL

## 2024-02-21 DIAGNOSIS — I10 BENIGN ESSENTIAL HYPERTENSION: ICD-10-CM

## 2024-02-21 DIAGNOSIS — E78.2 MIXED HYPERLIPIDEMIA: ICD-10-CM

## 2024-02-21 DIAGNOSIS — E11.9 TYPE 2 DIABETES MELLITUS WITHOUT COMPLICATION, WITHOUT LONG-TERM CURRENT USE OF INSULIN (H): ICD-10-CM

## 2024-02-21 LAB
ALT SERPL W P-5'-P-CCNC: 21 U/L (ref 0–70)
ANION GAP SERPL CALCULATED.3IONS-SCNC: 13 MMOL/L (ref 7–15)
BUN SERPL-MCNC: 18.7 MG/DL (ref 6–20)
CALCIUM SERPL-MCNC: 10.4 MG/DL (ref 8.6–10)
CHLORIDE SERPL-SCNC: 102 MMOL/L (ref 98–107)
CHOLEST SERPL-MCNC: 201 MG/DL
CREAT SERPL-MCNC: 1.17 MG/DL (ref 0.67–1.17)
DEPRECATED HCO3 PLAS-SCNC: 25 MMOL/L (ref 22–29)
EGFRCR SERPLBLD CKD-EPI 2021: 79 ML/MIN/1.73M2
FASTING STATUS PATIENT QL REPORTED: YES
GLUCOSE SERPL-MCNC: 175 MG/DL (ref 70–99)
HBA1C MFR BLD: 7.8 % (ref 0–5.6)
HDLC SERPL-MCNC: 50 MG/DL
LDLC SERPL CALC-MCNC: 85 MG/DL
NONHDLC SERPL-MCNC: 151 MG/DL
POTASSIUM SERPL-SCNC: 4.2 MMOL/L (ref 3.4–5.3)
SODIUM SERPL-SCNC: 140 MMOL/L (ref 135–145)
TRIGL SERPL-MCNC: 331 MG/DL
TSH SERPL DL<=0.005 MIU/L-ACNC: 1.56 UIU/ML (ref 0.3–4.2)

## 2024-02-21 PROCEDURE — 80048 BASIC METABOLIC PNL TOTAL CA: CPT

## 2024-02-21 PROCEDURE — 84443 ASSAY THYROID STIM HORMONE: CPT

## 2024-02-21 PROCEDURE — 83036 HEMOGLOBIN GLYCOSYLATED A1C: CPT

## 2024-02-21 PROCEDURE — 80061 LIPID PANEL: CPT

## 2024-02-21 PROCEDURE — 84460 ALANINE AMINO (ALT) (SGPT): CPT

## 2024-02-21 PROCEDURE — 36415 COLL VENOUS BLD VENIPUNCTURE: CPT

## 2024-03-01 ENCOUNTER — ALLIED HEALTH/NURSE VISIT (OUTPATIENT)
Dept: EDUCATION SERVICES | Facility: CLINIC | Age: 44
End: 2024-03-01
Attending: INTERNAL MEDICINE
Payer: COMMERCIAL

## 2024-03-01 ENCOUNTER — LAB (OUTPATIENT)
Dept: LAB | Facility: CLINIC | Age: 44
End: 2024-03-01
Payer: COMMERCIAL

## 2024-03-01 DIAGNOSIS — E11.9 TYPE 2 DIABETES MELLITUS WITHOUT COMPLICATION, WITHOUT LONG-TERM CURRENT USE OF INSULIN (H): ICD-10-CM

## 2024-03-01 DIAGNOSIS — I10 BENIGN ESSENTIAL HYPERTENSION: ICD-10-CM

## 2024-03-01 LAB — CALCIUM SERPL-MCNC: 10 MG/DL (ref 8.6–10)

## 2024-03-01 PROCEDURE — G0108 DIAB MANAGE TRN  PER INDIV: HCPCS | Performed by: DIETITIAN, REGISTERED

## 2024-03-01 PROCEDURE — 36415 COLL VENOUS BLD VENIPUNCTURE: CPT

## 2024-03-01 PROCEDURE — 82310 ASSAY OF CALCIUM: CPT

## 2024-03-01 NOTE — LETTER
3/1/2024         RE: Bertin Mcgill  [address] [facility] [address]        Dear Colleague,    Thank you for referring your patient, Bertin Mcgill, to the [facility]. Please see a copy of my visit note below.    Diabetes Self-Management Education & Support    Presents for: Individual review    Type of Service: In Person Visit    ASSESSMENT:  Patient reports he scheduled appointment at the recommendation of Dr. Flores.  States has not had diabetes education since time of diagnosis.  At that time he was focused on improving glycemic control in order to proceed with a surgery.  Patient initially unsure what he wants to discuss today.  Dr. Flores requested a discussion of personal CGM and GLP-1 medications.    Discussed with patient current A1C, A1C goal, and risks of A1C above 7%.  Discussed how personal CGM will provide feedback as to how lifestyle impacts his glycemic control and thus support can support behavior change.  Patient opposed to personal CGM noting his phobia of needles and discomfort when wearing a sensor several years ago.  Writer did demonstrate to patient the current personal CGMs available.  Patient recalls to writer in detail his needle phobia and declines to proceed with a personal CGM.    Patient identifies the need to:    -decrease alcohol intake   -increase physical activity   -make changes to food intake  States he wants to work on making changes in these areas before considering any other diabetes regimen changes.  Therefore, introduced to patient the SMART (Specific, Measurable, Attainable, Relevant, and Time Bound) acronym for goal setting.   Provided examples.  Discussed working to establish healthy habits.  Patient states to writer something along the lines of he is weighing eating what he prefers to eat because it tastes good (referencing fast food) and dying a few years earlier compared to eating poor tasting food and living a longer life.       Insufficient visit time to discuss GLP-1 medications.  Patient declined to schedule follow up visit with writer. Agrees to send writer a [employer] message in 4 weeks with the goals he set and his progress.      Patient's most recent   Lab Results   Component Value Date    A1C 7.8 02/21/2024    A1C 7.9 06/29/2020     is not meeting goal of <7.0    Diabetes knowledge and skills assessment:   Patient is knowledgeable in diabetes management concepts related to: Taking Medication    Continue education with the following diabetes management concepts: Healthy Eating, Being Active, Monitoring, and Reducing Risks    Based on learning assessment above, most appropriate setting for further diabetes education would be: Individual setting.      PLAN  Use the SMART (Specific, Measurable, Attainable, Relevant, and Time Bound) acronym for goal setting.   -set at most 1 physical activity and 1 food/meal plan goal at a time  -set a goal for 2-6 weeks (think about what do you want to be true by...[employer] for example?) and then re-evaluate    See www.diabetesfoodhub.org for ideas regarding food/meal planning    Send Annel a [employer] message in about 4 weeks with: the goal(s) you've been working on AND where you are at with them    Please call or send a [employer] message with any questions or concerns.    Topics to cover at upcoming visits: Healthy Eating, Being Active, Monitoring, Taking Medication, Reducing Risks, and Healthy Coping    Follow-up: [employer] message to Annel in 4 weeks     See Care Plan for co-developed, patient-state behavior change goals.  AVS provided for patient today.    Education Materials Provided:  American Diabetes Association: How to Thrive    SUBJECTIVE/OBJECTIVE:  Presents for: Individual review  Accompanied by: Self  Diabetes education in the past 24mo: No  Focus of Visit: Patient Unsure  Diabetes type: Type 2  Date of diagnosis: 9/2017  Disease course: Stable  Other concerns:: None  Cultural  "Influences/Ethnic Background:  Choose not to answer    Diabetes Symptoms & Complications:  Diabetes Related Symptoms: Fatigue, Polydipsia (increased thirst), Polyphagia (increased hunger), Polyuria (increased urination)  Weight trend: Stable  Symptom course: Stable  Disease course: Stable  Complications assessed today?: No    Patient Problem List and Family Medical History reviewed for relevant medical history, current medical status, and diabetes risk factors.    Vitals:  There were no vitals taken for this visit.  Estimated body mass index is 29.7 kg/m  as calculated from the following:    Height as of 6/28/23: 1.676 m (5' 6\").    Weight as of 12/18/23: 83.5 kg (184 lb).   Last 3 BP:   BP Readings from Last 3 Encounters:   12/18/23 (!) 133/92   11/21/23 124/88   06/28/23 119/84       History   Smoking Status     Every Day     Packs/day: 1.00     Types: Cigarettes, Cigars   Smokeless Tobacco     Never       Labs:  Lab Results   Component Value Date    A1C 7.8 02/21/2024    A1C 7.9 06/29/2020     Lab Results   Component Value Date     02/21/2024     02/10/2023    .0 06/29/2020     Lab Results   Component Value Date    LDL 85 02/21/2024     02/10/2023    LDL 69.0 06/29/2020    LDL  11/08/2019     Cannot estimate LDL when triglyceride exceeds 400 mg/dL     HDL Cholesterol   Date Value Ref Range Status   06/29/2020 67.0 >40.0 Final     Direct Measure HDL   Date Value Ref Range Status   02/21/2024 50 >=40 mg/dL Final   ]  GFR Estimate   Date Value Ref Range Status   02/21/2024 79 >60 mL/min/1.73m2 Final   11/08/2019 >90 >60 mL/min/[1.73_m2] Final     Comment:     Non  GFR Calc  Starting 12/18/2018, serum creatinine based estimated GFR (eGFR) will be   calculated using the Chronic Kidney Disease Epidemiology Collaboration   (CKD-EPI) equation.       GFR Estimate If Black   Date Value Ref Range Status   11/08/2019 >90 >60 mL/min/[1.73_m2] Final     Comment:      " "GFR Calc  Starting 12/18/2018, serum creatinine based estimated GFR (eGFR) will be   calculated using the Chronic Kidney Disease Epidemiology Collaboration   (CKD-EPI) equation.       Lab Results   Component Value Date    CR 1.17 02/21/2024    CR 0.9 06/29/2020     No results found for: \"MICROALBUMIN\"    Healthy Eating:  Healthy Eating Assessed Today: Yes  Meal planning/habits: Avoiding sweets  Who cooks/prepares meals for you?: Self  Who purchases food in  your home?: Self  How many times a week on average do you eat food made away from home (restaurant/take-out)?:  (weekends)  Breakfast: Glucerna, coffee (with fiber supplement)  Lunch: 2-3 PM: vegetables (celery, carrots, broccoli) with hummus with ranch or [place] onion and Greek yogurt  Dinner: last night = [employer] pasta with red sauce with cheese  Snacks: 12 PM: cottage cheese with fruit cup; PM/HS: peanuts OR chips OR Hot Pocket OR Girl  cookies with milk  Other: longer work hours Jan -April; frequently having frozen pizza for dinner at present  Beverages: Water, Tea, Coffee, Milk, Alcohol, Other (see Comments) (vodka OR gin drink (mixed in Zero sugar beverage) or whiskey (about 2+ drinks per night))  Please elaborate:: Crystal Light packet things, Zero Sugar soda  Has patient met with a dietitian in the past?: Yes    Being Active:  Being Active Assessed Today: Yes  Exercise:: Currently not exercising  Barrier to exercise: Time    Monitoring:  Monitoring Assessed Today: Yes  Did patient bring glucose meter to appointment? : No  Times checking blood sugar at home (number): Never  Times checking blood sugar at home (per): Day      Taking Medications:  Diabetes Medication(s)       Sodium-Glucose Co-Transporter 2 (SGLT2) Inhibitors       JARDIANCE 10 MG TABS tablet TAKE 1 TABLET BY MOUTH EVERY DAY       Antidiabetic Combinations       JANUMET -1000 MG TB24 TAKE 1 TABLET BY MOUTH EVERY DAY          Taking Medication Assessed Today: Yes  Current " Treatments: Oral Medication (taken by mouth), Diet  Problems taking diabetes medications regularly?: No  Diabetes medication side effects?: No    Problem Solving:  Problem Solving Assessed Today: Yes  Is the patient at risk for hypoglycemia?: No  Patient carries a carbohydrate source: Not Needed    Hypoglycemia Symptoms  Hypoglycemia: Hunger, Nervousness/Anxiety, Sleepiness    Hypoglycemia Complications  Hypoglycemia Complications: None    Reducing Risks:  Reducing Risks Assessed Today: Yes  Diabetes Risks: Hyperlipidemia, Sedentary Lifestyle  CAD Risks: Diabetes Mellitus, Dyslipidemia, Male sex, Hypertension, Sedentary lifestyle, Stress  Has dilated eye exam at least once a year?: Yes  Sees dentist every 6 months?: Yes  Feet checked by healthcare provider in the last year?: No    Healthy Coping:  Healthy Coping Assessed Today: Yes  Emotional response to diabetes: Acceptance, Fear/Anxiety  Informal Support system:: Other (colleagues)  Stage of change: PREPARATION (Decided to change - considering how)  Patient Activation Measure Survey Score:       No data to display                  Care Plan and Education Provided:  Care Plan: Diabetes   Updates made by Siri Thao RD since 3/1/2024 12:00 AM        Problem: HbA1C Not In Goal         Goal: Establish Regular Follow-Ups with PCP         Task: Discuss with PCP the recommended timing for patient's next follow up visit(s)    Responsible User: Siri Thao RD        Task: Discuss schedule for PCP visits with patient    Responsible User: Siri Thoa RD        Goal: Get HbA1C Level in Goal         Task: Educate patient on diabetes education self-management topics    Responsible User: Siri Thao RD        Task: Educate patient on benefits of regular glucose monitoring    Responsible User: Siri Thao RD        Task: Refer patient to appropriate extended care team member, as needed (Medication Therapy Management, Behavioral Health,  Physical Therapy, etc.)    Responsible User: Siri Thao RD        Task: Discuss diabetes treatment plan with patient    Responsible User: Siri Thao RD        Problem: Diabetes Self-Management Education Needed to Optimize Self-Care Behaviors         Goal: Understand diabetes pathophysiology and disease progression         Task: Provide education on diabetes pathophysiology and disease progression specfic to patient's diabetes type    Responsible User: Siri Thao RD        Goal: Healthy Eating - follow a healthy eating pattern for diabetes         Task: Provide education on portion control and consistency in amount, composition and timing of food intake    Responsible User: Siri Thao RD        Task: Provide education on managing carbohydrate intake (carbohydrate counting, plate planning method, etc.)    Responsible User: Siri Thao RD        Task: Provide education on weight management    Responsible User: Siri Thao RD        Task: Provide education on heart healthy eating    Responsible User: Siri Thao RD        Task: Provide education on eating out    Responsible User: Siri Thao RD        Task: Develop individualized healthy eating plan with patient    Responsible User: Siri Thao RD        Goal: Being Active - get regular physical activity, working up to at least 150 minutes per week         Task: Provide education on relationship of activity to glucose and precautions to take if at risk for low glucose    Responsible User: Siri Thao RD        Task: Discuss barriers to physical activity with patient    Responsible User: Siri Thao RD        Task: Develop physical activity plan with patient    Responsible User: Siri Thao RD        Task: Explore community resources including walking groups, assistance programs, and home videos    Responsible User: Siri Thao RD        Goal:  Monitoring - monitor glucose and ketones as directed         Task: Provide education on blood glucose monitoring (purpose, proper technique, frequency, glucose targets, interpreting results, when to use glucose control solution, sharps disposal)    Responsible User: Siri Thao RD        Task: Provide education on continuous glucose monitoring (sensor placement, use of kiel or /reader, understanding glucose trends, alerts and alarms, differences between sensor glucose and blood glucose) Completed 3/1/2024   Responsible User: Siri Thao RD        Task: Provide education on ketone monitoring (when to monitor, frequency, etc.)    Responsible User: Siri Thao RD        Goal: Taking Medication - patient is consistently taking medications as directed         Task: Provide education on action of prescribed medication, including when to take and possible side effects    Responsible User: Siri Thao RD        Task: Provide education on insulin and injectable diabetes medications, including administration, storage, site selection and rotation for injection sites    Responsible User: Siri Thao RD        Task: Discuss barriers to medication adherence with patient and provide management technique ideas as appropriate    Responsible User: Siri Thao RD        Task: Provide education on frequency and refill details of medications    Responsible User: Siri Thao RD        Goal: Problem Solving - know how to prevent and manage short-term diabetes complications         Task: Provide education on high blood glucose - causes, signs/symptoms, prevention and treatment    Responsible User: Siri Thao RD        Task: Provide education on low blood glucose - causes, signs/symptoms, prevention, treatment, carrying a carbohydrate source at all times, and medical identification    Responsible User: Siri Thao RD        Task: Provide education on  safe travel with diabetes    Responsible User: Siri Thao RD        Task: Provide education on how to care for diabetes on sick days    Responsible User: Siri Thao RD        Task: Provide education on when to call a health care provider    Responsible User: Siri Thao RD        Goal: Reducing Risks - know how to prevent and treat long-term diabetes complications         Task: Provide education on major complications of diabetes, prevention, early diagnostic measures and treatment of complications Completed 3/1/2024   Responsible User: Siri Thao RD        Task: Provide education on recommended care for dental, eye and foot health Completed 3/1/2024   Responsible User: Siri Thao RD        Task: Provide education on Hemoglobin A1c - goals and relationship to blood glucose levels    Responsible User: Siri Thao RD        Task: Provide education on recommendations for heart health - lipid levels and goals, blood pressure and goals, and aspirin therapy, if indicated    Responsible User: Siri Thao RD        Task: Provide education on tobacco cessation    Responsible User: Siri Thao RD        Goal: Healthy Coping - use available resources to cope with the challenges of managing diabetes         Task: Discuss recognizing feelings about having diabetes    Responsible User: Siri Thao RD        Task: Provide education on the benefits of making appropriate lifestyle changes    Responsible User: Siri Thao RD        Task: Provide education on benefits of utilizing support systems    Responsible User: Siri Thao RD        Task: Discuss methods for coping with stress    Responsible User: Siri Thao RD        Task: Provide education on when to seek professional counseling    Responsible User: Siri Thao RD Beth Reisdorf, MPH, RD, CDCES, LD 3/1/2024      Time Spent: 60 minutes  Encounter  Type: Individual    Any diabetes medication dose changes were made via the CDE Protocol per the patient's referring provider. A copy of this encounter was shared with the provider.

## 2024-03-01 NOTE — PATIENT INSTRUCTIONS
Use the SMART (Specific, Measurable, Attainable, Relevant, and Time Bound) acronym for goal setting.   -set at most 1 physical activity and 1 food/meal plan goal at a time  -set a goal for 2-6 weeks (think about what do you want to be true by...[employer] for example?) and then re-evaluate    See www.diabetesfoodhub.org for ideas regarding food/meal planning    Send Annel a [employer] message in about 4 weeks with: the goal(s) you've been working on AND where you are at with them    Please call or send a [employer] message with any questions or concerns.    Annel Thao, MPH, RD, SHAWN LD  Diabetes Education Triage Line: [phone]  Diabetes Education Appointment [phone]

## 2024-03-02 NOTE — PROGRESS NOTES
Diabetes Self-Management Education & Support    Presents for: Individual review    Type of Service: In Person Visit    ASSESSMENT:  Patient reports he scheduled appointment at the recommendation of Dr. Flores.  States has not had diabetes education since time of diagnosis.  At that time he was focused on improving glycemic control in order to proceed with a surgery.  Patient initially unsure what he wants to discuss today.  Dr. Flores requested a discussion of personal CGM and GLP-1 medications.    Discussed with patient current A1C, A1C goal, and risks of A1C above 7%.  Discussed how personal CGM will provide feedback as to how lifestyle impacts his glycemic control and thus support can support behavior change.  Patient opposed to personal CGM noting his phobia of needles and discomfort when wearing a sensor several years ago.  Writer did demonstrate to patient the current personal CGMs available.  Patient recalls to writer in detail his needle phobia and declines to proceed with a personal CGM.    Patient identifies the need to:    -decrease alcohol intake   -increase physical activity   -make changes to food intake  States he wants to work on making changes in these areas before considering any other diabetes regimen changes.  Therefore, introduced to patient the SMART (Specific, Measurable, Attainable, Relevant, and Time Bound) acronym for goal setting.   Provided examples.  Discussed working to establish healthy habits.  Patient states to writer something along the lines of he is weighing eating what he prefers to eat because it tastes good (referencing fast food) and dying a few years earlier compared to eating poor tasting food and living a longer life.      Insufficient visit time to discuss GLP-1 medications.  Patient declined to schedule follow up visit with writer. Agrees to send writer a [employer] message in 4 weeks with the goals he set and his progress.      Patient's most recent   Lab Results    Component Value Date    A1C 7.8 02/21/2024    A1C 7.9 06/29/2020     is not meeting goal of <7.0    Diabetes knowledge and skills assessment:   Patient is knowledgeable in diabetes management concepts related to: Taking Medication    Continue education with the following diabetes management concepts: Healthy Eating, Being Active, Monitoring, and Reducing Risks    Based on learning assessment above, most appropriate setting for further diabetes education would be: Individual setting.      PLAN  Use the SMART (Specific, Measurable, Attainable, Relevant, and Time Bound) acronym for goal setting.   -set at most 1 physical activity and 1 food/meal plan goal at a time  -set a goal for 2-6 weeks (think about what do you want to be true by...[employer] for example?) and then re-evaluate    See www.diabetesfoodhub.org for ideas regarding food/meal planning    Send Annel a [employer] message in about 4 weeks with: the goal(s) you've been working on AND where you are at with them    Please call or send a [employer] message with any questions or concerns.    Topics to cover at upcoming visits: Healthy Eating, Being Active, Monitoring, Taking Medication, Reducing Risks, and Healthy Coping    Follow-up: [employer] message to Annel in 4 weeks     See Care Plan for co-developed, patient-state behavior change goals.  AVS provided for patient today.    Education Materials Provided:  American Diabetes Association: How to Thrive    SUBJECTIVE/OBJECTIVE:  Presents for: Individual review  Accompanied by: Self  Diabetes education in the past 24mo: No  Focus of Visit: Patient Unsure  Diabetes type: Type 2  Date of diagnosis: 9/2017  Disease course: Stable  Other concerns:: None  Cultural Influences/Ethnic Background:  Choose not to answer    Diabetes Symptoms & Complications:  Diabetes Related Symptoms: Fatigue, Polydipsia (increased thirst), Polyphagia (increased hunger), Polyuria (increased urination)  Weight trend: Stable  Symptom course:  "Stable  Disease course: Stable  Complications assessed today?: No    Patient Problem List and Family Medical History reviewed for relevant medical history, current medical status, and diabetes risk factors.    Vitals:  There were no vitals taken for this visit.  Estimated body mass index is 29.7 kg/m  as calculated from the following:    Height as of 6/28/23: 1.676 m (5' 6\").    Weight as of 12/18/23: 83.5 kg (184 lb).   Last 3 BP:   BP Readings from Last 3 Encounters:   12/18/23 (!) 133/92   11/21/23 124/88   06/28/23 119/84       History   Smoking Status    Every Day    Packs/day: 1.00    Types: Cigarettes, Cigars   Smokeless Tobacco    Never       Labs:  Lab Results   Component Value Date    A1C 7.8 02/21/2024    A1C 7.9 06/29/2020     Lab Results   Component Value Date     02/21/2024     02/10/2023    .0 06/29/2020     Lab Results   Component Value Date    LDL 85 02/21/2024     02/10/2023    LDL 69.0 06/29/2020    LDL  11/08/2019     Cannot estimate LDL when triglyceride exceeds 400 mg/dL     HDL Cholesterol   Date Value Ref Range Status   06/29/2020 67.0 >40.0 Final     Direct Measure HDL   Date Value Ref Range Status   02/21/2024 50 >=40 mg/dL Final   ]  GFR Estimate   Date Value Ref Range Status   02/21/2024 79 >60 mL/min/1.73m2 Final   11/08/2019 >90 >60 mL/min/[1.73_m2] Final     Comment:     Non  GFR Calc  Starting 12/18/2018, serum creatinine based estimated GFR (eGFR) will be   calculated using the Chronic Kidney Disease Epidemiology Collaboration   (CKD-EPI) equation.       GFR Estimate If Black   Date Value Ref Range Status   11/08/2019 >90 >60 mL/min/[1.73_m2] Final     Comment:      GFR Calc  Starting 12/18/2018, serum creatinine based estimated GFR (eGFR) will be   calculated using the Chronic Kidney Disease Epidemiology Collaboration   (CKD-EPI) equation.       Lab Results   Component Value Date    CR 1.17 02/21/2024    CR 0.9 06/29/2020 " "    No results found for: \"MICROALBUMIN\"    Healthy Eating:  Healthy Eating Assessed Today: Yes  Meal planning/habits: Avoiding sweets  Who cooks/prepares meals for you?: Self  Who purchases food in  your home?: Self  How many times a week on average do you eat food made away from home (restaurant/take-out)?:  (weekends)  Breakfast: Glucerna, coffee (with fiber supplement)  Lunch: 2-3 PM: vegetables (celery, carrots, broccoli) with hummus with ranch or Wolof onion and Greek yogurt  Dinner: last night = [employer] pasta with red sauce with cheese  Snacks: 12 PM: cottage cheese with fruit cup; PM/HS: peanuts OR chips OR Hot Pocket OR Girl  cookies with milk  Other: longer work hours Jan -April; frequently having frozen pizza for dinner at present  Beverages: Water, Tea, Coffee, Milk, Alcohol, Other (see Comments) (vodka OR gin drink (mixed in Zero sugar beverage) or whiskey (about 2+ drinks per night))  Please elaborate:: Crystal Light packet things, Zero Sugar soda  Has patient met with a dietitian in the past?: Yes    Being Active:  Being Active Assessed Today: Yes  Exercise:: Currently not exercising  Barrier to exercise: Time    Monitoring:  Monitoring Assessed Today: Yes  Did patient bring glucose meter to appointment? : No  Times checking blood sugar at home (number): Never  Times checking blood sugar at home (per): Day      Taking Medications:  Diabetes Medication(s)       Sodium-Glucose Co-Transporter 2 (SGLT2) Inhibitors       JARDIANCE 10 MG TABS tablet TAKE 1 TABLET BY MOUTH EVERY DAY       Antidiabetic Combinations       JANUMET -1000 MG TB24 TAKE 1 TABLET BY MOUTH EVERY DAY          Taking Medication Assessed Today: Yes  Current Treatments: Oral Medication (taken by mouth), Diet  Problems taking diabetes medications regularly?: No  Diabetes medication side effects?: No    Problem Solving:  Problem Solving Assessed Today: Yes  Is the patient at risk for hypoglycemia?: No  Patient carries a " carbohydrate source: Not Needed    Hypoglycemia Symptoms  Hypoglycemia: Hunger, Nervousness/Anxiety, Sleepiness    Hypoglycemia Complications  Hypoglycemia Complications: None    Reducing Risks:  Reducing Risks Assessed Today: Yes  Diabetes Risks: Hyperlipidemia, Sedentary Lifestyle  CAD Risks: Diabetes Mellitus, Dyslipidemia, Male sex, Hypertension, Sedentary lifestyle, Stress  Has dilated eye exam at least once a year?: Yes  Sees dentist every 6 months?: Yes  Feet checked by healthcare provider in the last year?: No    Healthy Coping:  Healthy Coping Assessed Today: Yes  Emotional response to diabetes: Acceptance, Fear/Anxiety  Informal Support system:: Other (colleagues)  Stage of change: PREPARATION (Decided to change - considering how)  Patient Activation Measure Survey Score:       No data to display                  Care Plan and Education Provided:  Care Plan: Diabetes   Updates made by Siri Thao RD since 3/1/2024 12:00 AM        Problem: HbA1C Not In Goal         Goal: Establish Regular Follow-Ups with PCP         Task: Discuss with PCP the recommended timing for patient's next follow up visit(s)    Responsible User: Siri Thao RD        Task: Discuss schedule for PCP visits with patient    Responsible User: Siri Thao RD        Goal: Get HbA1C Level in Goal         Task: Educate patient on diabetes education self-management topics    Responsible User: Siri Thao RD        Task: Educate patient on benefits of regular glucose monitoring    Responsible User: Siri Thao RD        Task: Refer patient to appropriate extended care team member, as needed (Medication Therapy Management, Behavioral Health, Physical Therapy, etc.)    Responsible User: Siri Thao RD        Task: Discuss diabetes treatment plan with patient    Responsible User: Siri Thao RD        Problem: Diabetes Self-Management Education Needed to Optimize Self-Care Behaviors          Goal: Understand diabetes pathophysiology and disease progression         Task: Provide education on diabetes pathophysiology and disease progression specfic to patient's diabetes type    Responsible User: Siri Thao RD        Goal: Healthy Eating - follow a healthy eating pattern for diabetes         Task: Provide education on portion control and consistency in amount, composition and timing of food intake    Responsible User: Siri Thao RD        Task: Provide education on managing carbohydrate intake (carbohydrate counting, plate planning method, etc.)    Responsible User: Siri Thao RD        Task: Provide education on weight management    Responsible User: Siri Thao RD        Task: Provide education on heart healthy eating    Responsible User: Siri Thao RD        Task: Provide education on eating out    Responsible User: Siri Thao RD        Task: Develop individualized healthy eating plan with patient    Responsible User: Siri Thao RD        Goal: Being Active - get regular physical activity, working up to at least 150 minutes per week         Task: Provide education on relationship of activity to glucose and precautions to take if at risk for low glucose    Responsible User: Siri Thao RD        Task: Discuss barriers to physical activity with patient    Responsible User: Siri Thao RD        Task: Develop physical activity plan with patient    Responsible User: Siri Thao RD        Task: Explore community resources including walking groups, assistance programs, and home videos    Responsible User: Siri Thao RD        Goal: Monitoring - monitor glucose and ketones as directed         Task: Provide education on blood glucose monitoring (purpose, proper technique, frequency, glucose targets, interpreting results, when to use glucose control solution, sharps disposal)    Responsible User:  Siri Thao RD        Task: Provide education on continuous glucose monitoring (sensor placement, use of kiel or /reader, understanding glucose trends, alerts and alarms, differences between sensor glucose and blood glucose) Completed 3/1/2024   Responsible User: Siri Thao RD        Task: Provide education on ketone monitoring (when to monitor, frequency, etc.)    Responsible User: Siri Thao RD        Goal: Taking Medication - patient is consistently taking medications as directed         Task: Provide education on action of prescribed medication, including when to take and possible side effects    Responsible User: Siri Thao RD        Task: Provide education on insulin and injectable diabetes medications, including administration, storage, site selection and rotation for injection sites    Responsible User: Siri Thao RD        Task: Discuss barriers to medication adherence with patient and provide management technique ideas as appropriate    Responsible User: Siri Thao RD        Task: Provide education on frequency and refill details of medications    Responsible User: Siri Thao RD        Goal: Problem Solving - know how to prevent and manage short-term diabetes complications         Task: Provide education on high blood glucose - causes, signs/symptoms, prevention and treatment    Responsible User: Siri Thao RD        Task: Provide education on low blood glucose - causes, signs/symptoms, prevention, treatment, carrying a carbohydrate source at all times, and medical identification    Responsible User: Siri Thao RD        Task: Provide education on safe travel with diabetes    Responsible User: Siri Thao RD        Task: Provide education on how to care for diabetes on sick days    Responsible User: Siri Thao RD        Task: Provide education on when to call a health care provider     Responsible User: Siri Thao RD        Goal: Reducing Risks - know how to prevent and treat long-term diabetes complications         Task: Provide education on major complications of diabetes, prevention, early diagnostic measures and treatment of complications Completed 3/1/2024   Responsible User: Siri Thao RD        Task: Provide education on recommended care for dental, eye and foot health Completed 3/1/2024   Responsible User: Siri Thao RD        Task: Provide education on Hemoglobin A1c - goals and relationship to blood glucose levels    Responsible User: Siri Thao RD        Task: Provide education on recommendations for heart health - lipid levels and goals, blood pressure and goals, and aspirin therapy, if indicated    Responsible User: Siri Thao RD        Task: Provide education on tobacco cessation    Responsible User: Siri Thao RD        Goal: Healthy Coping - use available resources to cope with the challenges of managing diabetes         Task: Discuss recognizing feelings about having diabetes    Responsible User: Siri Thao RD        Task: Provide education on the benefits of making appropriate lifestyle changes    Responsible User: Siri Thao RD        Task: Provide education on benefits of utilizing support systems    Responsible User: Siri Thao RD        Task: Discuss methods for coping with stress    Responsible User: Siri Thao RD        Task: Provide education on when to seek professional counseling    Responsible User: Siri Thao RD Beth Reisdorf, MPH, RD, CDCES, LD 3/1/2024      Time Spent: 60 minutes  Encounter Type: Individual    Any diabetes medication dose changes were made via the CDE Protocol per the patient's referring provider. A copy of this encounter was shared with the provider.

## 2024-03-05 DIAGNOSIS — E11.9 TYPE 2 DIABETES MELLITUS WITHOUT COMPLICATION, WITHOUT LONG-TERM CURRENT USE OF INSULIN (H): ICD-10-CM

## 2024-03-05 RX ORDER — SITAGLIPTIN AND METFORMIN HYDROCHLORIDE 1000; 100 MG/1; MG/1
1 TABLET, FILM COATED, EXTENDED RELEASE ORAL DAILY
Qty: 90 TABLET | Refills: 1 | Status: SHIPPED | OUTPATIENT
Start: 2024-03-05 | End: 2024-08-19 | Stop reason: ALTCHOICE

## 2024-03-05 NOTE — TELEPHONE ENCOUNTER
"Last Written Prescription Date:  12/7/23  Last Fill Quantity: 90,  # refills: 0   Last office visit: 2/12/2024 with prescribing provider:  Dr. Flores   Future Office Visit:6/12/24          Requested Prescriptions   Pending Prescriptions Disp Refills    JANUMET -1000 MG TB24 [Pharmacy Med Name: JANUMET -1,000 MG TABLET] 90 tablet 0     Sig: TAKE 1 TABLET BY MOUTH EVERY DAY       Combination Oral Antihyperglycemic Agents Passed - 3/5/2024 12:42 AM        Passed - Patient has documented A1c within the specified period of time.     If HgbA1C is 8 or greater, it needs to be on file within the past 3 months.  If less than 8, must be on file within the past 6 months.     Recent Labs   Lab Test 02/21/24  0916   A1C 7.8*             Passed - Patient's CR is NOT>1.4 OR Patient's EGFR is NOT<45 within past 12 mos.     Recent Labs   Lab Test 02/21/24  0916 08/31/21  1020 11/08/19  0936   GFRESTIMATED 79   < > >90   GFRESTBLACK  --   --  >90    < > = values in this interval not displayed.       Recent Labs   Lab Test 02/21/24  0916   CR 1.17             Passed - Patient does not have a diagnosis of CHF.        Passed - Medication is active on med list        Passed - Patient is 18 years old or older.        Passed - Recent (6 mo) or future (30 days) visit within the authorizing provider's specialty     Patient had office visit in the last 6 months or has a visit in the next 30 days with authorizing provider or within the authorizing provider's specialty.  See \"Patient Info\" tab in inbasket, or \"Choose Columns\" in Meds & Orders section of the refill encounter.               Refills sent  Aimee Delgadillo RN    "

## 2024-03-06 ENCOUNTER — MYC MEDICAL ADVICE (OUTPATIENT)
Dept: EDUCATION SERVICES | Facility: CLINIC | Age: 44
End: 2024-03-06

## 2024-03-06 NOTE — TELEPHONE ENCOUNTER
Called patient to discuss scheduling a follow up visit with writer to discuss GLP-1 medications at the request of Dr. Flores.  There was no answer so a message was left requesting a return call.  Additionally, a [employer] message was sent to patient.  Awaiting response.   Annel Thao, MPH, RD, CDCES, LD 3/6/2024

## 2024-03-30 DIAGNOSIS — E11.9 TYPE 2 DIABETES MELLITUS WITHOUT COMPLICATION, WITHOUT LONG-TERM CURRENT USE OF INSULIN (H): ICD-10-CM

## 2024-04-01 RX ORDER — EMPAGLIFLOZIN 10 MG/1
TABLET, FILM COATED ORAL
Qty: 90 TABLET | Refills: 1 | Status: SHIPPED | OUTPATIENT
Start: 2024-04-01 | End: 2024-10-04

## 2024-04-01 NOTE — TELEPHONE ENCOUNTER
Last Written Prescription Date:  9/25/23  Last Fill Quantity: 90,  # refills: 1   Last office visit: 2/12/2024 with prescribing provider:  Dr. Flores   Future Office Visit: 6/12/24         Requested Prescriptions   Pending Prescriptions Disp Refills    JARDIANCE 10 MG TABS tablet [Pharmacy Med Name: JARDIANCE 10 MG TABLET] 90 tablet 1     Sig: TAKE 1 TABLET BY MOUTH EVERY DAY       Sodium Glucose Co-Transport Inhibitor Agents Passed - 3/30/2024  1:10 AM        Passed - Patient has documented A1c within the specified period of time.     If HgbA1C is 8 or greater, it needs to be on file within the past 3 months.  If less than 8, must be on file within the past 6 months.     Recent Labs   Lab Test 02/21/24  0916   A1C 7.8*             Passed - Medication is active on med list        Passed - Has GFR on file in past 12 months and most recent value is normal        Passed - Recent (6 mo) or future (90 days) visit within the authorizing provider's specialty     The patient must have completed an in-person or virtual visit within the past 6 months or has a future visit scheduled within the next 90 days with the authorizing provider s specialty.  Urgent care and e-visits do not quality as an office visit for this protocol.          Passed - Medication indicated for associated diagnosis     Medication is associated with one or more of the following diagnoses:     Diabetic nephropathy, With Albuminuria - Type 2 diabetes mellitus     Disorder of cardiovascular system; Prophylaxis - Type 2 diabetes mellitus     Type 2 diabetes mellitus    Disorder of cardiovascular system; Prophylaxis - Heart failure   Chronic kidney disease, (At risk of progression) to reduce the risk of sustained   estimated GFR decline, end-stage kidney disease, cardiovascular death,   and hospitalization for heart failure     Heart failure, (NYHA class II to IV, reduced ejection fraction) to reduce risk of  cardiovascular death and hospitalization            Passed - Patient is age 18 or older        Passed - Patient has documented normal Potassium within the last 12 mos.     Recent Labs   Lab Test 02/21/24  0916   POTASSIUM 4.2                Refills sent  Aimee Delgadillo RN

## 2024-05-06 DIAGNOSIS — I10 BENIGN ESSENTIAL HYPERTENSION: ICD-10-CM

## 2024-05-06 RX ORDER — HYDROCHLOROTHIAZIDE 12.5 MG/1
TABLET ORAL
Qty: 90 TABLET | Refills: 0 | Status: SHIPPED | OUTPATIENT
Start: 2024-05-06 | End: 2024-08-06

## 2024-05-06 NOTE — TELEPHONE ENCOUNTER
Medication requested: hydrochlorothiazide (HYDRODIURIL) 12.5 MG tablet   Last office visit: 12/18/2023  [facility] appointments: none  Medication last refilled: 8/14/2023; 90 + 2 refills  Last qualifying labs:     BP Readings from Last 3 Encounters:   12/18/23 (!) 133/92   11/21/23 124/88   06/28/23 119/84     Prescription approved per [facility] Refill Protocol.    RACHEL Urbina, RN  05/06/24, 12:53 PM

## 2024-05-17 ENCOUNTER — TELEPHONE (OUTPATIENT)
Dept: CARDIOLOGY | Facility: CLINIC | Age: 44
End: 2024-05-17
Payer: COMMERCIAL

## 2024-05-17 NOTE — TELEPHONE ENCOUNTER
----- Message from Gini Huntley RN sent at 5/17/2024 10:20 AM CDT -----  Regarding: Fasting labs  Patient is due for fasting labs. He was to get lipids checked about 6 months after seeing Dr. Harper. Can we offer to schedule a lab appointment for him?

## 2024-05-17 NOTE — TELEPHONE ENCOUNTER
Called and LVM for patient to return call to clinic scheduler to schedule fasting lab work per Dr. Harper.    Hawa LUCAS CA

## 2024-05-22 ENCOUNTER — LAB (OUTPATIENT)
Dept: LAB | Facility: CLINIC | Age: 44
End: 2024-05-22
Payer: COMMERCIAL

## 2024-05-22 ENCOUNTER — ALLIED HEALTH/NURSE VISIT (OUTPATIENT)
Dept: EDUCATION SERVICES | Facility: CLINIC | Age: 44
End: 2024-05-22
Payer: COMMERCIAL

## 2024-05-22 DIAGNOSIS — E11.9 TYPE 2 DIABETES MELLITUS WITHOUT COMPLICATION, WITHOUT LONG-TERM CURRENT USE OF INSULIN (H): Primary | ICD-10-CM

## 2024-05-22 DIAGNOSIS — E78.2 MIXED HYPERLIPIDEMIA: ICD-10-CM

## 2024-05-22 LAB
CHOLEST SERPL-MCNC: 195 MG/DL
FASTING STATUS PATIENT QL REPORTED: YES
HDLC SERPL-MCNC: 46 MG/DL
LDLC SERPL CALC-MCNC: 78 MG/DL
NONHDLC SERPL-MCNC: 149 MG/DL
TRIGL SERPL-MCNC: 356 MG/DL

## 2024-05-22 PROCEDURE — 80061 LIPID PANEL: CPT

## 2024-05-22 PROCEDURE — 36415 COLL VENOUS BLD VENIPUNCTURE: CPT

## 2024-05-22 PROCEDURE — G0108 DIAB MANAGE TRN  PER INDIV: HCPCS | Performed by: DIETITIAN, REGISTERED

## 2024-05-22 NOTE — Clinical Note
5/22/2024         RE: Bertin Mcgill  [address] [facility] [address]        Dear Colleague,    Thank you for referring your patient, Bertin Mcgill, to the [facility]. Please see a copy of my visit note below.    Diabetes Self-Management Education & Support    Presents for: Follow-up    Type of Service: In Person Visit      ASSESSMENT:  Patient here to follow up from initial visit with writer on 3/1/24.  Reviewed his goals from 3/1:   -decrease alcohol intake: patient reports no change              -increase physical activity: patient reports an increase in walking with the nicer weather              -make changes to food intake: patient reports no change    Expresses willingness to work on decreasing intake of alcohol.  Recommended patient limit alcohol to moderation (at most).  Given his preference for hard alcohol, discussed that moderation is no more than 3 oz of hard alcohol in 24 hours. Patient states he has no idea how much he consumes.  Does have measuring devices at home and agrees to start to measure.  At the end of the visit, he reports part of the reason he consumes alcohol is to help him sleep.  There was insufficient visit time to discuss sleep.  He asks about taking a supplemental sleep aid he found online.  Encouraged him to review ingredients with PCP to ensure no interaction with other medications before starting.  Sent [employer]ii message with sleep hygiene tips.     Intake reveals consumption of minimal carbohydrates throughout AM and early afternoon.  Intake in the evening is much higher in carbohydrates as he generally goes out to eat.  Reports eating out, fast food, brings him reza.  Patient unable to identify other sources of reza/enjoyment.  He comments to writer, his preference is remain within the 4 walls of his own home if possible. States he is familiar with label reading.  Suggested working towards having adequate carbohydrate intake at breakfast and  lunch to prevent overeating at evening meal. Suggested working on one meal at a time. Started to discuss options for breakfast with patient.     Discussed risks of uncontrolled glucoses especially in terms of quality of life. As in initial visit with writer, patient continues to voice he is weighing eating what he prefers to eat because it tastes good (referencing fast food) and dying a few years earlier compared to eating poor tasting food and living a longer life.  At the request of Dr. Flores, discussed starting a GLP-1 or GLP-1/GIP medication.  Per pharmacy liaison, Victoza, Trulicity, Rybelsus, Ozempic, and Mounjaro each are $20/month to patient. Given patient's needle phobia, demonstrated to patient the use of the Mounjaro/Trulicity pen.  Patient visibly moved backward in his chair with writer's demonstration and raised his voice when stating he is not willing to use an injectable diabetes medication.  He is open to Rybelsus and prefers to wait to start Rybelsus until after his upcoming appointment with Dr. Flores and his labs, specifically his A1C, have been drawn.    Prefers to schedule via [employer]. Follow Up order placed. Recommended follow up in late July, about 6 weeks after upcoming appointment with Dr. Flores.     Patient's most recent   Lab Results   Component Value Date    A1C 7.8 02/21/2024    A1C 7.9 06/29/2020     is not meeting goal of <7.0    Diabetes knowledge and skills assessment:   Patient is knowledgeable in diabetes management concepts related to: Taking Medication    Continue education with the following diabetes management concepts: Healthy Eating, Being Active, Monitoring, Taking Medication, Reducing Risks, and Healthy Coping    Based on learning assessment above, most appropriate setting for further diabetes education would be: Individual setting.      PLAN  Diabetes Medications   -no changes for now     -talk to Dr. Flores about adding Rybelsus    When you start Rybelsus, you need  to stop Janumet     Replace Janumet with metformin extended release    Can continue Jardiance    2.  Meal Planning   -work to limit hard alcohol to no more than 3 oz in 24 hours (this is what is considered moderation for men).  The amount would change if you are consuming other types of alcohol (beer, wine)       Meal Planning    Aim for 45-60 grams of carbohydrate per meal    Up to 15 grams of carbohydrate per snack    Use resources to count carbohydrates: food labels, written materials, [employer] Eyal web site or kiel    Try have a meal or snack every 4-6 hours while awake    Consider choosing from the kid's menu at restaurants to help with portions.     3.  Schedule with [facility] for about 6 weeks after you meet Dr. Flores    4. Please call or send a [employer] message with any questions or concerns.    Topics to cover at upcoming visits: Healthy Eating, Being Active, Monitoring, Taking Medication, Reducing Risks, and Healthy Coping    Follow-up: patient to schedule via [employer]; recommend late July    See Care Plan for co-developed, patient-state behavior change goals.  AVS provided for patient today.    Education Materials Provided:  No new materials provided today      SUBJECTIVE/OBJECTIVE:  Presents for: Follow-up  Accompanied by: Self  Diabetes education in the past 24mo: Yes  Focus of Visit: Healthy Eating, Assistance w/ making life changes, GLP-1 Start  Diabetes type: Type 2  Date of diagnosis: 2017  Disease course: Stable  How confident are you filling out medical forms by yourself:: Extremely  Diabetes management related comments/concerns: phobia of needles  Difficulty affording diabetes testing supplies?:  (n/a)  Cultural Influences/Ethnic Background:  Choose not to answer    Diabetes Symptoms & Complications:  Diabetes Related Symptoms: Fatigue, Polydipsia (increased thirst), Polyuria (increased urination)  Weight trend: Stable  Symptom course: Stable  Disease course: Stable  Complications assessed today?:  "No    Patient Problem List and Family Medical History reviewed for relevant medical history, current medical status, and diabetes risk factors.    Vitals:  There were no vitals taken for this visit.  Estimated body mass index is 29.7 kg/m  as calculated from the following:    Height as of 6/28/23: 1.676 m (5' 6\").    Weight as of 12/18/23: 83.5 kg (184 lb).   Last 3 BP:   BP Readings from Last 3 Encounters:   12/18/23 (!) 133/92   11/21/23 124/88   06/28/23 119/84       History   Smoking Status    Every Day    Packs/day: 1.00    Types: Cigarettes, Cigars   Smokeless Tobacco    Never       Labs:  Lab Results   Component Value Date    A1C 7.8 02/21/2024    A1C 7.9 06/29/2020     Lab Results   Component Value Date     02/21/2024     02/10/2023    .0 06/29/2020     Lab Results   Component Value Date    LDL 85 02/21/2024     02/10/2023    LDL 69.0 06/29/2020    LDL  11/08/2019     Cannot estimate LDL when triglyceride exceeds 400 mg/dL     HDL Cholesterol   Date Value Ref Range Status   06/29/2020 67.0 >40.0 Final     Direct Measure HDL   Date Value Ref Range Status   02/21/2024 50 >=40 mg/dL Final   ]  GFR Estimate   Date Value Ref Range Status   02/21/2024 79 >60 mL/min/1.73m2 Final   11/08/2019 >90 >60 mL/min/[1.73_m2] Final     Comment:     Non  GFR Calc  Starting 12/18/2018, serum creatinine based estimated GFR (eGFR) will be   calculated using the Chronic Kidney Disease Epidemiology Collaboration   (CKD-EPI) equation.       GFR Estimate If Black   Date Value Ref Range Status   11/08/2019 >90 >60 mL/min/[1.73_m2] Final     Comment:      GFR Calc  Starting 12/18/2018, serum creatinine based estimated GFR (eGFR) will be   calculated using the Chronic Kidney Disease Epidemiology Collaboration   (CKD-EPI) equation.       Lab Results   Component Value Date    CR 1.17 02/21/2024    CR 0.9 06/29/2020     No results found for: \"MICROALBUMIN\"    Healthy " "Eating:  Cultural/Confucianist diet restrictions?: No  How many times a week on average do you eat food made away from home (restaurant/take-out)?: 3  Meals include: Lunch, Dinner, Morning Snack, Evening Snack  Breakfast: work day: Glucerna, coffee (adds fiber)  Lunch: 1-2 PM: \"plate of sadness\": celery, carrots, broccoli, cucumber, dip, Greek yogurt, 1 piece of dark chocolate  Dinner: last night = frozen pizza with hot sauce, milk; Monday night: chicken (1/3 of 12 piece from [facility]), biscuit, gravy, and coleslaw, Crystal Light  Snacks: 12 PM: cottage cheese and mandarin oranges; PM: peanuts or chips  Beverages: Water, Tea, Coffee, Milk, Diet soda, Alcohol, Other (see Comments) (a \"couple\" drinks per night (gin or vodka mixed with zero sugar juice or tonic))  Please elaborate:: Glucerna Shakes, Gatorade Zero  Has patient met with a dietitian in the past?: Yes    Being Active:  Being Active Assessed Today: Yes  Exercise:: Yes (yard work, walking)  Days per week of moderate to strenuous exercise (like a brisk walk): 3  On average, minutes per day of exercise at this level:  (1.5-3 hours at a time)  How intense was your typical exercise? : Moderate (like brisk walking)  Barrier to exercise: Time, Other (weather (rain))    Monitoring:  Monitoring Assessed Today: Yes  Did patient bring glucose meter to appointment? : No  Blood Glucose Meter: One Touch  Times checking blood sugar at home (number): Never  Times checking blood sugar at home (per): Month    Taking Medications:  Diabetes Medication(s)       Sodium-Glucose Co-Transporter 2 (SGLT2) Inhibitors       JARDIANCE 10 MG TABS tablet TAKE 1 TABLET BY MOUTH EVERY DAY       Antidiabetic Combinations       JANUMET -1000 MG TB24 TAKE 1 TABLET BY MOUTH EVERY DAY          Taking Medication Assessed Today: Yes  Current Treatments: Diet, Oral Medication (taken by mouth)    Problem Solving:  Problem Solving Assessed Today: Yes  Is the patient at risk for hypoglycemia?: " No    Reducing Risks:  Reducing Risks Assessed Today: No  Diabetes Risks: Hyperlipidemia  CAD Risks: Male sex, Hypertension, Dyslipidemia, Diabetes Mellitus  Has dilated eye exam at least once a year?: Yes  Sees dentist every 6 months?: Yes  Feet checked by healthcare provider in the last year?: No    Healthy Coping:  Healthy Coping Assessed Today: Yes  Emotional response to diabetes: Fear/Anxiety  Informal Support system:: Friends, Parent, Other  Stage of change: ACTION (Actively working towards change)  Patient Activation Measure Survey Score:       No data to display                  Care Plan and Education Provided:  Healthy Eating: Consistency in amount and timing of carbohydrate intake and Eating out, Taking Medication: GLP-1/GIP Instruction - Mounjaro/Trulicity administration demonstrated today and Reducing Risks: Complications of diabetes    Annel Thao, MPH, RD, CDCES, LD 5/22/2024      Time Spent: 72 minutes  Encounter Type: Individual    Any diabetes medication dose changes were made via the CDE Protocol per the patient's referring provider and primary care provider. A copy of this encounter was shared with the provider.

## 2024-05-22 NOTE — PATIENT INSTRUCTIONS
Diabetes Medications   -no changes for now     -talk to Dr. Flores about adding Rybelsus    When you start Rybelsus, you need to stop Janumet     Replace Janumet with metformin extended release    Can continue Jardiance    2.  Meal Planning   -work to limit hard alcohol to no more than 3 oz in 24 hours (this is what is considered moderation for men).  The amount would change if you are consuming other types of alcohol (beer, wine)       Meal Planning    Aim for 45-60 grams of carbohydrate per meal    Up to 15 grams of carbohydrate per snack    Use resources to count carbohydrates: food labels, written materials, Calorie Eyal web site or kiel    Try have a meal or snack every 4-6 hours while awake    Consider choosing from the kid's menu at restaurants to help with portions.     3.  Schedule with [facility] for about 6 weeks after you meet Dr. Flores    4. Please call or send a [employer] message with any questions or concerns.    Annel Thao, MPH, RD, SHAWN, LD  Diabetes Education Triage Line: [phone]  Diabetes Education Appointment [phone]

## 2024-05-22 NOTE — Clinical Note
Hello! Right at the end of my diabetes education visit today, patient shared part of the reason for his alcohol consumption is to help him sleep.  I didn't have time to address this today.  I did send him some sleep hygiene tips/recommendations via [employer].  Can you follow up with him on sleep? I believe he needs more mental health/behavioral health support to address his needle phobia in order to proceed with CGM/fingerstick monitoring/injectable medication.  It looks like he does have some therapy at present.  Not sure if/what additional needs to be recommended/referrals placed.  Can one of you help me out with that part?    Dr. Flores: See my note for details.  He is open to Rybelsus.  Wants to have labs drawn and discuss them with you before he starts.  Can you order labs and let patient know to have drawn before his 6/12 visit with you?    Let me know if any questions.  Thanks! Annel Thao, MPH, RD, CDCES, LD 5/22/2024

## 2024-05-22 NOTE — PROGRESS NOTES
Diabetes Self-Management Education & Support    Presents for: Follow-up    Type of Service: In Person Visit      ASSESSMENT:  Patient here to follow up from initial visit with writer on 3/1/24.  Reviewed his goals from 3/1:   -decrease alcohol intake: patient reports no change              -increase physical activity: patient reports an increase in walking with the nicer weather              -make changes to food intake: patient reports no change    Expresses willingness to work on decreasing intake of alcohol.  Recommended patient limit alcohol to moderation (at most).  Given his preference for hard alcohol, discussed that moderation is no more than 3 oz of hard alcohol in 24 hours. Patient states he has no idea how much he consumes.  Does have measuring devices at home and agrees to start to measure.  At the end of the visit, he reports part of the reason he consumes alcohol is to help him sleep.  There was insufficient visit time to discuss sleep.  He asks about taking a supplemental sleep aid he found online.  Encouraged him to review ingredients with PCP to ensure no interaction with other medications before starting.  Sent [employer] message with sleep hygiene tips.     Intake reveals consumption of minimal carbohydrates throughout AM and early afternoon.  Intake in the evening is much higher in carbohydrates as he generally goes out to eat.  Reports eating out, fast food, brings him reza.  Patient unable to identify other sources of reza/enjoyment.  He comments to writer, his preference is remain within the 4 walls of his own home if possible. States he is familiar with label reading.  Suggested working towards having adequate carbohydrate intake at breakfast and lunch to prevent overeating at evening meal. Suggested working on one meal at a time. Started to discuss options for breakfast with patient.     Discussed risks of uncontrolled glucoses especially in terms of quality of life. As in initial visit with  writer, patient continues to voice he is weighing eating what he prefers to eat because it tastes good (referencing fast food) and dying a few years earlier compared to eating poor tasting food and living a longer life.  At the request of Dr. Flores, discussed starting a GLP-1 or GLP-1/GIP medication.  Per pharmacy liaison, Victoza, Trulicity, Rybelsus, Ozempic, and Mounjaro each are $20/month to patient. Given patient's needle phobia, demonstrated to patient the use of the Mounjaro/Trulicity pen.  Patient visibly moved backward in his chair with writer's demonstration and raised his voice when stating he is not willing to use an injectable diabetes medication.  He is open to Rybelsus and prefers to wait to start Rybelsus until after his upcoming appointment with Dr. Flores and his labs, specifically his A1C, have been drawn.    Prefers to schedule via [employer]. Follow Up order placed. Recommended follow up in late July, about 6 weeks after upcoming appointment with Dr. Flores.     Patient's most recent   Lab Results   Component Value Date    A1C 7.8 02/21/2024    A1C 7.9 06/29/2020     is not meeting goal of <7.0    Diabetes knowledge and skills assessment:   Patient is knowledgeable in diabetes management concepts related to: Taking Medication    Continue education with the following diabetes management concepts: Healthy Eating, Being Active, Monitoring, Taking Medication, Reducing Risks, and Healthy Coping    Based on learning assessment above, most appropriate setting for further diabetes education would be: Individual setting.      PLAN  Diabetes Medications   -no changes for now     -talk to Dr. Flores about adding Rybelsus    When you start Rybelsus, you need to stop Janumet     Replace Janumet with metformin extended release    Can continue Jardiance    2.  Meal Planning   -work to limit hard alcohol to no more than 3 oz in 24 hours (this is what is considered moderation for men).  The amount would change  if you are consuming other types of alcohol (beer, wine)       Meal Planning    Aim for 45-60 grams of carbohydrate per meal    Up to 15 grams of carbohydrate per snack    Use resources to count carbohydrates: food labels, written materials, Calorie Eyal web site or kiel    Try have a meal or snack every 4-6 hours while awake    Consider choosing from the kid's menu at restaurants to help with portions.     3.  Schedule with [facility] for about 6 weeks after you meet Dr. Flores    4. Please call or send a my[employer] message with any questions or concerns.    Topics to cover at upcoming visits: Healthy Eating, Being Active, Monitoring, Taking Medication, Reducing Risks, and Healthy Coping    Follow-up: patient to schedule via myLINGO; recommend late July    See Care Plan for co-developed, patient-state behavior change goals.  AVS provided for patient today.    Education Materials Provided:  No new materials provided today      SUBJECTIVE/OBJECTIVE:  Presents for: Follow-up  Accompanied by: Self  Diabetes education in the past 24mo: Yes  Focus of Visit: Healthy Eating, Assistance w/ making life changes, GLP-1 Start  Diabetes type: Type 2  Date of diagnosis: 2017  Disease course: Stable  How confident are you filling out medical forms by yourself:: Extremely  Diabetes management related comments/concerns: phobia of needles  Difficulty affording diabetes testing supplies?:  (n/a)  Cultural Influences/Ethnic Background:  Choose not to answer    Diabetes Symptoms & Complications:  Diabetes Related Symptoms: Fatigue, Polydipsia (increased thirst), Polyuria (increased urination)  Weight trend: Stable  Symptom course: Stable  Disease course: Stable  Complications assessed today?: No    Patient Problem List and Family Medical History reviewed for relevant medical history, current medical status, and diabetes risk factors.    Vitals:  There were no vitals taken for this visit.  Estimated body mass index is 29.7 kg/m  as calculated  "from the following:    Height as of 6/28/23: 1.676 m (5' 6\").    Weight as of 12/18/23: 83.5 kg (184 lb).   Last 3 BP:   BP Readings from Last 3 Encounters:   12/18/23 (!) 133/92   11/21/23 124/88   06/28/23 119/84       History   Smoking Status    Every Day    Packs/day: 1.00    Types: Cigarettes, Cigars   Smokeless Tobacco    Never       Labs:  Lab Results   Component Value Date    A1C 7.8 02/21/2024    A1C 7.9 06/29/2020     Lab Results   Component Value Date     02/21/2024     02/10/2023    .0 06/29/2020     Lab Results   Component Value Date    LDL 85 02/21/2024     02/10/2023    LDL 69.0 06/29/2020    LDL  11/08/2019     Cannot estimate LDL when triglyceride exceeds 400 mg/dL     HDL Cholesterol   Date Value Ref Range Status   06/29/2020 67.0 >40.0 Final     Direct Measure HDL   Date Value Ref Range Status   02/21/2024 50 >=40 mg/dL Final   ]  GFR Estimate   Date Value Ref Range Status   02/21/2024 79 >60 mL/min/1.73m2 Final   11/08/2019 >90 >60 mL/min/[1.73_m2] Final     Comment:     Non  GFR Calc  Starting 12/18/2018, serum creatinine based estimated GFR (eGFR) will be   calculated using the Chronic Kidney Disease Epidemiology Collaboration   (CKD-EPI) equation.       GFR Estimate If Black   Date Value Ref Range Status   11/08/2019 >90 >60 mL/min/[1.73_m2] Final     Comment:      GFR Calc  Starting 12/18/2018, serum creatinine based estimated GFR (eGFR) will be   calculated using the Chronic Kidney Disease Epidemiology Collaboration   (CKD-EPI) equation.       Lab Results   Component Value Date    CR 1.17 02/21/2024    CR 0.9 06/29/2020     No results found for: \"MICROALBUMIN\"    Healthy Eating:  Cultural/Scientology diet restrictions?: No  How many times a week on average do you eat food made away from home (restaurant/take-out)?: 3  Meals include: Lunch, Dinner, Morning Snack, Evening Snack  Breakfast: work day: Glucerna, coffee (adds fiber)  Lunch: " "1-2 PM: \"plate of sadness\": celery, carrots, broccoli, cucumber, dip, Greek yogurt, 1 piece of dark chocolate  Dinner: last night = frozen pizza with hot sauce, milk; Monday night: chicken (1/3 of 12 piece from [facility]), biscuit, gravy, and coleslaw, Crystal Light  Snacks: 12 PM: cottage cheese and mandarin oranges; PM: peanuts or chips  Beverages: Water, Tea, Coffee, Milk, Diet soda, Alcohol, Other (see Comments) (a \"couple\" drinks per night (gin or vodka mixed with zero sugar juice or tonic))  Please elaborate:: Glucerna Shakes, Gatorade Zero  Has patient met with a dietitian in the past?: Yes    Being Active:  Being Active Assessed Today: Yes  Exercise:: Yes (yard work, walking)  Days per week of moderate to strenuous exercise (like a brisk walk): 3  On average, minutes per day of exercise at this level:  (1.5-3 hours at a time)  How intense was your typical exercise? : Moderate (like brisk walking)  Barrier to exercise: Time, Other (weather (rain))    Monitoring:  Monitoring Assessed Today: Yes  Did patient bring glucose meter to appointment? : No  Blood Glucose Meter: One Touch  Times checking blood sugar at home (number): Never  Times checking blood sugar at home (per): Month    Taking Medications:  Diabetes Medication(s)       Sodium-Glucose Co-Transporter 2 (SGLT2) Inhibitors       JARDIANCE 10 MG TABS tablet TAKE 1 TABLET BY MOUTH EVERY DAY       Antidiabetic Combinations       JANUMET -1000 MG TB24 TAKE 1 TABLET BY MOUTH EVERY DAY          Taking Medication Assessed Today: Yes  Current Treatments: Diet, Oral Medication (taken by mouth)    Problem Solving:  Problem Solving Assessed Today: Yes  Is the patient at risk for hypoglycemia?: No    Reducing Risks:  Reducing Risks Assessed Today: No  Diabetes Risks: Hyperlipidemia  CAD Risks: Male sex, Hypertension, Dyslipidemia, Diabetes Mellitus  Has dilated eye exam at least once a year?: Yes  Sees dentist every 6 months?: Yes  Feet checked by healthcare " provider in the last year?: No    Healthy Coping:  Healthy Coping Assessed Today: Yes  Emotional response to diabetes: Fear/Anxiety  Informal Support system:: Friends, Parent, Other  Stage of change: ACTION (Actively working towards change)  Patient Activation Measure Survey Score:       No data to display                  Care Plan and Education Provided:  Healthy Eating: Consistency in amount and timing of carbohydrate intake and Eating out, Taking Medication: GLP-1/GIP Instruction - Mounjaro/Trulicity administration demonstrated today and Reducing Risks: Complications of diabetes    Annel Thao, MPH, RD, CDCES, LD 5/22/2024      Time Spent: 72 minutes  Encounter Type: Individual    Any diabetes medication dose changes were made via the CDE Protocol per the patient's referring provider and primary care provider. A copy of this encounter was shared with the provider.

## 2024-05-28 DIAGNOSIS — E11.9 TYPE 2 DIABETES MELLITUS WITHOUT COMPLICATION, WITHOUT LONG-TERM CURRENT USE OF INSULIN (H): Primary | ICD-10-CM

## 2024-05-29 ENCOUNTER — MYC MEDICAL ADVICE (OUTPATIENT)
Dept: ENDOCRINOLOGY | Facility: CLINIC | Age: 44
End: 2024-05-29
Payer: COMMERCIAL

## 2024-05-31 DIAGNOSIS — E11.9 TYPE 2 DIABETES, HBA1C GOAL < 7% (H): Primary | ICD-10-CM

## 2024-06-05 ENCOUNTER — LAB (OUTPATIENT)
Dept: LAB | Facility: CLINIC | Age: 44
End: 2024-06-05
Payer: COMMERCIAL

## 2024-06-05 DIAGNOSIS — E11.9 TYPE 2 DIABETES, HBA1C GOAL < 7% (H): ICD-10-CM

## 2024-06-05 DIAGNOSIS — E11.9 TYPE 2 DIABETES MELLITUS WITHOUT COMPLICATION, WITHOUT LONG-TERM CURRENT USE OF INSULIN (H): ICD-10-CM

## 2024-06-05 LAB — HBA1C MFR BLD: 7.9 % (ref 0–5.6)

## 2024-06-05 PROCEDURE — 83036 HEMOGLOBIN GLYCOSYLATED A1C: CPT

## 2024-06-05 PROCEDURE — 80048 BASIC METABOLIC PNL TOTAL CA: CPT

## 2024-06-05 PROCEDURE — 83721 ASSAY OF BLOOD LIPOPROTEIN: CPT

## 2024-06-05 PROCEDURE — 36415 COLL VENOUS BLD VENIPUNCTURE: CPT

## 2024-06-05 PROCEDURE — 80061 LIPID PANEL: CPT

## 2024-06-06 LAB
ANION GAP SERPL CALCULATED.3IONS-SCNC: 12 MMOL/L (ref 7–15)
BUN SERPL-MCNC: 21.8 MG/DL (ref 6–20)
CALCIUM SERPL-MCNC: 9.8 MG/DL (ref 8.6–10)
CHLORIDE SERPL-SCNC: 105 MMOL/L (ref 98–107)
CHOLEST SERPL-MCNC: 193 MG/DL
CREAT SERPL-MCNC: 1.08 MG/DL (ref 0.67–1.17)
DEPRECATED HCO3 PLAS-SCNC: 23 MMOL/L (ref 22–29)
EGFRCR SERPLBLD CKD-EPI 2021: 87 ML/MIN/1.73M2
FASTING STATUS PATIENT QL REPORTED: YES
FASTING STATUS PATIENT QL REPORTED: YES
GLUCOSE SERPL-MCNC: 162 MG/DL (ref 70–99)
HDLC SERPL-MCNC: 43 MG/DL
LDLC SERPL CALC-MCNC: ABNORMAL MG/DL
LDLC SERPL DIRECT ASSAY-MCNC: 88 MG/DL
NONHDLC SERPL-MCNC: 150 MG/DL
POTASSIUM SERPL-SCNC: 4.6 MMOL/L (ref 3.4–5.3)
SODIUM SERPL-SCNC: 140 MMOL/L (ref 135–145)
TRIGL SERPL-MCNC: 461 MG/DL

## 2024-06-12 ENCOUNTER — VIRTUAL VISIT (OUTPATIENT)
Dept: ENDOCRINOLOGY | Facility: CLINIC | Age: 44
End: 2024-06-12
Payer: COMMERCIAL

## 2024-06-12 DIAGNOSIS — E78.2 MIXED HYPERLIPIDEMIA: ICD-10-CM

## 2024-06-12 DIAGNOSIS — E11.9 TYPE 2 DIABETES MELLITUS WITHOUT COMPLICATION, WITHOUT LONG-TERM CURRENT USE OF INSULIN (H): Primary | ICD-10-CM

## 2024-06-12 PROCEDURE — 99214 OFFICE O/P EST MOD 30 MIN: CPT | Mod: 95 | Performed by: INTERNAL MEDICINE

## 2024-06-12 PROCEDURE — G2211 COMPLEX E/M VISIT ADD ON: HCPCS | Mod: 95 | Performed by: INTERNAL MEDICINE

## 2024-06-12 NOTE — PATIENT INSTRUCTIONS
Discussed future med changes:  Changing from JanumetXR and Jardiance to    Rybelsus, metforminXR, and Jardiance  Gradual dose increase of Rybelsus  Monitoring for possible GI symptoms

## 2024-06-12 NOTE — PROGRESS NOTES
Virtual Visit Details    Type of service:  Video Visit     Originating Location (pt. Location): Home  Distant Location (provider location):  Off-site  Platform used for Video Visit: Diego      Recent issues:  Diabetes follow-up evaluation  Taking the JanumetXR and Jardiance medications   He met with our [facility] Diab Educator (ER), reviewed overall T2DM management and new med options  Reports the lipid meds changed from NiacinER to fenofibrate + rosuvastatin med combo          2017. Diagnosis of diabetes mellitus when med eval for torn right shoulder muscle  High glucose level noted during med evaluation, hgbA1c near 11.5%  Started Jardiance medication, took for approx 6 months  Recalls significant weight loss of approx 30#  Switched to metformin medication, then dose increases              Concerns about GI symptoms with nausea, nausea, also morning vomiting              Tried Prilosec, then Zantac  Has seen [facility] for medical evaluations  Recent discussion with his workplace team physician, Dr. Skye Mane              Advised to see me for medical evaluation  Previously on metforminXR 500 mg 2 tabs BID     Previous [facility] labs include:               8/12/19. Initial diabetes evaluation with me at [place]  Reviewed health history and diabetes management  Medication change to JanumetXR  1/2022. Addition of Jardiance  5/2024. Met with [facility] Diab Educator (ER)    Current DM medications:  JanumetXR 100/1000 1-tab each morning  Jardiance 10 mg  1-tab each morning     Blood glucose (BG) meter              Infrequent testing  CGM use:  Years ago, not recently (phobia?)  Fam Hx Diabetes: none  Previous [facility] hgbA1c trends include:  Lab Results   Component Value Date    A1C 7.9 (H) 06/05/2024    A1C 7.8 (H) 02/21/2024    A1C 7.9 (H) 11/08/2023    A1C 7.7 (H) 06/16/2023    A1C 6.7 (H) 02/10/2023      Recent [facility] labs include:           Lab Results   Component Value Date    A1C 7.9 (H) 06/05/2024     06/05/2024     POTASSIUM 4.6 06/05/2024    CHLORIDE 105 06/05/2024    CO2 23 06/05/2024    ANIONGAP 12 06/05/2024     (H) 06/05/2024    BUN 21.8 (H) 06/05/2024    CR 1.08 06/05/2024    GFRESTIMATED 87 06/05/2024    GFRESTBLACK >90 11/08/2019    FLAVIO 9.8 06/05/2024    CHOL 193 06/05/2024    TRIG 461 (H) 06/05/2024    HDL 43 06/05/2024    LDL  06/05/2024      Comment:      Cannot estimate LDL when triglyceride exceeds 400 mg/dL    LDL 88 06/05/2024    NHDL 150 (H) 06/05/2024    UCRR 61.6 06/28/2023    MICROL <12.0 06/28/2023    UMALCR  06/28/2023      Comment:      Unable to calculate, urine albumin and/or urine creatinine is outside detectable limits.  Microalbuminuria is defined as an albumin:creatinine ratio of 17 to 299 for males and 25 to 299 for females. A ratio of albumin:creatinine of 300 or higher is indicative of overt proteinuria.  Due to biologic variability, positive results should be confirmed by a second, first-morning random or 24-hour timed urine specimen. If there is discrepancy, a third specimen is recommended. When 2 out of 3 results are in the microalbuminuria range, this is evidence for incipient nephropathy and warrants increased efforts at glucose control, blood pressure control, and institution of therapy with an angiotensin-converting-enzyme (ACE) inhibitor (if the patient can tolerate it).       Lab Results   Component Value Date    TSH 1.56 02/21/2024     [facility], no DR per patient  History of hyperlipidemia, takes simvastatin 20 mg daily   1/2023. Stopped statin med and started NiacinER (Niaspan) 500 mg 1-tab QPM along with aspirin 1-tab QPM, head sweating symptoms   ~8/2023. Continued the fenofibrate but changed from simvastatin to rosuvastatin medication  DM Complications:      None known          Lives in [facility]  Has seen [facility]   Also sees Dr. LANCE Jose/[facility] for general medicine  "evaluations.      PMH/PSH:  Past Medical History:   Diagnosis Date    Allergic rhinitis     Benign essential hypertension     Hyperlipidemia     Rotator cuff tear, left 2015    Rotator cuff tear, right 2017    Type 2 diabetes mellitus (H)      Past Surgical History:   Procedure Laterality Date    APPENDECTOMY      SHOULDER SURGERY      left and right previously       Family Hx:  No family history on file.      Social Hx:  Social History     Socioeconomic History    Marital status: Single     Spouse name: Not on file    Number of children: Not on file    Years of education: Not on file    Highest education level: Not on file   Occupational History    Not on file   Tobacco Use    Smoking status: Every Day     Current packs/day: 1.00     Types: Cigarettes, Cigars    Smokeless tobacco: Never   Substance and Sexual Activity    Alcohol use: Yes    Drug use: Never    Sexual activity: Not on file   Other Topics Concern    Not on file   Social History Narrative    Single. 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     From [place], [place]. Lives in [place] and works mostly from home.     Enjoys filling time doing \"lazy things\", e.g. binge watching shows and reading.     Also, enjoys yard work and walking around neighborhood. Used to enjoy taking long drives.      Social Determinants of Health     Financial Resource Strain: Not on file   Food Insecurity: Not on file   Transportation Needs: Not on file   Physical Activity: Not on file   Stress: Not on file   Social Connections: Not on file   Interpersonal Safety: Low Risk  (12/18/2023)    Interpersonal Safety     Do you feel physically and emotionally safe where you currently live?: Yes     Within the past 12 months, have you been hit, slapped, kicked or otherwise physically hurt by someone?: No     Within the past 12 months, have you been humiliated or emotionally abused in other ways by your partner or ex-partner?: No   Housing Stability: Not " on file          MEDICATIONS:  has a current medication list which includes the following prescription(s): fenofibrate, hydrochlorothiazide, janumet xr, jardiance, losartan, multiple vitamins-minerals, rosuvastatin, vitamin d, and acetaminophen.    ROS: 10 point ROS neg other than the symptoms noted above in the HPI.     GENERAL: energy good, wt stable; denies fevers, chills, night sweats.   HEENT: sinus congestion; no dysphagia, odonophagia, diplopia, neck pain  THYROID:  no apparent hyper or hypothyroid symptoms  CV: no chest pain, pressure, palpitations  LUNGS: no SOB, MEHTA, cough, wheezing   ABDOMEN: occasional nausea; denies abdominal pain  EXTREMITIES: no rashes, ulcers, edema  NEUROLOGY: no headaches, denies changes in vision, tingling, extremitiy numbness   MSK: no muscle aches or pains, weakness  SKIN: no rashes or lesions  : denies nocturia  PSYCH:  anxiety  ENDOCRINE: no heat or cold intolerance    Physical Exam (visual exam)  VS:  no vital signs taken for video visit  CONSTITUTIONAL: healthy, alert and NAD, well dressed, answering questions appropriately  ENT: no nose swelling or nasal discharge, mouth redness or gum changes.  EYES: eyes grossly normal to inspection, conjunctivae and sclerae normal, no exophthalmos or proptosis  THYROID:  no apparent nodules or goiter  LUNGS: no audible wheeze, cough or visible cyanosis, no visible retractions or increased work of breathing  ABDOMEN: abdomen not evaluated  EXTREMITIES: no hand tremors, limited exam  NEUROLOGY: CN grossly intact, mentation intact and speech normal   SKIN:  no apparent skin lesions, rash, or edema with visualized skin appearance  PSYCH: mentation appears normal, affect normal/bright, judgement and insight intact,   normal speech and appearance well groomed    LABS:     All pertinent notes, labs, and images personally reviewed by me.      A/P:  Encounter Diagnoses   Name Primary?    Type 2 diabetes mellitus without complication, without  long-term current use of insulin (H) Yes    Mixed hyperlipidemia          Comments:  Reviewed health history and diabetes issues.  Recent hgbA1c improved but cholesterol and TG levels high despite rosuvastatin and fenofibrate med use  Previous hgbA1c levels elevated, often in the 7-8% range  Difficult to assess glycemic control without recent BGM or CGM data  Reviewed and interpreted tests that I previously ordered.   Ordered appropriate tests for the endocrinology disease management.    Management options discussed and implemented after shared medical decision making with the patient.  T2DM problem is chronic-stable    Plan:  Discussed general issues with the diabetes diagnosis and management  We discussed the hgbA1c test which reflects previous overall glucose levels or control  Discussed the importance of blood glucose (BG) testing to assess glucose trends  Provided general overview of the diabetes medication options and medication treatment plan.  We have reviewed lipid test results and the cholesterol, triglyceride lower medication options     Recommend:  I recommend switching meds to include a GLP1RA medication    Since he does not wish to use an injectable pen medication, would change to Rybelsus tablet morning dosing    Provided overview of Rybelsus, dosing, potential benefits and possible SE's  Continue the current JanumetXR and Jardiance daily dose routine since he has a lot of these medications now  When Rybelsus started, change to MFXR and Jardiance as separate medications  Plan repeat lab testing in 8/2024    Testing at [facility]    Lab orders placed  We have reviewed ideal plan to begin glucose testing:  Test FBG at least 1-2x/week, goal target BG  mg/dl  We discussed the value of wearing a CGM sensor, painless attachment to the arm skin for 2-14 days, clinic Libre3 sample option  Continue the current lipid medications, goal LDL <100 mg/dl and TG <200 mg/dl    Consider adding Vascepa 2  gm BID for the hypertriglyceridemia  Consider seeing a lipid specialist at [facility], such as Dr. ZAHRA Farr  Keep focus on diet, increased exercise, weight management.  Advise having fasting lipid panel testing and dilated eye examination, at least annually    Keep regular follow-up appointments with PCP, also review his alcohol consumption and related medication questions  Addressed patient questions today    The longitudinal plan of care for the endocrine problem(s) were addressed during this visit.  Due to added complexity of care,   we will continue to support the patient and the subsequent management of this condition with ongoing continuity of care.    Patient Instructions   Discussed future med changes:  Changing from JanumetXR and Jardiance to    Rybelsus, metforminXR, and Jardiance  Gradual dose increase of Rybelsus  Monitoring for possible GI symptoms    Future labs ordered today:   Orders Placed This Encounter   Procedures    Hemoglobin A1c    Basic metabolic panel    Albumin Random Urine Quantitative with Creat Ratio     Radiology/Consults ordered today: None    Total time spent on day of encounter:  26 min    Follow-up:  8/19/24 at 2pm, Katie Flores MD, MS  Endocrinology  [facility]    CC:  MILES Jose

## 2024-06-17 DIAGNOSIS — E78.2 MIXED HYPERLIPIDEMIA: ICD-10-CM

## 2024-06-17 RX ORDER — FENOFIBRATE 145 MG/1
TABLET, COATED ORAL
Qty: 90 TABLET | Refills: 1 | Status: SHIPPED | OUTPATIENT
Start: 2024-06-17

## 2024-06-17 NOTE — TELEPHONE ENCOUNTER
Last Written Prescription Date:  12/26/23  Last Fill Quantity: 90,  # refills: 1   Last office visit: 6/12/2024 with prescribing provider:  Dr. Flores   Future Office Visit:  8/19/24    Requested Prescriptions   Pending Prescriptions Disp Refills    fenofibrate (TRICOR) 145 MG tablet [Pharmacy Med Name: FENOFIBRATE 145 MG TABLET] 90 tablet 1     Sig: TAKE 1 TABLET BY MOUTH EVERY DAY       Antihyperlipidemic agents Passed - 6/17/2024 12:47 AM        Passed - LDL on file in the past 12 months        Passed - Medication is active on med list        Passed - Recent (12 mo) or future (90 days) visit within the authorizing provider's specialty     The patient must have completed an in-person or virtual visit within the past 12 months or has a future visit scheduled within the next 90 days with the authorizing provider s specialty.  Urgent care and e-visits do not quality as an office visit for this protocol.          Passed - Patient is age 18 years or older           Refill sent  Aimee Delgadillo RN

## 2024-08-03 DIAGNOSIS — I10 BENIGN ESSENTIAL HYPERTENSION: ICD-10-CM

## 2024-08-06 RX ORDER — HYDROCHLOROTHIAZIDE 12.5 MG/1
TABLET ORAL
Qty: 90 TABLET | Refills: 0 | Status: SHIPPED | OUTPATIENT
Start: 2024-08-06 | End: 2024-08-28

## 2024-08-06 NOTE — TELEPHONE ENCOUNTER
Medication requested: hydroCHLOROthiazide 12.5 MG tablet   Last office visit: 12/18/2023  [facility] appointments: none  Medication last refilled: 5/6/2024; 90 + 0 refills  Last qualifying labs:     BP Readings from Last 3 Encounters:   12/18/23 (!) 133/92   11/21/23 124/88   06/28/23 119/84     Component      Latest Ref Rng 6/5/2024  9:17 AM   GFR Estimate      >60 mL/min/1.73m2 87      Medication is being filled for 1 time refill only due to:   pt due for BP visit. [facility] message sent to pt to schedule appt.    RACHEL Urbina, RN  08/06/24, 3:04 PM

## 2024-08-12 ENCOUNTER — LAB (OUTPATIENT)
Dept: LAB | Facility: CLINIC | Age: 44
End: 2024-08-12
Payer: COMMERCIAL

## 2024-08-12 DIAGNOSIS — E11.9 TYPE 2 DIABETES MELLITUS WITHOUT COMPLICATION, WITHOUT LONG-TERM CURRENT USE OF INSULIN (H): ICD-10-CM

## 2024-08-12 LAB
ANION GAP SERPL CALCULATED.3IONS-SCNC: 11 MMOL/L (ref 7–15)
BUN SERPL-MCNC: 20 MG/DL (ref 6–20)
CALCIUM SERPL-MCNC: 9.4 MG/DL (ref 8.8–10.4)
CHLORIDE SERPL-SCNC: 102 MMOL/L (ref 98–107)
CREAT SERPL-MCNC: 1.09 MG/DL (ref 0.67–1.17)
CREAT UR-MCNC: 73.7 MG/DL
EGFRCR SERPLBLD CKD-EPI 2021: 86 ML/MIN/1.73M2
GLUCOSE SERPL-MCNC: 174 MG/DL (ref 70–99)
HBA1C MFR BLD: 8 % (ref 0–5.6)
HCO3 SERPL-SCNC: 25 MMOL/L (ref 22–29)
MICROALBUMIN UR-MCNC: <12 MG/L
MICROALBUMIN/CREAT UR: NORMAL MG/G{CREAT}
POTASSIUM SERPL-SCNC: 4.4 MMOL/L (ref 3.4–5.3)
SODIUM SERPL-SCNC: 138 MMOL/L (ref 135–145)

## 2024-08-12 PROCEDURE — 82043 UR ALBUMIN QUANTITATIVE: CPT

## 2024-08-12 PROCEDURE — 80048 BASIC METABOLIC PNL TOTAL CA: CPT

## 2024-08-12 PROCEDURE — 83036 HEMOGLOBIN GLYCOSYLATED A1C: CPT

## 2024-08-12 PROCEDURE — 36415 COLL VENOUS BLD VENIPUNCTURE: CPT

## 2024-08-12 PROCEDURE — 82570 ASSAY OF URINE CREATININE: CPT

## 2024-08-19 ENCOUNTER — VIRTUAL VISIT (OUTPATIENT)
Dept: ENDOCRINOLOGY | Facility: CLINIC | Age: 44
End: 2024-08-19
Payer: COMMERCIAL

## 2024-08-19 ENCOUNTER — MYC MEDICAL ADVICE (OUTPATIENT)
Dept: FAMILY MEDICINE | Facility: CLINIC | Age: 44
End: 2024-08-19

## 2024-08-19 DIAGNOSIS — E78.2 MIXED HYPERLIPIDEMIA: ICD-10-CM

## 2024-08-19 DIAGNOSIS — E11.9 TYPE 2 DIABETES MELLITUS WITHOUT COMPLICATION, WITHOUT LONG-TERM CURRENT USE OF INSULIN (H): Primary | ICD-10-CM

## 2024-08-19 PROCEDURE — 99214 OFFICE O/P EST MOD 30 MIN: CPT | Mod: 95 | Performed by: INTERNAL MEDICINE

## 2024-08-19 PROCEDURE — G2211 COMPLEX E/M VISIT ADD ON: HCPCS | Mod: 95 | Performed by: INTERNAL MEDICINE

## 2024-08-19 RX ORDER — METFORMIN HCL 500 MG
1000 TABLET, EXTENDED RELEASE 24 HR ORAL DAILY
Qty: 60 TABLET | Refills: 5 | Status: SHIPPED | OUTPATIENT
Start: 2024-08-19

## 2024-08-19 NOTE — PROGRESS NOTES
Virtual Visit Details    Type of service:  Video Visit     Originating Location (pt. Location): Home  Distant Location (provider location):  Off-site  Platform used for Video Visit: Diego        Recent issues:  Diabetes follow-up evaluation  Taking the JanumetXR and Jardiance medications   He met with our [facility] Diab Educator (ER), reviewed overall T2DM management and new med options  Reports the lipid meds changed from NiacinER to fenofibrate + rosuvastatin med combo          2017. Diagnosis of diabetes mellitus when med eval for torn right shoulder muscle  High glucose level noted during med evaluation, hgbA1c near 11.5%  Started Jardiance medication, took for approx 6 months  Recalls significant weight loss of approx 30#  Switched to metformin medication, then dose increases              Concerns about GI symptoms with nausea, nausea, also morning vomiting              Tried Prilosec, then Zantac  Has seen [facility] for medical evaluations  Recent discussion with his workplace team physician, Dr. Skye Mane              Advised to see me for medical evaluation  Previously on metforminXR 500 mg 2 tabs BID  Previous [facility] labs include:               8/12/19. Initial diabetes evaluation with me at [place]  Reviewed health history and diabetes management  Medication change to JanumetXR  1/2022. Addition of Jardiance  5/2024. Met with [facility] Diab Educator (ER)  Current DM medications:  JanumetXR 100/1000 1-tab each morning  Jardiance 10 mg  1-tab each morning     Blood glucose (BG) meter              Infrequent testing  CGM use:  Years ago, not recently (phobia?)  Fam Hx Diabetes: none  Previous [facility] hgbA1c trends include:  Lab Results   Component Value Date    A1C 8.0 (H) 08/12/2024    A1C 7.9 (H) 06/05/2024    A1C 7.8 (H) 02/21/2024    A1C 7.9 (H) 11/08/2023    A1C 7.7 (H) 06/16/2023        Recent [facility] labs include:  Lab Results   Component Value Date    A1C 8.0 (H) 08/12/2024     08/12/2024     POTASSIUM 4.4 08/12/2024    CHLORIDE 102 08/12/2024    CO2 25 08/12/2024    ANIONGAP 11 08/12/2024     (H) 08/12/2024    BUN 20.0 08/12/2024    CR 1.09 08/12/2024    GFRESTIMATED 86 08/12/2024    GFRESTBLACK >90 11/08/2019    FLAVIO 9.4 08/12/2024    CHOL 193 06/05/2024    TRIG 461 (H) 06/05/2024    HDL 43 06/05/2024    LDL  06/05/2024      Comment:      Cannot estimate LDL when triglyceride exceeds 400 mg/dL    LDL 88 06/05/2024    NHDL 150 (H) 06/05/2024    UCRR 73.7 08/12/2024    MICROL <12.0 08/12/2024    UMALCR  08/12/2024      Comment:      Unable to calculate, urine albumin and/or urine creatinine is outside detectable limits.  Microalbuminuria is defined as an albumin:creatinine ratio of 17 to 299 for males and 25 to 299 for females. A ratio of albumin:creatinine of 300 or higher is indicative of overt proteinuria.  Due to biologic variability, positive results should be confirmed by a second, first-morning random or 24-hour timed urine specimen. If there is discrepancy, a third specimen is recommended. When 2 out of 3 results are in the microalbuminuria range, this is evidence for incipient nephropathy and warrants increased efforts at glucose control, blood pressure control, and institution of therapy with an angiotensin-converting-enzyme (ACE) inhibitor (if the patient can tolerate it).       Lab Results   Component Value Date    TSH 1.56 02/21/2024     [facility], no DR per patient  History of hyperlipidemia, takes simvastatin 20 mg daily   1/2023. Stopped statin med and started NiacinER (Niaspan) 500 mg 1-tab QPM along with aspirin 1-tab QPM, head sweating symptoms   ~8/2023. Continued the fenofibrate but changed from simvastatin to rosuvastatin medication  DM Complications:      None known          Lives in [facility]  Has [facility]   Also sees Dr. LANCE Jose/[facility] for general medicine  "evaluations.      PMH/PSH:  Past Medical History:   Diagnosis Date    Allergic rhinitis     Benign essential hypertension     Hyperlipidemia     Rotator cuff tear, left 2015    Rotator cuff tear, right 2017    Type 2 diabetes mellitus (H)      Past Surgical History:   Procedure Laterality Date    APPENDECTOMY      SHOULDER SURGERY      left and right previously       Family Hx:  No family history on file.      Social Hx:  Social History     Socioeconomic History    Marital status: Single     Spouse name: Not on file    Number of children: Not on file    Years of education: Not on file    Highest education level: Not on file   Occupational History    Not on file   Tobacco Use    Smoking status: Every Day     Current packs/day: 1.00     Types: Cigarettes, Cigars    Smokeless tobacco: Never   Substance and Sexual Activity    Alcohol use: Yes    Drug use: Never    Sexual activity: Not on file   Other Topics Concern    Not on file   Social History Narrative    Single. 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     From [place], [place]. Lives in [place] and works mostly from home.     Enjoys filling time doing \"lazy things\", e.g. binge watching shows and reading.     Also, enjoys yard work and walking around neighborhood. Used to enjoy taking long drives.      Social Determinants of Health     Financial Resource Strain: Not on file   Food Insecurity: Not on file   Transportation Needs: Not on file   Physical Activity: Not on file   Stress: Not on file   Social Connections: Not on file   Interpersonal Safety: Low Risk  (12/18/2023)    Interpersonal Safety     Do you feel physically and emotionally safe where you currently live?: Yes     Within the past 12 months, have you been hit, slapped, kicked or otherwise physically hurt by someone?: No     Within the past 12 months, have you been humiliated or emotionally abused in other ways by your partner or ex-partner?: No   Housing Stability: Not " on file          MEDICATIONS:  has a current medication list which includes the following prescription(s): acetaminophen, fenofibrate, hydrochlorothiazide, jardiance, losartan, multiple vitamins-minerals, rosuvastatin, semaglutide, vitamin d, and metformin.    ROS: 10 point ROS neg other than the symptoms noted above in the HPI.     GENERAL: energy good, wt stable; denies fevers, chills, night sweats.   HEENT: sinus congestion; no dysphagia, odonophagia, diplopia, neck pain  THYROID:  no apparent hyper or hypothyroid symptoms  CV: no chest pain, pressure, palpitations  LUNGS: no SOB, MEHTA, cough, wheezing   ABDOMEN: occasional nausea; denies abdominal pain  EXTREMITIES: no rashes, ulcers, edema  NEUROLOGY: no headaches, denies changes in vision, tingling, extremitiy numbness   MSK: no muscle aches or pains, weakness  SKIN: no rashes or lesions  : denies nocturia  PSYCH:  anxiety  ENDOCRINE: no heat or cold intolerance    Physical Exam (visual exam)  VS:  no vital signs taken for video visit  CONSTITUTIONAL: healthy, alert and NAD, well dressed, answering questions appropriately  ENT: no nose swelling or nasal discharge, mouth redness or gum changes.  EYES: eyes grossly normal to inspection, conjunctivae and sclerae normal, no exophthalmos or proptosis  THYROID:  no apparent nodules or goiter  LUNGS: no audible wheeze, cough or visible cyanosis, no visible retractions or increased work of breathing  ABDOMEN: abdomen not evaluated  EXTREMITIES: no hand tremors, limited exam  NEUROLOGY: CN grossly intact, mentation intact and speech normal   SKIN:  no apparent skin lesions, rash, or edema with visualized skin appearance  PSYCH: mentation appears normal, affect normal/bright, judgement and insight intact,   normal speech and appearance well groomed    LABS:     All pertinent notes, labs, and images personally reviewed by me.      A/P:  Encounter Diagnoses   Name Primary?    Type 2 diabetes mellitus without  complication, without long-term current use of insulin (H) Yes    Mixed hyperlipidemia          Comments:  Reviewed health history and diabetes issues.  Recent hgbA1c improved but cholesterol and TG levels high despite rosuvastatin and fenofibrate med use  Previous hgbA1c levels elevated, often in the 7-8% range  Difficult to assess glycemic control without recent BGM or CGM data  Reviewed and interpreted tests that I previously ordered.   Ordered appropriate tests for the endocrinology disease management.    Management options discussed and implemented after shared medical decision making with the patient.  T2DM problem is chronic-stable    Plan:  Discussed general issues with the diabetes diagnosis and management  We discussed the hgbA1c test which reflects previous overall glucose levels or control  Discussed the importance of blood glucose (BG) testing to assess glucose trends  Provided general overview of the diabetes medication options and medication treatment plan.  We have reviewed lipid test results and the cholesterol, triglyceride lower medication options     Recommend:  I recommend switching meds to include a GLP1RA medication    Since he does not wish to use an injectable pen medication, would change to Rybelsus tablet morning dosing    Provided overview of Rybelsus, dosing, potential benefits and possible SE's  Change to MFXR, Rybelsus, and Jardiance plan    Start Rybelsus 3 mg tablet each morning    Take metforminXR 500 mg 2-tabs daily and Jardiance 10 mg 1-tab daily, dose at midday (or later)  Monitor for possible GI symptoms  Plan repeat non-fasting lab testing in 11/2024    Testing at [facility]    Lab orders placed  We have reviewed ideal plan to begin glucose testing:  Test FBG at least 1-2x/week, goal target BG  mg/dl  We have discussed the value of wearing a CGM sensor, painless attachment to the arm skin for 2-14 days, clinic Libre3 sample option  Consider follow-up [facility] Diab Ed  appt   Continue the current lipid medications, goal LDL <100 mg/dl and TG <200 mg/dl    Consider adding Vascepa 2 gm BID for the hypertriglyceridemia  Consider seeing a lipid specialist at [facility], such as Dr. ZAHRA Farr  Keep focus on diet, increased exercise, weight management.  Limit or discontinue alcohol use  Advise having fasting lipid panel testing and dilated eye examination, at least annually    Keep regular follow-up appointments with PCP, also review his alcohol consumption and related medication questions  Addressed patient questions today    The longitudinal plan of care for the endocrine problem(s) were addressed during this visit.  Due to added complexity of care,   we will continue to support the patient and the subsequent management of this condition with ongoing continuity of care.    There are no Patient Instructions on file for this visit.    Future labs ordered today:   Orders Placed This Encounter   Procedures    Hemoglobin A1c    Basic metabolic panel    ALT     Radiology/Consults ordered today: None    Total time spent on day of encounter:  26 min    Follow-up:  11/2024    LEO Flores MD, MS  Endocrinology  [facility]    CC:  Care Team

## 2024-08-28 ENCOUNTER — OFFICE VISIT (OUTPATIENT)
Dept: FAMILY MEDICINE | Facility: CLINIC | Age: 44
End: 2024-08-28
Payer: COMMERCIAL

## 2024-08-28 VITALS
OXYGEN SATURATION: 100 % | WEIGHT: 184.5 LBS | BODY MASS INDEX: 29.78 KG/M2 | HEART RATE: 90 BPM | DIASTOLIC BLOOD PRESSURE: 86 MMHG | TEMPERATURE: 97.9 F | RESPIRATION RATE: 16 BRPM | SYSTOLIC BLOOD PRESSURE: 127 MMHG

## 2024-08-28 DIAGNOSIS — F41.1 GAD (GENERALIZED ANXIETY DISORDER): ICD-10-CM

## 2024-08-28 DIAGNOSIS — I10 BENIGN ESSENTIAL HYPERTENSION: Primary | ICD-10-CM

## 2024-08-28 DIAGNOSIS — R91.8 PULMONARY NODULES: ICD-10-CM

## 2024-08-28 DIAGNOSIS — F17.200 SMOKER: ICD-10-CM

## 2024-08-28 RX ORDER — LOSARTAN POTASSIUM 100 MG/1
100 TABLET ORAL DAILY
Qty: 90 TABLET | Refills: 3 | Status: CANCELLED | OUTPATIENT
Start: 2024-08-28

## 2024-08-28 RX ORDER — HYDROCHLOROTHIAZIDE 12.5 MG/1
12.5 TABLET ORAL DAILY
Qty: 90 TABLET | Refills: 3 | Status: SHIPPED | OUTPATIENT
Start: 2024-09-04

## 2024-08-28 ASSESSMENT — ANXIETY QUESTIONNAIRES
1. FEELING NERVOUS, ANXIOUS, OR ON EDGE: NEARLY EVERY DAY
GAD7 TOTAL SCORE: 12
GAD7 TOTAL SCORE: 12
4. TROUBLE RELAXING: MORE THAN HALF THE DAYS
7. FEELING AFRAID AS IF SOMETHING AWFUL MIGHT HAPPEN: SEVERAL DAYS
3. WORRYING TOO MUCH ABOUT DIFFERENT THINGS: SEVERAL DAYS
2. NOT BEING ABLE TO STOP OR CONTROL WORRYING: SEVERAL DAYS
8. IF YOU CHECKED OFF ANY PROBLEMS, HOW DIFFICULT HAVE THESE MADE IT FOR YOU TO DO YOUR WORK, TAKE CARE OF THINGS AT HOME, OR GET ALONG WITH OTHER PEOPLE?: SOMEWHAT DIFFICULT
5. BEING SO RESTLESS THAT IT IS HARD TO SIT STILL: MORE THAN HALF THE DAYS
6. BECOMING EASILY ANNOYED OR IRRITABLE: MORE THAN HALF THE DAYS
IF YOU CHECKED OFF ANY PROBLEMS ON THIS QUESTIONNAIRE, HOW DIFFICULT HAVE THESE PROBLEMS MADE IT FOR YOU TO DO YOUR WORK, TAKE CARE OF THINGS AT HOME, OR GET ALONG WITH OTHER PEOPLE: SOMEWHAT DIFFICULT
GAD7 TOTAL SCORE: 12
7. FEELING AFRAID AS IF SOMETHING AWFUL MIGHT HAPPEN: SEVERAL DAYS

## 2024-08-28 NOTE — NURSING NOTE
44 year old  Chief Complaint   Patient presents with    Follow Up    Hypertension       Blood pressure 127/86, pulse 90, temperature 97.9  F (36.6  C), temperature source Temporal, resp. rate 16, weight 83.7 kg (184 lb 8 oz), SpO2 100%. Body mass index is 29.78 kg/m .  Patient Active Problem List   Diagnosis    Type 2 diabetes mellitus without complication, without long-term current use of insulin (H)    Mixed hyperlipidemia    Benign essential hypertension    Overweight (BMI 25.0-29.9)    Seasonal allergies    Smoker    Mixed anxiety depressive disorder    Coronary artery calcinosis    NATHALIE (generalized anxiety disorder)       Wt Readings from Last 2 Encounters:   08/28/24 83.7 kg (184 lb 8 oz)   12/18/23 83.5 kg (184 lb)     BP Readings from Last 3 Encounters:   08/28/24 127/86   12/18/23 (!) 133/92   11/21/23 124/88         Current Outpatient Medications   Medication Sig Dispense Refill    Acetaminophen (TYLENOL EXTRA STRENGTH PO)       fenofibrate (TRICOR) 145 MG tablet TAKE 1 TABLET BY MOUTH EVERY DAY 90 tablet 1    hydroCHLOROthiazide 12.5 MG tablet TAKE 1 TABLET BY MOUTH EVERY DAY 90 tablet 0    JARDIANCE 10 MG TABS tablet TAKE 1 TABLET BY MOUTH EVERY DAY 90 tablet 1    losartan (COZAAR) 100 MG tablet Take 1 tablet (100 mg) by mouth daily 90 tablet 3    metFORMIN (GLUCOPHAGE XR) 500 MG 24 hr tablet Take 2 tablets (1,000 mg) by mouth daily 60 tablet 5    Multiple Vitamins-Minerals (CENTRUM MEN PO) One a day Mens      rosuvastatin (CRESTOR) 20 MG tablet Take 1 tablet (20 mg) by mouth daily 90 tablet 3    Semaglutide (RYBELSUS) 3 MG tablet Take 3 mg by mouth daily , dosing as directed 30 tablet 1    VITAMIN D PO Take 2,000 Units by mouth       No current facility-administered medications for this visit.       Social History     Tobacco Use    Smoking status: Every Day     Current packs/day: 1.00     Types: Cigarettes, Cigars    Smokeless tobacco: Never   Substance Use Topics    Alcohol use: Yes    Drug use: Never  "      Health Maintenance Due   Topic Date Due    ADVANCE CARE PLANNING  Never done    DEPRESSION ACTION PLAN  Never done    EYE EXAM  Never done    HIV SCREENING  Never done    HEPATITIS B IMMUNIZATION (1 of 3 - 19+ 3-dose series) Never done    DIABETIC FOOT EXAM  08/12/2020    NICOTINE/TOBACCO CESSATION COUNSELING Q 1 YR  09/26/2023    YEARLY PREVENTIVE VISIT  06/28/2024       No results found for: \"PAP\"      August 28, 2024 3:18 PM    "

## 2024-08-28 NOTE — PROGRESS NOTES
"    ASSESSMENT and PLAN:  Lj is a 43 yo with DM, Hypertension, hypercholesterolemia and Coronary artery calcium who also smokes about 1 ppd and drinks alcohol nightly (just 1-2 drinks).      - Hypertension  Continue on Losartan (50 mg/day). No need for refills today  Continue on 12.5 mg hydrochlorothiazide. New Rx sent   Aim to decrease alcohol intake and also weight        - Coronary Artery calcium score, elevated  Crestor at 20 mg/day  Also, taking 81 mg Aspirin/day  On Fenofibrate  Again encouraged to stop smoking and to decrease alcohol intake to decrease risk of bleeding    - DM2   Under care of Endocrinology.     Again, discouraged smoking.         - Anxiety: Saw Dennis for a while. Now has a steady psychologist at outside clinic.     - Previous Lung CT scan with \"Few scattered sub-3 mm solid pulmonary nodules present in the  visualized lungs. The patient is considered high risk and optional CT  can be performed in 12 months,...\" - Ordered new CT scan to be done in Dec. 2024.  Call [phone] or [phone] to schedule imaging tests at the [facility].           Follow-up for annual exam in a few months     Miguel Jose MD  Family Medicine and Sports Medicine  [facility]        SUBJECTIVE:   Lj is here primarily to obtain refills for Losartan and hydrochlorothiazide       Saw Endocrinology last week and they placed him back on Metformin and also Jardiance and Semaglutide.   Previously, Lj did not like Metformin as it caused stomach upset. He's anxious about this recent change. He is in touch with Endocrinology.         Review Of Systems:  He's not exercising much the last few days. Previously was walking about 3-4 times/week (taking 1.5 - 2 hours) and yardwork once every few weeks.   Still smoking 1 ppd and having a few drinks/night.     Problem list per EMR:  Patient Active Problem List   Diagnosis    Type 2 diabetes mellitus without complication, without long-term " current use of insulin (H)    Mixed hyperlipidemia    Benign essential hypertension    Overweight (BMI 25.0-29.9)    Seasonal allergies    Smoker    Mixed anxiety depressive disorder    Coronary artery calcinosis    NATHALIE (generalized anxiety disorder)       Current Outpatient Medications   Medication Sig Dispense Refill    Acetaminophen (TYLENOL EXTRA STRENGTH PO)       fenofibrate (TRICOR) 145 MG tablet TAKE 1 TABLET BY MOUTH EVERY DAY 90 tablet 1    [START ON 9/4/2024] hydroCHLOROthiazide 12.5 MG tablet Take 1 tablet (12.5 mg) by mouth daily. Do not start before September 4, 2024. 90 tablet 3    JARDIANCE 10 MG TABS tablet TAKE 1 TABLET BY MOUTH EVERY DAY 90 tablet 1    losartan (COZAAR) 100 MG tablet Take 1 tablet (100 mg) by mouth daily 90 tablet 3    metFORMIN (GLUCOPHAGE XR) 500 MG 24 hr tablet Take 2 tablets (1,000 mg) by mouth daily 60 tablet 5    Multiple Vitamins-Minerals (CENTRUM MEN PO) One a day Mens      rosuvastatin (CRESTOR) 20 MG tablet Take 1 tablet (20 mg) by mouth daily 90 tablet 3    Semaglutide (RYBELSUS) 3 MG tablet Take 3 mg by mouth daily , dosing as directed 30 tablet 1    VITAMIN D PO Take 2,000 Units by mouth         Allergies   Allergen Reactions    Seasonal Allergies     Lisinopril Rash        Social:   Unchanged. Continue to work from home for the [employer]    OBJECTIVE    Vitals: /86 (BP Location: Left arm, Patient Position: Sitting, Cuff Size: Adult Large)   Pulse 90   Temp 97.9  F (36.6  C) (Temporal)   Resp 16   Wt 83.7 kg (184 lb 8 oz)   SpO2 100%   BMI 29.78 kg/m    BMI= Body mass index is 29.78 kg/m .  Appears well. No further exam done.       Answers submitted by the patient for this visit:  Patient Health Questionnaire (Submitted on 8/28/2024)  If you checked off any problems, how difficult have these problems made it for you to do your work, take care of things at home, or get along with other people?: Somewhat difficult  PHQ9 TOTAL SCORE: 10  Patient Health  Questionnaire (G7) (Submitted on 8/28/2024)  NATHALIE 7 TOTAL SCORE: 12    SEE TOP OF NOTE FOR ASSESSMENT AND PLAN    --Miguel Jose MD  [facility], Department of Family Medicine and Community Health

## 2024-08-28 NOTE — PATIENT INSTRUCTIONS
"  ASSESSMENT and PLAN:  Lj is a 43 yo with DM, Hypertension, hypercholesterolemia and Coronary artery calcium who also smokes about 1 ppd and drinks alcohol nightly (just 1-2 drinks).      - Hypertension  Continue on Losartan (50 mg/day). No need for refills today  Continue on 12.5 mg hydrochlorothiazide. New Rx sent   Aim to decrease alcohol intake and also weight        - Coronary Artery calcium score, elevated  Crestor at 20 mg/day  Also, taking 81 mg Aspirin/day  On Fenofibrate  Again encouraged to stop smoking and to decrease alcohol intake to decrease risk of bleeding    - DM2   Under care of Endocrinology.     Again, discouraged smoking.         - Anxiety: Saw Dennis for a while. Now has a steady psychologist at outside clinic.     - Previous Lung CT scan with \"Few scattered sub-3 mm solid pulmonary nodules present in the  visualized lungs. The patient is considered high risk and optional CT  can be performed in 12 months,...\" - Ordered new CT scan to be done in Dec. 2024        Follow-up for annual exam in a few months     Miguel Jose MD  Family Medicine and Sports Medicine  [facility]  "

## 2024-09-03 ENCOUNTER — MYC MEDICAL ADVICE (OUTPATIENT)
Dept: ENDOCRINOLOGY | Facility: CLINIC | Age: 44
End: 2024-09-03
Payer: COMMERCIAL

## 2024-09-06 ENCOUNTER — ALLIED HEALTH/NURSE VISIT (OUTPATIENT)
Dept: EDUCATION SERVICES | Facility: CLINIC | Age: 44
End: 2024-09-06
Attending: INTERNAL MEDICINE
Payer: COMMERCIAL

## 2024-09-06 ENCOUNTER — TELEPHONE (OUTPATIENT)
Dept: SLEEP MEDICINE | Facility: CLINIC | Age: 44
End: 2024-09-06

## 2024-09-06 DIAGNOSIS — E11.9 TYPE 2 DIABETES MELLITUS WITHOUT COMPLICATION, WITHOUT LONG-TERM CURRENT USE OF INSULIN (H): Primary | ICD-10-CM

## 2024-09-06 DIAGNOSIS — G47.33 OSA (OBSTRUCTIVE SLEEP APNEA): Primary | ICD-10-CM

## 2024-09-06 PROCEDURE — G0108 DIAB MANAGE TRN  PER INDIV: HCPCS | Performed by: DIETITIAN, REGISTERED

## 2024-09-06 NOTE — TELEPHONE ENCOUNTER
General Call      Reason for Call:  [facility] called and scheduled in [facility]     Patient would like an order for new cpap supples    Please contact patient.  Thank you.    What are your questions or concerns:  no    Date of last appointment with provider: dec     Could we send this information to you in [employer] or would you prefer to receive a phone call?:   Patient would prefer a phone call   Okay to leave a detailed message?: Yes at Cell number on file:    Telephone Information:   Mobile [phone]

## 2024-09-06 NOTE — PATIENT INSTRUCTIONS
Diabetes Medications   -continue metformin extended release as 2 tablets (1000 mg) daily   -continue Jardiance as 1 tablet (10 mg) daily   -continue Rybelsus as 1 tablet (3 mg) daily in the morning (take on an empty stomach with no more than 4 oz water.  Wait at least 30 min before eating, drinking or taking other medicines)    Tips for improving tolerance:      -stop eating when you feel full     -avoid high fat foods/high fat meals when starting medication and changing doses    2. Use the SMART (Specific, Measurable, Attainable, Relevant, and Time Bound) acronym for goal setting.   -set at most 1 physical activity and 1 food/meal plan goal at a time  -set a goal for 2-6 weeks (think about what do you want to be true by... for example?) and then re-evaluate    3.  Phone follow up with Annel on Thursday,  at 2:30 PM    4. Please call or send a [employer] message with any questions or concerns.    Annel Thao, MPH, RD, SHAWN, LD  Diabetes Education Triage Line: [phone]  Diabetes Education Appointment [phone]

## 2024-09-06 NOTE — Clinical Note
Hi Dr. Jose and Dr. Flores,  I met with Lj yesterday.  You may already be aware though in case you are not, he identifies the reason for his alcohol consumption is so he can quiet his mind so it does not take him several hours to fall asleep. I did encourage him to discuss this with you.  Let me know if you have any questions/concerns.  Thank you,  Annel Thao, MPH, RD, [facility], LD 9/7/2024

## 2024-09-06 NOTE — Clinical Note
9/6/2024         RE: Bertin Mcgill  [address] [facility] [address]        Dear Colleague,    Thank you for referring your patient, Bertin Mcgill, to the [facility]. Please see a copy of my visit note below.    Diabetes Self-Management Education & Support    Presents for: Follow-up    Type of Service: In Person Visit    ASSESSMENT:  Patient stopped Janumet and started Rybelsus 3 mg/d and metformin XR 1000 mg/d on 8/25/24.  In addition, continues on Jardiance 10 mg/d. Reports some GI disturbance/distress since the change in medication; see patient's [email or web address] message to endocrinology on 9/3/24.  While not yet back to baseline, patient reports the adverse GI symptoms are subsiding.  Provided recommendations, see below, for decreasing adverse GI side effects.  Patient expresses opposition to recommendations citing his preference for take out/fried foods.  Discussed how Rybelsus is increased and scheduled phone follow up in about 2 weeks to assess for dose change.   Discussed preventative care including annual eye exam, every 6 month dental cleanings/exams, and foot care.   When initially asked, patient unable to identify an area of lifestyle which he wanted to discuss with writer.  Patient not yet ready to stop smoking. Repots his activity decreased with the adverse side effects from his medication change.  Patient expressed some openness to discussing a decrease in alcohol intake. Patient able to identify the reason for making a change to his alcohol intake would be for his health. Upon exploring decreased alcohol consumption with patient, patient shared the reason for his alcohol consumption is to prevent/stop his mind from racing so it does not take him a couple of hours to fall asleep at night.  Reports mindfulness, anxiety medication, and meditation have been unsuccessful at quieting his mind so he can fall asleep.  Encouraged him to discuss with providers. Writer shared  with providers.    Patient's most recent   Lab Results   Component Value Date    A1C 8.0 08/12/2024    A1C 7.9 06/29/2020     is not meeting goal of <7.0    Diabetes knowledge and skills assessment:   Patient is knowledgeable in diabetes management concepts related to: Taking Medication    Continue education with the following diabetes management concepts: Healthy Eating, Being Active, Monitoring, Reducing Risks, and Healthy Coping    Based on learning assessment above, most appropriate setting for further diabetes education would be: Individual setting.      PLAN   Diabetes Medications   -continue metformin extended release as 2 tablets (1000 mg) daily   -continue Jardiance as 1 tablet (10 mg) daily   -continue Rybelsus as 1 tablet (3 mg) daily in the morning (take on an empty stomach with no more than 4 oz water.  Wait at least 30 min before eating, drinking or taking other medicines)    Tips for improving tolerance:      -stop eating when you feel full     -avoid high fat foods/high fat meals when starting medication and changing doses    2. Use the SMART (Specific, Measurable, Attainable, Relevant, and Time Bound) acronym for goal setting.   -set at most 1 physical activity and 1 food/meal plan goal at a time  -set a goal for 2-6 weeks (think about what do you want to be true by...October 1st for example?) and then re-evaluate    3.   Please call or send a [employer] message with any questions or concerns.    Topics to cover at upcoming visits: Healthy Eating, Being Active, Monitoring, Reducing Risks, and Healthy Coping    Follow-up: 9/19/24    See Care Plan for co-developed, patient-state behavior change goals.  AVS provided for patient today.    Education Materials Provided:  Patient declined    SUBJECTIVE/OBJECTIVE:  Presents for: Follow-up  Accompanied by: Self  Diabetes education in the past 24mo: Yes  Focus of Visit: Healthy Eating, Assistance w/ making life changes  Diabetes type: Type 2  Date of diagnosis:  "2017  How confident are you filling out medical forms by yourself:: Not Assessed  Diabetes management related comments/concerns: none  Difficulty affording diabetes testing supplies?:  (n/a)  Cultural Influences/Ethnic Background:  Not  or     Diabetes Symptoms & Complications:  Complications assessed today?: No    Patient Problem List and Family Medical History reviewed for relevant medical history, current medical status, and diabetes risk factors.    Vitals:  There were no vitals taken for this visit.  Estimated body mass index is 29.78 kg/m  as calculated from the following:    Height as of 6/28/23: 1.676 m (5' 6\").    Weight as of 8/28/24: 83.7 kg (184 lb 8 oz).   Last 3 BP:   BP Readings from Last 3 Encounters:   08/28/24 127/86   12/18/23 (!) 133/92   11/21/23 124/88       History   Smoking Status    Every Day    Types: Cigarettes, Cigars   Smokeless Tobacco    Never       Labs:  Lab Results   Component Value Date    A1C 8.0 08/12/2024    A1C 7.9 06/29/2020     Lab Results   Component Value Date     08/12/2024     02/10/2023    .0 06/29/2020     Lab Results   Component Value Date    LDL  06/05/2024      Comment:      Cannot estimate LDL when triglyceride exceeds 400 mg/dL    LDL 88 06/05/2024     02/10/2023    LDL 69.0 06/29/2020    LDL  11/08/2019     Cannot estimate LDL when triglyceride exceeds 400 mg/dL     HDL Cholesterol   Date Value Ref Range Status   06/29/2020 67.0 >40.0 Final     Direct Measure HDL   Date Value Ref Range Status   06/05/2024 43 >=40 mg/dL Final   ]  GFR Estimate   Date Value Ref Range Status   08/12/2024 86 >60 mL/min/1.73m2 Final     Comment:     eGFR calculated using 2021 CKD-EPI equation.   11/08/2019 >90 >60 mL/min/[1.73_m2] Final     Comment:     Non  GFR Calc  Starting 12/18/2018, serum creatinine based estimated GFR (eGFR) will be   calculated using the Chronic Kidney Disease Epidemiology Collaboration   (CKD-EPI) " "equation.       GFR Estimate If Black   Date Value Ref Range Status   11/08/2019 >90 >60 mL/min/[1.73_m2] Final     Comment:      GFR Calc  Starting 12/18/2018, serum creatinine based estimated GFR (eGFR) will be   calculated using the Chronic Kidney Disease Epidemiology Collaboration   (CKD-EPI) equation.       Lab Results   Component Value Date    CR 1.09 08/12/2024    CR 0.9 06/29/2020     No results found for: \"MICROALBUMIN\"    Healthy Eating:  Healthy Eating Assessed Today: Yes  How many times a week on average do you eat food made away from home (restaurant/take-out)?:  (1-4 times/week)  Breakfast: today = Glucerna, coffee (adds fiber to first cup)  Lunch: yesterday: salami and cheese sandwich, pickles, water  Dinner: last night = box of [employer] Macarroni and Cheese and peanut butter and jelly sandwich, Crystal Light ice tea  Snacks: 12-1 PM: cottage cheese and mandarin oranges; HS: sometimes tortilla chips  Beverages: Water, Tea, Coffee, Milk, Diet soda, Alcohol, Other (see Comments) (a \"couple\" drinks per night (gin or vodka mixed with zero sugar juice or tonic; wine); Gatorade Zero; 1% milk)  Has patient met with a dietitian in the past?: Yes    Being Active:  Being Active Assessed Today: Yes  Exercise:: Yes (yard work, walking (less since medication change))  Days per week of moderate to strenuous exercise (like a brisk walk): 3  On average, minutes per day of exercise at this level:  (1.5-3 hours at a time)  How intense was your typical exercise? : Moderate (like brisk walking)    Monitoring:  Monitoring Assessed Today: Yes  Did patient bring glucose meter to appointment? : No  Times checking blood sugar at home (number): Never      Taking Medications:  Diabetes Medication(s)       Biguanides       metFORMIN (GLUCOPHAGE XR) 500 MG 24 hr tablet Take 2 tablets (1,000 mg) by mouth daily       Sodium-Glucose Co-Transporter 2 (SGLT2) Inhibitors       JARDIANCE 10 MG TABS tablet TAKE 1 TABLET BY " MOUTH EVERY DAY       Incretin Mimetic Agents       Semaglutide (RYBELSUS) 3 MG tablet Take 3 mg by mouth daily , dosing as directed          Taking Medication Assessed Today: Yes  Current Treatments: Diet, Oral Medication (taken by mouth)  Problems taking diabetes medications regularly?: No  Diabetes medication side effects?: Yes    Problem Solving:  Problem Solving Assessed Today: Yes  Is the patient at risk for hypoglycemia?: No    Reducing Risks:  Reducing Risks Assessed Today: Yes  Diabetes Risks: Hyperlipidemia  CAD Risks: Male sex, Hypertension, Dyslipidemia, Diabetes Mellitus, Tobacco exposure  Has dilated eye exam at least once a year?: No (due)  Sees dentist every 6 months?: Yes  Feet checked by healthcare provider in the last year?: No    Healthy Coping:  Healthy Coping Assessed Today: Yes  Emotional response to diabetes: Fear/Anxiety  Informal Support system:: Friends, Parent  Stage of change: ACTION (Actively working towards change)  Patient Activation Measure Survey Score:       No data to display                Care Plan and Education Provided:  Healthy Eating: decreasing/eliminating alcohol, Taking Medication: Side effects of prescribed medication(s) and When to take medication(s), and Reducing Risks: Dental care, Eye care, Foot care, and Smoking cessation    Annel Thao, MPH, RD, CDCES, LD 9/7/2024      Time Spent: 65 minutes  Encounter Type: Individual    Any diabetes medication dose changes were made via the CDE Protocol per the patient's referring provider. A copy of this encounter was shared with the provider.

## 2024-09-07 NOTE — PROGRESS NOTES
Diabetes Self-Management Education & Support    Presents for: Follow-up    Type of Service: In Person Visit    ASSESSMENT:  Patient stopped Janumet and started Rybelsus 3 mg/d and metformin XR 1000 mg/d on 8/25/24.  In addition, continues on Jardiance 10 mg/d. Reports some GI disturbance/distress since the change in medication; see patient's [employer] message to endocrinology on 9/3/24.  While not yet back to baseline, patient reports the adverse GI symptoms are subsiding.  Provided recommendations, see below, for decreasing adverse GI side effects.  Patient expresses opposition to recommendations citing his preference for take out/fried foods.  Discussed how Rybelsus is increased and scheduled phone follow up in about 2 weeks to assess for dose change.   Discussed preventative care including annual eye exam, every 6 month dental cleanings/exams, and foot care.   When initially asked, patient unable to identify an area of lifestyle which he wanted to discuss with writer.  Patient not yet ready to stop smoking. Repots his activity decreased with the adverse side effects from his medication change.  Patient expressed some openness to discussing a decrease in alcohol intake. Patient able to identify the reason for making a change to his alcohol intake would be for his health. Discussed how decreasing/stopping alcohol consumption would likely improve his triglyceride lab. Upon exploring decreased alcohol consumption with patient, patient shared the reason for his alcohol consumption is to prevent/stop his mind from racing so it does not take him a couple of hours to fall asleep at night.  Reports mindfulness, anxiety medication, and meditation have been unsuccessful at quieting his mind so he can fall asleep.  Encouraged him to discuss with providers. Writer shared with providers.  Affirmed patient for working to begin using his CPAP again.     Patient's most recent   Lab Results   Component Value Date    A1C 8.0  08/12/2024    A1C 7.9 06/29/2020     is not meeting goal of <7.0    Diabetes knowledge and skills assessment:   Patient is knowledgeable in diabetes management concepts related to: Taking Medication    Continue education with the following diabetes management concepts: Healthy Eating, Being Active, Monitoring, Reducing Risks, and Healthy Coping    Based on learning assessment above, most appropriate setting for further diabetes education would be: Individual setting.      PLAN   Diabetes Medications   -continue metformin extended release as 2 tablets (1000 mg) daily   -continue Jardiance as 1 tablet (10 mg) daily   -continue Rybelsus as 1 tablet (3 mg) daily in the morning (take on an empty stomach with no more than 4 oz water.  Wait at least 30 min before eating, drinking or taking other medicines)    Tips for improving tolerance:      -stop eating when you feel full     -avoid high fat foods/high fat meals when starting medication and changing doses    2. Use the SMART (Specific, Measurable, Attainable, Relevant, and Time Bound) acronym for goal setting.   -set at most 1 physical activity and 1 food/meal plan goal at a time  -set a goal for 2-6 weeks (think about what do you want to be true by...October 1st for example?) and then re-evaluate    3.   Please call or send a [employer] message with any questions or concerns.    Topics to cover at upcoming visits: Healthy Eating, Being Active, Monitoring, Reducing Risks, and Healthy Coping    Follow-up: 9/19/24    See Care Plan for co-developed, patient-state behavior change goals.  AVS provided for patient today.    Education Materials Provided:  Patient declined    SUBJECTIVE/OBJECTIVE:  Presents for: Follow-up  Accompanied by: Self  Diabetes education in the past 24mo: Yes  Focus of Visit: Healthy Eating, Assistance w/ making life changes  Diabetes type: Type 2  Date of diagnosis: 2017  How confident are you filling out medical forms by yourself:: Not  "Assessed  Diabetes management related comments/concerns: none  Difficulty affording diabetes testing supplies?:  (n/a)  Cultural Influences/Ethnic Background:  Not  or     Diabetes Symptoms & Complications:  Complications assessed today?: No    Patient Problem List and Family Medical History reviewed for relevant medical history, current medical status, and diabetes risk factors.    Vitals:  There were no vitals taken for this visit.  Estimated body mass index is 29.78 kg/m  as calculated from the following:    Height as of 6/28/23: 1.676 m (5' 6\").    Weight as of 8/28/24: 83.7 kg (184 lb 8 oz).   Last 3 BP:   BP Readings from Last 3 Encounters:   08/28/24 127/86   12/18/23 (!) 133/92   11/21/23 124/88       History   Smoking Status    Every Day    Types: Cigarettes, Cigars   Smokeless Tobacco    Never       Labs:  Lab Results   Component Value Date    A1C 8.0 08/12/2024    A1C 7.9 06/29/2020     Lab Results   Component Value Date     08/12/2024     02/10/2023    .0 06/29/2020     Lab Results   Component Value Date    LDL  06/05/2024      Comment:      Cannot estimate LDL when triglyceride exceeds 400 mg/dL    LDL 88 06/05/2024     02/10/2023    LDL 69.0 06/29/2020    LDL  11/08/2019     Cannot estimate LDL when triglyceride exceeds 400 mg/dL     HDL Cholesterol   Date Value Ref Range Status   06/29/2020 67.0 >40.0 Final     Direct Measure HDL   Date Value Ref Range Status   06/05/2024 43 >=40 mg/dL Final   ]  GFR Estimate   Date Value Ref Range Status   08/12/2024 86 >60 mL/min/1.73m2 Final     Comment:     eGFR calculated using 2021 CKD-EPI equation.   11/08/2019 >90 >60 mL/min/[1.73_m2] Final     Comment:     Non  GFR Calc  Starting 12/18/2018, serum creatinine based estimated GFR (eGFR) will be   calculated using the Chronic Kidney Disease Epidemiology Collaboration   (CKD-EPI) equation.       GFR Estimate If Black   Date Value Ref Range Status " "  11/08/2019 >90 >60 mL/min/[1.73_m2] Final     Comment:      GFR Calc  Starting 12/18/2018, serum creatinine based estimated GFR (eGFR) will be   calculated using the Chronic Kidney Disease Epidemiology Collaboration   (CKD-EPI) equation.       Lab Results   Component Value Date    CR 1.09 08/12/2024    CR 0.9 06/29/2020     No results found for: \"MICROALBUMIN\"    Healthy Eating:  Healthy Eating Assessed Today: Yes  How many times a week on average do you eat food made away from home (restaurant/take-out)?:  (1-4 times/week)  Breakfast: today = Glucerna, coffee (adds fiber to first cup)  Lunch: yesterday: salami and cheese sandwich, pickles, water  Dinner: last night = box of [employer] Macarroni and Cheese and peanut butter and jelly sandwich, Crystal Light ice tea  Snacks: 12-1 PM: cottage cheese and mandarin oranges; HS: sometimes tortilla chips  Beverages: Water, Tea, Coffee, Milk, Diet soda, Alcohol, Other (see Comments) (a \"couple\" drinks per night (gin or vodka mixed with zero sugar juice or tonic; wine); Gatorade Zero; 1% milk)  Has patient met with a dietitian in the past?: Yes    Being Active:  Being Active Assessed Today: Yes  Exercise:: Yes (yard work, walking (less since medication change))  Days per week of moderate to strenuous exercise (like a brisk walk): 3  On average, minutes per day of exercise at this level:  (1.5-3 hours at a time)  How intense was your typical exercise? : Moderate (like brisk walking)    Monitoring:  Monitoring Assessed Today: Yes  Did patient bring glucose meter to appointment? : No  Times checking blood sugar at home (number): Never      Taking Medications:  Diabetes Medication(s)       Biguanides       metFORMIN (GLUCOPHAGE XR) 500 MG 24 hr tablet Take 2 tablets (1,000 mg) by mouth daily       Sodium-Glucose Co-Transporter 2 (SGLT2) Inhibitors       JARDIANCE 10 MG TABS tablet TAKE 1 TABLET BY MOUTH EVERY DAY       Incretin Mimetic Agents       Semaglutide " (RYBELSUS) 3 MG tablet Take 3 mg by mouth daily , dosing as directed          Taking Medication Assessed Today: Yes  Current Treatments: Diet, Oral Medication (taken by mouth)  Problems taking diabetes medications regularly?: No  Diabetes medication side effects?: Yes    Problem Solving:  Problem Solving Assessed Today: Yes  Is the patient at risk for hypoglycemia?: No    Reducing Risks:  Reducing Risks Assessed Today: Yes  Diabetes Risks: Hyperlipidemia  CAD Risks: Male sex, Hypertension, Dyslipidemia, Diabetes Mellitus, Tobacco exposure  Has dilated eye exam at least once a year?: No (due)  Sees dentist every 6 months?: Yes  Feet checked by healthcare provider in the last year?: No    Healthy Coping:  Healthy Coping Assessed Today: Yes  Emotional response to diabetes: Fear/Anxiety  Informal Support system:: Friends, Parent  Stage of change: ACTION (Actively working towards change)  Patient Activation Measure Survey Score:       No data to display                Care Plan and Education Provided:  Healthy Eating: decreasing/eliminating alcohol, Taking Medication: Side effects of prescribed medication(s) and When to take medication(s), and Reducing Risks: Dental care, Eye care, Foot care, and Smoking cessation    Annel Thao, MPH, RD, CDCES, LD 9/7/2024      Time Spent: 65 minutes  Encounter Type: Individual    Any diabetes medication dose changes were made via the CDE Protocol per the patient's referring provider. A copy of this encounter was shared with the provider.

## 2024-09-11 NOTE — TELEPHONE ENCOUNTER
Last ov 2/15/22  Next ov 12/18/24    Patient requesting updated order for CPAP supplies prior to appointment. Order pended and routed to provider for consideration.    Patient uses [facility]    Heide REBOLLEDO RN  [facility]

## 2024-09-14 ENCOUNTER — HEALTH MAINTENANCE LETTER (OUTPATIENT)
Age: 44
End: 2024-09-14

## 2024-09-19 ENCOUNTER — MYC MEDICAL ADVICE (OUTPATIENT)
Dept: EDUCATION SERVICES | Facility: CLINIC | Age: 44
End: 2024-09-19

## 2024-09-19 ENCOUNTER — VIRTUAL VISIT (OUTPATIENT)
Dept: EDUCATION SERVICES | Facility: CLINIC | Age: 44
End: 2024-09-19
Payer: COMMERCIAL

## 2024-09-19 DIAGNOSIS — E11.9 TYPE 2 DIABETES MELLITUS WITHOUT COMPLICATION, WITHOUT LONG-TERM CURRENT USE OF INSULIN (H): Primary | ICD-10-CM

## 2024-09-19 PROCEDURE — 98967 PH1 ASSMT&MGMT NQHP 11-20: CPT | Mod: 93

## 2024-09-19 NOTE — PATIENT INSTRUCTIONS
Diabetes Medications              -continue metformin extended release as 2 tablets (1000 mg) daily              -continue Jardiance as 1 tablet (10 mg) daily              -continue Rybelsus as 1 tablet (3 mg) daily in the morning (take on an empty stomach with no more than 4 oz water.  Wait at least 30 min before eating, drinking or taking other medicines)    Order requested from Dr. Flores to increase dose to 7 mg daily when out of the 3 mg tablets.                           Tips for improving tolerance:                                       -stop eating when you feel full                                      -avoid high fat foods/high fat meals when starting medication and changing doses     2. Phone follow up with Annel on  at 2:45 PM.     3.   Please call or send a [employer] message with any questions or concerns.    Annel Thao, MPH, RD, SHAWN LD  Diabetes Education Triage Line: [phone]  Diabetes Education Appointment [phone]

## 2024-09-19 NOTE — LETTER
9/19/2024         RE: Bertin Mcgill  [address] [facility] [facility] [address]        Dear Colleague,    Thank you for referring your patient, Bertin Mcgill, to the [facility]. Please see a copy of my visit note below.    Diabetes Education Follow-up  Type of Service: Telephone Visit/ 18 minutes     Originating Location (Patient Location): Home  Distant Location (Provider Location): Home - [facility]  Mode of Communication:  Telephone     Telephone Visit Start Time: 2:33 PM  Telephone Visit End Time (telephone visit stop time): 2:51 PM     How would patient like to obtain AVS? MyChart    Subjective/Objective:    Bertin Mcgill was called for Rybelsus follow up. Last date of communication was: 9/6/24.    Diabetes is being managed with Diabetes Medications   Diabetes Medication(s)       Biguanides       metFORMIN (GLUCOPHAGE XR) 500 MG 24 hr tablet Take 2 tablets (1,000 mg) by mouth daily       Sodium-Glucose Co-Transporter 2 (SGLT2) Inhibitors       JARDIANCE 10 MG TABS tablet TAKE 1 TABLET BY MOUTH EVERY DAY       Incretin Mimetic Agents       Semaglutide (RYBELSUS) 3 MG tablet Take 3 mg by mouth daily , dosing as directed            BG/Food Log:   Patient does not monitor glucose    Assessment:  Patient reports he has sufficient Rybelsus 3 mg tablets through his dose on 9/23/24.  Denies any consistent GI concerns since last visit with writer on 9/6/24. Reports at times stool consistency is different than prior to Rybelsus.  For example, reports stool softer in consistency this AM.  Patient unable to confirm or deny if he has had any diarrhea or watery stools since last visit with writer. Patient believes his stool changes may be due to food choices.  He is open to increasing Rybelsus to 7 mg/day when out of 3 mg tablets.  Order requested from Dr. Flores.     Plan/Response:   Diabetes Medications              -continue metformin extended release as 2 tablets (1000 mg) daily               -continue Jardiance as 1 tablet (10 mg) daily              -continue Rybelsus as 1 tablet (3 mg) daily in the morning (take on an empty stomach with no more than 4 oz water.  Wait at least 30 min before eating, drinking or taking other medicines)    Order requested from Dr. Flores to increase dose to 7 mg daily when out of the 3 mg tablets.                           Tips for improving tolerance:                                       -stop eating when you feel full                                      -avoid high fat foods/high fat meals when starting medication and changing doses     2. Phone follow up with Annel on Tuesday, October 15th at 2:45 PM.     3.   Please call or send a [employer] message with any questions or concerns.    Annel Thao, MPH, RD, CDCES, LD 9/19/2024    Any diabetes medication dose changes were made via the CDE Protocol and Collaborative Practice Agreement with the patient's referring provider. A copy of this encounter was shared with the provider.

## 2024-09-19 NOTE — PROGRESS NOTES
Diabetes Education Follow-up  Type of Service: Telephone Visit/ 18 minutes     Originating Location (Patient Location): Home  Distant Location (Provider Location): [place] [facility]  Mode of Communication:  Telephone     Telephone Visit Start Time: 2:33 PM  Telephone Visit End Time (telephone visit stop time): 2:51 PM     How would patient like to obtain AVS? Randal    Subjective/Objective:    Bertin Mcgill was called for Rybelsus follow up. Last date of communication was: 9/6/24.    Diabetes is being managed with Diabetes Medications   Diabetes Medication(s)       Biguanides       metFORMIN (GLUCOPHAGE XR) 500 MG 24 hr tablet Take 2 tablets (1,000 mg) by mouth daily       Sodium-Glucose Co-Transporter 2 (SGLT2) Inhibitors       JARDIANCE 10 MG TABS tablet TAKE 1 TABLET BY MOUTH EVERY DAY       Incretin Mimetic Agents       Semaglutide (RYBELSUS) 3 MG tablet Take 3 mg by mouth daily , dosing as directed            BG/Food Log:   Patient does not monitor glucose    Assessment:  Patient reports he has sufficient Rybelsus 3 mg tablets through his dose on 9/23/24.  Denies any consistent GI concerns since last visit with writer on 9/6/24. Reports at times stool consistency is different than prior to Rybelsus.  For example, reports stool softer in consistency this AM.  Patient unable to confirm or deny if he has had any diarrhea or watery stools since last visit with writer. Patient believes his stool changes may be due to food choices.  He is open to increasing Rybelsus to 7 mg/day when out of 3 mg tablets.  Order requested from Dr. Flores.     Plan/Response:   Diabetes Medications              -continue metformin extended release as 2 tablets (1000 mg) daily              -continue Jardiance as 1 tablet (10 mg) daily              -continue Rybelsus as 1 tablet (3 mg) daily in the morning (take on an empty stomach with no more than 4 oz water.  Wait at least 30 min before eating, drinking or taking other  medicines)    Order requested from Dr. Flores to increase dose to 7 mg daily when out of the 3 mg tablets.                           Tips for improving tolerance:                                       -stop eating when you feel full                                      -avoid high fat foods/high fat meals when starting medication and changing doses     2. Phone follow up with Annel on Tuesday, October 15th at 2:45 PM.     3.   Please call or send a [employer] message with any questions or concerns.    Annel Thao, MPH, RD, CDCES, LD 9/19/2024    Any diabetes medication dose changes were made via the CDE Protocol and Collaborative Practice Agreement with the patient's referring provider. A copy of this encounter was shared with the provider.

## 2024-09-19 NOTE — TELEPHONE ENCOUNTER
Visit was a phone visit.  Please see visit encounter dated 9/19/24.  Annel Thao, MPH, RD, CDCES, LD 9/19/2024

## 2024-10-04 ENCOUNTER — MYC MEDICAL ADVICE (OUTPATIENT)
Dept: FAMILY MEDICINE | Facility: CLINIC | Age: 44
End: 2024-10-04

## 2024-10-04 DIAGNOSIS — E11.9 TYPE 2 DIABETES MELLITUS WITHOUT COMPLICATION, WITHOUT LONG-TERM CURRENT USE OF INSULIN (H): ICD-10-CM

## 2024-10-04 RX ORDER — EMPAGLIFLOZIN 10 MG/1
TABLET, FILM COATED ORAL
Qty: 90 TABLET | Refills: 1 | Status: SHIPPED | OUTPATIENT
Start: 2024-10-04

## 2024-10-04 NOTE — TELEPHONE ENCOUNTER
Last Written Prescription Date:  4/1/24  Last Fill Quantity: 90,  # refills: 1   Last office visit: Visit date not found ; last virtual visit: 8/19/2024 with prescribing provider:  Dr. Flores   Future Office Visit: 11/19/24     Requested Prescriptions   Pending Prescriptions Disp Refills    JARDIANCE 10 MG TABS tablet [Pharmacy Med Name: JARDIANCE 10 MG TABLET] 90 tablet 1     Sig: TAKE 1 TABLET BY MOUTH EVERY DAY       Sodium Glucose Co-Transport Inhibitor Agents Passed - 10/4/2024  9:07 AM        Passed - Patient has documented A1c within the specified period of time.     If HgbA1C is 8 or greater, it needs to be on file within the past 3 months.  If less than 8, must be on file within the past 6 months.     Recent Labs   Lab Test 08/12/24  0918   A1C 8.0*             Passed - Medication is active on med list        Passed - Recent (6 mo) or future (90 days) visit within the authorizing provider's specialty     The patient must have completed an in-person or virtual visit within the past 6 months or has a future visit scheduled within the next 90 days with the authorizing provider s specialty.  Urgent care and e-visits do not quality as an office visit for this protocol.          Passed - Medication indicated for associated diagnosis     Medication is associated with one or more of the following diagnoses:     Diabetic nephropathy, With Albuminuria - Type 2 diabetes mellitus     Disorder of cardiovascular system; Prophylaxis - Type 2 diabetes mellitus     Type 2 diabetes mellitus    Disorder of cardiovascular system; Prophylaxis - Heart failure   Chronic kidney disease, (At risk of progression) to reduce the risk of sustained   estimated GFR decline, end-stage kidney disease, cardiovascular death,   and hospitalization for heart failure     Heart failure, (NYHA class II to IV, reduced ejection fraction) to reduce risk of  cardiovascular death and hospitalization           Passed - Has GFR on file in past 12  months and most recent value is >30        Passed - Patient is age 18 or older        Passed - Patient has documented normal Potassium within the last 12 mos.     Recent Labs   Lab Test 08/12/24  0918   POTASSIUM 4.4                Refills sent  Aimee Delgadillo RN

## 2024-10-15 ENCOUNTER — VIRTUAL VISIT (OUTPATIENT)
Dept: EDUCATION SERVICES | Facility: CLINIC | Age: 44
End: 2024-10-15
Payer: COMMERCIAL

## 2024-10-15 DIAGNOSIS — E11.9 TYPE 2 DIABETES MELLITUS WITHOUT COMPLICATION, WITHOUT LONG-TERM CURRENT USE OF INSULIN (H): Primary | ICD-10-CM

## 2024-10-15 PROCEDURE — 98967 PH1 ASSMT&MGMT NQHP 11-20: CPT | Mod: 93 | Performed by: DIETITIAN, REGISTERED

## 2024-10-15 NOTE — LETTER
"    10/15/2024         RE: Bertin Mcgill  [address] [facility] [address]        Dear Colleague,    Thank you for referring your patient, Bertin Mcgill, to the [facility]. Please see a copy of my visit note below.    Diabetes Education Follow-up  Type of Service: Telephone Visit/ 15 minutes     Originating Location (Patient Location): Home  Distant Location (Provider Location): [facility]  Mode of Communication:  Telephone     Telephone Visit Start Time: 3:07 PM  Telephone Visit End Time (telephone visit stop time): 3:22 PM     How would patient like to obtain AVS? MyChart    Subjective/Objective:    Bertin Mcglil sent in blood glucose log for review. Last date of communication was: 9/19/24.    Diabetes is being managed with Diabetes Medications   Diabetes Medication(s)       Biguanides       metFORMIN (GLUCOPHAGE XR) 500 MG 24 hr tablet Take 2 tablets (1,000 mg) by mouth daily       Sodium-Glucose Co-Transporter 2 (SGLT2) Inhibitors       JARDIANCE 10 MG TABS tablet TAKE 1 TABLET BY MOUTH EVERY DAY       Incretin Mimetic Agents       Semaglutide (RYBELSUS) 7 MG tablet Take 1 tablet (7 mg) by mouth daily.            BG/Food Log:   Declines to monitor    Assessment:  Has been taking Rybelsus at 7 mg/day for just over 3 weeks.  Overall, patient reports manageable GI concerns.  He states once a week it is \"all chaotic.\"  When clarification requested, he states that once a week he has an urgent stool that is soft/liquid/loose in consistency.  Question if this stooling is related to food vs a medication.  Asked patient to take notes about what he ate at the meal just prior to the urgent stool.  Notes a lack of energy in the past week which he is attributing to recent receipt of each COVID and flu vaccines.  Patient agrees with no changes to diabetes medications at present.  Has scheduled follow up with Dr. Flores next month. Declined to schedule additional " follow up with writer today.     Plan/Response:   Diabetes Medications              -continue metformin extended release as 2 tablets (1000 mg) daily              -continue Jardiance as 1 tablet (10 mg) daily              -continue Rybelsus as 1 tablet (7 mg) daily in the morning (take on an empty stomach with no more than 4 oz water.  Wait at least 30 min before eating, drinking or taking other medicines)                          Tips for improving tolerance:                                       -stop eating when you feel full                                      -avoid high fat foods/high fat meals when starting medication and changing doses     2.  When you have an urgent stool that is soft/loose/liquid in consistency, write down what you ate at the meal just before.     3.  Please call or send a [employer] message with any questions or concerns or to schedule follow up.    Annel Thao, MPH, RD, CDCES, LD 10/15/2024    Any diabetes medication dose changes were made via the CDE Protocol and Collaborative Practice Agreement with the patient's referring provider. A copy of this encounter was shared with the provider.

## 2024-10-15 NOTE — PROGRESS NOTES
"Diabetes Education Follow-up  Type of Service: Telephone Visit/ 15 minutes     Originating Location (Patient Location): Home  Distant Location (Provider Location): [facility]  Mode of Communication:  Telephone     Telephone Visit Start Time: 3:07 PM  Telephone Visit End Time (telephone visit stop time): 3:22 PM     How would patient like to obtain AVS? Randal    Subjective/Objective:    Bertin Mcgill sent in blood glucose log for review. Last date of communication was: 9/19/24.    Diabetes is being managed with Diabetes Medications   Diabetes Medication(s)       Biguanides       metFORMIN (GLUCOPHAGE XR) 500 MG 24 hr tablet Take 2 tablets (1,000 mg) by mouth daily       Sodium-Glucose Co-Transporter 2 (SGLT2) Inhibitors       JARDIANCE 10 MG TABS tablet TAKE 1 TABLET BY MOUTH EVERY DAY       Incretin Mimetic Agents       Semaglutide (RYBELSUS) 7 MG tablet Take 1 tablet (7 mg) by mouth daily.            BG/Food Log:   Declines to monitor    Assessment:  Has been taking Rybelsus at 7 mg/day for just over 3 weeks.  Overall, patient reports manageable GI concerns.  He states once a week it is \"all chaotic.\"  When clarification requested, he states that once a week he has an urgent stool that is soft/liquid/loose in consistency.  Question if this stooling is related to food vs a medication.  Asked patient to take notes about what he ate at the meal just prior to the urgent stool.  Notes a lack of energy in the past week which he is attributing to recent receipt of each COVID and flu vaccines.  Patient agrees with no changes to diabetes medications at present.  Has scheduled follow up with Dr. Flores next month. Declined to schedule additional follow up with writer today.     Plan/Response:   Diabetes Medications              -continue metformin extended release as 2 tablets (1000 mg) daily              -continue Jardiance as 1 tablet (10 mg) daily              -continue Rybelsus as 1 tablet (7 " mg) daily in the morning (take on an empty stomach with no more than 4 oz water.  Wait at least 30 min before eating, drinking or taking other medicines)                          Tips for improving tolerance:                                       -stop eating when you feel full                                      -avoid high fat foods/high fat meals when starting medication and changing doses     2.  When you have an urgent stool that is soft/loose/liquid in consistency, write down what you ate at the meal just before.     3.  Please call or send a [employer] message with any questions or concerns or to schedule follow up.    Annel Thao, MPH, RD, CDCES, LD 10/15/2024    Any diabetes medication dose changes were made via the CDE Protocol and Collaborative Practice Agreement with the patient's referring provider. A copy of this encounter was shared with the provider.

## 2024-10-16 NOTE — PATIENT INSTRUCTIONS
Diabetes Medications              -continue metformin extended release as 2 tablets (1000 mg) daily              -continue Jardiance as 1 tablet (10 mg) daily              -continue Rybelsus as 1 tablet (7 mg) daily in the morning (take on an empty stomach with no more than 4 oz water.  Wait at least 30 min before eating, drinking or taking other medicines)                          Tips for improving tolerance:                                       -stop eating when you feel full                                      -avoid high fat foods/high fat meals when starting medication and changing doses     2.  When you have an urgent stool that is soft/loose/liquid in consistency, write down what you ate at the meal just before.     3.  Please call or send a [facility] message with any questions or concerns or to schedule follow up.    Annel Thao, MPH, RD, SHAWN, LD  Diabetes Education Triage Line: [phone]  Diabetes Education Appointment [phone]

## 2024-11-12 ENCOUNTER — LAB (OUTPATIENT)
Dept: LAB | Facility: CLINIC | Age: 44
End: 2024-11-12
Payer: COMMERCIAL

## 2024-11-12 DIAGNOSIS — E11.9 TYPE 2 DIABETES MELLITUS WITHOUT COMPLICATION, WITHOUT LONG-TERM CURRENT USE OF INSULIN (H): ICD-10-CM

## 2024-11-12 LAB
ALT SERPL W P-5'-P-CCNC: 23 U/L (ref 0–70)
ANION GAP SERPL CALCULATED.3IONS-SCNC: 12 MMOL/L (ref 7–15)
BUN SERPL-MCNC: 21.9 MG/DL (ref 6–20)
CALCIUM SERPL-MCNC: 10.3 MG/DL (ref 8.8–10.4)
CHLORIDE SERPL-SCNC: 103 MMOL/L (ref 98–107)
CREAT SERPL-MCNC: 1.07 MG/DL (ref 0.67–1.17)
EGFRCR SERPLBLD CKD-EPI 2021: 88 ML/MIN/1.73M2
EST. AVERAGE GLUCOSE BLD GHB EST-MCNC: 174 MG/DL
GLUCOSE SERPL-MCNC: 144 MG/DL (ref 70–99)
HBA1C MFR BLD: 7.7 % (ref 0–5.6)
HCO3 SERPL-SCNC: 24 MMOL/L (ref 22–29)
POTASSIUM SERPL-SCNC: 4.6 MMOL/L (ref 3.4–5.3)
SODIUM SERPL-SCNC: 139 MMOL/L (ref 135–145)

## 2024-11-12 PROCEDURE — 84460 ALANINE AMINO (ALT) (SGPT): CPT

## 2024-11-12 PROCEDURE — 80048 BASIC METABOLIC PNL TOTAL CA: CPT

## 2024-11-12 PROCEDURE — 83036 HEMOGLOBIN GLYCOSYLATED A1C: CPT

## 2024-11-12 PROCEDURE — 36415 COLL VENOUS BLD VENIPUNCTURE: CPT

## 2024-11-19 ENCOUNTER — VIRTUAL VISIT (OUTPATIENT)
Dept: ENDOCRINOLOGY | Facility: CLINIC | Age: 44
End: 2024-11-19
Payer: COMMERCIAL

## 2024-11-19 DIAGNOSIS — E11.9 TYPE 2 DIABETES MELLITUS WITHOUT COMPLICATION, WITHOUT LONG-TERM CURRENT USE OF INSULIN (H): Primary | ICD-10-CM

## 2024-11-19 DIAGNOSIS — E78.2 MIXED HYPERLIPIDEMIA: ICD-10-CM

## 2024-11-19 PROCEDURE — G2211 COMPLEX E/M VISIT ADD ON: HCPCS | Mod: 95 | Performed by: INTERNAL MEDICINE

## 2024-11-19 PROCEDURE — 99214 OFFICE O/P EST MOD 30 MIN: CPT | Mod: 95 | Performed by: INTERNAL MEDICINE

## 2024-11-19 NOTE — PROGRESS NOTES
Virtual Visit Details    Type of service:  Video Visit     Originating Location (pt. Location): Home  Distant Location (provider location):  Off-site  Platform used for Video Visit: Diego      Recent issues:  Diabetes follow-up evaluation  Med changed to MF, Rybelsus, and Jardiance medications   Has noticed less appetite, unclear if wt change or pattern of glucose levels since not testing          2017. Diagnosis of diabetes mellitus when med eval for torn right shoulder muscle  High glucose level noted during med evaluation, hgbA1c near 11.5%  Started Jardiance medication, took for approx 6 months  Recalls significant weight loss of approx 30#  Switched to metformin medication, then dose increases              Concerns about GI symptoms with nausea, nausea, also morning vomiting              Tried Prilosec, then Zantac  Has seen [facility] for medical evaluations  Recent discussion with his workplace team physician, Dr. Skye Mane              Advised to see me for medical evaluation  Previously on metforminXR 500 mg 2 tabs BID  Previous [facility] labs include:               8/12/19. Initial diabetes evaluation with me at [place]  Reviewed health history and diabetes management  Medication change to JanumetXR  1/2022. Addition of Jardiance  5/2024. Met with [facility] Diab Educator (ER)  Changed from JanumetXR to metformin and Rybelsus  Current DM medications:  Metformin 500 mg 2- tabs daily  Rybelsus 7 mg 1-tab in morning  Jardiance 10 mg  1-tab each morning     Blood glucose (BG) meter: ?              Infrequent testing  CGM use:  Years ago, not recently (phobia?)  Fam Hx Diabetes: none  Previous [facility] hgbA1c trends include:  Lab Results   Component Value Date    A1C 7.7 (H) 11/12/2024    A1C 8.0 (H) 08/12/2024    A1C 7.9 (H) 06/05/2024    A1C 7.8 (H) 02/21/2024    A1C 7.9 (H) 11/08/2023        Recent [facility] labs include:  Lab Results   Component Value Date    A1C 7.7 (H) 11/12/2024     11/12/2024     POTASSIUM 4.6 11/12/2024    CHLORIDE 103 11/12/2024    CO2 24 11/12/2024    ANIONGAP 12 11/12/2024     (H) 11/12/2024    BUN 21.9 (H) 11/12/2024    CR 1.07 11/12/2024    GFRESTIMATED 88 11/12/2024    GFRESTBLACK >90 11/08/2019    FLAVIO 10.3 11/12/2024    CHOL 193 06/05/2024    TRIG 461 (H) 06/05/2024    HDL 43 06/05/2024    LDL  06/05/2024      Comment:      Cannot estimate LDL when triglyceride exceeds 400 mg/dL    LDL 88 06/05/2024    NHDL 150 (H) 06/05/2024    UCRR 73.7 08/12/2024    MICROL <12.0 08/12/2024    UMALCR  08/12/2024      Comment:      Unable to calculate, urine albumin and/or urine creatinine is outside detectable limits.  Microalbuminuria is defined as an albumin:creatinine ratio of 17 to 299 for males and 25 to 299 for females. A ratio of albumin:creatinine of 300 or higher is indicative of overt proteinuria.  Due to biologic variability, positive results should be confirmed by a second, first-morning random or 24-hour timed urine specimen. If there is discrepancy, a third specimen is recommended. When 2 out of 3 results are in the microalbuminuria range, this is evidence for incipient nephropathy and warrants increased efforts at glucose control, blood pressure control, and institution of therapy with an angiotensin-converting-enzyme (ACE) inhibitor (if the patient can tolerate it).       Lab Results   Component Value Date    TSH 1.56 02/21/2024     Last eye exam 5/2022 at [facility], no DR per patient  History of hyperlipidemia, takes simvastatin 20 mg daily   1/2023. Stopped statin med and started NiacinER (Niaspan) 500 mg 1-tab QPM along with aspirin 1-tab QPM, head sweating symptoms   ~8/2023. Continued the fenofibrate but changed from simvastatin to rosuvastatin medication  DM Complications:      None known        Lives in [facility]  [facility]   Also sees Dr. LANCE Jose/[facility] for general medicine  "evaluations.      PMH/PSH:  Past Medical History:   Diagnosis Date    Allergic rhinitis     Benign essential hypertension     Elevated coronary artery calcium score     NATHALIE (generalized anxiety disorder)     Hyperlipidemia     Pulmonary nodules     Rotator cuff tear, left 2015    Rotator cuff tear, right 2017    Type 2 diabetes mellitus (H)      Past Surgical History:   Procedure Laterality Date    APPENDECTOMY      SHOULDER SURGERY      left and right previously       Family Hx:  No family history on file.      Social Hx:  Social History     Socioeconomic History    Marital status: Single     Spouse name: Not on file    Number of children: Not on file    Years of education: Not on file    Highest education level: Not on file   Occupational History    Not on file   Tobacco Use    Smoking status: Every Day     Current packs/day: 1.00     Types: Cigarettes, Cigars    Smokeless tobacco: Never   Substance and Sexual Activity    Alcohol use: Yes    Drug use: Never    Sexual activity: Not on file   Other Topics Concern    Not on file   Social History Narrative    Single. 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     From [place], [place]. Lives in [place] and works mostly from home.     Enjoys filling time doing \"lazy things\", e.g. binge watching shows and reading.     Also, enjoys yard work and walking around neighborhood. Used to enjoy taking long drives.      Social Drivers of Health     Financial Resource Strain: Not on file   Food Insecurity: Not on file   Transportation Needs: Not on file   Physical Activity: Not on file   Stress: Not on file   Social Connections: Not on file   Interpersonal Safety: Low Risk  (8/28/2024)    Interpersonal Safety     Do you feel physically and emotionally safe where you currently live?: Yes     Within the past 12 months, have you been hit, slapped, kicked or otherwise physically hurt by someone?: No     Within the past 12 months, have you been humiliated " or emotionally abused in other ways by your partner or ex-partner?: No   Housing Stability: Not on file          MEDICATIONS:  has a current medication list which includes the following prescription(s): acetaminophen, fenofibrate, hydrochlorothiazide, jardiance, losartan, metformin, multiple vitamins-minerals, rosuvastatin, semaglutide, vitamin d, and semaglutide.    ROS: 10 point ROS neg other than the symptoms noted above in the HPI.     GENERAL: energy good, wt stable; denies fevers, chills, night sweats.   HEENT: no dysphagia, odonophagia, diplopia, neck pain  THYROID:  no apparent hyper or hypothyroid symptoms  CV: no chest pain, pressure, palpitations  LUNGS: no SOB, MEHTA, cough, wheezing   ABDOMEN: occasional nausea; denies abdominal pain  EXTREMITIES: no rashes, ulcers, edema  NEUROLOGY: no headaches, denies changes in vision, tingling, extremitiy numbness   MSK: no muscle aches or pains, weakness  SKIN: no rashes or lesions  : denies nocturia  PSYCH:  some anxiety  ENDOCRINE: no heat or cold intolerance    Physical Exam (visual exam)  VS:  no vital signs taken for video visit  CONSTITUTIONAL: healthy, alert and NAD, well dressed, answering questions appropriately  ENT: no nose swelling or nasal discharge, mouth redness or gum changes.  EYES: eyes grossly normal to inspection, conjunctivae and sclerae normal, no exophthalmos or proptosis  THYROID:  no apparent nodules or goiter  LUNGS: no audible wheeze, cough or visible cyanosis, no visible retractions or increased work of breathing  ABDOMEN: abdomen not evaluated  EXTREMITIES: no hand tremors, limited exam  NEUROLOGY: CN grossly intact, mentation intact and speech normal   SKIN:  no apparent skin lesions, rash, or edema with visualized skin appearance  PSYCH: mentation appears normal, affect normal/bright, judgement and insight intact,   normal speech and appearance well groomed    LABS:     All pertinent notes, labs, and images personally reviewed by me.       A/P:  Encounter Diagnoses   Name Primary?    Type 2 diabetes mellitus without complication, without long-term current use of insulin (H) Yes    Mixed hyperlipidemia        Comments:  Reviewed health history and diabetes issues.  Recent hgbA1c improved but cholesterol and TG levels high despite rosuvastatin and fenofibrate med use  Previous hgbA1c levels elevated, often in the 7-8% range  Difficult to assess glycemic control without recent BGM or CGM data  Reviewed and interpreted tests that I previously ordered.   Ordered appropriate tests for the endocrinology disease management.    Management options discussed and implemented after shared medical decision making with the patient.  T2DM problem is chronic-stable    Plan:  Discussed general issues with the diabetes diagnosis and management  We discussed the hgbA1c test which reflects previous overall glucose levels or control  Discussed the importance of blood glucose (BG) testing to assess glucose trends  Provided general overview of the diabetes medication options and medication treatment plan.  We have reviewed lipid test results and the cholesterol, triglyceride lower medication options     Recommend:  Change to MFXR, Rybelsus, and Jardiance plan  Increase Rybelsus as 7 mg 2-tabs each morning, reviewed dosing routine  Monitor for possible GI symptoms  Keep focus on diet, increased exercise, weight management.  Message me with feedback on this higher Rybelsus dose in 1-2 weeks, then consider new Rybelsus 14 mg tablet Rx  We have reviewed ideal plan to begin glucose testing:  Test FBG at least 1-2x/week, goal target BG  mg/dl  We have discussed the value of wearing a CGM sensor, painless attachment to the arm skin for 2-14 days, clinic Libre3 sample option  Consider follow-up [facility] Diab Ed appt   Continue the current lipid medications, goal LDL <100 mg/dl and TG <200 mg/dl    Consider adding Vascepa 2 gm BID for the hypertriglyceridemia  Consider seeing  a lipid specialist at [facility], such as Dr. ZAHRA Farr  Limit or discontinue alcohol use  Advise having fasting lipid panel testing and dilated eye examination, at least annually    Addressed patient questions today    The longitudinal plan of care for the endocrine problem(s) were addressed during this visit.  Due to added complexity of care,   we will continue to support the patient and the subsequent management of this condition with ongoing continuity of care.    There are no Patient Instructions on file for this visit.    Future labs ordered today: No orders of the defined types were placed in this encounter.    Radiology/Consults ordered today: None    Total time spent on day of encounter:  26 min    Follow-up:  1/2025 or 2/2025 Katie Flores MD, MS  Endocrinology  [facility]

## 2024-11-20 ENCOUNTER — TELEPHONE (OUTPATIENT)
Dept: ENDOCRINOLOGY | Facility: CLINIC | Age: 44
End: 2024-11-20
Payer: COMMERCIAL

## 2024-11-20 NOTE — TELEPHONE ENCOUNTER
LVM for patient to call and schedule his 3 mos follow up . Please schedule next availbility. [phone]   Thank you AY

## 2024-11-29 ENCOUNTER — MYC MEDICAL ADVICE (OUTPATIENT)
Dept: ENDOCRINOLOGY | Facility: CLINIC | Age: 44
End: 2024-11-29
Payer: COMMERCIAL

## 2024-11-29 DIAGNOSIS — E11.9 TYPE 2 DIABETES MELLITUS WITHOUT COMPLICATION, WITHOUT LONG-TERM CURRENT USE OF INSULIN (H): ICD-10-CM

## 2024-11-29 NOTE — TELEPHONE ENCOUNTER
Chart notes from 11/19/2024 visit:  Increase Rybelsus as 7 mg 2-tabs each morning, reviewed dosing routine  Monitor for possible GI symptoms  Keep focus on diet, increased exercise, weight management.  Message me with feedback on this higher Rybelsus dose in 1-2 weeks, then consider new Rybelsus 14 mg tablet Rx    Pending new Rx and routing message to Dr. Flores for review based on chart notes and patients feedback on how new dose was going.     Samira Orellana RN

## 2024-12-01 DIAGNOSIS — E11.9 TYPE 2 DIABETES MELLITUS WITHOUT COMPLICATION, WITHOUT LONG-TERM CURRENT USE OF INSULIN (H): Primary | ICD-10-CM

## 2024-12-14 ENCOUNTER — ANCILLARY PROCEDURE (OUTPATIENT)
Dept: CT IMAGING | Facility: CLINIC | Age: 44
End: 2024-12-14
Attending: FAMILY MEDICINE
Payer: COMMERCIAL

## 2024-12-14 DIAGNOSIS — R91.8 PULMONARY NODULES: ICD-10-CM

## 2024-12-14 PROCEDURE — 71250 CT THORAX DX C-: CPT | Mod: GC | Performed by: RADIOLOGY

## 2024-12-15 DIAGNOSIS — E78.2 MIXED HYPERLIPIDEMIA: ICD-10-CM

## 2024-12-16 RX ORDER — FENOFIBRATE 145 MG/1
TABLET, COATED ORAL
Qty: 90 TABLET | Refills: 1 | Status: SHIPPED | OUTPATIENT
Start: 2024-12-16

## 2024-12-16 NOTE — TELEPHONE ENCOUNTER
Last Written Prescription Date:  6/17/24  Last Fill Quantity: 90,  # refills: 1   Last office visit: Visit date not found ; last virtual visit: 11/19/2024 with prescribing provider:  Dr. Flores   Future Office Visit: Jan or Feb 2025     Requested Prescriptions   Pending Prescriptions Disp Refills    fenofibrate (TRICOR) 145 MG tablet [Pharmacy Med Name: FENOFIBRATE 145 MG TABLET] 90 tablet 1     Sig: TAKE 1 TABLET BY MOUTH EVERY DAY       Antihyperlipidemic agents Passed - 12/16/2024 11:52 AM        Passed - LDL on file in the past 12 months        Passed - Medication is active on med list        Passed - Recent (12 mo) or future (90 days) visit within the authorizing provider's specialty     The patient must have completed an in-person or virtual visit within the past 12 months or has a future visit scheduled within the next 90 days with the authorizing provider s specialty.  Urgent care and e-visits do not qualify as an office visit for this protocol.          Passed - Patient is age 18 years or older           Refills sent  Aimee Delgadillo RN

## 2024-12-17 ASSESSMENT — SLEEP AND FATIGUE QUESTIONNAIRES
HOW LIKELY ARE YOU TO NOD OFF OR FALL ASLEEP WHILE WATCHING TV: SLIGHT CHANCE OF DOZING
HOW LIKELY ARE YOU TO NOD OFF OR FALL ASLEEP WHILE SITTING QUIETLY AFTER LUNCH WITHOUT ALCOHOL: WOULD NEVER DOZE
HOW LIKELY ARE YOU TO NOD OFF OR FALL ASLEEP WHILE SITTING AND READING: SLIGHT CHANCE OF DOZING
HOW LIKELY ARE YOU TO NOD OFF OR FALL ASLEEP IN A CAR, WHILE STOPPED FOR A FEW MINUTES IN TRAFFIC: WOULD NEVER DOZE
HOW LIKELY ARE YOU TO NOD OFF OR FALL ASLEEP WHILE SITTING INACTIVE IN A PUBLIC PLACE: WOULD NEVER DOZE
HOW LIKELY ARE YOU TO NOD OFF OR FALL ASLEEP WHEN YOU ARE A PASSENGER IN A CAR FOR AN HOUR WITHOUT A BREAK: WOULD NEVER DOZE
HOW LIKELY ARE YOU TO NOD OFF OR FALL ASLEEP WHILE SITTING AND TALKING TO SOMEONE: WOULD NEVER DOZE
HOW LIKELY ARE YOU TO NOD OFF OR FALL ASLEEP WHILE LYING DOWN TO REST IN THE AFTERNOON WHEN CIRCUMSTANCES PERMIT: MODERATE CHANCE OF DOZING

## 2024-12-18 ENCOUNTER — VIRTUAL VISIT (OUTPATIENT)
Dept: SLEEP MEDICINE | Facility: CLINIC | Age: 44
End: 2024-12-18
Payer: COMMERCIAL

## 2024-12-18 ENCOUNTER — MYC MEDICAL ADVICE (OUTPATIENT)
Dept: CARDIOLOGY | Facility: CLINIC | Age: 44
End: 2024-12-18

## 2024-12-18 VITALS — BODY MASS INDEX: 28.93 KG/M2 | HEIGHT: 66 IN | WEIGHT: 180 LBS

## 2024-12-18 DIAGNOSIS — G47.33 OSA (OBSTRUCTIVE SLEEP APNEA): Primary | ICD-10-CM

## 2024-12-18 PROCEDURE — 99213 OFFICE O/P EST LOW 20 MIN: CPT | Mod: 95

## 2024-12-18 ASSESSMENT — PAIN SCALES - GENERAL: PAINLEVEL_OUTOF10: MODERATE PAIN (4)

## 2024-12-18 NOTE — PATIENT INSTRUCTIONS
Try a nasal saline gel or spray  Try over the counter fluticasone spray   Use a full face mask   Adjust your humidity settings    Central [phone]

## 2024-12-18 NOTE — NURSING NOTE
Current patient location: [address] [place] [id number]    Is the patient currently in the state of [place]? YES    Visit mode:VIDEO    If the visit is dropped, the patient can be reconnected by:VIDEO VISIT: Text to cell phone:   Telephone Information:   Mobile [phone]       Will anyone else be joining the visit? NO  (If patient encounters technical issues they should call [phone]    Are changes needed to the allergy or medication list? No    Are refills needed on medications prescribed by this physician? NO    Rooming Documentation:  Questionnaire(s) completed    Reason for visit: RECHECK    Ani CORTESF

## 2024-12-18 NOTE — PROGRESS NOTES
Virtual Visit Details  Type of service: Video Visit   Start time: 3:21 PM  End time: 3:30 PM  Originating Location (pt. Location): Home  Distant Location (provider location): On-site  Platform used for Video Visit: [facility]    Sleep Apnea - Follow-up Visit:    Impression/Plan:  (G47.33) ATFAB (obstructive sleep apnea) (primary encounter diagnosis)  Comment: Bertin Mcgill presents for follow-up of his moderate sleep apnea, managed with CPAP. Bertin says things are going well with his CPAP machine; however, he had trouble getting parts over the summer months due to an  prescription. He also has not been using his CPAP as much lately. He feels it is harder to use his CPAP machine in the fall/winter months due to nasal congestion. He does sometimes use Breathe Right strips or he will take a cold and flu medication. When he does use his CPAP machine, he feels more rested. He is tolerating the set pressures. His download shows his apnea is well controlled with a residual AHI of 0.4 events per hour.    Plan: Comprehensive DME  Continue auto-PAP 5-15 cmH2O. A prescription was written for new supplies. We reviewed recommendations for cleaning and replacing supplies. Suggested trying nasal saline spray/gel or over the counter fluticasone spray to help with nasal congestion. Also recommended trying a full face mask if he is too congested to breathe through his nose.      Bertin Mcgill will follow up in about 1 year(s).     Total time spent reviewing medical records, history and physical examination, review of previous testing and interpretation as well as documentation on this date: 17 minutes     CC: Miguel Jose MD    History of Present Illness:  Chief Complaint   Patient presents with    RECHECK     Bertin Mcgill presents for follow-up of his moderate sleep apnea, managed with CPAP. Things are going well with his CPAP machine; however, he had trouble getting parts over the summer months. He has not  been using his CPAP as much lately. He feels it is harder to use his CPAP machine in the fall/winter months due to nasal congestion. He does sometimes use Breathe Right strips or he will take a cold and flu medication.     He does feel more rested when he uses his CPAP machine.       2/4/2022 [facility] Sleep Study (185.0 lbs) - AHI 27.5, RDI 29.7, Supine AHI 58.4, REM AHI 22.5, Low O2 79.9%, Time Spent less than or = to 88% 5.1 minutes/Time Spent less than or equal to 89% 8.6 minutes.    DME: [facility]    Do you use a CPAP Machine at home: (Patient-Rptd) Yes  Overall, on a scale of 0-10 how would you rate your CPAP (0 poor, 10 great): (Patient-Rptd) 7    What type of mask do you use: (Patient-Rptd) Nasal Mask  Is your mask comfortable: (Patient-Rptd) Yes  If not, why:      Is your mask leaking: (Patient-Rptd) No  If yes, where do you feel it:    How many night per week does the mask leak (0-7):      Do you notice snoring with mask on: (Patient-Rptd) No  Do you notice gasping arousals with mask on: (Patient-Rptd) No  Are you having significant oral or nasal dryness: (Patient-Rptd) Yes He uses the humidifier.   Is the pressure setting comfortable: (Patient-Rptd) Yes He feels like he has enough air.   If not, why:      What is your typical bedtime: (Patient-Rptd) 2:00 AM  How long does it take you to go to sleep on PAP therapy: (Patient-Rptd) 15-30 minutes?  What time do you typically get out of bed for the day: (Patient-Rptd) Usually between 8-8:30 weekdays, no set time on weekends  How many hours on average per night are you using PAP therapy: (Patient-Rptd) When I use it, around 3-5 hours. I usually wake in the middle of the night for a bathroom break and don't replace when I'm done.  How many hours are you sleeping per night: (Patient-Rptd) 4-5?  Do you feel well rested in the morning: (Patient-Rptd) No    ResMed AirSense 11  Auto-PAP 5.0 - 15.0 cmH2O 30 day usage data: 09/16/2024-12/14/2024    10% of days  with > 4 hours of use. 62/90 days with no use.   Average use 3 hours 11 minutes per day.   95%ile Leak 3.6 L/min.   CPAP 95% pressure 10.8 cm.   AHI 0.4 events per hour.      EPWORTH SLEEPINESS SCALE         12/17/2024    10:31 AM    McLean Sleepiness Scale ( JACQUIE Frey  [id number]<br>ESS - [place]/English - Final version - 21 Nov 07 - [facility] [employer].)   Sitting and reading Slight chance of dozing   Watching TV Slight chance of dozing   Sitting, inactive in a public place (e.g. a theatre or a meeting) Would never doze   As a passenger in a car for an hour without a break Would never doze   Lying down to rest in the afternoon when circumstances permit Moderate chance of dozing   Sitting and talking to someone Would never doze   Sitting quietly after a lunch without alcohol Would never doze   In a car, while stopped for a few minutes in traffic Would never doze   McLean Score (MC) 4   McLean Score (Sleep) 4        Patient-reported       INSOMNIA SEVERITY INDEX (ZAHIDA)          12/17/2024    10:25 AM   Insomnia Severity Index (ZAHIDA)   Difficulty falling asleep 2    Difficulty staying asleep 2    Problems waking up too early 1    How SATISFIED/DISSATISFIED are you with your CURRENT sleep pattern? 2    How NOTICEABLE to others do you think your sleep problem is in terms of impairing the quality of your life? 2    How WORRIED/DISTRESSED are you about your current sleep problem? 1    To what extent do you consider your sleep problem to INTERFERE with your daily functioning (e.g. daytime fatigue, mood, ability to function at work/daily chores, concentration, memory, mood, etc.) CURRENTLY? 2    ZAHIDA Total Score 12        Patient-reported       Guidelines for Scoring/Interpretation:  Total score categories:  0-7 = No clinically significant insomnia   8-14 = Subthreshold insomnia   15-21 = Clinical insomnia (moderate severity)  22-28 = Clinical insomnia (severe)  Used via courtesy of www.2Nite2Nite.netth.va.gov with permission from  "Bradley Fuller PhD., [facility]      Past medical/surgical history, family history, social history, medications and allergies were reviewed.        Problem List:  Patient Active Problem List    Diagnosis Date Noted    NATHALIE (generalized anxiety disorder) 12/20/2023     Priority: Medium    Coronary artery calcinosis 12/18/2023     Priority: Medium    Mixed anxiety depressive disorder 06/28/2023     Priority: Medium    Benign essential hypertension      Priority: Medium    Type 2 diabetes mellitus without complication, without long-term current use of insulin (H) 10/18/2017     Priority: Medium     Formatting of this note might be different from the original.  Vendor update to replace retired or updated dx codes and/or terms.  Formatting of this note might be different from the original.  Overview:   Vendor update to replace retired or updated dx codes and/or terms.      Seasonal allergies 09/08/2015     Priority: Medium    Smoker 03/13/2015     Priority: Medium    Mixed hyperlipidemia 12/06/2007     Priority: Medium    Overweight (BMI 25.0-29.9) 12/06/2007     Priority: Medium        Ht 1.676 m (5' 6\")   Wt 81.6 kg (180 lb)   BMI 29.05 kg/m      Zach Be, APRN CNP  "

## 2024-12-23 NOTE — TELEPHONE ENCOUNTER
Attempted to call patient. Left message for patient.    Gini Huntley, RN, BSN  Cardiology RN Care Coordinator   [place]   Phone: [phone]  Fax: [phone] ([place]) [phone] ([place])

## 2024-12-30 NOTE — TELEPHONE ENCOUNTER
Patient saw [employer] message. No response at this time.    Gini Huntley, RN, BSN  Cardiology RN Care Coordinator   [place]   Phone: [phone]  Fax: [phone] ([place]) [phone] ([place])

## 2025-01-13 ENCOUNTER — DOCUMENTATION ONLY (OUTPATIENT)
Dept: ENDOCRINOLOGY | Facility: CLINIC | Age: 45
End: 2025-01-13
Payer: COMMERCIAL

## 2025-01-13 DIAGNOSIS — E11.9 TYPE 2 DIABETES MELLITUS WITHOUT COMPLICATION, WITHOUT LONG-TERM CURRENT USE OF INSULIN (H): Primary | ICD-10-CM

## 2025-01-13 NOTE — PROGRESS NOTES
Bertin Mcgill has an upcoming lab appointment:Please place lab order in epic     Thank you    [facility] lab team  [phone]     Future Appointments   Date Time Provider Department Center   1/30/2025  9:30 AM  LAB [facility]   2/6/2025  3:00 PM Anival Flores MD [facility]

## 2025-01-14 NOTE — PROGRESS NOTES
Message reviewed, lab orders placed.    LEO Flores MD, MS  Endocrinology  [facility]

## 2025-01-28 DIAGNOSIS — E11.9 TYPE 2 DIABETES MELLITUS WITHOUT COMPLICATION, WITHOUT LONG-TERM CURRENT USE OF INSULIN (H): ICD-10-CM

## 2025-01-28 RX ORDER — METFORMIN HYDROCHLORIDE 500 MG/1
1000 TABLET, EXTENDED RELEASE ORAL DAILY
Qty: 60 TABLET | Refills: 5 | Status: SHIPPED | OUTPATIENT
Start: 2025-01-28

## 2025-01-28 NOTE — TELEPHONE ENCOUNTER
Last Written Prescription Date:  8/19/24  Last Fill Quantity: 60,  # refills: 5   Last office visit: Visit date not found ; last virtual visit: 11/19/2024 with prescribing provider:  Dr. Flores   Future Office Visit: 2/6/25     Requested Prescriptions   Pending Prescriptions Disp Refills    metFORMIN (GLUCOPHAGE XR) 500 MG 24 hr tablet [Pharmacy Med Name: METFORMIN HCL  MG TABLET] 60 tablet 5     Sig: TAKE 2 TABLETS BY MOUTH DAILY       Biguanide Agents Passed - 1/28/2025 10:03 AM        Passed - Patient is age 10 or older        Passed - Patient has documented A1c within the specified period of time.     If HgbA1C is 8 or greater, it needs to be on file within the past 3 months.  If less than 8, must be on file within the past 6 months.     Recent Labs   Lab Test 11/12/24  0903   A1C 7.7*             Passed - Patient does NOT have a diagnosis of CHF.        Passed - Medication is active on med list        Passed - Medication indicated for associated diagnosis     Medication is associated with one or more of the following diagnoses:     Gestational diabetes mellitus     Hyperinsulinar obesity     Hypersecretion of ovarian androgens    Non-alcoholic fatty liver    Polycystic ovarian syndrome               Pre-diabetes (DM 2 prevention)    Type 2 diabetes mellitus     Weight gain, antipsychotic therapy-induced    Impaired fasting glucose          Passed - Has GFR on file in past 12 months and most recent value is normal        Passed - Recent (6 mo) or future (90 days) visit within the authorizing provider's specialty     The patient must have completed an in-person or virtual visit within the past 6 months or has a future visit scheduled within the next 90 days with the authorizing provider s specialty.  Urgent care and e-visits do not quality as an office visit for this protocol.             Refills sent  Aimee Delgadillo RN

## 2025-01-30 ENCOUNTER — LAB (OUTPATIENT)
Dept: LAB | Facility: CLINIC | Age: 45
End: 2025-01-30
Payer: COMMERCIAL

## 2025-01-30 DIAGNOSIS — E11.9 TYPE 2 DIABETES MELLITUS WITHOUT COMPLICATION, WITHOUT LONG-TERM CURRENT USE OF INSULIN (H): ICD-10-CM

## 2025-01-30 LAB
ALT SERPL W P-5'-P-CCNC: 16 U/L (ref 0–70)
ANION GAP SERPL CALCULATED.3IONS-SCNC: 13 MMOL/L (ref 7–15)
BUN SERPL-MCNC: 19.1 MG/DL (ref 6–20)
CALCIUM SERPL-MCNC: 10.6 MG/DL (ref 8.8–10.4)
CHLORIDE SERPL-SCNC: 105 MMOL/L (ref 98–107)
CREAT SERPL-MCNC: 1.1 MG/DL (ref 0.67–1.17)
EGFRCR SERPLBLD CKD-EPI 2021: 85 ML/MIN/1.73M2
EST. AVERAGE GLUCOSE BLD GHB EST-MCNC: 157 MG/DL
GLUCOSE SERPL-MCNC: 148 MG/DL (ref 70–99)
HBA1C MFR BLD: 7.1 % (ref 0–5.6)
HCO3 SERPL-SCNC: 24 MMOL/L (ref 22–29)
POTASSIUM SERPL-SCNC: 4.1 MMOL/L (ref 3.4–5.3)
SODIUM SERPL-SCNC: 142 MMOL/L (ref 135–145)

## 2025-02-05 DIAGNOSIS — I10 BENIGN ESSENTIAL HYPERTENSION: ICD-10-CM

## 2025-02-06 ENCOUNTER — VIRTUAL VISIT (OUTPATIENT)
Dept: ENDOCRINOLOGY | Facility: CLINIC | Age: 45
End: 2025-02-06
Payer: COMMERCIAL

## 2025-02-06 DIAGNOSIS — E11.9 TYPE 2 DIABETES MELLITUS WITHOUT COMPLICATION, WITHOUT LONG-TERM CURRENT USE OF INSULIN (H): Primary | ICD-10-CM

## 2025-02-06 DIAGNOSIS — E78.2 MIXED HYPERLIPIDEMIA: ICD-10-CM

## 2025-02-06 NOTE — PROGRESS NOTES
Virtual Visit Details    Type of service:  Video Visit     Originating Location (pt. Location): Home  Distant Location (provider location):  Off-site  Platform used for Video Visit: [place]      Recent issues:  Diabetes follow-up evaluation  Med changed to MF, Jardiance, and higher dose Rybelsus medications, per plan  Has noticed less appetite, and generally tolerating Rybelsus OK          2017. Diagnosis of diabetes mellitus when med eval for torn right shoulder muscle  High glucose level noted during med evaluation, hgbA1c near 11.5%  Started Jardiance medication, took for approx 6 months  Recalls significant weight loss of approx 30#  Switched to metformin medication, then dose increases              Concerns about GI symptoms with nausea, nausea, also morning vomiting              Tried Prilosec, then Zantac  Has seen [facility] for medical evaluations  Recent discussion with his workplace team physician, Dr. Skye Mane              Advised to see me for medical evaluation  Previously on metforminXR 500 mg 2 tabs BID  Previous [facility] labs include:               8/12/19. Initial diabetes evaluation with me at [place]  Reviewed health history and diabetes management  Medication change to JanumetXR  1/2022. Addition of Jardiance  5/2024. Met with [facility] Diab Educator (ER)  Changed from JanumetXR to metformin and Rybelsus  Current DM medications:  Metformin 500 mg 2- tabs daily  Rybelsus 14 mg 1-tab in morning  Jardiance 10 mg  1-tab each morning     Blood glucose (BG) meter: ?              Infrequent testing  CGM use:  Years ago, not recently (phobia?)  Fam Hx Diabetes: none  Previous [facility] hgbA1c trends include:  Lab Results   Component Value Date    A1C 7.1 (H) 01/30/2025    A1C 7.7 (H) 11/12/2024    A1C 8.0 (H) 08/12/2024    A1C 7.9 (H) 06/05/2024    A1C 7.8 (H) 02/21/2024        Recent [facility] labs include:  Lab Results   Component Value Date    A1C 7.1 (H) 01/30/2025     01/30/2025    POTASSIUM 4.1  01/30/2025    CHLORIDE 105 01/30/2025    CO2 24 01/30/2025    ANIONGAP 13 01/30/2025     (H) 01/30/2025    BUN 19.1 01/30/2025    CR 1.10 01/30/2025    GFRESTIMATED 85 01/30/2025    GFRESTBLACK >90 11/08/2019    FLAVIO 10.6 (H) 01/30/2025    CHOL 193 06/05/2024    TRIG 461 (H) 06/05/2024    HDL 43 06/05/2024    LDL  06/05/2024      Comment:      Cannot estimate LDL when triglyceride exceeds 400 mg/dL    LDL 88 06/05/2024    NHDL 150 (H) 06/05/2024    UCRR 73.7 08/12/2024    MICROL <12.0 08/12/2024    UMALCR  08/12/2024      Comment:      Unable to calculate, urine albumin and/or urine creatinine is outside detectable limits.  Microalbuminuria is defined as an albumin:creatinine ratio of 17 to 299 for males and 25 to 299 for females. A ratio of albumin:creatinine of 300 or higher is indicative of overt proteinuria.  Due to biologic variability, positive results should be confirmed by a second, first-morning random or 24-hour timed urine specimen. If there is discrepancy, a third specimen is recommended. When 2 out of 3 results are in the microalbuminuria range, this is evidence for incipient nephropathy and warrants increased efforts at glucose control, blood pressure control, and institution of therapy with an angiotensin-converting-enzyme (ACE) inhibitor (if the patient can tolerate it).       Lab Results   Component Value Date    TSH 1.56 02/21/2024     [facility], no DR per patient  History of hyperlipidemia, takes simvastatin 20 mg daily   1/2023. Stopped statin med and started NiacinER (Niaspan) 500 mg 1-tab QPM along with aspirin 1-tab QPM, head sweating symptoms   ~8/2023. Continued the fenofibrate but changed from simvastatin to rosuvastatin medication  DM Complications:      None known        Lives in [place]. \Hospital Has a New Name and Outlook.\""  Has seen [facility]   Also sees Dr. LANCE Jose/[facility] for general medicine  "evaluations.      PMH/PSH:  Past Medical History:   Diagnosis Date    Allergic rhinitis     Benign essential hypertension     Elevated coronary artery calcium score     NATHALIE (generalized anxiety disorder)     Hyperlipidemia     Pulmonary nodules     Rotator cuff tear, left 2015    Rotator cuff tear, right 2017    Type 2 diabetes mellitus (H)      Past Surgical History:   Procedure Laterality Date    APPENDECTOMY      SHOULDER SURGERY      left and right previously       Family Hx:  No family history on file.      Social Hx:  Social History     Socioeconomic History    Marital status: Single     Spouse name: Not on file    Number of children: Not on file    Years of education: Not on file    Highest education level: Not on file   Occupational History    Not on file   Tobacco Use    Smoking status: Every Day     Current packs/day: 1.00     Types: Cigarettes, Cigars     Passive exposure: Never    Smokeless tobacco: Never   Vaping Use    Vaping status: Never Used   Substance and Sexual Activity    Alcohol use: Yes    Drug use: Never    Sexual activity: Not on file   Other Topics Concern    Not on file   Social History Narrative    Single. 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     From [place], [place]. Lives in [place] and works mostly from home.     Enjoys filling time doing \"lazy things\", e.g. binge watching shows and reading.     Also, enjoys yard work and walking around neighborhood. Used to enjoy taking long drives.      Social Drivers of Health     Financial Resource Strain: Not on file   Food Insecurity: Not on file   Transportation Needs: Not on file   Physical Activity: Not on file   Stress: Not on file   Social Connections: Not on file   Interpersonal Safety: Low Risk  (8/28/2024)    Interpersonal Safety     Do you feel physically and emotionally safe where you currently live?: Yes     Within the past 12 months, have you been hit, slapped, kicked or otherwise physically hurt " by someone?: No     Within the past 12 months, have you been humiliated or emotionally abused in other ways by your partner or ex-partner?: No   Housing Stability: Not on file          MEDICATIONS:  has a current medication list which includes the following prescription(s): acetaminophen, aspirin, fenofibrate, hydrochlorothiazide, jardiance, losartan, metformin, multiple vitamins-minerals, rosuvastatin, semaglutide, and vitamin d.    ROS: 10 point ROS neg other than the symptoms noted above in the HPI.     GENERAL: energy good, wt stable; denies fevers, chills, night sweats.   HEENT: no dysphagia, odonophagia, diplopia, neck pain  THYROID:  no apparent hyper or hypothyroid symptoms  CV: no chest pain, pressure, palpitations  LUNGS: no SOB, MEHTA, cough, wheezing   ABDOMEN: occasional nausea and fullness, consipation; denies reflux or abdominal pain  EXTREMITIES: no rashes, ulcers, edema  NEUROLOGY: no headaches, denies changes in vision, tingling, extremitiy numbness   MSK: no muscle aches or pains, weakness  SKIN: no rashes or lesions  : denies nocturia  PSYCH:  some anxiety  ENDOCRINE: no heat or cold intolerance    Physical Exam (visual exam)  VS:  no vital signs taken for video visit  CONSTITUTIONAL: healthy, alert and NAD, well dressed, answering questions appropriately  ENT: no nose swelling or nasal discharge, mouth redness or gum changes.  EYES: eyes grossly normal to inspection, conjunctivae and sclerae normal, no exophthalmos or proptosis  THYROID:  no apparent nodules or goiter  LUNGS: no audible wheeze, cough or visible cyanosis, no visible retractions or increased work of breathing  ABDOMEN: abdomen not evaluated  EXTREMITIES: no hand tremors, limited exam  NEUROLOGY: CN grossly intact, mentation intact and speech normal   SKIN:  no apparent skin lesions, rash, or edema with visualized skin appearance  PSYCH: mentation appears normal, affect normal/bright, judgement and insight intact,   normal speech  and appearance well groomed    LABS:     All pertinent notes, labs, and images personally reviewed by me.      A/P:  Encounter Diagnoses   Name Primary?    Type 2 diabetes mellitus without complication, without long-term current use of insulin (H) Yes    Mixed hyperlipidemia        Comments:  Reviewed health history and diabetes issues.  Recent hgbA1c improved but cholesterol and TG levels high despite rosuvastatin and fenofibrate med use  Difficult to assess glycemic control without recent BGM or CGM data  Reviewed and interpreted tests that I previously ordered.   Ordered appropriate tests for the endocrinology disease management.    Management options discussed and implemented after shared medical decision making with the patient.  T2DM problem is chronic-stable    Plan:  Discussed general issues with the diabetes diagnosis and management  We discussed the hgbA1c test which reflects previous overall glucose levels or control  Discussed the importance of blood glucose (BG) testing to assess glucose trends  Provided general overview of the diabetes medication options and medication treatment plan.  We have reviewed lipid test results and the cholesterol, triglyceride lower medication options     Recommend:  Change to MFXR, Rybelsus, and Jardiance plan  Monitor for possible GI symptoms  Keep focus on diet, increased exercise, weight management.  We have reviewed ideal plan to begin glucose testing:  Test FBG at least 1-2x/week, goal target BG  mg/dl  We have discussed the value of wearing a CGM sensor, painless attachment to the arm skin for 2-14 days, clinic Libre3 sample option  Consider follow-up [facility] appt   Plan fasting labs in early 6/2025    Lab orders placed  Continue the current lipid medications, goal LDL <100 mg/dl and TG <200 mg/dl    Consider adding Vascepa 2 gm BID for the hypertriglyceridemia  Consider seeing a lipid specialist at [facility], such as Dr. ZAHRA Eduardo or  discontinue alcohol use  Advise having fasting lipid panel testing and dilated eye examination, at least annually    Addressed patient questions today    The longitudinal plan of care for the endocrine problem(s) were addressed during this visit.  Due to added complexity of care,   we will continue to support the patient and the subsequent management of this condition with ongoing continuity of care.    There are no Patient Instructions on file for this visit.    Future labs ordered today:   Orders Placed This Encounter   Procedures    Hemoglobin A1c    Basic metabolic panel    Vitamin D Deficiency    Lipid panel reflex to direct LDL Fasting    ALT     Radiology/Consults ordered today: None    Total time spent on day of encounter:  31 min    Follow-up: 6/17/25 at 12 noon, Return    LEO Flores MD, MS  Endocrinology  [facility]

## 2025-02-08 NOTE — TELEPHONE ENCOUNTER
losartan (COZAAR) 100 MG tablet       Usman Harper MD  Cardiovascular Disease  Lv 11/21/23  Nv  None    Routed because:  Angiotensin-II Receptors [id number]/05/2025 12:28 AM   Protocol Details Recent (12 mo) or future (90days) visit within the authorizing provider's specialty

## 2025-02-10 RX ORDER — LOSARTAN POTASSIUM 100 MG/1
100 TABLET ORAL DAILY
Qty: 90 TABLET | Refills: 0 | Status: SHIPPED | OUTPATIENT
Start: 2025-02-10

## 2025-02-10 NOTE — TELEPHONE ENCOUNTER
Name from pharmacy: LOSARTAN POTASSIUM 100 MG TAB         Will file in chart as: losartan (COZAAR) 100 MG tablet    Sig: TAKE 1 TABLET BY MOUTH EVERY DAY    Disp: 90 tablet    Refills: 3    Start: 2/5/2025    Class: E-Prescribe    For: Benign essential hypertension    Last ordered: 1 year ago (2/7/2024) by Usman Harper MD    Last refill: 11/2/2024    Rx #: [id number]    Angiotensin-II Receptors [id number]/08/2025 09:57 AM   Protocol Details Recent (12 mo) or future (90days) visit within the authorizing provider's specialty    Most recent blood pressure under 140/90 in past 12 months    Medication is active on med list    Has GFR on file in past 12 months and most recent value is normal    Medication indicated for associated diagnosis    Patient is age 18 or older    Normal serum potassium on file in past 12 months      To be filled at: [facility] [address] [place] [address]     Patient saw Dr. Harper on 11/21/23. Patient was to follow up as needed with cardiology. Patient was to increase losartan to 100 mg daily. See mychart encounter on 1/18/24. Sofia refill sent for patient. Further refills to go through PCP.    Gini Huntley, RN, BSN  Cardiology RN Care Coordinator   [place]   Phone: [phone]  Fax: [phone] ([place]) [phone] ([place])

## 2025-03-16 DIAGNOSIS — E78.2 MIXED HYPERLIPIDEMIA: ICD-10-CM

## 2025-03-20 RX ORDER — ROSUVASTATIN CALCIUM 20 MG/1
20 TABLET, COATED ORAL DAILY
Qty: 90 TABLET | Refills: 0 | Status: SHIPPED | OUTPATIENT
Start: 2025-03-20

## 2025-03-20 NOTE — TELEPHONE ENCOUNTER
rosuvastatin (CRESTOR) 20 MG tablet   90 tablet 0 12/19/2024     Last Office Visit : 11-  Future Office visit:  none    Refill decision: Medication refilled per  Medication Refill in Ambulatory Care  policy.   Pt seen <18 months  Request from pharmacy:  Requested Prescriptions   Pending Prescriptions Disp Refills    rosuvastatin (CRESTOR) 20 MG tablet [Pharmacy Med Name: ROSUVASTATIN CALCIUM 20 MG TAB] 90 tablet 0     Sig: TAKE 1 TABLET BY MOUTH EVERY DAY       Antihyperlipidemic agents Failed - 3/20/2025  9:26 AM        Failed - Recent (12 mo) or future (90 days) visit within the authorizing provider's specialty     The patient must have completed an in-person or virtual visit within the past 12 months or has a future visit scheduled within the next 90 days with the authorizing provider s specialty.  Urgent care and e-visits do not qualify as an office visit for this protocol.     Labs completed on :  6-5-2024  Lipid panel reflex to direct LDL Fasting      Passed - LDL on file in the past 12 months     Labs completed on :   6-5-2024  LDL Cholesterol Direct  <100 mg/dL 88          Passed - Medication is active on med list and the sig matches. RN to manually verify dose and sig if red X/fail.     If the protocol passes (green check), you do not need to verify med dose and sig.    A prescription matches if they are the same clinical intention.    For Example: once daily and every morning are the same.    The protocol can not identify upper and lower case letters as matching and will fail.     For Example: Take 1 tablet (50 mg) by mouth daily     TAKE 1 TABLET (50 MG) BY MOUTH DAILY    For all fails (red x), verify dose and sig.    If the refill does match what is on file, the RN can still proceed to approve the refill request.       If they do not match, route to the appropriate provider.             Passed - Patient is age 18 years or older

## 2025-03-31 ENCOUNTER — MYC MEDICAL ADVICE (OUTPATIENT)
Dept: CARDIOLOGY | Facility: CLINIC | Age: 45
End: 2025-03-31
Payer: COMMERCIAL

## 2025-04-01 NOTE — TELEPHONE ENCOUNTER
Patient saw [employer] message. No questions at this time.    Gini Huntley, RN, BSN  Cardiology RN Care Coordinator   [place]   Phone: [phone]  Fax: [phone] ([place]) [phone] ([place])

## 2025-05-11 ENCOUNTER — HEALTH MAINTENANCE LETTER (OUTPATIENT)
Age: 45
End: 2025-05-11

## 2025-05-14 DIAGNOSIS — I10 BENIGN ESSENTIAL HYPERTENSION: ICD-10-CM

## 2025-05-16 NOTE — TELEPHONE ENCOUNTER
Last Written Prescription:  2/10/25   #90  ----------------------  Last Visit Date: 11/21/23  Future Visit Date: NONE  Cardiology follow up as needed   ----------------------    Refill decision: Medication unable to be refilled by RN due to: Pt not seen within past 12 months, No FOV or FOV exceeds timeframe per protocol    Does card want to continue refills ? RETURN IS PRN AND   > 15  MOS  FROM LAST APPT    Request from pharmacy:  Requested Prescriptions   Pending Prescriptions Disp Refills    losartan (COZAAR) 100 MG tablet [Pharmacy Med Name: LOSARTAN POTASSIUM 100 MG TAB] 90 tablet 0     Sig: TAKE 1 TABLET BY MOUTH EVERY DAY       Angiotensin-II Receptors Failed - 5/15/2025  8:25 PM        Failed - Recent (12 mo) or future (90days) visit within the authorizing provider's specialty     The patient must have completed an in-person or virtual visit within the past 12 months or has a future visit scheduled within the next 90 days with the authorizing provider s specialty.  Urgent care and e-visits do not qualify as an office visit for this protocol.          Passed - Most recent blood pressure under 140/90 in past 12 months     BP Readings from Last 3 Encounters:   08/28/24 127/86   12/18/23 (!) 133/92   11/21/23 124/88       No data recorded            Passed - Medication is active on med list and the sig matches. RN to manually verify dose and sig if red X/fail.             Passed - Has GFR on file in past 12 months and most recent value is normal        Passed - Medication indicated for associated diagnosis     The medication is prescribed for one or more of the following conditions:    Chronic Kidney Disease (CDK)    Heart Failure (HF)    Diabetes, Nephropathy   Hypertension    Coronary Artery Disease (CAD)   Raynaud's Disease   Ischemic cardiomyopathy   Cardiomyopathy   Pulmonary Hypertension          Passed - Patient is age 18 or older        Passed - Normal serum potassium on file in past 12 months     Recent  Labs   Lab Test 01/30/25  0921   POTASSIUM 4.1

## 2025-05-16 NOTE — TELEPHONE ENCOUNTER
Name from pharmacy: LOSARTAN POTASSIUM 100 MG TAB          Will file in chart as: losartan (COZAAR) 100 MG tablet    Sig: TAKE 1 TABLET BY MOUTH EVERY DAY    Disp: 90 tablet    Refills: 0 (Pharmacy requested: Not specified)    Start: 5/14/2025    Class: E-Prescribe    Non-formulary For: Benign essential hypertension    Last ordered: 3 months ago (2/10/2025) by Usman Harper MD    Last refill: 2/10/2025    Rx #: [id number]    Angiotensin-II Receptors Sdbjri78/15/2025 08:31 PM   Protocol Details Recent (12 mo) or future (90days) visit within the authorizing provider's specialty    Most recent blood pressure under 140/90 in past 12 months    Medication is active on med list and the sig matches. RN to manually verify dose and sig if red X/fail.    Has GFR on file in past 12 months and most recent value is normal    Medication indicated for associated diagnosis    Patient is age 18 or older    Normal serum potassium on file in past 12 months      To be filled at: [facility] [address] [place] [address]     Patient saw Dr. Harper on 11/21/23. Patient was to follow up as needed. Sofia refill already sent for patient. Patient was to have further refills through PCP. Attempted to call patient. Unable to speak with patient.     Gini Huntley, RN, BSN  Cardiology RN Care Coordinator   [place]   Phone: [phone]  Fax: [phone] ([place]) [phone] ([place])

## 2025-05-20 DIAGNOSIS — E11.9 TYPE 2 DIABETES MELLITUS WITHOUT COMPLICATION, WITHOUT LONG-TERM CURRENT USE OF INSULIN (H): ICD-10-CM

## 2025-05-20 RX ORDER — ORAL SEMAGLUTIDE 14 MG/1
TABLET ORAL
Qty: 30 TABLET | Refills: 5 | Status: SHIPPED | OUTPATIENT
Start: 2025-05-20

## 2025-05-20 RX ORDER — LOSARTAN POTASSIUM 100 MG/1
100 TABLET ORAL DAILY
Qty: 90 TABLET | Refills: 0 | Status: SHIPPED | OUTPATIENT
Start: 2025-05-20

## 2025-05-20 NOTE — TELEPHONE ENCOUNTER
Last Written Prescription Date:  12/1/24  Last Fill Quantity: 30,  # refills: 5   Last visit: 2/6/2025 with prescribing provider:  Dr. Flores   Future Office Visit:  6/17/25    Requested Prescriptions   Pending Prescriptions Disp Refills    RYBELSUS 14 MG tablet [Pharmacy Med Name: RYBELSUS 14 MG TABLET] 30 tablet 5     Sig: TAKE 1 TABLET BY MOUTH DAILY AS DIRECTED       GLP-1 Agonists Protocol Failed - 5/20/2025 11:54 AM        Failed - Medication is active on med list and the sig matches. RN to manually verify dose and sig if red X/fail.     If the protocol passes (green check), you do not need to verify med dose and sig.    A prescription matches if they are the same clinical intention.    For Example: once daily and every morning are the same.    The protocol can not identify upper and lower case letters as matching and will fail.     For Example: Take 1 tablet (50 mg) by mouth daily     TAKE 1 TABLET (50 MG) BY MOUTH DAILY    For all fails (red x), verify dose and sig.    If the refill does match what is on file, the RN can still proceed to approve the refill request.       If they do not match, route to the appropriate provider.             Passed - HgbA1C in past 3 or 6 months     If HgbA1C is 8 or greater, it needs to be on file within the past 3 months.  If less than 8, must be on file within the past 6 months.     Recent Labs   Lab Test 01/30/25  0921   A1C 7.1*             Passed - Has GFR on file in past 12 months and most recent value is normal        Passed - Recent (6 month) or future (90 days) visit with the authorizing provider's specialty (provided they have been seen in the past 9 months)     The patient must have completed an in-person or virtual visit within the past 6 months or has a future visit scheduled within the next 90 days with the authorizing provider s specialty.  Urgent care and e-visits do not quality as an office visit for this protocol.          Passed - Medication indicated for  associated diagnosis     Medication is associated with one or more of the following diagnoses:     Type 2 diabetes mellitus           Passed - Patient is age 18 or older           Refills sent  Aimee Delgadillo RN

## 2025-05-20 NOTE — TELEPHONE ENCOUNTER
Signed Prescriptions:                        Disp   Refills    losartan (COZAAR) 100 MG tablet            90 tab*0        Sig: TAKE 1 TABLET BY MOUTH EVERY DAY  Authorizing Provider: KRISTI PIPER  Ordering User: ODALIS ENAMORADO scheduled 6/2.  Odalis Enamorado RN

## 2025-06-02 ENCOUNTER — LAB (OUTPATIENT)
Dept: LAB | Facility: CLINIC | Age: 45
End: 2025-06-02
Payer: COMMERCIAL

## 2025-06-02 DIAGNOSIS — E78.2 MIXED HYPERLIPIDEMIA: ICD-10-CM

## 2025-06-02 DIAGNOSIS — E11.9 TYPE 2 DIABETES MELLITUS WITHOUT COMPLICATION, WITHOUT LONG-TERM CURRENT USE OF INSULIN (H): ICD-10-CM

## 2025-06-02 LAB
ALT SERPL W P-5'-P-CCNC: 19 U/L (ref 0–70)
ANION GAP SERPL CALCULATED.3IONS-SCNC: 13 MMOL/L (ref 7–15)
BUN SERPL-MCNC: 21.5 MG/DL (ref 6–20)
CALCIUM SERPL-MCNC: 10.4 MG/DL (ref 8.8–10.4)
CHLORIDE SERPL-SCNC: 103 MMOL/L (ref 98–107)
CHOLEST SERPL-MCNC: 158 MG/DL
CREAT SERPL-MCNC: 1.12 MG/DL (ref 0.67–1.17)
EGFRCR SERPLBLD CKD-EPI 2021: 83 ML/MIN/1.73M2
EST. AVERAGE GLUCOSE BLD GHB EST-MCNC: 154 MG/DL
FASTING STATUS PATIENT QL REPORTED: ABNORMAL
FASTING STATUS PATIENT QL REPORTED: ABNORMAL
GLUCOSE SERPL-MCNC: 147 MG/DL (ref 70–99)
HBA1C MFR BLD: 7 % (ref 0–5.6)
HCO3 SERPL-SCNC: 23 MMOL/L (ref 22–29)
HDLC SERPL-MCNC: 42 MG/DL
LDLC SERPL CALC-MCNC: 52 MG/DL
NONHDLC SERPL-MCNC: 116 MG/DL
POTASSIUM SERPL-SCNC: 4.7 MMOL/L (ref 3.4–5.3)
SODIUM SERPL-SCNC: 139 MMOL/L (ref 135–145)
TRIGL SERPL-MCNC: 319 MG/DL
VIT D+METAB SERPL-MCNC: 63 NG/ML (ref 20–50)

## 2025-06-02 PROCEDURE — 83036 HEMOGLOBIN GLYCOSYLATED A1C: CPT

## 2025-06-02 PROCEDURE — 80061 LIPID PANEL: CPT

## 2025-06-02 PROCEDURE — 84460 ALANINE AMINO (ALT) (SGPT): CPT

## 2025-06-02 PROCEDURE — 80048 BASIC METABOLIC PNL TOTAL CA: CPT

## 2025-06-02 PROCEDURE — 36415 COLL VENOUS BLD VENIPUNCTURE: CPT

## 2025-06-02 PROCEDURE — 82306 VITAMIN D 25 HYDROXY: CPT

## 2025-06-03 ENCOUNTER — RESULTS FOLLOW-UP (OUTPATIENT)
Dept: CARDIOLOGY | Facility: CLINIC | Age: 45
End: 2025-06-03
Payer: COMMERCIAL

## 2025-06-08 DIAGNOSIS — E78.2 MIXED HYPERLIPIDEMIA: ICD-10-CM

## 2025-06-09 RX ORDER — FENOFIBRATE 145 MG/1
145 TABLET, FILM COATED ORAL
Qty: 90 TABLET | Refills: 1 | Status: SHIPPED | OUTPATIENT
Start: 2025-06-09

## 2025-06-09 NOTE — TELEPHONE ENCOUNTER
Requested Prescriptions   Pending Prescriptions Disp Refills    fenofibrate (TRICOR) 145 MG tablet [Pharmacy Med Name: FENOFIBRATE 145 MG TABLET] 90 tablet 1     Sig: TAKE 1 TABLET BY MOUTH EVERY DAY       Antihyperlipidemic agents Passed - 6/9/2025  9:13 AM        Passed - LDL on file in the past 12 months        Passed - Medication is active on med list and the sig matches. RN to manually verify dose and sig if red X/fail.     If the protocol passes (green check), you do not need to verify med dose and sig.    A prescription matches if they are the same clinical intention.    For Example: once daily and every morning are the same.    The protocol can not identify upper and lower case letters as matching and will fail.     For Example: Take 1 tablet (50 mg) by mouth daily     TAKE 1 TABLET (50 MG) BY MOUTH DAILY    For all fails (red x), verify dose and sig.    If the refill does match what is on file, the RN can still proceed to approve the refill request.       If they do not match, route to the appropriate provider.             Passed - Recent (12 month) or future (90 days) visit with authorizing provider's specialty (provided they have been seen in the past 15 months)     The patient must have completed an in-person or virtual visit within the past 12 months or has a future visit scheduled within the next 90 days with the authorizing provider s specialty.  Urgent care and e-visits do not qualify as an office visit for this protocol.          Passed - Patient is age 18 years or older           Last Written Prescription Date:  12/16/24  Last Fill Quantity: 90 tab,  # refills: 1   Last office visit: Visit date not found ; last virtual visit: 2/6/2025 with prescribing provider:  Dr Flores   Future Office Visit:  6/17/25    Refill sent  Blil Pena RN

## 2025-06-15 DIAGNOSIS — E78.2 MIXED HYPERLIPIDEMIA: ICD-10-CM

## 2025-06-16 NOTE — TELEPHONE ENCOUNTER
Last Written Prescription:   Disp Refills Start End MANUEL   rosuvastatin (CRESTOR) 20 MG tablet 90 tablet 0 3/20/2025 -- No   Sig - Route: Take 1 tablet (20 mg) by mouth daily. - Oral     ----------------------  Last Visit Date:   11/21/2023  [facility] [place]    Future Visit Date: 0  ----------------------       [x]  If no future appointment scheduled: Route to Clinic Coordinators to contact the pt for appointment.      Refill decision: Medication unable to be refilled by RN due to: Pt not seen within past 12 months, No FOV or FOV exceeds timeframe per protocol      Recent Labs   Lab Test 06/02/25  0927 06/05/24  0917 04/06/22  1205 06/29/20  1454   CHOL 158 193   < > 204.0*   HDL 42 43   < > 67.0   LDL 52 88   < > 69.0   TRIG 319* 461*   < > 343.0*   CHOLHDLRATIO  --   --   --  3.1    < > = values in this interval not displayed.         Request from pharmacy:  Requested Prescriptions   Pending Prescriptions Disp Refills    rosuvastatin (CRESTOR) 20 MG tablet [Pharmacy Med Name: ROSUVASTATIN CALCIUM 20 MG TAB] 90 tablet 0     Sig: TAKE 1 TABLET BY MOUTH EVERY DAY       Antihyperlipidemic agents Failed - 6/16/2025  4:54 PM        Failed - Recent (12 month) or future (90 days) visit with authorizing provider's specialty (provided they have been seen in the past 15 months)     The patient must have completed an in-person or virtual visit within the past 12 months or has a future visit scheduled within the next 90 days with the authorizing provider s specialty.  Urgent care and e-visits do not qualify as an office visit for this protocol.          Passed - LDL on file in the past 12 months        Passed - Medication is active on med list and the sig matches. RN to manually verify dose and sig if red X/fail.     If the protocol passes (green check), you do not need to verify med dose and sig.    A prescription matches if they are the same clinical intention.    For Example: once daily and every  morning are the same.    The protocol can not identify upper and lower case letters as matching and will fail.     For Example: Take 1 tablet (50 mg) by mouth daily     TAKE 1 TABLET (50 MG) BY MOUTH DAILY    For all fails (red x), verify dose and sig.    If the refill does match what is on file, the RN can still proceed to approve the refill request.       If they do not match, route to the appropriate provider.             Passed - Patient is age 18 years or older

## 2025-06-17 ENCOUNTER — OFFICE VISIT (OUTPATIENT)
Dept: ENDOCRINOLOGY | Facility: CLINIC | Age: 45
End: 2025-06-17
Payer: COMMERCIAL

## 2025-06-17 VITALS
DIASTOLIC BLOOD PRESSURE: 95 MMHG | SYSTOLIC BLOOD PRESSURE: 132 MMHG | BODY MASS INDEX: 29.05 KG/M2 | WEIGHT: 180 LBS | HEART RATE: 96 BPM

## 2025-06-17 DIAGNOSIS — E78.2 MIXED HYPERLIPIDEMIA: ICD-10-CM

## 2025-06-17 DIAGNOSIS — E11.9 TYPE 2 DIABETES MELLITUS WITHOUT COMPLICATION, WITHOUT LONG-TERM CURRENT USE OF INSULIN (H): Primary | ICD-10-CM

## 2025-06-17 PROCEDURE — 3075F SYST BP GE 130 - 139MM HG: CPT | Performed by: INTERNAL MEDICINE

## 2025-06-17 PROCEDURE — 99214 OFFICE O/P EST MOD 30 MIN: CPT | Performed by: INTERNAL MEDICINE

## 2025-06-17 PROCEDURE — 3080F DIAST BP >= 90 MM HG: CPT | Performed by: INTERNAL MEDICINE

## 2025-06-17 PROCEDURE — G2211 COMPLEX E/M VISIT ADD ON: HCPCS | Performed by: INTERNAL MEDICINE

## 2025-06-17 RX ORDER — ROSUVASTATIN CALCIUM 20 MG/1
20 TABLET, COATED ORAL DAILY
Qty: 90 TABLET | Refills: 0 | Status: SHIPPED | OUTPATIENT
Start: 2025-06-17

## 2025-06-17 NOTE — TELEPHONE ENCOUNTER
Name from pharmacy: ROSUVASTATIN CALCIUM 20 MG TAB         Will file in chart as: rosuvastatin (CRESTOR) 20 MG tablet    Sig: TAKE 1 TABLET BY MOUTH EVERY DAY    Disp: 90 tablet    Refills: Not specified    Start: 6/16/2025    Class: E-Prescribe    For: Mixed hyperlipidemia    Last ordered: 2 months ago (3/20/2025) by Usman Harper MD    Last refill: 3/20/2025    Rx #: [id number]    Antihyperlipidemic agents [id number]/16/2025 04:55 PM   Protocol Details Recent (12 month) or future (90 days) visit with authorizing provider's specialty (provided they have been seen in the past 15 months)    LDL on file in the past 12 months    Medication is active on med list and the sig matches. RN to manually verify dose and sig if red X/fail.    Patient is age 18 years or older      To be filled at: [facility] [id number] [place] [address] [address]     Patient last saw Dr. Harper on 11/21/23. Patient was to continue crestor 10 mg daily. Dose was increased on 12/8/23. Patient was to follow up with Cardiology as needed. Sofia refill sent for patient. Further refills to go through PCP.     Gini Huntley RN, BSN  Cardiology RN Care Coordinator   [place]   Phone: [phone]  Fax: [phone] ([place]) [phone] ([place])

## 2025-06-17 NOTE — PROGRESS NOTES
Recent issues:  Diabetes follow-up evaluation  Med changed to MF, Jardiance, and higher dose Rybelsus medications, per plan  Has noticed less appetite, and generally tolerating Rybelsus well          2017. Diagnosis of diabetes mellitus when med eval for torn right shoulder muscle  High glucose level noted during med evaluation, hgbA1c near 11.5%  Started Jardiance medication, took for approx 6 months  Recalls significant weight loss of approx 30#  Switched to metformin medication, then dose increases              Concerns about GI symptoms with nausea, nausea, also morning vomiting              Tried Prilosec, then Zantac  Has seen [facility] for medical evaluations  Recent discussion with his workplace team physician, Dr. Skye Mane              Advised to see me for medical evaluation  Previously on metforminXR 500 mg 2 tabs BID  Previous [facility] labs include:               8/12/19. Initial diabetes evaluation with me at [place]  Reviewed health history and diabetes management  Medication change to JanumetXR  1/2022. Addition of Jardiance  5/2024. Met with [facility] Diab Educator (ER)  Changed from JanumetXR to metformin and Rybelsus  Current DM medications:  MetforminXR 500 mg 2- tabs daily  Rybelsus 14 mg 1-tab in morning  Jardiance 10 mg  1-tab each morning     Blood glucose (BG) meter: ?, Infrequent testing  CGM use:  Years ago, not recently (phobia?)  Fam Hx Diabetes: none  Previous [facility] hgbA1c trends include:  Lab Results   Component Value Date    A1C 7.0 (H) 06/02/2025    A1C 7.1 (H) 01/30/2025    A1C 7.7 (H) 11/12/2024    A1C 8.0 (H) 08/12/2024    A1C 7.9 (H) 06/05/2024        Recent [facility] labs include:  Lab Results   Component Value Date    A1C 7.0 (H) 06/02/2025     06/02/2025    POTASSIUM 4.7 06/02/2025    CHLORIDE 103 06/02/2025    CO2 23 06/02/2025    ANIONGAP 13 06/02/2025     (H) 06/02/2025    BUN 21.5 (H) 06/02/2025    CR 1.12 06/02/2025    GFRESTIMATED 83 06/02/2025    GFRESTBLACK  >90 11/08/2019    FLAVIO 10.4 06/02/2025    CHOL 158 06/02/2025    TRIG 319 (H) 06/02/2025    HDL 42 06/02/2025    LDL 52 06/02/2025    NHDL 116 06/02/2025    UCRR 73.7 08/12/2024    MICROL <12.0 08/12/2024    UMALCR  08/12/2024      Comment:      Unable to calculate, urine albumin and/or urine creatinine is outside detectable limits.  Microalbuminuria is defined as an albumin:creatinine ratio of 17 to 299 for males and 25 to 299 for females. A ratio of albumin:creatinine of 300 or higher is indicative of overt proteinuria.  Due to biologic variability, positive results should be confirmed by a second, first-morning random or 24-hour timed urine specimen. If there is discrepancy, a third specimen is recommended. When 2 out of 3 results are in the microalbuminuria range, this is evidence for incipient nephropathy and warrants increased efforts at glucose control, blood pressure control, and institution of therapy with an angiotensin-converting-enzyme (ACE) inhibitor (if the patient can tolerate it).       Lab Results   Component Value Date    TSH 1.56 02/21/2024     [facility] no DR per patient  History of hyperlipidemia, takes simvastatin 20 mg daily   1/2023. Stopped statin med and started NiacinER (Niaspan) 500 mg 1-tab QPM along with aspirin 1-tab QPM, head sweating symptoms   ~8/2023. Continued the fenofibrate but changed from simvastatin to rosuvastatin medication  DM Complications:      None known        Lives in [facility]  [facility]   Also sees Dr. LANCE Jose/[facility] for general medicine evaluations.      PMH/PSH:  Past Medical History:   Diagnosis Date    Allergic rhinitis     Benign essential hypertension     Elevated coronary artery calcium score     NATHALIE (generalized anxiety disorder)     Hyperlipidemia     Pulmonary nodules     Rotator cuff tear, left 2015    Rotator cuff tear, right 2017    Type 2 diabetes  "mellitus (H)      Past Surgical History:   Procedure Laterality Date    APPENDECTOMY      SHOULDER SURGERY      left and right previously       Family Hx:  No family history on file.      Social Hx:  Social History     Socioeconomic History    Marital status: Single     Spouse name: Not on file    Number of children: Not on file    Years of education: Not on file    Highest education level: Not on file   Occupational History    Not on file   Tobacco Use    Smoking status: Every Day     Current packs/day: 1.00     Types: Cigarettes, Cigars     Passive exposure: Never    Smokeless tobacco: Never   Vaping Use    Vaping status: Never Used   Substance and Sexual Activity    Alcohol use: Yes    Drug use: Never    Sexual activity: Not on file   Other Topics Concern    Not on file   Social History Narrative    Single. 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     From [place], [place]. Lives in [place] and works mostly from home.     Enjoys filling time doing \"lazy things\", e.g. binge watching shows and reading.     Also, enjoys yard work and walking around neighborhood. Used to enjoy taking long drives.      Social Drivers of Health     Financial Resource Strain: Not on file   Food Insecurity: Not on file   Transportation Needs: Not on file   Physical Activity: Not on file   Stress: Not on file   Social Connections: Not on file   Interpersonal Safety: Low Risk  (8/28/2024)    Interpersonal Safety     Do you feel physically and emotionally safe where you currently live?: Yes     Within the past 12 months, have you been hit, slapped, kicked or otherwise physically hurt by someone?: No     Within the past 12 months, have you been humiliated or emotionally abused in other ways by your partner or ex-partner?: No   Housing Stability: Not on file          MEDICATIONS:  has a current medication list which includes the following prescription(s): acetaminophen, aspirin, empagliflozin, fenofibrate, " fiber, hydrochlorothiazide, losartan, metformin, multiple vitamins-minerals, rybelsus, vitamin d, and rosuvastatin.    ROS: 10 point ROS neg other than the symptoms noted above in the HPI.     GENERAL: energy good, wt stable; denies fevers, chills, night sweats.   HEENT: no dysphagia, odonophagia, diplopia, neck pain  THYROID:  no apparent hyper or hypothyroid symptoms  CV: no chest pain, pressure, palpitations  LUNGS: no SOB, MEHTA, cough, wheezing   ABDOMEN: occasional nausea and fullness, consipation; denies reflux or abdominal pain  EXTREMITIES: no rashes, ulcers, edema  NEUROLOGY: no headaches, denies changes in vision, tingling, extremitiy numbness   MSK: no muscle aches or pains, weakness  SKIN: no rashes or lesions  : denies nocturia  PSYCH:  some anxiety  ENDOCRINE: no heat or cold intolerance      Physical Exam   VS: BP (!) 132/95   Pulse 96   Wt 81.6 kg (180 lb)   BMI 29.05 kg/m    GENERAL: AXOX3, NAD, well dressed, answering questions appropriately, appears stated age.  ENT: no nose swelling or nasal discharge, mouth redness or gum changes.  EYES: eyes grossly normal to inspection, conjunctivae and sclerae normal, no exophthalmos or proptosis  THYROID:  no apparent nodules or goiter  LUNGS: no audible wheeze, cough or visible cyanosis, or increased work of breathing  ABDOMEN: abdomen mildly obese size  EXTREMITIES: normal appearance of feet, pulses R/L DP 4/4; no pedal edema noted  NEUROLOGY: CN grossly intact, no tremors  MSK: grossly intact  SKIN:  no apparent skin lesions, rash, or edema with visualized skin appearance  PSYCH: mentation appears normal, affect normal/bright, judgement and insight intact,   normal speech and appearance well groomed      LABS:     All pertinent notes, labs, and images personally reviewed by me.      A/P:  Encounter Diagnoses   Name Primary?    Type 2 diabetes mellitus without complication, without long-term current use of insulin (H) Yes    Mixed hyperlipidemia         Comments:  Reviewed health history and diabetes issues.  Recent hgbA1c improved but cholesterol and TG levels high despite rosuvastatin and fenofibrate med use  Difficult to assess glycemic control without recent BGM or CGM data  Reviewed and interpreted tests that I previously ordered.   Ordered appropriate tests for the endocrinology disease management.    Management options discussed and implemented after shared medical decision making with the patient.  T2DM problem is chronic-stable    Plan:  Discussed general issues with the diabetes diagnosis and management  We discussed the hgbA1c test which reflects previous overall glucose levels or control  Discussed the importance of blood glucose (BG) testing to assess glucose trends  Provided general overview of the diabetes medication options and medication treatment plan.  We have reviewed lipid test results and the cholesterol, triglyceride lower medication options     Recommend:  Change to MFXR, Rybelsus, and Jardiance plan  Monitor for possible GI symptoms  Keep focus on diet, increased exercise, weight loss management.  We have reviewed ideal plan to begin glucose testing:  Test FBG at least 1-2x/week, goal target BG  mg/dl  We have discussed the value of wearing a CGM sensor, painless attachment to the arm skin for 2-14 days, clinic Libre3 sample option  Consider follow-up [facility] Diab Education appt   Plan fasting labs in early 12/2025    Lab orders placed  Continue the current lipid medications, goal LDL <100 mg/dl and TG <200 mg/dl    Consider adding Vascepa 2 gm BID for the hypertriglyceridemia  Consider seeing a lipid specialist at [facility], such as Dr. ZAHRA Farr  Limit or discontinue alcohol use  Advise having fasting lipid panel testing and dilated eye examination, at least annually    Addressed patient questions today    The longitudinal plan of care for the endocrine problem(s) were addressed during this visit.  Due to added  complexity of care,   we will continue to support the patient and the subsequent management of this condition with ongoing continuity of care.    There are no Patient Instructions on file for this visit.    Future labs ordered today:   Orders Placed This Encounter   Procedures    Hemoglobin A1c    Basic metabolic panel    Lipid panel reflex to direct LDL Fasting    Albumin Random Urine Quantitative with Creat Ratio     Radiology/Consults ordered today: None    Total time spent on day of encounter:  21 min    Follow-up: 12/2025, Katie Flores MD, MS  Endocrinology  [facility]    CC:  MILES Jose

## 2025-07-23 DIAGNOSIS — E11.9 TYPE 2 DIABETES MELLITUS WITHOUT COMPLICATION, WITHOUT LONG-TERM CURRENT USE OF INSULIN (H): ICD-10-CM

## 2025-07-23 RX ORDER — METFORMIN HYDROCHLORIDE 500 MG/1
1000 TABLET, EXTENDED RELEASE ORAL DAILY
Qty: 180 TABLET | Refills: 1 | Status: SHIPPED | OUTPATIENT
Start: 2025-07-23

## 2025-07-23 NOTE — TELEPHONE ENCOUNTER
Last Written Prescription Date:  1/28/25  Last Fill Quantity: 60,  # refills: 5   Last office visit: 6/17/2025 ; last virtual visit: 2/6/2025 with prescribing provider:  Dr. Flores   Future Office Visit: 12/9/25     Requested Prescriptions   Pending Prescriptions Disp Refills    metFORMIN (GLUCOPHAGE XR) 500 MG 24 hr tablet [Pharmacy Med Name: METFORMIN HCL  MG TABLET] 180 tablet 1     Sig: TAKE 2 TABLETS BY MOUTH EVERY DAY       Biguanide Agents Passed - 7/23/2025 11:05 AM        Passed - Patient is age 10 or older        Passed - Patient has documented A1c within the specified period of time.     If HgbA1C is 8 or greater, it needs to be on file within the past 3 months.  If less than 8, must be on file within the past 6 months.     Recent Labs   Lab Test 06/02/25  0927   A1C 7.0*             Passed - Patient does NOT have a diagnosis of CHF.        Passed - Medication is active on med list and the sig matches. RN to manually verify dose and sig if red X/fail.     If the protocol passes (green check), you do not need to verify med dose and sig.    A prescription matches if they are the same clinical intention.    For Example: once daily and every morning are the same.    The protocol can not identify upper and lower case letters as matching and will fail.     For Example: Take 1 tablet (50 mg) by mouth daily     TAKE 1 TABLET (50 MG) BY MOUTH DAILY    For all fails (red x), verify dose and sig.    If the refill does match what is on file, the RN can still proceed to approve the refill request.       If they do not match, route to the appropriate provider.             Passed - Medication indicated for associated diagnosis     Medication is associated with one or more of the following diagnoses:     Gestational diabetes mellitus     Hyperinsulinar obesity     Hypersecretion of ovarian androgens    Non-alcoholic fatty liver    Polycystic ovarian syndrome               Pre-diabetes (DM 2 prevention)    Type 2  diabetes mellitus     Weight gain, antipsychotic therapy-induced    Impaired fasting glucose          Passed - Has GFR on file in past 12 months and most recent value is normal        Passed - Recent (6 month) or future (90 days) visit with the authorizing provider's specialty (provided they have been seen in the past 9 months)     The patient must have completed an in-person or virtual visit within the past 6 months or has a future visit scheduled within the next 90 days with the authorizing provider s specialty.  Urgent care and e-visits do not quality as an office visit for this protocol.             Refills sent  Aimee Delgadillo RN

## 2025-08-17 DIAGNOSIS — I10 BENIGN ESSENTIAL HYPERTENSION: ICD-10-CM

## 2025-08-18 RX ORDER — LOSARTAN POTASSIUM 100 MG/1
100 TABLET ORAL DAILY
Qty: 90 TABLET | Refills: 0 | OUTPATIENT
Start: 2025-08-18